# Patient Record
Sex: FEMALE | Race: WHITE | NOT HISPANIC OR LATINO
[De-identification: names, ages, dates, MRNs, and addresses within clinical notes are randomized per-mention and may not be internally consistent; named-entity substitution may affect disease eponyms.]

---

## 2017-08-28 PROBLEM — Z00.00 ENCOUNTER FOR PREVENTIVE HEALTH EXAMINATION: Status: ACTIVE | Noted: 2017-08-28

## 2017-10-02 ENCOUNTER — APPOINTMENT (OUTPATIENT)
Dept: UROLOGY | Facility: CLINIC | Age: 82
End: 2017-10-02

## 2019-05-14 ENCOUNTER — APPOINTMENT (OUTPATIENT)
Dept: CARDIOLOGY | Facility: CLINIC | Age: 84
End: 2019-05-14

## 2019-06-14 ENCOUNTER — APPOINTMENT (OUTPATIENT)
Dept: CARDIOLOGY | Facility: CLINIC | Age: 84
End: 2019-06-14
Payer: MEDICARE

## 2019-06-14 PROCEDURE — 93306 TTE W/DOPPLER COMPLETE: CPT

## 2019-06-20 ENCOUNTER — APPOINTMENT (OUTPATIENT)
Dept: CARDIOLOGY | Facility: CLINIC | Age: 84
End: 2019-06-20
Payer: MEDICARE

## 2019-06-20 PROCEDURE — 93000 ELECTROCARDIOGRAM COMPLETE: CPT

## 2019-06-20 PROCEDURE — 99214 OFFICE O/P EST MOD 30 MIN: CPT

## 2019-09-19 ENCOUNTER — APPOINTMENT (OUTPATIENT)
Dept: CARDIOLOGY | Facility: CLINIC | Age: 84
End: 2019-09-19

## 2019-10-08 ENCOUNTER — APPOINTMENT (OUTPATIENT)
Dept: CARDIOLOGY | Facility: CLINIC | Age: 84
End: 2019-10-08
Payer: MEDICARE

## 2019-10-08 PROCEDURE — 93000 ELECTROCARDIOGRAM COMPLETE: CPT

## 2019-10-08 PROCEDURE — 99213 OFFICE O/P EST LOW 20 MIN: CPT

## 2020-06-06 ENCOUNTER — EMERGENCY (EMERGENCY)
Facility: HOSPITAL | Age: 85
LOS: 0 days | Discharge: HOME | End: 2020-06-06
Attending: EMERGENCY MEDICINE | Admitting: EMERGENCY MEDICINE
Payer: MEDICARE

## 2020-06-06 VITALS
TEMPERATURE: 99 F | OXYGEN SATURATION: 96 % | RESPIRATION RATE: 18 BRPM | DIASTOLIC BLOOD PRESSURE: 109 MMHG | HEART RATE: 80 BPM | SYSTOLIC BLOOD PRESSURE: 194 MMHG

## 2020-06-06 DIAGNOSIS — I25.810 ATHEROSCLEROSIS OF CORONARY ARTERY BYPASS GRAFT(S) WITHOUT ANGINA PECTORIS: Chronic | ICD-10-CM

## 2020-06-06 DIAGNOSIS — N39.0 URINARY TRACT INFECTION, SITE NOT SPECIFIED: ICD-10-CM

## 2020-06-06 DIAGNOSIS — Z96.659 PRESENCE OF UNSPECIFIED ARTIFICIAL KNEE JOINT: Chronic | ICD-10-CM

## 2020-06-06 DIAGNOSIS — E11.649 TYPE 2 DIABETES MELLITUS WITH HYPOGLYCEMIA WITHOUT COMA: ICD-10-CM

## 2020-06-06 DIAGNOSIS — Z96.643 PRESENCE OF ARTIFICIAL HIP JOINT, BILATERAL: Chronic | ICD-10-CM

## 2020-06-06 DIAGNOSIS — R53.1 WEAKNESS: ICD-10-CM

## 2020-06-06 DIAGNOSIS — Z88.5 ALLERGY STATUS TO NARCOTIC AGENT: ICD-10-CM

## 2020-06-06 LAB
ALBUMIN SERPL ELPH-MCNC: 4.6 G/DL — SIGNIFICANT CHANGE UP (ref 3.5–5.2)
ALP SERPL-CCNC: 41 U/L — SIGNIFICANT CHANGE UP (ref 30–115)
ALT FLD-CCNC: 14 U/L — SIGNIFICANT CHANGE UP (ref 0–41)
ANION GAP SERPL CALC-SCNC: 14 MMOL/L — SIGNIFICANT CHANGE UP (ref 7–14)
APPEARANCE UR: ABNORMAL
AST SERPL-CCNC: 18 U/L — SIGNIFICANT CHANGE UP (ref 0–41)
BACTERIA # UR AUTO: ABNORMAL
BASOPHILS # BLD AUTO: 0.03 K/UL — SIGNIFICANT CHANGE UP (ref 0–0.2)
BASOPHILS NFR BLD AUTO: 0.3 % — SIGNIFICANT CHANGE UP (ref 0–1)
BILIRUB SERPL-MCNC: 0.5 MG/DL — SIGNIFICANT CHANGE UP (ref 0.2–1.2)
BILIRUB UR-MCNC: NEGATIVE — SIGNIFICANT CHANGE UP
BUN SERPL-MCNC: 25 MG/DL — HIGH (ref 10–20)
CALCIUM SERPL-MCNC: 9.8 MG/DL — SIGNIFICANT CHANGE UP (ref 8.5–10.1)
CHLORIDE SERPL-SCNC: 105 MMOL/L — SIGNIFICANT CHANGE UP (ref 98–110)
CO2 SERPL-SCNC: 25 MMOL/L — SIGNIFICANT CHANGE UP (ref 17–32)
COLOR SPEC: SIGNIFICANT CHANGE UP
CREAT SERPL-MCNC: 1.3 MG/DL — SIGNIFICANT CHANGE UP (ref 0.7–1.5)
DIFF PNL FLD: SIGNIFICANT CHANGE UP
EOSINOPHIL # BLD AUTO: 0.09 K/UL — SIGNIFICANT CHANGE UP (ref 0–0.7)
EOSINOPHIL NFR BLD AUTO: 1 % — SIGNIFICANT CHANGE UP (ref 0–8)
EPI CELLS # UR: 0 /HPF — SIGNIFICANT CHANGE UP (ref 0–5)
GLUCOSE SERPL-MCNC: 82 MG/DL — SIGNIFICANT CHANGE UP (ref 70–99)
GLUCOSE UR QL: NEGATIVE — SIGNIFICANT CHANGE UP
HCT VFR BLD CALC: 42.2 % — SIGNIFICANT CHANGE UP (ref 37–47)
HGB BLD-MCNC: 13.5 G/DL — SIGNIFICANT CHANGE UP (ref 12–16)
HYALINE CASTS # UR AUTO: 0 /LPF — SIGNIFICANT CHANGE UP (ref 0–7)
IMM GRANULOCYTES NFR BLD AUTO: 0.3 % — SIGNIFICANT CHANGE UP (ref 0.1–0.3)
KETONES UR-MCNC: NEGATIVE — SIGNIFICANT CHANGE UP
LEUKOCYTE ESTERASE UR-ACNC: ABNORMAL
LYMPHOCYTES # BLD AUTO: 1.17 K/UL — LOW (ref 1.2–3.4)
LYMPHOCYTES # BLD AUTO: 12.6 % — LOW (ref 20.5–51.1)
MCHC RBC-ENTMCNC: 29.9 PG — SIGNIFICANT CHANGE UP (ref 27–31)
MCHC RBC-ENTMCNC: 32 G/DL — SIGNIFICANT CHANGE UP (ref 32–37)
MCV RBC AUTO: 93.4 FL — SIGNIFICANT CHANGE UP (ref 81–99)
MONOCYTES # BLD AUTO: 0.54 K/UL — SIGNIFICANT CHANGE UP (ref 0.1–0.6)
MONOCYTES NFR BLD AUTO: 5.8 % — SIGNIFICANT CHANGE UP (ref 1.7–9.3)
NEUTROPHILS # BLD AUTO: 7.45 K/UL — HIGH (ref 1.4–6.5)
NEUTROPHILS NFR BLD AUTO: 80 % — HIGH (ref 42.2–75.2)
NITRITE UR-MCNC: POSITIVE
NRBC # BLD: 0 /100 WBCS — SIGNIFICANT CHANGE UP (ref 0–0)
PH UR: 6 — SIGNIFICANT CHANGE UP (ref 5–8)
PLATELET # BLD AUTO: 141 K/UL — SIGNIFICANT CHANGE UP (ref 130–400)
POTASSIUM SERPL-MCNC: 4.5 MMOL/L — SIGNIFICANT CHANGE UP (ref 3.5–5)
POTASSIUM SERPL-SCNC: 4.5 MMOL/L — SIGNIFICANT CHANGE UP (ref 3.5–5)
PROT SERPL-MCNC: 7.7 G/DL — SIGNIFICANT CHANGE UP (ref 6–8)
PROT UR-MCNC: ABNORMAL
RBC # BLD: 4.52 M/UL — SIGNIFICANT CHANGE UP (ref 4.2–5.4)
RBC # FLD: 13.6 % — SIGNIFICANT CHANGE UP (ref 11.5–14.5)
RBC CASTS # UR COMP ASSIST: 3 /HPF — SIGNIFICANT CHANGE UP (ref 0–4)
SODIUM SERPL-SCNC: 144 MMOL/L — SIGNIFICANT CHANGE UP (ref 135–146)
SP GR SPEC: 1.01 — SIGNIFICANT CHANGE UP (ref 1.01–1.02)
UROBILINOGEN FLD QL: SIGNIFICANT CHANGE UP
WBC # BLD: 9.31 K/UL — SIGNIFICANT CHANGE UP (ref 4.8–10.8)
WBC # FLD AUTO: 9.31 K/UL — SIGNIFICANT CHANGE UP (ref 4.8–10.8)
WBC UR QL: 206 /HPF — HIGH (ref 0–5)

## 2020-06-06 PROCEDURE — 99284 EMERGENCY DEPT VISIT MOD MDM: CPT

## 2020-06-06 PROCEDURE — 93010 ELECTROCARDIOGRAM REPORT: CPT

## 2020-06-06 RX ORDER — CEFDINIR 250 MG/5ML
1 POWDER, FOR SUSPENSION ORAL
Qty: 14 | Refills: 0
Start: 2020-06-06 | End: 2020-06-12

## 2020-06-06 NOTE — ED ADULT NURSE NOTE - OBJECTIVE STATEMENT
Patient presents with cco decreased LOC and slurred speech at home. Initial FSBS 43 mg/dl FD gave oral glucose repeat FSBS 68 mg/dl. Patient now awake, alert, and oriented. Patient given juice and sandwich by provider.

## 2020-06-06 NOTE — ED PROVIDER NOTE - OBJECTIVE STATEMENT
86 year old F with hx of HTN, HLD, DM, CAD s/p CABG biba for evaluation. As per family pt was found in bed this morning altered with slurred speech. Pt FS was found to be in the 40's by EMS and was given oral glucose. As per family pt is back at baseline now. Pt sts did not take insulin this morning. She currently denies any complaints. No recent illness, fever/chills, nausea, vomiting, abdominal pain, chest pain, sob, weakness, decreased rom.

## 2020-06-06 NOTE — ED ADULT NURSE NOTE - NSIMPLEMENTINTERV_GEN_ALL_ED
Implemented All Fall with Harm Risk Interventions:  [place] to call system. Call bell, personal items and telephone within reach. Instruct patient to call for assistance. Room bathroom lighting operational. Non-slip footwear when patient is off stretcher. Physically safe environment: no spills, clutter or unnecessary equipment. Stretcher in lowest position, wheels locked, appropriate side rails in place. Provide visual cue, wrist band, yellow gown, etc. Monitor gait and stability. Monitor for mental status changes and reorient to person, place, and time. Review medications for side effects contributing to fall risk. Reinforce activity limits and safety measures with patient and family. Provide visual clues: red socks.

## 2020-06-06 NOTE — ED ADULT TRIAGE NOTE - CHIEF COMPLAINT QUOTE
pt biba for weakness , slurred speech starting at 0830. f/s 43 , one amp of glucose given by ems. pt with slight right sided facial droop in amb bay, no drift noted. pt biba for weakness , slurred speech starting at 0830. f/s 43 , one amp of glucose given by ems. pt with slight right sided facial droop in amb bay, no drift noted. pt cleared by dr morrow for the main er. pt with no symptoms , alert and oriented.

## 2020-06-06 NOTE — ED ADULT NURSE NOTE - CHIEF COMPLAINT QUOTE
pt biba for weakness , slurred speech starting at 0830. f/s 43 , one amp of glucose given by ems. pt with slight right sided facial droop in amb bay, no drift noted. pt cleared by dr morrow for the main er. pt with no symptoms , alert and oriented.

## 2020-06-06 NOTE — ED PROVIDER NOTE - NS ED ROS FT
Constitutional: no fever, chills, no recent weight loss, change in appetite or malaise  Eyes: no redness/discharge/pain/vision changes  ENT: no rhinorrhea/ear pain/sore throat  Cardiac: No chest pain, SOB or edema.  Respiratory: No cough or respiratory distress  GI: No nausea, vomiting, diarrhea or abdominal pain.  : No dysuria, frequency, urgency or hematuria  MS: no pain to back or extremities, no loss of ROM, no weakness  Neuro: No headache or weakness. No LOC.  Skin: No skin rash.  Endocrine: + hx of DM  Except as documented in the HPI, all other systems are negative.

## 2020-06-06 NOTE — ED ADULT NURSE NOTE - PSH
CAD (coronary artery disease) of artery bypass graft    History of total hip replacement, bilateral    S/P total knee replacement

## 2020-06-06 NOTE — ED PROVIDER NOTE - CLINICAL SUMMARY MEDICAL DECISION MAKING FREE TEXT BOX
86F p/w ams and hypoglycemia. pt is inconsistent with meals and insulin administration. om my arrival pt was anox3, eating a sandwich. Physical-nad,perrl,mmm,rrr,ctab,abd softntnd,fromx4,anox3. review of labs wnl. repeat fs after 3 hr or obs at 114. meds reviewed with pt and family who will keep a glucose diary and coordinate dosing with  the endocrinologist. dc home.

## 2020-06-06 NOTE — ED PROVIDER NOTE - PHYSICAL EXAMINATION
CONSTITUTIONAL: Well-appearing; well-nourished; in no apparent distress.   EYES: PERRL; EOM intact.   ENT: normal nose; no rhinorrhea; normal pharynx with no tonsillar hypertrophy.   NECK: Supple; non-tender; no cervical lymphadenopathy.   CARDIOVASCULAR: Normal S1, S2; no murmurs, rubs, or gallops.   RESPIRATORY: Normal chest excursion with respiration; breath sounds clear and equal bilaterally; no wheezes, rhonchi, or rales.  GI/: Normal bowel sounds; non-distended; non-tender; no palpable organomegaly.   MS: No evidence of trauma or deformity. Normal ROM in all four extremities; non-tender to palpation; distal pulses are normal.   SKIN: Normal for age and race; warm; dry; good turgor; no apparent lesions or exudate.   NEURO/PSYCH: A & O x 3; grossly unremarkable. mood and manner are appropriate. No focal deficits. No facial droop. No tongue deviation. Cerebellar intact. Strength equal b/l 5/5 throughout. Sensation intact

## 2020-06-06 NOTE — ED PROVIDER NOTE - PATIENT PORTAL LINK FT
You can access the FollowMyHealth Patient Portal offered by [facility] by registering at the following website: [email or web address]. By joining [facility]’s FollowMyHealth portal, you will also be able to view your health information using other applications (apps) compatible with our system.

## 2020-06-08 LAB
-  AMIKACIN: SIGNIFICANT CHANGE UP
-  AMIKACIN: SIGNIFICANT CHANGE UP
-  AMPICILLIN/SULBACTAM: SIGNIFICANT CHANGE UP
-  AMPICILLIN/SULBACTAM: SIGNIFICANT CHANGE UP
-  AMPICILLIN: SIGNIFICANT CHANGE UP
-  AMPICILLIN: SIGNIFICANT CHANGE UP
-  AZTREONAM: SIGNIFICANT CHANGE UP
-  AZTREONAM: SIGNIFICANT CHANGE UP
-  CEFAZOLIN: SIGNIFICANT CHANGE UP
-  CEFAZOLIN: SIGNIFICANT CHANGE UP
-  CEFEPIME: SIGNIFICANT CHANGE UP
-  CEFEPIME: SIGNIFICANT CHANGE UP
-  CEFOXITIN: SIGNIFICANT CHANGE UP
-  CEFOXITIN: SIGNIFICANT CHANGE UP
-  CEFTRIAXONE: SIGNIFICANT CHANGE UP
-  CEFTRIAXONE: SIGNIFICANT CHANGE UP
-  CIPROFLOXACIN: SIGNIFICANT CHANGE UP
-  CIPROFLOXACIN: SIGNIFICANT CHANGE UP
-  GENTAMICIN: SIGNIFICANT CHANGE UP
-  GENTAMICIN: SIGNIFICANT CHANGE UP
-  IMIPENEM: SIGNIFICANT CHANGE UP
-  IMIPENEM: SIGNIFICANT CHANGE UP
-  LEVOFLOXACIN: SIGNIFICANT CHANGE UP
-  LEVOFLOXACIN: SIGNIFICANT CHANGE UP
-  MEROPENEM: SIGNIFICANT CHANGE UP
-  MEROPENEM: SIGNIFICANT CHANGE UP
-  NITROFURANTOIN: SIGNIFICANT CHANGE UP
-  NITROFURANTOIN: SIGNIFICANT CHANGE UP
-  PIPERACILLIN/TAZOBACTAM: SIGNIFICANT CHANGE UP
-  PIPERACILLIN/TAZOBACTAM: SIGNIFICANT CHANGE UP
-  TIGECYCLINE: SIGNIFICANT CHANGE UP
-  TIGECYCLINE: SIGNIFICANT CHANGE UP
-  TOBRAMYCIN: SIGNIFICANT CHANGE UP
-  TOBRAMYCIN: SIGNIFICANT CHANGE UP
-  TRIMETHOPRIM/SULFAMETHOXAZOLE: SIGNIFICANT CHANGE UP
-  TRIMETHOPRIM/SULFAMETHOXAZOLE: SIGNIFICANT CHANGE UP
CULTURE RESULTS: SIGNIFICANT CHANGE UP
METHOD TYPE: SIGNIFICANT CHANGE UP
METHOD TYPE: SIGNIFICANT CHANGE UP
ORGANISM # SPEC MICROSCOPIC CNT: SIGNIFICANT CHANGE UP
SPECIMEN SOURCE: SIGNIFICANT CHANGE UP

## 2020-07-23 ENCOUNTER — RECORD ABSTRACTING (OUTPATIENT)
Age: 85
End: 2020-07-23

## 2020-07-23 DIAGNOSIS — I34.0 NONRHEUMATIC MITRAL (VALVE) INSUFFICIENCY: ICD-10-CM

## 2020-07-23 DIAGNOSIS — I65.23 OCCLUSION AND STENOSIS OF BILATERAL CAROTID ARTERIES: ICD-10-CM

## 2020-07-23 DIAGNOSIS — Z78.9 OTHER SPECIFIED HEALTH STATUS: ICD-10-CM

## 2020-07-23 DIAGNOSIS — I36.0 NONRHEUMATIC TRICUSPID (VALVE) STENOSIS: ICD-10-CM

## 2020-07-23 RX ORDER — INSULIN GLARGINE 100 [IU]/ML
100 INJECTION, SOLUTION SUBCUTANEOUS
Refills: 0 | Status: ACTIVE | COMMUNITY

## 2020-08-18 ENCOUNTER — RX RENEWAL (OUTPATIENT)
Age: 85
End: 2020-08-18

## 2020-10-21 ENCOUNTER — APPOINTMENT (OUTPATIENT)
Dept: CARDIOLOGY | Facility: CLINIC | Age: 85
End: 2020-10-21
Payer: MEDICARE

## 2020-10-21 VITALS
SYSTOLIC BLOOD PRESSURE: 120 MMHG | HEIGHT: 64 IN | DIASTOLIC BLOOD PRESSURE: 60 MMHG | TEMPERATURE: 98.6 F | BODY MASS INDEX: 34.15 KG/M2 | HEART RATE: 89 BPM | WEIGHT: 200 LBS

## 2020-10-21 PROBLEM — I10 ESSENTIAL (PRIMARY) HYPERTENSION: Chronic | Status: ACTIVE | Noted: 2020-06-06

## 2020-10-21 PROBLEM — E78.5 HYPERLIPIDEMIA, UNSPECIFIED: Chronic | Status: ACTIVE | Noted: 2020-06-06

## 2020-10-21 PROBLEM — E11.9 TYPE 2 DIABETES MELLITUS WITHOUT COMPLICATIONS: Chronic | Status: ACTIVE | Noted: 2020-06-06

## 2020-10-21 PROCEDURE — 93000 ELECTROCARDIOGRAM COMPLETE: CPT

## 2020-10-21 PROCEDURE — 99214 OFFICE O/P EST MOD 30 MIN: CPT

## 2020-11-09 ENCOUNTER — RX RENEWAL (OUTPATIENT)
Age: 85
End: 2020-11-09

## 2021-03-06 ENCOUNTER — RX RENEWAL (OUTPATIENT)
Age: 86
End: 2021-03-06

## 2021-05-24 ENCOUNTER — APPOINTMENT (OUTPATIENT)
Dept: CARDIOLOGY | Facility: CLINIC | Age: 86
End: 2021-05-24
Payer: MEDICARE

## 2021-05-24 PROCEDURE — 93306 TTE W/DOPPLER COMPLETE: CPT

## 2021-06-08 ENCOUNTER — APPOINTMENT (OUTPATIENT)
Dept: CARDIOLOGY | Facility: CLINIC | Age: 86
End: 2021-06-08
Payer: MEDICARE

## 2021-06-08 ENCOUNTER — RESULT CHARGE (OUTPATIENT)
Age: 86
End: 2021-06-08

## 2021-06-08 VITALS
DIASTOLIC BLOOD PRESSURE: 68 MMHG | HEIGHT: 64 IN | WEIGHT: 196 LBS | HEART RATE: 98 BPM | SYSTOLIC BLOOD PRESSURE: 110 MMHG | BODY MASS INDEX: 33.46 KG/M2 | TEMPERATURE: 96.63 F

## 2021-06-08 PROCEDURE — 93000 ELECTROCARDIOGRAM COMPLETE: CPT

## 2021-06-08 PROCEDURE — 99213 OFFICE O/P EST LOW 20 MIN: CPT

## 2021-07-13 ENCOUNTER — RX RENEWAL (OUTPATIENT)
Age: 86
End: 2021-07-13

## 2021-08-04 ENCOUNTER — RX RENEWAL (OUTPATIENT)
Age: 86
End: 2021-08-04

## 2021-09-13 ENCOUNTER — TRANSCRIPTION ENCOUNTER (OUTPATIENT)
Age: 86
End: 2021-09-13

## 2021-10-24 ENCOUNTER — RX RENEWAL (OUTPATIENT)
Age: 86
End: 2021-10-24

## 2022-02-20 ENCOUNTER — RX RENEWAL (OUTPATIENT)
Age: 87
End: 2022-02-20

## 2022-05-10 ENCOUNTER — APPOINTMENT (OUTPATIENT)
Dept: CARDIOLOGY | Facility: CLINIC | Age: 87
End: 2022-05-10
Payer: MEDICARE

## 2022-05-10 VITALS
HEART RATE: 84 BPM | HEIGHT: 64 IN | DIASTOLIC BLOOD PRESSURE: 70 MMHG | OXYGEN SATURATION: 97 % | BODY MASS INDEX: 32.78 KG/M2 | WEIGHT: 192 LBS | TEMPERATURE: 97.4 F | SYSTOLIC BLOOD PRESSURE: 108 MMHG

## 2022-05-10 PROCEDURE — 93000 ELECTROCARDIOGRAM COMPLETE: CPT

## 2022-05-10 PROCEDURE — 99214 OFFICE O/P EST MOD 30 MIN: CPT

## 2022-07-05 ENCOUNTER — RX RENEWAL (OUTPATIENT)
Age: 87
End: 2022-07-05

## 2022-07-20 ENCOUNTER — RX RENEWAL (OUTPATIENT)
Age: 87
End: 2022-07-20

## 2022-08-10 ENCOUNTER — INPATIENT (INPATIENT)
Facility: HOSPITAL | Age: 87
LOS: 7 days | Discharge: HOME | End: 2022-08-18
Attending: PSYCHIATRY & NEUROLOGY | Admitting: PSYCHIATRY & NEUROLOGY

## 2022-08-10 VITALS
DIASTOLIC BLOOD PRESSURE: 88 MMHG | TEMPERATURE: 98 F | RESPIRATION RATE: 18 BRPM | HEART RATE: 85 BPM | OXYGEN SATURATION: 96 % | SYSTOLIC BLOOD PRESSURE: 133 MMHG

## 2022-08-10 DIAGNOSIS — I25.810 ATHEROSCLEROSIS OF CORONARY ARTERY BYPASS GRAFT(S) WITHOUT ANGINA PECTORIS: Chronic | ICD-10-CM

## 2022-08-10 DIAGNOSIS — Z96.659 PRESENCE OF UNSPECIFIED ARTIFICIAL KNEE JOINT: Chronic | ICD-10-CM

## 2022-08-10 DIAGNOSIS — Z96.643 PRESENCE OF ARTIFICIAL HIP JOINT, BILATERAL: Chronic | ICD-10-CM

## 2022-08-10 LAB
ALBUMIN SERPL ELPH-MCNC: 4.3 G/DL — SIGNIFICANT CHANGE UP (ref 3.5–5.2)
ALP SERPL-CCNC: 48 U/L — SIGNIFICANT CHANGE UP (ref 30–115)
ALT FLD-CCNC: 11 U/L — SIGNIFICANT CHANGE UP (ref 0–41)
ANION GAP SERPL CALC-SCNC: 11 MMOL/L — SIGNIFICANT CHANGE UP (ref 7–14)
APTT BLD: 33.4 SEC — SIGNIFICANT CHANGE UP (ref 27–39.2)
AST SERPL-CCNC: 14 U/L — SIGNIFICANT CHANGE UP (ref 0–41)
BASOPHILS # BLD AUTO: 0.05 K/UL — SIGNIFICANT CHANGE UP (ref 0–0.2)
BASOPHILS NFR BLD AUTO: 0.8 % — SIGNIFICANT CHANGE UP (ref 0–1)
BILIRUB SERPL-MCNC: 0.3 MG/DL — SIGNIFICANT CHANGE UP (ref 0.2–1.2)
BUN SERPL-MCNC: 36 MG/DL — HIGH (ref 10–20)
CALCIUM SERPL-MCNC: 9.7 MG/DL — SIGNIFICANT CHANGE UP (ref 8.5–10.1)
CHLORIDE SERPL-SCNC: 106 MMOL/L — SIGNIFICANT CHANGE UP (ref 98–110)
CO2 SERPL-SCNC: 24 MMOL/L — SIGNIFICANT CHANGE UP (ref 17–32)
CREAT SERPL-MCNC: 1.6 MG/DL — HIGH (ref 0.7–1.5)
EGFR: 31 ML/MIN/1.73M2 — LOW
EOSINOPHIL # BLD AUTO: 0.26 K/UL — SIGNIFICANT CHANGE UP (ref 0–0.7)
EOSINOPHIL NFR BLD AUTO: 3.9 % — SIGNIFICANT CHANGE UP (ref 0–8)
GLUCOSE BLDC GLUCOMTR-MCNC: 129 MG/DL — HIGH (ref 70–99)
GLUCOSE BLDC GLUCOMTR-MCNC: 67 MG/DL — LOW (ref 70–99)
GLUCOSE BLDC GLUCOMTR-MCNC: 78 MG/DL — SIGNIFICANT CHANGE UP (ref 70–99)
GLUCOSE SERPL-MCNC: 104 MG/DL — HIGH (ref 70–99)
HCT VFR BLD CALC: 36.7 % — LOW (ref 37–47)
HGB BLD-MCNC: 12.4 G/DL — SIGNIFICANT CHANGE UP (ref 12–16)
IMM GRANULOCYTES NFR BLD AUTO: 0.3 % — SIGNIFICANT CHANGE UP (ref 0.1–0.3)
INR BLD: 1.33 RATIO — HIGH (ref 0.65–1.3)
LYMPHOCYTES # BLD AUTO: 1.57 K/UL — SIGNIFICANT CHANGE UP (ref 1.2–3.4)
LYMPHOCYTES # BLD AUTO: 23.6 % — SIGNIFICANT CHANGE UP (ref 20.5–51.1)
MCHC RBC-ENTMCNC: 30.7 PG — SIGNIFICANT CHANGE UP (ref 27–31)
MCHC RBC-ENTMCNC: 33.8 G/DL — SIGNIFICANT CHANGE UP (ref 32–37)
MCV RBC AUTO: 90.8 FL — SIGNIFICANT CHANGE UP (ref 81–99)
MONOCYTES # BLD AUTO: 0.67 K/UL — HIGH (ref 0.1–0.6)
MONOCYTES NFR BLD AUTO: 10.1 % — HIGH (ref 1.7–9.3)
NEUTROPHILS # BLD AUTO: 4.08 K/UL — SIGNIFICANT CHANGE UP (ref 1.4–6.5)
NEUTROPHILS NFR BLD AUTO: 61.3 % — SIGNIFICANT CHANGE UP (ref 42.2–75.2)
NRBC # BLD: 0 /100 WBCS — SIGNIFICANT CHANGE UP (ref 0–0)
PLATELET # BLD AUTO: 150 K/UL — SIGNIFICANT CHANGE UP (ref 130–400)
POTASSIUM SERPL-MCNC: 4.8 MMOL/L — SIGNIFICANT CHANGE UP (ref 3.5–5)
POTASSIUM SERPL-SCNC: 4.8 MMOL/L — SIGNIFICANT CHANGE UP (ref 3.5–5)
PROT SERPL-MCNC: 7.3 G/DL — SIGNIFICANT CHANGE UP (ref 6–8)
PROTHROM AB SERPL-ACNC: 15.2 SEC — HIGH (ref 9.95–12.87)
RBC # BLD: 4.04 M/UL — LOW (ref 4.2–5.4)
RBC # FLD: 13.8 % — SIGNIFICANT CHANGE UP (ref 11.5–14.5)
SARS-COV-2 RNA SPEC QL NAA+PROBE: SIGNIFICANT CHANGE UP
SODIUM SERPL-SCNC: 141 MMOL/L — SIGNIFICANT CHANGE UP (ref 135–146)
TROPONIN T SERPL-MCNC: <0.01 NG/ML — SIGNIFICANT CHANGE UP
WBC # BLD: 6.65 K/UL — SIGNIFICANT CHANGE UP (ref 4.8–10.8)
WBC # FLD AUTO: 6.65 K/UL — SIGNIFICANT CHANGE UP (ref 4.8–10.8)

## 2022-08-10 PROCEDURE — 93010 ELECTROCARDIOGRAM REPORT: CPT

## 2022-08-10 PROCEDURE — 99291 CRITICAL CARE FIRST HOUR: CPT

## 2022-08-10 PROCEDURE — 70496 CT ANGIOGRAPHY HEAD: CPT | Mod: 26,MA

## 2022-08-10 PROCEDURE — 70498 CT ANGIOGRAPHY NECK: CPT | Mod: 26,MA

## 2022-08-10 PROCEDURE — 99223 1ST HOSP IP/OBS HIGH 75: CPT

## 2022-08-10 PROCEDURE — 0042T: CPT | Mod: MA

## 2022-08-10 RX ORDER — DEXTROSE 50 % IN WATER 50 %
25 SYRINGE (ML) INTRAVENOUS ONCE
Refills: 0 | Status: DISCONTINUED | OUTPATIENT
Start: 2022-08-10 | End: 2022-08-18

## 2022-08-10 RX ORDER — INSULIN LISPRO 100/ML
VIAL (ML) SUBCUTANEOUS
Refills: 0 | Status: DISCONTINUED | OUTPATIENT
Start: 2022-08-10 | End: 2022-08-11

## 2022-08-10 RX ORDER — ATORVASTATIN CALCIUM 80 MG/1
80 TABLET, FILM COATED ORAL AT BEDTIME
Refills: 0 | Status: DISCONTINUED | OUTPATIENT
Start: 2022-08-10 | End: 2022-08-18

## 2022-08-10 RX ORDER — INSULIN GLARGINE 100 [IU]/ML
0 INJECTION, SOLUTION SUBCUTANEOUS
Qty: 0 | Refills: 0 | DISCHARGE

## 2022-08-10 RX ORDER — METOPROLOL TARTRATE 50 MG
100 TABLET ORAL DAILY
Refills: 0 | Status: DISCONTINUED | OUTPATIENT
Start: 2022-08-10 | End: 2022-08-11

## 2022-08-10 RX ORDER — INSULIN ASPART 100 [IU]/ML
0 INJECTION, SOLUTION SUBCUTANEOUS
Qty: 0 | Refills: 0 | DISCHARGE

## 2022-08-10 RX ORDER — DEXTROSE 50 % IN WATER 50 %
12.5 SYRINGE (ML) INTRAVENOUS ONCE
Refills: 0 | Status: DISCONTINUED | OUTPATIENT
Start: 2022-08-10 | End: 2022-08-18

## 2022-08-10 RX ORDER — GLUCAGON INJECTION, SOLUTION 0.5 MG/.1ML
1 INJECTION, SOLUTION SUBCUTANEOUS ONCE
Refills: 0 | Status: DISCONTINUED | OUTPATIENT
Start: 2022-08-10 | End: 2022-08-18

## 2022-08-10 RX ORDER — DEXTROSE 50 % IN WATER 50 %
15 SYRINGE (ML) INTRAVENOUS ONCE
Refills: 0 | Status: DISCONTINUED | OUTPATIENT
Start: 2022-08-10 | End: 2022-08-18

## 2022-08-10 RX ORDER — SODIUM CHLORIDE 9 MG/ML
1000 INJECTION, SOLUTION INTRAVENOUS
Refills: 0 | Status: DISCONTINUED | OUTPATIENT
Start: 2022-08-10 | End: 2022-08-18

## 2022-08-10 RX ORDER — APIXABAN 2.5 MG/1
2.5 TABLET, FILM COATED ORAL EVERY 12 HOURS
Refills: 0 | Status: DISCONTINUED | OUTPATIENT
Start: 2022-08-10 | End: 2022-08-11

## 2022-08-10 RX ORDER — SODIUM CHLORIDE 9 MG/ML
1000 INJECTION INTRAMUSCULAR; INTRAVENOUS; SUBCUTANEOUS
Refills: 0 | Status: DISCONTINUED | OUTPATIENT
Start: 2022-08-10 | End: 2022-08-13

## 2022-08-10 RX ORDER — SODIUM CHLORIDE 9 MG/ML
1000 INJECTION INTRAMUSCULAR; INTRAVENOUS; SUBCUTANEOUS
Refills: 0 | Status: DISCONTINUED | OUTPATIENT
Start: 2022-08-10 | End: 2022-08-10

## 2022-08-10 RX ORDER — DEXTROSE 50 % IN WATER 50 %
50 SYRINGE (ML) INTRAVENOUS ONCE
Refills: 0 | Status: COMPLETED | OUTPATIENT
Start: 2022-08-10 | End: 2022-08-10

## 2022-08-10 RX ADMIN — Medication 50 MILLILITER(S): at 18:00

## 2022-08-10 RX ADMIN — APIXABAN 2.5 MILLIGRAM(S): 2.5 TABLET, FILM COATED ORAL at 23:41

## 2022-08-10 RX ADMIN — SODIUM CHLORIDE 75 MILLILITER(S): 9 INJECTION INTRAMUSCULAR; INTRAVENOUS; SUBCUTANEOUS at 16:32

## 2022-08-10 NOTE — CONSULT NOTE ADULT - ASSESSMENT
Assessment:88 F hx of Diabetes, Hyperlipemia, Hypertension, Afib on Eliquis presents to ED BIBA from home for slurred speech and right sided facial droop. Patient went to bed last night around ~10PM at baseline. Patient woke up this morning around ~11Am which is late for her. Daughter noted she was not herself,  checked her sugar and it was ~45. Noted the slurred speech. Also noted patient had trouble swallowing. Patient took her Eliquis dose this morning.  No history of strokes. Discussed case with Dr. Stanley at time of stroke code. CTH-negative for acute findings. CTA-no LVO. CTP-pparent perfusion abnormality (penumbra) within the brainstem and left posterior fossa with a total Tmax > 6s volume of 15 cc. This may be artifactual. Patient is not a tpa candidate as LKW-out of window, took Eliquis dose today and coags are abnormal. PAtient is not IA candidate as no LVO.    Suggestions:  Admit to stroke unit q4 neurochecks, vital signs  Dysphagia screen   Keep patient strictly NPO, until speech sees patient.  Permissive HTN x 24 hours  Gentle hydration @75cc per hour  Continue with Eliquis dose  a1c, lipid panel  MRI brain non contrast   TTE  PT/OT/ST/rehab  Seen and discussed case with Dr. Stanley. Pending note to follow    Assessment:88 F hx of Diabetes, Hyperlipemia, Hypertension, Afib on Eliquis presents to ED BIBA from home for slurred speech and right sided facial droop. Patient went to bed last night around ~10PM at baseline. Patient woke up this morning around ~11Am which is late for her. Daughter noted she was not herself,  checked her sugar and it was ~45. Noted the slurred speech. Also noted patient had trouble swallowing. Patient took her Eliquis dose this morning.  No history of strokes. Discussed case with Dr. Stanley at time of stroke code. CTH-negative for acute findings. CTA-no LVO. CTP-pparent perfusion abnormality (penumbra) within the brainstem and left posterior fossa with a total Tmax > 6s volume of 15 cc. This may be artifactual. Patient is not a tpa candidate as LKW-out of window, took Eliquis dose today and coags are abnormal. PAtient is not IA candidate as no LVO.    Suggestions:  Admit to stroke unit q4 neurochecks, vital signs  Dysphagia screen   Keep patient strictly NPO, until speech sees patient.  Permissive HTN x 24 hours keep < 220/110  Gentle hydration @ 60 cc/hr NS  Continue with Eliquis dose  a1c, lipid panel  MRI brain non contrast   TTE  PT/OT/ST/rehab  Seen and discussed case with Dr. Stanley. Pending note to follow    Assessment:88 F hx of Diabetes, Hyperlipemia, Hypertension, Afib on Eliquis presents to ED BIBA from home for slurred speech and right sided facial droop. Patient went to bed last night around ~10PM at baseline. Patient woke up this morning around ~11Am which is late for her. Daughter noted she was not herself,  checked her sugar and it was ~45. Noted the slurred speech. Also noted patient had trouble swallowing. Patient took her Eliquis dose this morning.  No history of strokes. Discussed case with Dr. Stanley at time of stroke code. CTH-negative for acute findings. CTA-no LVO. CTP-pparent perfusion abnormality (penumbra) within the brainstem and left posterior fossa with a total Tmax > 6s volume of 15 cc. This may be artifactual. Patient is not a tpa candidate as LKW-out of window, took Eliquis dose today and coags are abnormal. PAtient is not IA candidate as no LVO.    Suggestions:  Admit to stroke unit q4 neurochecks, vital signs  Dysphagia screen. Most likely will need NGT for medications  Keep patient strictly NPO, until speech sees patient.  Permissive HTN x 24 hours keep < 220/110  Gentle hydration @ 60 cc/hr NS  Continue with Eliquis dose  a1c, lipid panel  MRI brain non contrast   TTE  PT/OT/ST/rehab  Seen and discussed case with Dr. Stanley. Pending note to follow

## 2022-08-10 NOTE — CONSULT NOTE ADULT - NS ATTEND AMEND GEN_ALL_CORE FT
87 yo F w/ h/o HTN, DLD, AFib on Eliquis p/w acute severe dysarthria/dysphagia/R facial droop not thrombolytic candidate on Eliquis with LKNT > 4.5 hrs.  Not thrombectomy candidate with no evidence of LVO.  Recommend admit for further stroke workup and management.  Recommendations as above.

## 2022-08-10 NOTE — ED PROVIDER NOTE - OBJECTIVE STATEMENT
88 F hx of HTN, DM,HLD presents to ED BIBA from home for 7days of SOB and congestion, Son states pt has occasional productive cough where she brings up clear sputum Son states that he took pt to [facility] on Sunday where pt had work up and was found to have some congestion. pt was discharged home with Mucinex. Son states since pt left [facility] she as gotten worse . pt is nonverbal/ bedbound at baseline unable to obtain proper ROS given pt current mental status. 88 F hx of Diabetes, Hyperlipemia, Hypertension  presents to ED BIBA from home for slurred speech and right sided facial droop. daughter states she first noticed the pts symptoms around 1 pm today. as per daughter pt was not able to communicate with her and was acting altered. no hx of strokes in the past. Pt denies CP, SOB, fever, chills, N,V,D, 88 F hx of Diabetes, Hyperlipemia, Hypertension, Afib on Eliquis presents to ED BIBA from home for slurred speech and right sided facial droop. daughter states she first noticed the pts symptoms around 1 pm today. as per daughter pt was not able to communicate with her and was acting altered. no hx of strokes in the past. Pt denies CP, SOB, fever, chills, N,V,D,

## 2022-08-10 NOTE — ED PROVIDER NOTE - CLINICAL SUMMARY MEDICAL DECISION MAKING FREE TEXT BOX
Patient presented with acute onset of slurred speech and R facial droop since 1pm today. No hx strokes in the past. On arrival patient afebrile, Hd stable but with NIHSS 4 as documented in stroke code note. Code stroke called from triage. Obtained CT head, CTA/perfusion per stroke protocol which were negative for hemorrhage or signs of MVO. Labs otherwise unremarkable. UA showed (+) possible UTI. Started on IV abx for UTI and per neuro, recommending admission to stroke unit for further work up. Family agreeable with plan. HD stable at time of admission.

## 2022-08-10 NOTE — H&P ADULT - ASSESSMENT
87 yo F w/ h/o HTN, DLD, AFib on Eliquis p/w acute severe dysarthria/dysphagia/R facial droop not thrombolytic candidate on Eliquis.  Not thrombectomy candidate with no evidence of LVO.    #Acute CVA: Severe dysarthria/dysphagia/R facial droop  - CTH-negative for acute findings. CTA-no LVO. CTP-apparent perfusion abnormality (penumbra) within the brainstem and left posterior fossa with a total Tmax > 6s volume of 15cc. This may be artifactual  - Admit to stroke unit   - Q4 neurochecks, vital signs  - Keep patient strictly NPO, until speech sees patient.  - Permissive HTN x 24 hours keep < 220/110  - Gentle hydration @ 60 cc/hr NS  - Continue with Eliquis dose  - c/w ASA and statin   - F/u A1C and lipid panel  - F/u MRI brain non contrast   - F/u TTE  - PT/OT/ST/rehab    #MARCIN   - baseline: 1.3 6/2020  - check ua / urine electrolytes + pr:cr  - check us retroperitoneal  - trend bmp / scr  - ivf: iv ns  - nephro eval if no improvement    #AFib on Eliquis   - c/w Eliquis     #HTN  - Permissive HTN x 24 hours keep < 220/110    #DLD  - c/w statin   - f/u Lipid panel     #Misc  - DVT prophylaxis: Eliquis   - GI prophylaxis:   - Diet:  - Activity: IAT   - Disposition: admit to Stroke unit        87 yo F w/ h/o HTN, DLD, AFib on Eliquis p/w acute severe dysarthria/dysphagia/R facial droop not thrombolytic candidate on Eliquis.  Not thrombectomy candidate with no evidence of LVO.    #Acute CVA: Severe dysarthria/dysphagia/R facial droop  - CTH-negative for acute findings. CTA-no LVO. CTP-apparent perfusion abnormality (penumbra) within the brainstem and left posterior fossa with a total Tmax > 6s volume of 15cc. This may be artifactual  - Admit to stroke unit   - Q4 neurochecks, vital signs  - Keep patient strictly NPO, until speech sees patient.  - Permissive HTN x 24 hours keep < 220/110  - Gentle hydration @ 60 cc/hr NS  - Continue with Eliquis dose  - c/w ASA and statin   - F/u A1C and lipid panel  - F/u MRI brain non contrast   - F/u TTE  - PT/OT/ST/rehab    #MARCIN   - baseline: 1.3 6/2020  - check ua / urine electrolytes + pr:cr  - check us retroperitoneal  - trend bmp / scr  - ivf: iv ns  - nephro eval if no improvement    #AFib on Eliquis   - c/w Eliquis   - c/w Metoprolol     #HTN  - Permissive HTN x 24 hours keep < 220/110  - on Valsartan 320mg qd at home     #DLD  - c/w statin   - f/u Lipid panel     #DM   - Monitor FS  - On Lantus 40U in the AM and lispro 3U TID   - Hold insulin for now given hypoglycemia   - c/w ISS     #Misc  - DVT prophylaxis: Eliquis   - GI prophylaxis: Not indicated   - Diet: NPO   - Activity: IAT   - Disposition: admit to Stroke unit

## 2022-08-10 NOTE — H&P ADULT - NSHPPHYSICALEXAM_GEN_ALL_CORE
LOS:     VITALS:   T(C): 36.4 (08-10-22 @ 14:45), Max: 36.4 (08-10-22 @ 14:45)  HR: 75 (08-10-22 @ 21:00) (75 - 99)  BP: 163/85 (08-10-22 @ 21:00) (133/88 - 196/76)  RR: 18 (08-10-22 @ 21:00) (18 - 18)  SpO2: 99% (08-10-22 @ 21:00) (96% - 99%)    GENERAL: NAD, lying in bed comfortably  HEAD:  Atraumatic, Normocephalic  EYES: EOMI, PERRLA, conjunctiva and sclera clear  ENT: Moist mucous membranes  NECK: Supple, No JVD  CHEST/LUNG: Clear to auscultation bilaterally; No rales, rhonchi, wheezing, or rubs. Unlabored respirations  HEART: Regular rate and rhythm; No murmurs, rubs, or gallops  ABDOMEN: BSx4; Soft, nontender, nondistended  EXTREMITIES:  2+ Peripheral Pulses, brisk capillary refill. No clubbing, cyanosis, or edema  NERVOUS SYSTEM:  A&Ox3, Minor paralysis (flattened nasolabial fold, asymmetry on smiling). Severe dysarthria LOS:     VITALS:   T(C): 36.4 (08-10-22 @ 14:45), Max: 36.4 (08-10-22 @ 14:45)  HR: 75 (08-10-22 @ 21:00) (75 - 99)  BP: 163/85 (08-10-22 @ 21:00) (133/88 - 196/76)  RR: 18 (08-10-22 @ 21:00) (18 - 18)  SpO2: 99% (08-10-22 @ 21:00) (96% - 99%)    GENERAL: NAD, lying in bed comfortably  HEAD:  Atraumatic, Normocephalic  EYES: EOMI, PERRLA, conjunctiva and sclera clear  ENT: Moist mucous membranes  NECK: Supple, No JVD  CHEST/LUNG: Clear to auscultation bilaterally; No rales, rhonchi, wheezing, or rubs. Unlabored respirations  HEART: Regular rate and rhythm; No murmurs, rubs, or gallops  ABDOMEN: BSx4; Soft, nontender, nondistended  EXTREMITIES:  2+ Peripheral Pulses, brisk capillary refill. No clubbing, cyanosis, or edema  NERVOUS SYSTEM:  A&Ox2, Minor paralysis (flattened nasolabial fold, asymmetry on smiling). Severe dysarthria

## 2022-08-10 NOTE — ED PROVIDER NOTE - PHYSICAL EXAMINATION
VITAL SIGNS: I have reviewed nursing notes and confirm.  CONSTITUTIONAL:  in no acute distress. audible wheeze can be heard   SKIN: Skin exam is warm and dry, no acute rash. stage 2 sacral ulcer  HEAD: Normocephalic; atraumatic.  EYES: PERRL, EOM intact; conjunctiva and sclera clear.  ENT: No nasal discharge; airway clear.   NECK: Supple; non tender.  CARD: S1, S2 normal; no murmurs, gallops, or rubs. Regular rate and rhythm.  RESP: + b/l wheezes to all lung fields   ABD: soft; non-distended; non-tender; No rebound or guarding. No CVA tenderness. pt has peg tube in place that appears dirty  EXT:  No clubbing, cyanosis or edema. no  ulcerations to legs VITAL SIGNS: I have reviewed nursing notes and confirm.  CONSTITUTIONAL: in no acute distress. pt calm and cooperative, aphasia   SKIN: Skin exam is warm and dry, no acute rash.  HEAD: Normocephalic; atraumatic.  EYES: PERRL, EOM intact; conjunctiva and sclera clear.  ENT: No nasal discharge; airway clear.   NECK: Supple; non tender.  CARD: S1, S2 normal; no murmurs, gallops, or rubs. Regular rate and rhythm.  RESP: No wheezes, rales or rhonchi  ABD: Normal bowel sounds; soft; non-distended; non-tender; No rebound or guarding. No CVA tenderness.  EXT: Normal ROM. No clubbing, cyanosis or edema.  NEURO: right sided facial droop, + b/l forehead wrinkling, no pronator drift,

## 2022-08-10 NOTE — ED ADULT TRIAGE NOTE - CHIEF COMPLAINT QUOTE
Decreased sugar this AM with slurred speech, EMS was called, medications were given and pt got better. This afternoon, pt starting having slurred speech again, blood sugar was normal   slurred speech. Blood sugar 112 in triage.

## 2022-08-10 NOTE — ED ADULT NURSE NOTE - OBJECTIVE STATEMENT
pt came to ed wei slurred speech and low sugar, in am received meds felt better, later afternoon slurred speech again

## 2022-08-10 NOTE — ED PROVIDER NOTE - NSICDXPASTSURGICALHX_GEN_ALL_CORE_FT
PAST SURGICAL HISTORY:  CAD (coronary artery disease) of artery bypass graft     History of total hip replacement, bilateral     S/P total knee replacement

## 2022-08-10 NOTE — H&P ADULT - HISTORY OF PRESENT ILLNESS
This is a 88 F hx of Diabetes, Hyperlipemia, Hypertension, Afib on Eliquis presents to ED BIBA from home for slurred speech and right sided facial droop. Patient went to bed last night around ~10PM at baseline. Patient woke up this morning around ~11Am which is late for her. Daughter noted she was not herself,  checked her sugar and it was ~45. Noted the slurred speech. Also noted patient had trouble swallowing. Patient took her Eliquis dose this morning.  No history of strokes.     ED vitals T(F): 97.5, HR: 75, BP: 163/85, RR: 18, SpO2: 99%   Labs show Hb 12.4, HCT 36.7, WBC 6.65, , Trops neg x1, TPro  7.3  /  Alb  4.3  /  TBili  0.3  /  DBili  x   /  AST  14  /  ALT  11  /  AlkPhos  48  08-10  141  |  106  |  36<H>  ----------------------------<  104<H>  4.8   |  24  |  1.6<H>    Ca    9.7      10 Aug 2022 15:35    CT Angio Brain Stroke Protocol  w/ IV Cont, No evidence of major vascular stenosis or occlusion. Scattered mild calcific plaque.  CT perfusion: Apparent perfusion abnormality (penumbra) within the brainstem and left posterior fossa with a total Tmax > 6s volume of 15   CT Brain Stroke Protocol shows no evidence of acute intracranial hemorrhage or large territory infarct.  Chronic-appearing left cerebellar infarct (new since 2010). Moderate/severe chronic microvascular changes and parenchymal atrophy (moderately progressed since 2010).           This is a 88 F hx of CAD, Diabetes, Hyperlipemia, Hypertension, Afib on Eliquis presents to ED BIBA from home for slurred speech and right sided facial droop. Patient went to bed last night around ~10PM at baseline. Patient woke up this morning around ~11Am which is late for her. Daughter noted she was not herself,  checked her sugar and it was ~45. Noted the slurred speech. Also noted patient had trouble swallowing. Patient took her Eliquis dose this morning.  No history of strokes.     ED vitals T(F): 97.5, HR: 75, BP: 163/85, RR: 18, SpO2: 99%   Labs show Hb 12.4, HCT 36.7, WBC 6.65, , Trops neg x1, TPro  7.3  /  Alb  4.3  /  TBili  0.3  /  DBili  x   /  AST  14  /  ALT  11  /  AlkPhos  48  08-10  141  |  106  |  36<H>  ----------------------------<  104<H>  4.8   |  24  |  1.6<H>    Ca    9.7      10 Aug 2022 15:35    CT Angio Brain Stroke Protocol  w/ IV Cont, No evidence of major vascular stenosis or occlusion. Scattered mild calcific plaque.  CT perfusion: Apparent perfusion abnormality (penumbra) within the brainstem and left posterior fossa with a total Tmax > 6s volume of 15   CT Brain Stroke Protocol shows no evidence of acute intracranial hemorrhage or large territory infarct.  Chronic-appearing left cerebellar infarct (new since 2010). Moderate/severe chronic microvascular changes and parenchymal atrophy (moderately progressed since 2010).

## 2022-08-10 NOTE — ED PROVIDER NOTE - NS ED ROS FT
unable to obtain proper ROS given pt current status    AS PER SON  Review of Systems  Constitutional:  No fever  Eyes:  No eye discharge.  ENMT:  No hearing changes, pain, or discharge. No nasal congestion, discharge. No throat pain or difficulty swallowing.  Cardiac:  ,- syncope, - edema.  Respiratory:  + dyspnea,+ wheezing, + cough. No hemoptysis.  GI:  No nausea, vomiting, diarrhea, or abdominal pain. No melena or hematochezia.  :  No dysuria  MS:  No myalgia, muscle weakness, gait abnormality, joint swelling, joint pain, or back pain.  Skin:  No skin rash, + lesions to sacrum   Neuro:   No change in mental status. AS PER DAUGHTER    Review of Systems  Constitutional:  No fever, chills, malaise, generalized weakness.  Cardiac:  No chest pain, palpitations, syncope, or edema.  Respiratory:  No dyspnea, orthopnea, stridor, wheezing, or cough. No hemoptysis.  GI:  No nausea, vomiting, diarrhea, or abdominal pain. No melena or hematochezia.  :  No dysuria  MS:  No myalgia, or back pain.  Skin:  No skin rash, pruritis, jaundice, or lesions.  Neuro:  No headache, + change in mental status.

## 2022-08-10 NOTE — CONSULT NOTE ADULT - SUBJECTIVE AND OBJECTIVE BOX
Stroke CODE consult Note:    GARY NUNEZ    1. Chief Complaint:    HPI: 88 F hx of Diabetes, Hyperlipemia, Hypertension, Afib on Eliquis presents to ED BIBA from home for slurred speech and right sided facial droop. Patient went to bed last night around ~10PM at baseline. Patient woke up this morning around ~11Am which is late for her. Daughter noted she was not herself,  checked her sugar and it was ~45. Noted the slurred speech. Also noted patient had trouble swallowing. Patient took her Eliquis dose this morning.  No history of strokes.       2. Relevant PMH:   Prior ischemic stroke/TIA[ ], Afib [ ], CAD [ ], HTN [ ], DLD [ ], DM [ ], PVD [ ], Obesity [ ],   Sedentary lifestyle [ ], CHF [ ], RENATA [ ], Cancer Hx [ ].    3. Social History: Smoking [ ], Drug Use [ ], Alcohol Use [ ], Other [ ]    4. Possible Location of Stroke:    5. Relevant Brain Tissue Imaging:  < from: CT Brain Stroke Protocol (08.10.22 @ 15:04) >  IMPRESSION:    1.  No evidence of acute intracranial hemorrhage or large territory   infarct.    2.  Chronic-appearing left cerebellar infarct (new since ).    3.  Moderate/severe chronic microvascular changes and parenchymal atrophy   (moderately progressed since ).    Case was discussed with STEPHANIE DEAN 3:07 PM 8/10/2022    --- End of Report ---    < end of copied text >    6. Relevant Cerebrovascular Imaging:   < from: CT Angio Neck Stroke Protocol w/ IV Cont (08.10.22 @ 15:43) >  IMPRESSION:    2.  CTA head/neck: No evidence of major vascular stenosis or occlusion.   Scattered mild calcific plaque.    3.  CT perfusion: Apparent perfusion abnormality (penumbra) within the   brainstem and left posterior fossa with a total Tmax > 6s volume of 15   cc. This may be artifactual, recommend attention on follow-up CT or MRI.    --- End of Report ---    < end of copied text >    7. Relevant blood tests:      8. Relevant cardiac rhythm monitorin. Relevant Cardiac Structure: (TTE/DEBBIE +/-):[ ]No intracardiac thrombus/[ ] no vegetation/[ ]no akynesia/EF:    Home Medications:  Crestor 40 mg oral tablet: 1 tab(s) orally once a day (2020 10:56)  Diovan 320 mg oral tablet: 1 tab(s) orally once a day (2020 10:56)  Lantus 100 units/mL subcutaneous solution:  (2020 10:56)  NovoLOG 100 units/mL subcutaneous solution:  (2020 10:56)  Toprol- mg oral tablet, extended release: 1 tab(s) orally once a day (2020 10:56)      MEDICATIONS  (STANDING):  sodium chloride 0.9%. 1000 milliLiter(s) (75 mL/Hr) IV Continuous <Continuous>      10. PT/OT/Speech/Rehab/S&Sw/ Cognitive eval results and recommendations:    11. Exam:    Vital Signs Last 24 Hrs  T(C): 36.4 (10 Aug 2022 14:45), Max: 36.4 (10 Aug 2022 14:45)  T(F): 97.5 (10 Aug 2022 14:45), Max: 97.5 (10 Aug 2022 14:45)  HR: 85 (10 Aug 2022 14:45) (85 - 85)  BP: 133/88 (10 Aug 2022 14:45) (133/88 - 133/88)  BP(mean): --  RR: 18 (10 Aug 2022 14:45) (18 - 18)  SpO2: 96% (10 Aug 2022 14:45) (96% - 96%)    Parameters below as of 10 Aug 2022 14:45  Patient On (Oxygen Delivery Method): room air        NIH Stroke Scale:   · NIH Stroke Scale: LOC	(0) Alert; keenly responsive  · NIH Stroke Scale: LOC Question	(1) Answers one question correctly  · NIH Stroke Scale: LOC Command	(0) Performs both tasks correctly  · NIH Stroke Scale: Gaze	(0) Normal  · NIH Stroke Scale: Visual	(0) No visual loss  · NIH Stroke Scale: Facial	(1) Minor paralysis (flattened nasolabial fold, asymmetry on smiling)  · NIH Stroke Scale: Arm Left	(0) No drift; limb holds 90 (or 45) degrees for full 10 secs  · NIH Stroke Scale: Arm Right	(0) No drift; limb holds 90 (or 45) degrees for full 10 secs  · NIH Stroke Scale: Leg Left	(0) No drift; leg holds 30 degree position for full 5 secs  · NIH Stroke Scale: Leg Right	(0) No drift; leg holds 30 degree position for full 5 secs  · NIH Stroke Scale: Ataxia	(0) Absent  · NIH Stroke Scale: Sensory	(0) Normal; no sensory loss  · NIH Stroke Scale: Language	(0) No aphasia; normal  · NIH Stroke Scale: Dysarthria	(2) Severe dysarthria; patients speech is so slurred as to be unintelligible in the absence of or out of proportion to any dysphasia, or is mute/anarthric  · NIH Stroke Scale: Extinct Inattention	(0) No abnormality  · NIH Stroke Scale: Total	4   Stroke CODE consult Note:    GARY NUNEZ    1. Chief Complaint:    HPI: 88 F hx of Diabetes, Hyperlipemia, Hypertension, Afib on Eliquis presents to ED BIBA from home for slurred speech and right sided facial droop. Patient went to bed last night around ~10PM at baseline. Patient woke up this morning around ~11Am which is late for her. Daughter noted she was not herself,  checked her sugar and it was ~45. Noted the slurred speech. Also noted patient had trouble swallowing. Patient took her Eliquis dose this morning.  No history of strokes.       2. Relevant PMH:   Prior ischemic stroke/TIA[ ], Afib [ ], CAD [ ], HTN [ ], DLD [ ], DM [ ], PVD [ ], Obesity [ ],   Sedentary lifestyle [ ], CHF [ ], RENATA [ ], Cancer Hx [ ].    3. Social History: Smoking [ ], Drug Use [ ], Alcohol Use [ ], Other [ ]    4. Possible Location of Stroke:    5. Relevant Brain Tissue Imaging:  < from: CT Brain Stroke Protocol (08.10.22 @ 15:04) >  IMPRESSION:    1.  No evidence of acute intracranial hemorrhage or large territory   infarct.    2.  Chronic-appearing left cerebellar infarct (new since ).    3.  Moderate/severe chronic microvascular changes and parenchymal atrophy   (moderately progressed since ).    Case was discussed with STEPHANIE DEAN 3:07 PM 8/10/2022    --- End of Report ---    < end of copied text >    6. Relevant Cerebrovascular Imaging:   < from: CT Angio Neck Stroke Protocol w/ IV Cont (08.10.22 @ 15:43) >  IMPRESSION:    2.  CTA head/neck: No evidence of major vascular stenosis or occlusion.   Scattered mild calcific plaque.    3.  CT perfusion: Apparent perfusion abnormality (penumbra) within the   brainstem and left posterior fossa with a total Tmax > 6s volume of 15   cc. This may be artifactual, recommend attention on follow-up CT or MRI.    --- End of Report ---    < end of copied text >    7. Relevant blood tests:      8. Relevant cardiac rhythm monitorin. Relevant Cardiac Structure: (TTE/DEBBIE +/-):[ ]No intracardiac thrombus/[ ] no vegetation/[ ]no akynesia/EF:    Home Medications:  Crestor 40 mg oral tablet: 1 tab(s) orally once a day (2020 10:56)  Diovan 320 mg oral tablet: 1 tab(s) orally once a day (2020 10:56)  Lantus 100 units/mL subcutaneous solution:  (2020 10:56)  NovoLOG 100 units/mL subcutaneous solution:  (2020 10:56)  Toprol- mg oral tablet, extended release: 1 tab(s) orally once a day (2020 10:56)      MEDICATIONS  (STANDING):  sodium chloride 0.9%. 1000 milliLiter(s) (75 mL/Hr) IV Continuous <Continuous>      10. PT/OT/Speech/Rehab/S&Sw/ Cognitive eval results and recommendations:    11. Exam:    Vital Signs Last 24 Hrs  T(C): 36.4 (10 Aug 2022 14:45), Max: 36.4 (10 Aug 2022 14:45)  T(F): 97.5 (10 Aug 2022 14:45), Max: 97.5 (10 Aug 2022 14:45)  HR: 85 (10 Aug 2022 14:45) (85 - 85)  BP: 133/88 (10 Aug 2022 14:45) (133/88 - 133/88)  BP(mean): --  RR: 18 (10 Aug 2022 14:45) (18 - 18)  SpO2: 96% (10 Aug 2022 14:45) (96% - 96%)    Parameters below as of 10 Aug 2022 14:45  Patient On (Oxygen Delivery Method): room air        NIH Stroke Scale:   · NIH Stroke Scale: LOC	(0) Alert; keenly responsive  · NIH Stroke Scale: LOC Question	(1) Answers one question correctly  · NIH Stroke Scale: LOC Command	(0) Performs both tasks correctly  · NIH Stroke Scale: Gaze	(0) Normal  · NIH Stroke Scale: Visual	(0) No visual loss  · NIH Stroke Scale: Facial	(1) Minor paralysis (flattened nasolabial fold, asymmetry on smiling)  · NIH Stroke Scale: Arm Left	(0) No drift; limb holds 90 (or 45) degrees for full 10 secs  · NIH Stroke Scale: Arm Right	(0) No drift; limb holds 90 (or 45) degrees for full 10 secs  · NIH Stroke Scale: Leg Left	(0) No drift; leg holds 30 degree position for full 5 secs  · NIH Stroke Scale: Leg Right	(0) No drift; leg holds 30 degree position for full 5 secs  · NIH Stroke Scale: Ataxia	(0) Absent  · NIH Stroke Scale: Sensory	(0) Normal; no sensory loss  · NIH Stroke Scale: Language	(0) No aphasia; normal  · NIH Stroke Scale: Dysarthria	(2) Severe dysarthria; patients speech is so slurred as to be unintelligible in the absence of or out of proportion to any dysphasia, or is mute/anarthric  · NIH Stroke Scale: Extinct Inattention	(0) No abnormality  · NIH Stroke Scale: Total	4    no elevation palate on command.  no gag.    lingual dysarthria significant - midline tongue

## 2022-08-10 NOTE — H&P ADULT - NSHPLABSRESULTS_GEN_ALL_CORE
< from: CT Angio Brain Stroke Protocol  w/ IV Cont (08.10.22 @ 15:44) >    Findings:    CTA neck:  The visualized aortic arch and great vessel origins demonstrate calcific   plaque without significant stenosis.    The right common, internal and external carotid arteries are patent.    The left common, internal and external carotid arteries are patent.   Minimal calcific plaque at the left carotid bifurcation without   significant stenosis.    The vertebral arteries are patent.    CTA brain: The distal segments of the internal carotid arteries   demonstrate mild calcific plaque without significant stenosis. The   anterior and middle cerebral arteries are patent.    The distal vertebral arteries are patent. The basilar artery is patent.   The posterior cerebral arteries are patent.    Perfusion:  There is apparent perfusion abnormality (penumbra) within the   brainstem and left posterior fossa with a total Tmax > 6s volume of 15   cc. There is no evidence of abnormal cerebral blood flow to this cortical   infarct.    Other: Multilevel degenerative changes of the spine. The patient is   status post median sternotomy. Status post bilateral cataract surgery.    IMPRESSION:    2.  CTA head/neck: No evidence of major vascular stenosis or occlusion.   Scattered mild calcific plaque.    3.  CT perfusion: Apparent perfusion abnormality (penumbra) within the   brainstem and left posterior fossa with a total Tmax > 6s volume of 15   cc. This may be artifactual, recommend attention on follow-up CT or MRI.    --- End of Report ---    < end of copied text >      < from: CT Brain Stroke Protocol (08.10.22 @ 15:04) >      IMPRESSION:    1.  No evidence of acute intracranial hemorrhage or large territory   infarct.    2.  Chronic-appearing left cerebellar infarct (new since 2010).    3.  Moderate/severe chronic microvascular changes and parenchymal atrophy   (moderately progressed since 2010).    Case was discussed with STEPHANIE DEAN 3:07 PM 8/10/2022    --- End of Report ---    < end of copied text >

## 2022-08-11 LAB
A1C WITH ESTIMATED AVERAGE GLUCOSE RESULT: 6.3 % — HIGH (ref 4–5.6)
ALBUMIN SERPL ELPH-MCNC: 4.2 G/DL — SIGNIFICANT CHANGE UP (ref 3.5–5.2)
ALP SERPL-CCNC: 48 U/L — SIGNIFICANT CHANGE UP (ref 30–115)
ALT FLD-CCNC: 10 U/L — SIGNIFICANT CHANGE UP (ref 0–41)
ANION GAP SERPL CALC-SCNC: 12 MMOL/L — SIGNIFICANT CHANGE UP (ref 7–14)
APPEARANCE UR: CLEAR — SIGNIFICANT CHANGE UP
AST SERPL-CCNC: 15 U/L — SIGNIFICANT CHANGE UP (ref 0–41)
BACTERIA # UR AUTO: ABNORMAL
BILIRUB SERPL-MCNC: 0.5 MG/DL — SIGNIFICANT CHANGE UP (ref 0.2–1.2)
BILIRUB UR-MCNC: NEGATIVE — SIGNIFICANT CHANGE UP
BUN SERPL-MCNC: 28 MG/DL — HIGH (ref 10–20)
CALCIUM SERPL-MCNC: 9.1 MG/DL — SIGNIFICANT CHANGE UP (ref 8.5–10.1)
CHLORIDE SERPL-SCNC: 107 MMOL/L — SIGNIFICANT CHANGE UP (ref 98–110)
CHOLEST SERPL-MCNC: 154 MG/DL — SIGNIFICANT CHANGE UP
CO2 SERPL-SCNC: 22 MMOL/L — SIGNIFICANT CHANGE UP (ref 17–32)
COLOR SPEC: SIGNIFICANT CHANGE UP
CREAT ?TM UR-MCNC: 34 MG/DL — SIGNIFICANT CHANGE UP
CREAT SERPL-MCNC: 1.4 MG/DL — SIGNIFICANT CHANGE UP (ref 0.7–1.5)
DIFF PNL FLD: ABNORMAL
EGFR: 36 ML/MIN/1.73M2 — LOW
EPI CELLS # UR: 0 /HPF — SIGNIFICANT CHANGE UP (ref 0–5)
ESTIMATED AVERAGE GLUCOSE: 134 MG/DL — HIGH (ref 68–114)
GLUCOSE BLDC GLUCOMTR-MCNC: 101 MG/DL — HIGH (ref 70–99)
GLUCOSE BLDC GLUCOMTR-MCNC: 109 MG/DL — HIGH (ref 70–99)
GLUCOSE BLDC GLUCOMTR-MCNC: 121 MG/DL — HIGH (ref 70–99)
GLUCOSE BLDC GLUCOMTR-MCNC: 134 MG/DL — HIGH (ref 70–99)
GLUCOSE BLDC GLUCOMTR-MCNC: 145 MG/DL — HIGH (ref 70–99)
GLUCOSE BLDC GLUCOMTR-MCNC: 62 MG/DL — LOW (ref 70–99)
GLUCOSE BLDC GLUCOMTR-MCNC: 98 MG/DL — SIGNIFICANT CHANGE UP (ref 70–99)
GLUCOSE SERPL-MCNC: 94 MG/DL — SIGNIFICANT CHANGE UP (ref 70–99)
GLUCOSE UR QL: NEGATIVE — SIGNIFICANT CHANGE UP
HCT VFR BLD CALC: 38.7 % — SIGNIFICANT CHANGE UP (ref 37–47)
HDLC SERPL-MCNC: 53 MG/DL — SIGNIFICANT CHANGE UP
HGB BLD-MCNC: 12.6 G/DL — SIGNIFICANT CHANGE UP (ref 12–16)
HYALINE CASTS # UR AUTO: 0 /LPF — SIGNIFICANT CHANGE UP (ref 0–7)
KETONES UR-MCNC: NEGATIVE — SIGNIFICANT CHANGE UP
LEUKOCYTE ESTERASE UR-ACNC: ABNORMAL
LIPID PNL WITH DIRECT LDL SERPL: 83 MG/DL — SIGNIFICANT CHANGE UP
MAGNESIUM SERPL-MCNC: 1.9 MG/DL — SIGNIFICANT CHANGE UP (ref 1.8–2.4)
MCHC RBC-ENTMCNC: 29.5 PG — SIGNIFICANT CHANGE UP (ref 27–31)
MCHC RBC-ENTMCNC: 32.6 G/DL — SIGNIFICANT CHANGE UP (ref 32–37)
MCV RBC AUTO: 90.6 FL — SIGNIFICANT CHANGE UP (ref 81–99)
NITRITE UR-MCNC: NEGATIVE — SIGNIFICANT CHANGE UP
NON HDL CHOLESTEROL: 101 MG/DL — SIGNIFICANT CHANGE UP
NRBC # BLD: 0 /100 WBCS — SIGNIFICANT CHANGE UP (ref 0–0)
OSMOLALITY UR: 456 MOS/KG — SIGNIFICANT CHANGE UP (ref 50–1200)
PH UR: 6.5 — SIGNIFICANT CHANGE UP (ref 5–8)
PLATELET # BLD AUTO: 133 K/UL — SIGNIFICANT CHANGE UP (ref 130–400)
POTASSIUM SERPL-MCNC: 4.8 MMOL/L — SIGNIFICANT CHANGE UP (ref 3.5–5)
POTASSIUM SERPL-SCNC: 4.8 MMOL/L — SIGNIFICANT CHANGE UP (ref 3.5–5)
POTASSIUM UR-SCNC: 19 MMOL/L — SIGNIFICANT CHANGE UP
PROT ?TM UR-MCNC: 24 MG/DLG/24H — SIGNIFICANT CHANGE UP
PROT SERPL-MCNC: 6.8 G/DL — SIGNIFICANT CHANGE UP (ref 6–8)
PROT UR-MCNC: ABNORMAL
PROT/CREAT UR-RTO: 0.7 RATIO — HIGH (ref 0–0.2)
RBC # BLD: 4.27 M/UL — SIGNIFICANT CHANGE UP (ref 4.2–5.4)
RBC # FLD: 13.8 % — SIGNIFICANT CHANGE UP (ref 11.5–14.5)
RBC CASTS # UR COMP ASSIST: 2 /HPF — SIGNIFICANT CHANGE UP (ref 0–4)
SODIUM SERPL-SCNC: 141 MMOL/L — SIGNIFICANT CHANGE UP (ref 135–146)
SODIUM UR-SCNC: 107 MMOL/L — SIGNIFICANT CHANGE UP
SP GR SPEC: 1.03 — HIGH (ref 1.01–1.03)
TRIGL SERPL-MCNC: 91 MG/DL — SIGNIFICANT CHANGE UP
TSH SERPL-MCNC: 2.44 UIU/ML — SIGNIFICANT CHANGE UP (ref 0.27–4.2)
UROBILINOGEN FLD QL: SIGNIFICANT CHANGE UP
UUN UR-MCNC: 374 MG/DL — SIGNIFICANT CHANGE UP
WBC # BLD: 8.93 K/UL — SIGNIFICANT CHANGE UP (ref 4.8–10.8)
WBC # FLD AUTO: 8.93 K/UL — SIGNIFICANT CHANGE UP (ref 4.8–10.8)
WBC UR QL: 90 /HPF — HIGH (ref 0–5)

## 2022-08-11 PROCEDURE — 70551 MRI BRAIN STEM W/O DYE: CPT | Mod: 26

## 2022-08-11 PROCEDURE — 93010 ELECTROCARDIOGRAM REPORT: CPT

## 2022-08-11 PROCEDURE — 99232 SBSQ HOSP IP/OBS MODERATE 35: CPT

## 2022-08-11 PROCEDURE — 99223 1ST HOSP IP/OBS HIGH 75: CPT

## 2022-08-11 RX ORDER — LANOLIN ALCOHOL/MO/W.PET/CERES
5 CREAM (GRAM) TOPICAL ONCE
Refills: 0 | Status: DISCONTINUED | OUTPATIENT
Start: 2022-08-11 | End: 2022-08-11

## 2022-08-11 RX ORDER — DIAZEPAM 5 MG
2 TABLET ORAL ONCE
Refills: 0 | Status: DISCONTINUED | OUTPATIENT
Start: 2022-08-11 | End: 2022-08-11

## 2022-08-11 RX ORDER — METOPROLOL TARTRATE 50 MG
5 TABLET ORAL EVERY 8 HOURS
Refills: 0 | Status: DISCONTINUED | OUTPATIENT
Start: 2022-08-11 | End: 2022-08-11

## 2022-08-11 RX ORDER — ENOXAPARIN SODIUM 100 MG/ML
90 INJECTION SUBCUTANEOUS EVERY 12 HOURS
Refills: 0 | Status: DISCONTINUED | OUTPATIENT
Start: 2022-08-11 | End: 2022-08-17

## 2022-08-11 RX ORDER — DEXTROSE 50 % IN WATER 50 %
25 SYRINGE (ML) INTRAVENOUS ONCE
Refills: 0 | Status: DISCONTINUED | OUTPATIENT
Start: 2022-08-11 | End: 2022-08-11

## 2022-08-11 RX ORDER — HALOPERIDOL DECANOATE 100 MG/ML
1 INJECTION INTRAMUSCULAR ONCE
Refills: 0 | Status: COMPLETED | OUTPATIENT
Start: 2022-08-11 | End: 2022-08-11

## 2022-08-11 RX ORDER — METOPROLOL TARTRATE 50 MG
5 TABLET ORAL EVERY 8 HOURS
Refills: 0 | Status: DISCONTINUED | OUTPATIENT
Start: 2022-08-11 | End: 2022-08-12

## 2022-08-11 RX ORDER — LABETALOL HCL 100 MG
10 TABLET ORAL ONCE
Refills: 0 | Status: COMPLETED | OUTPATIENT
Start: 2022-08-11 | End: 2022-08-11

## 2022-08-11 RX ORDER — CEFTRIAXONE 500 MG/1
1000 INJECTION, POWDER, FOR SOLUTION INTRAMUSCULAR; INTRAVENOUS EVERY 24 HOURS
Refills: 0 | Status: COMPLETED | OUTPATIENT
Start: 2022-08-11 | End: 2022-08-13

## 2022-08-11 RX ORDER — METOPROLOL TARTRATE 50 MG
50 TABLET ORAL
Refills: 0 | Status: DISCONTINUED | OUTPATIENT
Start: 2022-08-11 | End: 2022-08-11

## 2022-08-11 RX ORDER — METOPROLOL TARTRATE 50 MG
5 TABLET ORAL ONCE
Refills: 0 | Status: COMPLETED | OUTPATIENT
Start: 2022-08-11 | End: 2022-08-11

## 2022-08-11 RX ORDER — DEXTROSE 50 % IN WATER 50 %
12.5 SYRINGE (ML) INTRAVENOUS ONCE
Refills: 0 | Status: DISCONTINUED | OUTPATIENT
Start: 2022-08-11 | End: 2022-08-18

## 2022-08-11 RX ORDER — METOPROLOL TARTRATE 50 MG
5 TABLET ORAL ONCE
Refills: 0 | Status: DISCONTINUED | OUTPATIENT
Start: 2022-08-11 | End: 2022-08-11

## 2022-08-11 RX ADMIN — SODIUM CHLORIDE 60 MILLILITER(S): 9 INJECTION INTRAMUSCULAR; INTRAVENOUS; SUBCUTANEOUS at 12:02

## 2022-08-11 RX ADMIN — Medication 10 MILLIGRAM(S): at 20:43

## 2022-08-11 RX ADMIN — CEFTRIAXONE 100 MILLIGRAM(S): 500 INJECTION, POWDER, FOR SOLUTION INTRAMUSCULAR; INTRAVENOUS at 15:44

## 2022-08-11 RX ADMIN — Medication 5 MILLIGRAM(S): at 20:57

## 2022-08-11 RX ADMIN — Medication 5 MILLIGRAM(S): at 21:25

## 2022-08-11 RX ADMIN — SODIUM CHLORIDE 60 MILLILITER(S): 9 INJECTION INTRAMUSCULAR; INTRAVENOUS; SUBCUTANEOUS at 00:01

## 2022-08-11 RX ADMIN — ENOXAPARIN SODIUM 90 MILLIGRAM(S): 100 INJECTION SUBCUTANEOUS at 18:24

## 2022-08-11 RX ADMIN — HALOPERIDOL DECANOATE 1 MILLIGRAM(S): 100 INJECTION INTRAMUSCULAR at 07:05

## 2022-08-11 RX ADMIN — Medication 5 MILLIGRAM(S): at 17:28

## 2022-08-11 RX ADMIN — Medication 2 MILLIGRAM(S): at 17:28

## 2022-08-11 RX ADMIN — Medication 5 MILLIGRAM(S): at 11:22

## 2022-08-11 NOTE — PROGRESS NOTE ADULT - SUBJECTIVE AND OBJECTIVE BOX
Neurology Stroke Progress Note    INTERVAL HPI/OVERNIGHT EVENTS:  HD #2  Patient seen and examined.   Overnight patient was hypoglycemic ~ 65. Was given 1/2 D5  Patient was agitated and combative towards staff in early AM. Was given Haldol 1mg IM one time.  No other acute overnight events.   She denies any headache, chest pain, fever, chills, shortness of breath, abd pain, n/v/d.   Endorses generalized weakness, unable to cooperate with further questioning.     MEDICATIONS  (STANDING):  apixaban 2.5 milliGRAM(s) Oral every 12 hours  atorvastatin 80 milliGRAM(s) Oral at bedtime  cefTRIAXone   IVPB 1000 milliGRAM(s) IV Intermittent every 24 hours  dextrose 5%. 1000 milliLiter(s) (50 mL/Hr) IV Continuous <Continuous>  dextrose 5%. 1000 milliLiter(s) (100 mL/Hr) IV Continuous <Continuous>  dextrose 50% Injectable 25 Gram(s) IV Push once  dextrose 50% Injectable 12.5 Gram(s) IV Push once  dextrose 50% Injectable 25 Gram(s) IV Push once  dextrose 50% Injectable 12.5 Gram(s) IV Push once  glucagon  Injectable 1 milliGRAM(s) IntraMuscular once  metoprolol tartrate Injectable 5 milliGRAM(s) IV Push every 8 hours  metoprolol tartrate Injectable 5 milliGRAM(s) IV Push once  sodium chloride 0.9%. 1000 milliLiter(s) (60 mL/Hr) IV Continuous <Continuous>    MEDICATIONS  (PRN):  dextrose Oral Gel 15 Gram(s) Oral once PRN Blood Glucose LESS THAN 70 milliGRAM(s)/deciliter    Allergies    codeine (Unknown)    Intolerances      Vital Signs Last 24 Hrs  T(C): 35.9 (11 Aug 2022 08:00), Max: 37.2 (10 Aug 2022 20:00)  T(F): 96.7 (11 Aug 2022 08:00), Max: 98.9 (10 Aug 2022 20:00)  HR: 106 (11 Aug 2022 12:10) (75 - 106)  BP: 186/76 (11 Aug 2022 12:10) (133/88 - 196/76)  BP(mean): 122 (11 Aug 2022 12:10) (104 - 133)  RR: 18 (11 Aug 2022 12:10) (18 - 18)  SpO2: 95% (11 Aug 2022 12:10) (95% - 99%)    Parameters below as of 11 Aug 2022 12:10  Patient On (Oxygen Delivery Method): nasal cannula      Physical exam:  General: No acute distress, awake to speech, edentulous, elderly female    Eyes: Anicteric sclerae, moist conjunctivae, see below for CNs  Neck: trachea midline, supple, no thyromegaly or lymphadenopathy  Cardiovascular: Regular rate and rhythm, no murmurs, rubs, or gallops. No carotid bruits.   Pulmonary: Anterior breath sounds clear bilaterally, no crackles or wheezing. No use of accessory muscles  GI: Abdomen soft, non-distended, non-tender  Extremities: Radial and DP pulses +2, no edema    Neurologic:  -Mental status: Awake to speech, alert, oriented to person, not place or time. Speech is slurred, dysarthria. Memory unable to be assessed as patient is non-cooperative Follows commands. Attention/concentration and fund of knowledge unable to be assessed  -Cranial nerves:   II: left visual field deficit   III, IV, VI: Extraocular movements are intact without nystagmus. Pupils equally round and reactive to light  V:  Facial sensation V1-V3 equal and intact   VII: asymmetric with normal eye closure and smile  VIII: Hearing is bilaterally intact to finger rub  IX, X: Uvula is midline and soft palate rises symmetrically  XI: Head turning and shoulder shrug are intact.  XII: Tongue protrudes midline and out of oral cavity, appears swollen   Motor: Normal bulk and tone. No pronator drift. Strength bilateral upper extremity 5/5, bilateral lower extremities 5/5.  Rapid alternating movements unable to be assessed   Sensation: Intact to light touch bilaterally. No neglect or extinction on double simultaneous testing.  Coordination: No dysmetria on finger-to-nose and heel-to-shin bilaterally  Reflexes: Downgoing toes bilaterally   Gait: Deferred     LABS:                        12.6   8.93  )-----------( 133      ( 11 Aug 2022 05:39 )             38.7         141  |  107  |  28<H>  ----------------------------<  94  4.8   |  22  |  1.4    Ca    9.1      11 Aug 2022 05:39  Mg     1.9         TPro  6.8  /  Alb  4.2  /  TBili  0.5  /  DBili  x   /  AST  15  /  ALT  10  /  AlkPhos  48  08-11    PT/INR - ( 10 Aug 2022 15:35 )   PT: 15.20 sec;   INR: 1.33 ratio         PTT - ( 10 Aug 2022 15:35 )  PTT:33.4 sec  Urinalysis Basic - ( 10 Aug 2022 23:45 )    Color: Light Yellow / Appearance: Clear / S.035 / pH: x  Gluc: x / Ketone: Negative  / Bili: Negative / Urobili: <2 mg/dL   Blood: x / Protein: 30 mg/dL / Nitrite: Negative   Leuk Esterase: Large / RBC: 2 /HPF / WBC 90 /HPF   Sq Epi: x / Non Sq Epi: 0 /HPF / Bacteria: Many         Neurology Stroke Progress Note    INTERVAL HPI/OVERNIGHT EVENTS:  HD #2  Patient seen and examined.   Overnight patient was hypoglycemic ~ 65. Was given 1/2 D5  Patient was agitated and combative towards staff in early AM. Was given Haldol 1mg IM one time.  No other acute overnight events.   She denies any headache, chest pain, fever, chills, shortness of breath, abd pain, n/v/d.   Endorses generalized weakness, unable to cooperate with further questioning.     MEDICATIONS  (STANDING):  apixaban 2.5 milliGRAM(s) Oral every 12 hours  atorvastatin 80 milliGRAM(s) Oral at bedtime  cefTRIAXone   IVPB 1000 milliGRAM(s) IV Intermittent every 24 hours  dextrose 5%. 1000 milliLiter(s) (50 mL/Hr) IV Continuous <Continuous>  dextrose 5%. 1000 milliLiter(s) (100 mL/Hr) IV Continuous <Continuous>  dextrose 50% Injectable 25 Gram(s) IV Push once  dextrose 50% Injectable 12.5 Gram(s) IV Push once  dextrose 50% Injectable 25 Gram(s) IV Push once  dextrose 50% Injectable 12.5 Gram(s) IV Push once  glucagon  Injectable 1 milliGRAM(s) IntraMuscular once  metoprolol tartrate Injectable 5 milliGRAM(s) IV Push every 8 hours  metoprolol tartrate Injectable 5 milliGRAM(s) IV Push once  sodium chloride 0.9%. 1000 milliLiter(s) (60 mL/Hr) IV Continuous <Continuous>    MEDICATIONS  (PRN):  dextrose Oral Gel 15 Gram(s) Oral once PRN Blood Glucose LESS THAN 70 milliGRAM(s)/deciliter    Allergies    codeine (Unknown)    Intolerances      Vital Signs Last 24 Hrs  T(C): 35.9 (11 Aug 2022 08:00), Max: 37.2 (10 Aug 2022 20:00)  T(F): 96.7 (11 Aug 2022 08:00), Max: 98.9 (10 Aug 2022 20:00)  HR: 106 (11 Aug 2022 12:10) (75 - 106)  BP: 186/76 (11 Aug 2022 12:10) (133/88 - 196/76)  BP(mean): 122 (11 Aug 2022 12:10) (104 - 133)  RR: 18 (11 Aug 2022 12:10) (18 - 18)  SpO2: 95% (11 Aug 2022 12:10) (95% - 99%)    Parameters below as of 11 Aug 2022 12:10  Patient On (Oxygen Delivery Method): nasal cannula      Physical exam:  General: No acute distress, awake to speech, edentulous, elderly female    Eyes: Anicteric sclerae, moist conjunctivae, see below for CNs  Neck: trachea midline, supple, no thyromegaly or lymphadenopathy  Cardiovascular: Regular rate and rhythm, no murmurs, rubs, or gallops. No carotid bruits.   Pulmonary: Anterior breath sounds clear bilaterally, no crackles or wheezing. No use of accessory muscles  GI: Abdomen soft, non-distended, non-tender  Extremities: Radial and DP pulses +2, no edema    Neurologic:  -Mental status: Awake to speech, alert, oriented to person, not place or time. dysarthric. Memory unable to be assessed as patient is non-cooperative Follows commands. Attention/concentration and fund of knowledge unable to be assessed  -Cranial nerves:   II: left visual field deficit   III, IV, VI: Extraocular movements are intact without nystagmus. Pupils equally round and reactive to light  V:  Facial sensation V1-V3 equal and intact   VII: asymmetric with normal eye closure and smile  VIII: Hearing is bilaterally intact to finger rub  IX, X: Uvula is midline and soft palate rises symmetrically  XI: Head turning and shoulder shrug are intact.  XII: Tongue protrudes midline and out of oral cavity, appears swollen   Motor: Normal bulk and tone. No pronator drift. Strength bilateral upper extremity 5/5, bilateral lower extremities 5/5.  Rapid alternating movements unable to be assessed   Sensation: Intact to light touch bilaterally. No neglect or extinction on double simultaneous testing.  Coordination: No dysmetria on finger-to-nose and heel-to-shin bilaterally  Reflexes: Downgoing toes bilaterally   Gait: Deferred     LABS:                        12.6   8.93  )-----------( 133      ( 11 Aug 2022 05:39 )             38.7         141  |  107  |  28<H>  ----------------------------<  94  4.8   |  22  |  1.4    Ca    9.1      11 Aug 2022 05:39  Mg     1.9         TPro  6.8  /  Alb  4.2  /  TBili  0.5  /  DBili  x   /  AST  15  /  ALT  10  /  AlkPhos  48  08-11    PT/INR - ( 10 Aug 2022 15:35 )   PT: 15.20 sec;   INR: 1.33 ratio         PTT - ( 10 Aug 2022 15:35 )  PTT:33.4 sec  Urinalysis Basic - ( 10 Aug 2022 23:45 )    Color: Light Yellow / Appearance: Clear / S.035 / pH: x  Gluc: x / Ketone: Negative  / Bili: Negative / Urobili: <2 mg/dL   Blood: x / Protein: 30 mg/dL / Nitrite: Negative   Leuk Esterase: Large / RBC: 2 /HPF / WBC 90 /HPF   Sq Epi: x / Non Sq Epi: 0 /HPF / Bacteria: Many

## 2022-08-11 NOTE — CONSULT NOTE ADULT - ASSESSMENT
89 yo F with history of CAD, HTN, diabetes, AFib on Eliquis presented to ED with CC of acute severe dysarthria/dysphagia/R facial droop noticed by daughter at which time blood sugar was 45. In ED, , not thrombolytic candidate on Eliquis.  Not thrombectomy candidate- no evidence of LVO.    #R/o acute stroke vs encephalopathy due to hypoglycemia vs ?UTI: Severe dysarthria/dysphagia/R facial droop  - CTH: negative for acute findings. CTA: no LVO. CTP: apparent perfusion abnormality (penumbra) within the brainstem and left posterior fossa with a total Tmax > 6s volume of 15cc. This may be artifactual  - Q4 neurochecks, vital signs  - Keep patient strictly NPO, until speech sees patient  - Permissive HTN  - Gentle hydration @ 60 cc/hr NS while NPO  - Continue with Eliquis dose, crush into applesauce. If unable to swallow, consider therapeutic Lovenox   - c/w ASA and statin   - A1C 6.3, Chol 154, LDL 83, Triglycerides 91  - Pending MRI brain non contrast   - Pending TTE  - PT/OT/ST/rehab  - Ordered B12  - hold all Insulin products. FS q4-q6  - treat possible UTI    #Possible allergic rxn  - ENT urgent consult for tongue/oral swelling possible bedside scope required to r/o allergic rxn   - if signs of angioedema, stat Decadron    #Possible UTI  - Ordered urine culture given pyuria   - ceftriaxone 1000mg IV daily after Cx collected    #Chronic AFib on Eliquis   - Continue home Eliquis 2.5mg PO BID or Lovenox as above  - Continue metoprolol 5mg IV push Q8 while NPO    #HTN  - on Valsartan 320mg qd at home   - Ordered metoprolol 5mg IV push Q8, hold for SBP <110, HR <70     #Dyslipidemia   - Chol 154, LDL 83, Triglycerides 91  - Continue Atorvastatin 80mg PO QHS once speech/swallow clears    #DM   - Hypoglycemic on fingersticks requiring 1/2 D5  - Monitor FS  - On Lantus 40U in the AM and lispro 3U TID at home  - Hold all insulin for now given hypoglycemia    - Ordered glucagon 1mg IM PRN for BG <70     #MARCIN - Resolved, Cr 1.4 - 8/11/22  - baseline: 1.3 6/2020  - continue ivf: iv ns    #Misc  - DVT prophylaxis: Eliquis or Lovenox  - GI prophylaxis: Not indicated   - Diet: NPO   - Activity: IAT   - Disposition: stroke unit    #Progress Note Handoff  Pending: Consults____Clinical improvement and stability__x___ENT, MRI, TTE_______PT____x____  Pt/Family discussion: Pt/son informed and agree with the current plan  Disposition: Home______/SNF_______/4A______/To be determined____x____    My note supersedes the residents note should a discrepancy arise.    Chart and notes personally reviewed.  Care Discussed with Consultants/Other Providers/ Housestaff [ x] YES [ ] NO   Radiology, labs, old records personally reviewed.    discussed w/ housestaff, nursing, case management, neuro team, ENT, son

## 2022-08-11 NOTE — PROGRESS NOTE ADULT - ATTENDING COMMENTS
Patient seen and examined and agree with above except as noted.  Patients history, notes, labs, imaging, vitals and meds reviewed personally.  Patient with dysarthria and appears to have swelling of the tongue.  Son says she has had similar episodes including tongue swelling when she is hypoglycemic   Had some agitation overnight    Plan as above

## 2022-08-11 NOTE — CHART NOTE - NSCHARTNOTEFT_GEN_A_CORE
Neurovascular addendum:  Called for tachycardia 130-160 with confusion.   Examined patient, no obvious focality, patient coherant but combative, wanting to go home.   Discussed with nocturnist Dr. Key, suggested metoprolol IV. Patients toprol was held the previous evening for suspected dysphagia, also need for permissive HTN and   unable to crush toprol. Discussed case with Dr. Stanley, assessed EKG and suggested Haldol 1mg IM.   Following Neurovascular team to follow

## 2022-08-11 NOTE — CONSULT NOTE ADULT - ASSESSMENT
Pt is a 87yo Female who presents with slurred speech and facial droop - called to assess for tongue edema. Tongue mildly swollen - likely related to oral cavity being dry/irritated.     ·	consider switching to humidified face mask for moisture to oral cavity  ·	aggressive oral care  ·	would rec Decadron 10mg IV x 1 dose if any signs of worsening tongue edema  ·	HOB elevated if possible  ·	diet per SLP, although pt not currently appropriate for trials, consider alternate means of nutrition  ·	MRI brain pending  ·	d/w medical resident  ·	w/d with attng, will follow

## 2022-08-11 NOTE — CONSULT NOTE ADULT - SUBJECTIVE AND OBJECTIVE BOX
GARY NUNEZ  88y  Female    Patient is a 88y old  Female who presents with a chief complaint of slurred speech and right sided facial droop. (10 Aug 2022 22:15)      HPI:  This is a 88 F hx of CAD, Diabetes, Hyperlipemia, Hypertension, Afib on Eliquis presents to ED BIBA from home for slurred speech and right sided facial droop. Patient went to bed last night around ~10PM at baseline. Patient woke up this morning around ~11Am which is late for her. Daughter noted she was not herself,  checked her sugar and it was ~45. Noted the slurred speech. Also noted patient had trouble swallowing. Patient took her Eliquis dose this morning.  No history of strokes.     ED vitals T(F): 97.5, HR: 75, BP: 163/85, RR: 18, SpO2: 99%   Labs show Hb 12.4, HCT 36.7, WBC 6.65, , Trops neg x1, TPro  7.3  /  Alb  4.3  /  TBili  0.3  /  DBili  x   /  AST  14  /  ALT  11  /  AlkPhos  48  08-10  141  |  106  |  36<H>  ----------------------------<  104<H>  4.8   |  24  |  1.6<H>    Ca    9.7      10 Aug 2022 15:35    CT Angio Brain Stroke Protocol  w/ IV Cont, No evidence of major vascular stenosis or occlusion. Scattered mild calcific plaque.  CT perfusion: Apparent perfusion abnormality (penumbra) within the brainstem and left posterior fossa with a total Tmax > 6s volume of 15   CT Brain Stroke Protocol shows no evidence of acute intracranial hemorrhage or large territory infarct.  Chronic-appearing left cerebellar infarct (new since ). Moderate/severe chronic microvascular changes and parenchymal atrophy (moderately progressed since ).           (10 Aug 2022 22:15)    S: Patient was examined and seen at bedside. This morning pt is resting comfortably in bed and reports no new issues or overnight events. No complaints, feels better  Denies CP, SOB, N/V/D/C/AP, cough, F, chills, dizziness, new focal weakness, HA, vision changes, dysuria, or urinary symptoms, blood in stool.  ROS: all other systems reviewed and are negative    PAST MEDICAL & SURGICAL HISTORY:  Diabetes      Hypertension      Hyperlipemia      CAD (coronary artery disease) of artery bypass graft      S/P total knee replacement      History of total hip replacement, bilateral        SOCIAL HISTORY:  Tobacco: denies  Illicit drugs: denies  Alcohol: social  Family history reviewed and otherwise non-contributory No clotting disorders, CVAs at early age.  ALLERGIES: NKDA    MEDICATIONS  (STANDING):  apixaban 2.5 milliGRAM(s) Oral every 12 hours  atorvastatin 80 milliGRAM(s) Oral at bedtime  dextrose 5%. 1000 milliLiter(s) (50 mL/Hr) IV Continuous <Continuous>  dextrose 5%. 1000 milliLiter(s) (100 mL/Hr) IV Continuous <Continuous>  dextrose 50% Injectable 25 Gram(s) IV Push once  dextrose 50% Injectable 12.5 Gram(s) IV Push once  dextrose 50% Injectable 25 Gram(s) IV Push once  dextrose 50% Injectable 12.5 Gram(s) IV Push once  glucagon  Injectable 1 milliGRAM(s) IntraMuscular once  insulin lispro (ADMELOG) corrective regimen sliding scale   SubCutaneous three times a day before meals  metoprolol succinate  milliGRAM(s) Oral daily  metoprolol tartrate Injectable 5 milliGRAM(s) IV Push once  sodium chloride 0.9%. 1000 milliLiter(s) (60 mL/Hr) IV Continuous <Continuous>    MEDICATIONS  (PRN):  dextrose Oral Gel 15 Gram(s) Oral once PRN Blood Glucose LESS THAN 70 milliGRAM(s)/deciliter    Home Medications:  Crestor 40 mg oral tablet: 1 tab(s) orally once a day (2020 10:56)  Diovan 320 mg oral tablet: 1 tab(s) orally once a day (2020 10:56)  Eliquis 2.5 mg oral tablet: 1 tab(s) orally 2 times a day (10 Aug 2022 23:02)  Lantus 100 units/mL subcutaneous solution: 40 unit(s) subcutaneous once a day (in the morning) (10 Aug 2022 23:01)  NovoLOG 100 units/mL subcutaneous solution: 3 unit(s) subcutaneous 3 times a day (before meals) (10 Aug 2022 23:01)  oxybutynin 15 mg/24 hr oral tablet, extended release: 1 tab(s) orally once a day (10 Aug 2022 23:03)  Toprol- mg oral tablet, extended release: 1 tab(s) orally once a day (2020 10:56)          Vital Signs Last 24 Hrs  T(C): 35.9 (11 Aug 2022 08:00), Max: 37.2 (10 Aug 2022 20:00)  T(F): 96.7 (11 Aug 2022 08:00), Max: 98.9 (10 Aug 2022 20:00)  HR: 103 (11 Aug 2022 08:00) (75 - 104)  BP: 148/84 (11 Aug 2022 08:00) (133/88 - 196/76)  BP(mean): 125 (11 Aug 2022 08:00) (104 - 133)  RR: 18 (11 Aug 2022 08:00) (18 - 18)  SpO2: 97% (11 Aug 2022 08:00) (95% - 99%)    Parameters below as of 11 Aug 2022 08:00  Patient On (Oxygen Delivery Method): room air      CAPILLARY BLOOD GLUCOSE  112 (10 Aug 2022 18:24)      POCT Blood Glucose.: 98 mg/dL (11 Aug 2022 05:55)  POCT Blood Glucose.: 101 mg/dL (11 Aug 2022 04:09)  POCT Blood Glucose.: 62 mg/dL (11 Aug 2022 01:44)  POCT Blood Glucose.: 78 mg/dL (10 Aug 2022 23:52)  POCT Blood Glucose.: 129 mg/dL (10 Aug 2022 19:43)  POCT Blood Glucose.: 67 mg/dL (10 Aug 2022 18:19)  POCT Blood Glucose.: 112 mg/dL (10 Aug 2022 14:48)      General: NAD. Looks stated age.  HEENT: clean oropharynx, EOMI, no LAD; ?LUQ field cut  Neck: trachea midline, no thyromegaly  CV: nl S1 S2; no m/r/g  Resp: decreased breath sounds at base  GI: NT/ND/S +BS  MS: no clubbing/cyanosis/edema, +pulses  Neuro: motor, sensory intact; + reflexes  Skin: no rashes, nl turgor  Psychiatric: AA0x w/  insight and judgement    tele: SR, nonspecific changes (on my own evaluation of tele monitor)        LABS:                        12.6   8.93  )-----------( 133      ( 11 Aug 2022 05:39 )             38.7     08-11    141  |  107  |  28<H>  ----------------------------<  94  4.8   |  22  |  1.4    Ca    9.1      11 Aug 2022 05:39  Mg     1.9     -    TPro  6.8  /  Alb  4.2  /  TBili  0.5  /  DBili  x   /  AST  15  /  ALT  10  /  AlkPhos  48  08-11    LIVER FUNCTIONS - ( 11 Aug 2022 05:39 )  Alb: 4.2 g/dL / Pro: 6.8 g/dL / ALK PHOS: 48 U/L / ALT: 10 U/L / AST: 15 U/L / GGT: x           CARDIAC MARKERS ( 10 Aug 2022 15:35 )  x     / <0.01 ng/mL / x     / x     / x          PT/INR - ( 10 Aug 2022 15:35 )   PT: 15.20 sec;   INR: 1.33 ratio         PTT - ( 10 Aug 2022 15:35 )  PTT:33.4 sec  Urinalysis Basic - ( 10 Aug 2022 23:45 )    Color: Light Yellow / Appearance: Clear / S.035 / pH: x  Gluc: x / Ketone: Negative  / Bili: Negative / Urobili: <2 mg/dL   Blood: x / Protein: 30 mg/dL / Nitrite: Negative   Leuk Esterase: Large / RBC: 2 /HPF / WBC 90 /HPF   Sq Epi: x / Non Sq Epi: 0 /HPF / Bacteria: Many            EKG - afib, nonspecific changes (my read)  Chart and consultant noted personally reviewed.  Care Discussed with Consultants/Other Providers/ Housestaff [ x] YES [ ] NO   Radiology, labs, old records personally reviewed.           GARY NUNEZ  88y  Female    Patient is a 88y old  Female who presents with a chief complaint of slurred speech and right sided facial droop. (10 Aug 2022 22:15)      HPI:  This is a 88 F hx of CAD, Diabetes, Hyperlipemia, Hypertension, Afib on Eliquis presents to ED BIBA from home for slurred speech and right sided facial droop. Patient went to bed last night around ~10PM at baseline. Patient woke up this morning around ~11Am which is late for her. Daughter noted she was not herself,  checked her sugar and it was ~45. Noted the slurred speech. Also noted patient had trouble swallowing. Patient took her Eliquis dose this morning.  No history of strokes.     ED vitals T(F): 97.5, HR: 75, BP: 163/85, RR: 18, SpO2: 99%   Labs show Hb 12.4, HCT 36.7, WBC 6.65, , Trops neg x1, TPro  7.3  /  Alb  4.3  /  TBili  0.3  /  DBili  x   /  AST  14  /  ALT  11  /  AlkPhos  48  08-10  141  |  106  |  36<H>  ----------------------------<  104<H>  4.8   |  24  |  1.6<H>    Ca    9.7      10 Aug 2022 15:35    CT Angio Brain Stroke Protocol  w/ IV Cont, No evidence of major vascular stenosis or occlusion. Scattered mild calcific plaque.  CT perfusion: Apparent perfusion abnormality (penumbra) within the brainstem and left posterior fossa with a total Tmax > 6s volume of 15   CT Brain Stroke Protocol shows no evidence of acute intracranial hemorrhage or large territory infarct.  Chronic-appearing left cerebellar infarct (new since ). Moderate/severe chronic microvascular changes and parenchymal atrophy (moderately progressed since ).           (10 Aug 2022 22:15)    S: Patient was examined and seen at bedside. This morning pt is resting comfortably in bed. Son at bedside. Son reports ?tongue swelling that she gets when her sugars are low. Pt denies CP, SOB, urinary symptoms. No h/o allergic rxns. No lip tingling. Denies throat swelling. FS still on a low side. Overnight was agitated and received haldol.  Denies CP, SOB, N/V/D/C/AP, cough, F, chills, dizziness, new focal weakness, HA, vision changes, dysuria, or urinary symptoms, blood in stool.  ROS: all other systems reviewed and are negative    PAST MEDICAL & SURGICAL HISTORY:  Diabetes      Hypertension      Hyperlipemia      CAD (coronary artery disease) of artery bypass graft      S/P total knee replacement      History of total hip replacement, bilateral        SOCIAL HISTORY:  Tobacco: denies  Illicit drugs: denies  Alcohol: social  Family history reviewed and otherwise non-contributory No clotting disorders, CVAs at early age.  ALLERGIES: codeine    MEDICATIONS  (STANDING):  apixaban 2.5 milliGRAM(s) Oral every 12 hours  atorvastatin 80 milliGRAM(s) Oral at bedtime  dextrose 5%. 1000 milliLiter(s) (50 mL/Hr) IV Continuous <Continuous>  dextrose 5%. 1000 milliLiter(s) (100 mL/Hr) IV Continuous <Continuous>  dextrose 50% Injectable 25 Gram(s) IV Push once  dextrose 50% Injectable 12.5 Gram(s) IV Push once  dextrose 50% Injectable 25 Gram(s) IV Push once  dextrose 50% Injectable 12.5 Gram(s) IV Push once  glucagon  Injectable 1 milliGRAM(s) IntraMuscular once  insulin lispro (ADMELOG) corrective regimen sliding scale   SubCutaneous three times a day before meals  metoprolol succinate  milliGRAM(s) Oral daily  metoprolol tartrate Injectable 5 milliGRAM(s) IV Push once  sodium chloride 0.9%. 1000 milliLiter(s) (60 mL/Hr) IV Continuous <Continuous>    MEDICATIONS  (PRN):  dextrose Oral Gel 15 Gram(s) Oral once PRN Blood Glucose LESS THAN 70 milliGRAM(s)/deciliter    Home Medications:  Crestor 40 mg oral tablet: 1 tab(s) orally once a day (2020 10:56)  Diovan 320 mg oral tablet: 1 tab(s) orally once a day (2020 10:56)  Eliquis 2.5 mg oral tablet: 1 tab(s) orally 2 times a day (10 Aug 2022 23:02)  Lantus 100 units/mL subcutaneous solution: 40 unit(s) subcutaneous once a day (in the morning) (10 Aug 2022 23:01)  NovoLOG 100 units/mL subcutaneous solution: 3 unit(s) subcutaneous 3 times a day (before meals) (10 Aug 2022 23:01)  oxybutynin 15 mg/24 hr oral tablet, extended release: 1 tab(s) orally once a day (10 Aug 2022 23:03)  Toprol- mg oral tablet, extended release: 1 tab(s) orally once a day (2020 10:56)          Vital Signs Last 24 Hrs  T(C): 35.9 (11 Aug 2022 08:00), Max: 37.2 (10 Aug 2022 20:00)  T(F): 96.7 (11 Aug 2022 08:00), Max: 98.9 (10 Aug 2022 20:00)  HR: 103 (11 Aug 2022 08:00) (75 - 104)  BP: 148/84 (11 Aug 2022 08:00) (133/88 - 196/76)  BP(mean): 125 (11 Aug 2022 08:00) (104 - 133)  RR: 18 (11 Aug 2022 08:00) (18 - 18)  SpO2: 97% (11 Aug 2022 08:00) (95% - 99%)    Parameters below as of 11 Aug 2022 08:00  Patient On (Oxygen Delivery Method): room air      CAPILLARY BLOOD GLUCOSE  112 (10 Aug 2022 18:24)      POCT Blood Glucose.: 98 mg/dL (11 Aug 2022 05:55)  POCT Blood Glucose.: 101 mg/dL (11 Aug 2022 04:09)  POCT Blood Glucose.: 62 mg/dL (11 Aug 2022 01:44)  POCT Blood Glucose.: 78 mg/dL (10 Aug 2022 23:52)  POCT Blood Glucose.: 129 mg/dL (10 Aug 2022 19:43)  POCT Blood Glucose.: 67 mg/dL (10 Aug 2022 18:19)  POCT Blood Glucose.: 112 mg/dL (10 Aug 2022 14:48)      General: NAD. Looks stated age. +Dysarthria  HEENT: clean oropharynx, EOMI, no LAD; ?LUQ field cut, +?macroglossia ?dry tongue  Neck: trachea midline, no thyromegaly  CV: nl S1 S2; no m/r/g  Resp: decreased breath sounds at base  GI: NT/ND/S +BS  MS: no clubbing/cyanosis/edema, +pulses  Neuro: motor, sensory intact; + reflexes  Skin: no rashes, nl turgor  Psychiatric: AA0x1+ w/ compromised insight and judgement    tele: SR, nonspecific changes (on my own evaluation of tele monitor)        LABS:                        12.6   8.93  )-----------( 133      ( 11 Aug 2022 05:39 )             38.7     08-11    141  |  107  |  28<H>  ----------------------------<  94  4.8   |  22  |  1.4    Ca    9.1      11 Aug 2022 05:39  Mg     1.9     08-    TPro  6.8  /  Alb  4.2  /  TBili  0.5  /  DBili  x   /  AST  15  /  ALT  10  /  AlkPhos  48  08-11    LIVER FUNCTIONS - ( 11 Aug 2022 05:39 )  Alb: 4.2 g/dL / Pro: 6.8 g/dL / ALK PHOS: 48 U/L / ALT: 10 U/L / AST: 15 U/L / GGT: x           CARDIAC MARKERS ( 10 Aug 2022 15:35 )  x     / <0.01 ng/mL / x     / x     / x          PT/INR - ( 10 Aug 2022 15:35 )   PT: 15.20 sec;   INR: 1.33 ratio         PTT - ( 10 Aug 2022 15:35 )  PTT:33.4 sec  Urinalysis Basic - ( 10 Aug 2022 23:45 )    Color: Light Yellow / Appearance: Clear / S.035 / pH: x  Gluc: x / Ketone: Negative  / Bili: Negative / Urobili: <2 mg/dL   Blood: x / Protein: 30 mg/dL / Nitrite: Negative   Leuk Esterase: Large / RBC: 2 /HPF / WBC 90 /HPF   Sq Epi: x / Non Sq Epi: 0 /HPF / Bacteria: Many            EKG - afib, nonspecific changes (my read)  Chart and consultant noted personally reviewed.  Care Discussed with Consultants/Other Providers/ Housestaff [ x] YES [ ] NO   Radiology, labs, old records personally reviewed.

## 2022-08-11 NOTE — PROGRESS NOTE ADULT - ASSESSMENT
87 yo F with history of CAD, HTN, diabetes, AFib on Eliquis presented to ED with CC of acute severe dysarthria/dysphagia/R facial droop noticed by daughter at which time blood sugar was 45. In ED, , not thrombolytic candidate on Eliquis.  Not thrombectomy candidate- no evidence of LVO.    #Possible acute stroke vs encephalopathy: Severe dysarthria/dysphagia/R facial droop  - CTH: negative for acute findings. CTA: no LVO. CTP: apparent perfusion abnormality (penumbra) within the brainstem and left posterior fossa with a total Tmax > 6s volume of 15cc. This may be artifactual  - Q4 neurochecks, vital signs  - Keep patient strictly NPO, until speech sees patient  - Permissive HTN x 24 hours keep < 220/110  - Gentle hydration @ 60 cc/hr NS  - Continue with Eliquis dose, crush into applesauce   - c/w ASA and statin   - A1C 6.3, Chol 154, LDL 83, Triglycerides 91  - Pending MRI brain non contrast   - Pending TTE  - PT/OT/ST/rehab  - Ordered B12  - Ordered urine culture given pyuria   - Ordered ceftriaxone 1000mg IV daily   - ENT urgent consult for tongue/oral swelling possible bedside scope required    #AFib on Eliquis   - Continue home Eliquis 2.5mg PO BID  - Continue metoprolol 5mg IV push Q8    #HTN  - on Valsartan 320mg qd at home   - Ordered metoprolol 5mg IV push Q8, hold for SBP <110, HR <70     #Dyslipidemia   - Chol 154, LDL 83, Triglycerides 91  - Continue Atorvastatin 80mg PO QHS once speech/swallow clears    #DM   - Hypoglycemic on fingersticks requiring 1/2 D5  - Monitor FS  - On Lantus 40U in the AM and lispro 3U TID at home  - Hold insulin for now given hypoglycemia   - Discontinue ISS for now given hypoglycemia   - Ordered glucagon 1mg IM PRN for BG <70     #MARCIN - Resolved, Cr 1.4 - 8/11/22  - baseline: 1.3 6/2020  - continue ivf: iv ns  - nephro eval if no improvement    #Misc  - DVT prophylaxis: Eliquis   - GI prophylaxis: Not indicated   - Diet: NPO   - Activity: IAT   - Disposition: 3E    This a 89 yo F with history of CAD, HTN, diabetes, AFib on Eliquis presented to ED with CC of acute severe dysarthria/dysphagia/R facial droop possibly secondary to L MCA territory infarct vs infectious encephalopathy    #Possible acute stroke vs encephalopathy: Severe dysarthria/dysphagia/R facial droop  - Initial NIHSS 4 for dysarthria, facial and LOC  - CTH: negative for acute findings  - CTA: no LVO  - CTP: apparent perfusion abnormality (penumbra) within the brainstem and left posterior fossa with a total Tmax > 6s volume of 15cc. This may be artifactual  - Q4 neurochecks, vital signs  - s/p Speech eval: strict NPO for now  - Permissive HTN x 24 hours keep < 220/110  - Gentle hydration @ 60 cc/hr NS  - Continue with Eliquis dose, crush into applesauce   - c/w ASA and statin   - A1C 6.3, Chol 154, LDL 83, Triglycerides 91  - Pending MRI brain non contrast   - Pending TTE  - PT/OT/ST/rehab  - Ordered B12  - Ordered urine culture given pyuria   - Ordered ceftriaxone 1000mg IV daily   - ENT Consult for tongue/oral swelling possible bedside scope required, will consider steroid if any signs of worsening  - Obtain PT/OT/Physiatry    #AFib on Eliquis   - Switch eliquis to Heparin drip  - Continue metoprolol 5mg IV push Q8 with holding parameters    #HTN  - on Valsartan 320mg qd at home   - Ordered metoprolol 5mg IV push Q8, hold for SBP <110, HR <70     #Dyslipidemia   - Chol 154, LDL 83, Triglycerides 91  - Continue Atorvastatin 80mg PO QHS once speech/swallow clears    #DM   - Hypoglycemic on fingersticks requiring 1/2 D5  - Monitor FS  - On Lantus 40U in the AM and lispro 3U TID at home  - Hold insulin for now given hypoglycemia   - Discontinue ISS for now given hypoglycemia   - Ordered glucagon 1mg IM PRN for BG <70     #MARCIN - Resolved, Cr 1.4 - 8/11/22  - baseline: 1.3 6/2020  - continue ivf: iv ns  - nephro eval if no improvement    #Misc  - DVT prophylaxis: Eliquis   - GI prophylaxis: Not indicated   - Diet: NPO   - Activity: IAT   - Disposition: 3E from home    Pending: MRI, ENT eval   This a 87 yo F with history of CAD, HTN, diabetes, AFib on Eliquis presented to ED with CC of acute severe dysarthria/dysphagia/R facial droop possibly secondary to L MCA territory infarct vs infectious encephalopathy    #Possible acute stroke vs encephalopathy: Severe dysarthria/dysphagia/R facial droop  - Initial NIHSS 4 for dysarthria, facial and LOC  - CTH: negative for acute findings  - CTA: no LVO  - CTP: apparent perfusion abnormality (penumbra) within the brainstem and left posterior fossa with a total Tmax > 6s volume of 15cc. This may be artifactual  - Q4 neurochecks, vital signs  - s/p Speech eval: strict NPO for now  - Permissive HTN x 24 hours keep < 220/110  - Gentle hydration @ 60 cc/hr NS  - will switch from eliquis to lovenox or heparin drip  - c/w ASA and statin   - A1C 6.3, Chol 154, LDL 83, Triglycerides 91  - Pending MRI brain non contrast   - Pending TTE  - PT/OT/ST/rehab  - Ordered B12  - Ordered urine culture given pyuria   - Ordered ceftriaxone 1000mg IV daily   - ENT Consult for tongue/oral swelling possible bedside scope required, will consider steroid if any signs of worsening  - Obtain PT/OT/Physiatry    #AFib on Eliquis   - Switch eliquis to Lovenox or hep drip  - Continue metoprolol 5mg IV push Q8 with holding parameters    #HTN  - on Valsartan 320mg qd at home   - Ordered metoprolol 5mg IV push Q8, hold for SBP <110, HR <70     #Dyslipidemia   - Chol 154, LDL 83, Triglycerides 91  - Continue Atorvastatin 80mg PO QHS once speech/swallow clears    #DM   - Hypoglycemic on fingersticks requiring 1/2 D5  - Monitor FS  - On Lantus 40U in the AM and lispro 3U TID at home  - Hold insulin for now given hypoglycemia   - Discontinue ISS for now given hypoglycemia   - Ordered glucagon 1mg IM PRN for BG <70     #MARCIN - Resolved, Cr 1.4 - 8/11/22  - baseline: 1.3 6/2020  - continue ivf: iv ns  - nephro eval if no improvement    #Misc  - DVT prophylaxis: Eliquis   - GI prophylaxis: Not indicated   - Diet: NPO   - Activity: IAT   - Disposition: 3E from home    Pending: MRI, ENT eval

## 2022-08-11 NOTE — CHART NOTE - NSCHARTNOTEFT_GEN_A_CORE
Neurovascular addendum:  Patient NPO for dysarthria and facial asymmetry however in the presence of being adentulous.   Attempted NGT x3 with no success. Bedside dysphagia screen done cautiously and patient able to swallow Eliquis without coughing.   Formal swallow Eval in the morning.

## 2022-08-11 NOTE — CONSULT NOTE ADULT - SUBJECTIVE AND OBJECTIVE BOX
Pt is a 89yo Female who presents with slurred speech and facial droop - called to assess for tongue edema. Pt seen and examined at bedside with son present. Pt's son states he noticed the symptoms yesterday, thought patient may have had a hypoglycemic episode, but became more confused - called an ambulance to have pt assessed in ED. PT's son does not endorse any precipitating factors to her tongue swelling, does not know of any recent changes or possible allergies to anything new. Pt currently does not offer any complaints, no tongue or oral pain, no neck pain. Pt denies any SOB/trouble breathing. As per son, patient voice is altered, but difficult to assess as she is now slurring her speech more than prior.     PAST MEDICAL & SURGICAL HISTORY:  Diabetes  Hypertension  Hyperlipemia  CAD (coronary artery disease) of artery bypass graft  S/P total knee replacement  History of total hip replacement, bilateral      MEDICATIONS  (STANDING):  atorvastatin 80 milliGRAM(s) Oral at bedtime  cefTRIAXone   IVPB 1000 milliGRAM(s) IV Intermittent every 24 hours  dextrose 5%. 1000 milliLiter(s) (50 mL/Hr) IV Continuous <Continuous>  dextrose 5%. 1000 milliLiter(s) (100 mL/Hr) IV Continuous <Continuous>  dextrose 50% Injectable 25 Gram(s) IV Push once  dextrose 50% Injectable 12.5 Gram(s) IV Push once  dextrose 50% Injectable 25 Gram(s) IV Push once  dextrose 50% Injectable 12.5 Gram(s) IV Push once  enoxaparin Injectable 90 milliGRAM(s) SubCutaneous every 12 hours  glucagon  Injectable 1 milliGRAM(s) IntraMuscular once  metoprolol tartrate Injectable 5 milliGRAM(s) IV Push every 8 hours  sodium chloride 0.9%. 1000 milliLiter(s) (60 mL/Hr) IV Continuous <Continuous>    MEDICATIONS  (PRN):  dextrose Oral Gel 15 Gram(s) Oral once PRN Blood Glucose LESS THAN 70 milliGRAM(s)/deciliter      Allergies    codeine (Unknown)    Intolerances      REVIEW OF SYSTEMS   [x]Due to altered mental status/intubation, subjective information were not able to be obtained from patient. History was obtained, to the extent possible, from review of the chart and collateral sources of information.    Vital Signs Last 24 Hrs  T(C): 35.9 (11 Aug 2022 08:00), Max: 37.2 (10 Aug 2022 20:00)  T(F): 96.7 (11 Aug 2022 08:00), Max: 98.9 (10 Aug 2022 20:00)  HR: 100 (11 Aug 2022 15:) (75 - 106)  BP: 163/94 (11 Aug 2022 15:00) (148/83 - 196/76)  BP(mean): 129 (11 Aug 2022 15:) (104 - 133)  RR: 18 (11 Aug 2022 15:) (18 - 18)  SpO2: 94% (11 Aug 2022 15:) (94% - 99%)    Parameters below as of 11 Aug 2022 15:00  Patient On (Oxygen Delivery Method): nasal cannula      GEN: NAD, lethargic, confused at times. No drooling or pooling of secretions. No stridor or stertor. + slurred speech  SKIN: Good color, non diaphoretic.  HEENT: Oral mucosa pink and very dry. tongue with mild edema, soft. able to easily visualize post OP/uvula. No erythema or edema noted to buccal mucosa, tongue, FOM, uvula or posterior oropharynx. Uvula midline.   NECK: Trachea midline, Neck supple, no TTP to B/L lateral neck, no cervical LAD.  RESP: No dyspnea, non-labored breathing. No use of accessory muscles.+ nasal cannula.    CARDIO: +S1/S2  ABDO: Soft, NT.  EXT: BELLA x 4      LABS:                        12.6   8.93  )-----------( 133      ( 11 Aug 2022 05:39 )             38.7     08-11    141  |  107  |  28<H>  ----------------------------<  94  4.8   |  22  |  1.4    Ca    9.1      11 Aug 2022 05:39  Mg     1.9     08-11    TPro  6.8  /  Alb  4.2  /  TBili  0.5  /  DBili  x   /  AST  15  /  ALT  10  /  AlkPhos  48  08-11    PT/INR - ( 10 Aug 2022 15:35 )   PT: 15.20 sec;   INR: 1.33 ratio         PTT - ( 10 Aug 2022 15:35 )  PTT:33.4 sec  Urinalysis Basic - ( 10 Aug 2022 23:45 )    Color: Light Yellow / Appearance: Clear / S.035 / pH: x  Gluc: x / Ketone: Negative  / Bili: Negative / Urobili: <2 mg/dL   Blood: x / Protein: 30 mg/dL / Nitrite: Negative   Leuk Esterase: Large / RBC: 2 /HPF / WBC 90 /HPF   Sq Epi: x / Non Sq Epi: 0 /HPF / Bacteria: Many        RADIOLOGY & ADDITIONAL STUDIES:    cc< from: CT Brain Stroke Protocol (08.10.22 @ 15:04) >    ACC: [id number] EXAM:  CT BRAIN STROKE PROTOCOL                          PROCEDURE DATE:  08/10/2022          INTERPRETATION:  Clinical History / Reason for exam: Stroke code. Slurred   speech.    Technique: Noncontrast head CT.  Contiguous unenhanced CT axial images of   the head from the base to the vertex with coronal and sagittal reformats.    Comparison: CT head 2010    Findings:    The ventricles and cortical sulci are compatible with a moderate/severe   degree of parenchymal volume loss.    There are confluent hypodensities throughout the hemispheric white matter   without mass effect compatible with chronic microvascular changes. There   is a well demarcated infarct within the inferior left cerebellar   hemisphere, likely chronic.    There is no acute intracranial hemorrhage, extra-axial fluid collection   or midline shift.  Gray white matter differentiation is otherwise   maintained.    Vascular calcifications are noted.    There is partial opacification of the right maxillary sinus with an   air-fluid level. There is additional minor scattered paranasal sinus   mucosal thickening. The mastoids are grossly clear. The patient is status   post bilateral cataract surgery.    IMPRESSION:    1.  No evidence of acute intracranial hemorrhage or large territory   infarct.    2.  Chronic-appearing left cerebellar infarct (new since ).    3.  Moderate/severe chronic microvascular changes and parenchymal atrophy   (moderately progressed since ).    Case was discussed with STEPHANIE DEAN 3:07 PM 8/10/2022    --- End of Report ---            CARROLL ROJAS MD; Attending Radiologist  This document has been electronically signed. Aug 10 2022  3:13PM    < end of copied text >

## 2022-08-11 NOTE — PATIENT PROFILE ADULT - FALL HARM RISK - HARM RISK INTERVENTIONS
Assistance with ambulation/Assistance OOB with selected safe patient handling equipment/Communicate Risk of Fall with Harm to all staff/Monitor for mental status changes/Reinforce activity limits and safety measures with patient and family/Tailored Fall Risk Interventions/Visual Cue: Yellow wristband and red socks/Bed in lowest position, wheels locked, appropriate side rails in place/Call bell, personal items and telephone in reach/Instruct patient to call for assistance before getting out of bed or chair/Non-slip footwear when patient is out of bed/[place] to call system/Physically safe environment - no spills, clutter or unnecessary equipment/Purposeful Proactive Rounding/Room/bathroom lighting operational, light cord in reach

## 2022-08-11 NOTE — SWALLOW BEDSIDE ASSESSMENT ADULT - SLP PERTINENT HISTORY OF CURRENT PROBLEM
88 y.o F adm w/ slurred speech, R sided facial droop. 88 y.o F adm w/ slurred speech, R sided facial droop. Patient went to bed last night around ~10PM at baseline. Patient woke up this morning around ~11Am which is late for her. Daughter noted she was not herself,  checked her sugar and it was ~45. Noted the slurred speech and difficulty swallowing pills.  CT perfusion: Apparent perfusion abnormality (penumbra) within the  brainstem and left posterior fossa with a total Tmax > 6s volume of 15 cc. MRI pending.   PM history of CAD, HTN, diabetes, AFib on Eliquis

## 2022-08-12 LAB
ALBUMIN SERPL ELPH-MCNC: 4 G/DL — SIGNIFICANT CHANGE UP (ref 3.5–5.2)
ALP SERPL-CCNC: 51 U/L — SIGNIFICANT CHANGE UP (ref 30–115)
ALT FLD-CCNC: 11 U/L — SIGNIFICANT CHANGE UP (ref 0–41)
ANION GAP SERPL CALC-SCNC: 15 MMOL/L — HIGH (ref 7–14)
AST SERPL-CCNC: 23 U/L — SIGNIFICANT CHANGE UP (ref 0–41)
BASOPHILS # BLD AUTO: 0.02 K/UL — SIGNIFICANT CHANGE UP (ref 0–0.2)
BASOPHILS NFR BLD AUTO: 0.2 % — SIGNIFICANT CHANGE UP (ref 0–1)
BILIRUB SERPL-MCNC: 0.5 MG/DL — SIGNIFICANT CHANGE UP (ref 0.2–1.2)
BUN SERPL-MCNC: 33 MG/DL — HIGH (ref 10–20)
CALCIUM SERPL-MCNC: 9.3 MG/DL — SIGNIFICANT CHANGE UP (ref 8.5–10.1)
CHLORIDE SERPL-SCNC: 106 MMOL/L — SIGNIFICANT CHANGE UP (ref 98–110)
CO2 SERPL-SCNC: 18 MMOL/L — SIGNIFICANT CHANGE UP (ref 17–32)
CREAT SERPL-MCNC: 1.4 MG/DL — SIGNIFICANT CHANGE UP (ref 0.7–1.5)
EGFR: 36 ML/MIN/1.73M2 — LOW
EOSINOPHIL # BLD AUTO: 0 K/UL — SIGNIFICANT CHANGE UP (ref 0–0.7)
EOSINOPHIL NFR BLD AUTO: 0 % — SIGNIFICANT CHANGE UP (ref 0–8)
GAS PNL BLDA: SIGNIFICANT CHANGE UP
GLUCOSE BLDC GLUCOMTR-MCNC: 144 MG/DL — HIGH (ref 70–99)
GLUCOSE BLDC GLUCOMTR-MCNC: 161 MG/DL — HIGH (ref 70–99)
GLUCOSE BLDC GLUCOMTR-MCNC: 173 MG/DL — HIGH (ref 70–99)
GLUCOSE BLDC GLUCOMTR-MCNC: 177 MG/DL — HIGH (ref 70–99)
GLUCOSE SERPL-MCNC: 173 MG/DL — HIGH (ref 70–99)
HCT VFR BLD CALC: 40.4 % — SIGNIFICANT CHANGE UP (ref 37–47)
HGB BLD-MCNC: 13.7 G/DL — SIGNIFICANT CHANGE UP (ref 12–16)
IMM GRANULOCYTES NFR BLD AUTO: 0.6 % — HIGH (ref 0.1–0.3)
LYMPHOCYTES # BLD AUTO: 0.77 K/UL — LOW (ref 1.2–3.4)
LYMPHOCYTES # BLD AUTO: 6.2 % — LOW (ref 20.5–51.1)
MAGNESIUM SERPL-MCNC: 1.7 MG/DL — LOW (ref 1.8–2.4)
MCHC RBC-ENTMCNC: 30.8 PG — SIGNIFICANT CHANGE UP (ref 27–31)
MCHC RBC-ENTMCNC: 33.9 G/DL — SIGNIFICANT CHANGE UP (ref 32–37)
MCV RBC AUTO: 90.8 FL — SIGNIFICANT CHANGE UP (ref 81–99)
MONOCYTES # BLD AUTO: 0.74 K/UL — HIGH (ref 0.1–0.6)
MONOCYTES NFR BLD AUTO: 6 % — SIGNIFICANT CHANGE UP (ref 1.7–9.3)
NEUTROPHILS # BLD AUTO: 10.75 K/UL — HIGH (ref 1.4–6.5)
NEUTROPHILS NFR BLD AUTO: 87 % — HIGH (ref 42.2–75.2)
NRBC # BLD: 0 /100 WBCS — SIGNIFICANT CHANGE UP (ref 0–0)
PLATELET # BLD AUTO: 121 K/UL — LOW (ref 130–400)
POTASSIUM SERPL-MCNC: 4.8 MMOL/L — SIGNIFICANT CHANGE UP (ref 3.5–5)
POTASSIUM SERPL-SCNC: 4.8 MMOL/L — SIGNIFICANT CHANGE UP (ref 3.5–5)
PROT SERPL-MCNC: 6.8 G/DL — SIGNIFICANT CHANGE UP (ref 6–8)
RBC # BLD: 4.45 M/UL — SIGNIFICANT CHANGE UP (ref 4.2–5.4)
RBC # FLD: 13.9 % — SIGNIFICANT CHANGE UP (ref 11.5–14.5)
SODIUM SERPL-SCNC: 139 MMOL/L — SIGNIFICANT CHANGE UP (ref 135–146)
VIT B12 SERPL-MCNC: 341 PG/ML — SIGNIFICANT CHANGE UP (ref 232–1245)
WBC # BLD: 12.35 K/UL — HIGH (ref 4.8–10.8)
WBC # FLD AUTO: 12.35 K/UL — HIGH (ref 4.8–10.8)

## 2022-08-12 PROCEDURE — 76770 US EXAM ABDO BACK WALL COMP: CPT | Mod: 26

## 2022-08-12 PROCEDURE — 95816 EEG AWAKE AND DROWSY: CPT | Mod: 26

## 2022-08-12 PROCEDURE — 71045 X-RAY EXAM CHEST 1 VIEW: CPT | Mod: 26

## 2022-08-12 PROCEDURE — 70450 CT HEAD/BRAIN W/O DYE: CPT | Mod: 26

## 2022-08-12 PROCEDURE — 99232 SBSQ HOSP IP/OBS MODERATE 35: CPT

## 2022-08-12 PROCEDURE — 31575 DIAGNOSTIC LARYNGOSCOPY: CPT

## 2022-08-12 PROCEDURE — 99233 SBSQ HOSP IP/OBS HIGH 50: CPT

## 2022-08-12 RX ORDER — MAGNESIUM SULFATE 500 MG/ML
2 VIAL (ML) INJECTION ONCE
Refills: 0 | Status: COMPLETED | OUTPATIENT
Start: 2022-08-12 | End: 2022-08-12

## 2022-08-12 RX ORDER — METOPROLOL TARTRATE 50 MG
3 TABLET ORAL ONCE
Refills: 0 | Status: COMPLETED | OUTPATIENT
Start: 2022-08-12 | End: 2022-08-12

## 2022-08-12 RX ORDER — METOPROLOL TARTRATE 50 MG
5 TABLET ORAL ONCE
Refills: 0 | Status: COMPLETED | OUTPATIENT
Start: 2022-08-12 | End: 2022-08-12

## 2022-08-12 RX ORDER — METOPROLOL TARTRATE 50 MG
5 TABLET ORAL EVERY 6 HOURS
Refills: 0 | Status: DISCONTINUED | OUTPATIENT
Start: 2022-08-12 | End: 2022-08-17

## 2022-08-12 RX ORDER — FUROSEMIDE 40 MG
20 TABLET ORAL DAILY
Refills: 0 | Status: DISCONTINUED | OUTPATIENT
Start: 2022-08-12 | End: 2022-08-15

## 2022-08-12 RX ADMIN — SODIUM CHLORIDE 60 MILLILITER(S): 9 INJECTION INTRAMUSCULAR; INTRAVENOUS; SUBCUTANEOUS at 00:55

## 2022-08-12 RX ADMIN — Medication 5 MILLIGRAM(S): at 09:45

## 2022-08-12 RX ADMIN — ENOXAPARIN SODIUM 90 MILLIGRAM(S): 100 INJECTION SUBCUTANEOUS at 18:28

## 2022-08-12 RX ADMIN — CEFTRIAXONE 100 MILLIGRAM(S): 500 INJECTION, POWDER, FOR SOLUTION INTRAMUSCULAR; INTRAVENOUS at 14:04

## 2022-08-12 RX ADMIN — ENOXAPARIN SODIUM 90 MILLIGRAM(S): 100 INJECTION SUBCUTANEOUS at 05:54

## 2022-08-12 RX ADMIN — Medication 25 GRAM(S): at 10:58

## 2022-08-12 RX ADMIN — Medication 5 MILLIGRAM(S): at 12:29

## 2022-08-12 RX ADMIN — Medication 3 MILLIGRAM(S): at 01:04

## 2022-08-12 RX ADMIN — Medication 5 MILLIGRAM(S): at 05:53

## 2022-08-12 RX ADMIN — Medication 5 MILLIGRAM(S): at 18:28

## 2022-08-12 RX ADMIN — Medication 20 MILLIGRAM(S): at 10:59

## 2022-08-12 NOTE — PROGRESS NOTE ADULT - ASSESSMENT
Pt is a 88y Female a/w slurred speech/facial droop, MRI confirmed CVA - ENT following for tongue swelling. Medical team concerned for possible laryngeal involvement due to worsening respiratory status. FFL at bedside shows + thick dry secretions to supraglottic larynx, pt not triggering to swallow, unable to clear secretions. The surface of epiglottis very dry, no edema. Patent laryngeal and tracheal airway. TVC/FVC mobile and intact, although patient not following commands to fully assess vocal cord movement.    ·	no airway involvement noted at this time  ·	good oral care, humidified 02  ·	alternate means of nutrition; can recall SLP, however doubt pt will be able to tolerate adequate PO safely  ·	no need for steroids at this point from an ENT perspective  ·	d/w medical/neuro team, d/w SLP  ·	w/d with attng

## 2022-08-12 NOTE — PROVIDER CONTACT NOTE (CHANGE IN STATUS NOTIFICATION) - SITUATION
at sign out received pt lethargic, only responsive to pain. NIHSS done at bedside by 2 RNS. GCS = 7 ( down from 14), NIHSS 25 ( up from 5). Pt lethargic on assessment, not following commands, does not answer any questions, has facial droop, moves all extremities to painful stimulus. unable to assess speech due to lethargy.

## 2022-08-12 NOTE — PROGRESS NOTE ADULT - ATTENDING COMMENTS
Patient seen and examined and agree with above except as noted.  Patients history, notes, labs, imaging, vitals and meds reviewed personally.  Patient has no new complaints  No major confusion overnight  Still unable to swallow   SOB slightly worse today and likely new fluid in lung bases    Plan as above

## 2022-08-12 NOTE — PROGRESS NOTE ADULT - ASSESSMENT
87 yo F with history of CAD, HTN, diabetes, AFib on Eliquis presented to ED with CC of acute severe dysarthria/dysphagia/R facial droop noticed by daughter at which time blood sugar was 45. In ED, , not thrombolytic candidate on Eliquis.  Not thrombectomy candidate- no evidence of LVO.    #Acute ischemic strokes / Hypoglycemia, ?UTI: Severe dysarthria/dysphagia/R facial droop  - CTH: negative for acute findings. CTA: no LVO. CTP: apparent perfusion abnormality (penumbra) within the brainstem and left posterior fossa with a total Tmax > 6s volume of 15cc. This may be artifactual  - Q4 neurochecks, vital signs  - Keep patient strictly NPO, until speech clears  - Permissive HTN  - Gentle hydration @ 60 cc/hr NS while NPO  - therapeutic Lovenox for now  - c/w ASA and statin   - A1C 6.3, Chol 154, LDL 83, Triglycerides 91  -  MRI brain non contrast < from: MR Head No Cont (08.11.22 @ 17:30) >  Motion degraded incomplete examination demonstrating small foci of acute infarct in the left temporal and parietal lobes.  Extensive chronic microsphere ischemic changes.  < end of copied text >  - Pending TTE  - PT/OT/ST/rehab  - Ordered B12  - hold all Insulin products. FS q4-q6  - treat possible UTI  - Cr better and might need ELiquis 5 BID on d/c    #Acute hypoxemic resp failure  -CXR w/ b/l opacities (?effusions, atlectasis, asp-n pneumonitis)  -Lasix 20mg IVP x1, then reacess  -asp-n precautions    #Dysphagia  -consider NGT feeds    #R/o angioedema  - scoped by ENT: no evidence of angioedema. Significant secretions and pt appears not to be able to clear secretions  -keep NPO  -suction pt    #Possible UTI  - Ordered urine culture given pyuria   - ceftriaxone 1000mg IV daily after Cx collected  -f/u Cx    #Chronic AFib on Eliquis   - Lovenox as above  - metoprolol 5-10mg IV push Q6 while NPO w/ holding parameters  - Cr better and might need ELiquis 5 BID on d/c    #HTN  - on Valsartan 320mg qd at home   - Ordered metoprolol 5mg IV push Q6, hold for SBP <110, HR <70     #Dyslipidemia   - Chol 154, LDL 83, Triglycerides 91  - Continue Atorvastatin 80mg PO QHS once speech/swallow clears    #DM w/ hypoglycemia on admission  - Hypoglycemic on fingersticks requiring 1/2 D5  - Monitor FS  - On Lantus 40U in the AM and lispro 3U TID at home  - Hold all insulin for now given hypoglycemia    - Ordered glucagon 1mg IM PRN for BG <70   -A1c=6.3  -lower dose of Insulin on d/c    #MARCIN - Resolved, Cr 1.4 - 8/11/22  - baseline: 1.3 6/2020  - continue ivf: iv ns    #Misc  - DVT prophylaxis: Eliquis or Lovenox  - GI prophylaxis: Not indicated   - Diet: NPO   - Activity: IAT   - Disposition: stroke unit    High risk pt. Px is guarded    #Progress Note Handoff  Pending: Consults____Clinical improvement and stability__x__TTE_______PT____x____  Pt/Family discussion: Pt/son informed and agree with the current plan  Disposition: Home______/SNF_______/4A______/To be determined____x____    My note supersedes the residents note should a discrepancy arise.    Chart and notes personally reviewed.  Care Discussed with Consultants/Other Providers/ Housestaff [ x] YES [ ] NO   Radiology, labs, old records personally reviewed.    discussed w/ housestaff, nursing, case management, neuro team, ENT, son

## 2022-08-12 NOTE — SWALLOW BEDSIDE ASSESSMENT ADULT - SWALLOW EVAL: PATIENT/FAMILY GOALS STATEMENT
Pt's son in law Jose Elias educated on reasoning and rationale for diet rec
SLP educated Pt's daughter Virgie and her son in law Amos Parra on recs

## 2022-08-12 NOTE — PROGRESS NOTE ADULT - SUBJECTIVE AND OBJECTIVE BOX
ENT DAILY PROGRESS NOTE    Pt is a 88y Female a/w slurred speech/facial droop, MRI confirmed CVA - ENT following for tongue swelling. Pt seen and examined at bedside. Pts son at bedside states he feels her tongue looks the same but her breathing is worse today, more labored. Pt still lethargic, able to open eyes and respond verbally - still with dysarthria. Medical team concerned for possible intralaryngeal issue, asking for airway eval.       REVIEW OF SYSTEMS   [x] A ten-point review of systems was otherwise negative except as noted.    Allergies    codeine (Unknown)    Intolerances      MEDICATIONS:  atorvastatin 80 milliGRAM(s) Oral at bedtime  cefTRIAXone   IVPB 1000 milliGRAM(s) IV Intermittent every 24 hours  dextrose 5%. 1000 milliLiter(s) IV Continuous <Continuous>  dextrose 5%. 1000 milliLiter(s) IV Continuous <Continuous>  dextrose 50% Injectable 25 Gram(s) IV Push once  dextrose 50% Injectable 12.5 Gram(s) IV Push once  dextrose 50% Injectable 25 Gram(s) IV Push once  dextrose 50% Injectable 12.5 Gram(s) IV Push once  dextrose Oral Gel 15 Gram(s) Oral once PRN  enoxaparin Injectable 90 milliGRAM(s) SubCutaneous every 12 hours  furosemide   Injectable 20 milliGRAM(s) IV Push daily  glucagon  Injectable 1 milliGRAM(s) IntraMuscular once  metoprolol tartrate Injectable 5 milliGRAM(s) IV Push every 6 hours  sodium chloride 0.9%. 1000 milliLiter(s) IV Continuous <Continuous>      Vital Signs Last 24 Hrs  T(C): 36.4 (12 Aug 2022 08:29), Max: 37.1 (12 Aug 2022 04:00)  T(F): 97.5 (12 Aug 2022 08:29), Max: 98.8 (12 Aug 2022 04:00)  HR: 112 (12 Aug 2022 08:29) (95 - 154)  BP: 168/103 (12 Aug 2022 08:29) (110/74 - 189/137)  BP(mean): 124 (12 Aug 2022 08:29) (95 - 160)  RR: 21 (12 Aug 2022 08:29) (18 - 21)  SpO2: 95% (12 Aug 2022 08:29) (94% - 98%)    Parameters below as of 12 Aug 2022 08:29  Patient On (Oxygen Delivery Method): nasal cannula  O2 Flow (L/min): 3      08-11 @ 07:01  -  08-12 @ 07:00  --------------------------------------------------------  IN:    sodium chloride 0.9%: 660 mL  Total IN: 660 mL    OUT:  Total OUT: 0 mL    Total NET: 660 mL    PHYSICAL EXAM:    GEN: NAD, lethargic, opens eyes to verbal stimuli. No drooling or pooling of secretions. No stridor or stertor. dysarthric, garbled speech.    SKIN: Good color, non diaphoretic  HEENT: Oral mucosa pink and very dry. tongue with mild edema, still very dry, uvula very dry. FOM non boggy, not raised. No erythema or edema noted to buccal mucosa, FOM, uvula or posterior oropharynx. Uvula midline  NECK:  Trachea midline. Neck supple, no TTP to B/L lateral neck, no cervical LAD.  RESP: No dyspnea, non-labored breathing. No use of accessory muscles.  CARDIO: +S1/S2  ABDO: Soft, NT.  EXT: BELLA x 4    FFL: no masses or lesions noted to NP/OP/HP. + thick dry secretions noted to supraglottic larynx, pt not triggering to swallow, unable to clear secretions. surface of epiglottis very dry, no edema. + patent laryngeal and tracheal airway. TVC/FVC mobile and intact, although patient not following commands to fully assess vocal cord movement.     LABS:  CBC-                        13.7   12.35 )-----------( 121      ( 12 Aug 2022 05:39 )             40.4     BMP/CMP-  12 Aug 2022 05:39    139    |  106    |  33     ----------------------------<  173    4.8     |  18     |  1.4      Ca    9.3        12 Aug 2022 05:39  Mg     1.7       12 Aug 2022 05:39    TPro  6.8    /  Alb  4.0    /  TBili  0.5    /  DBili  x      /  AST  23     /  ALT  11     /  AlkPhos  51     12 Aug 2022 05:39    Coagulation Studies-  PT/INR - ( 10 Aug 2022 15:35 )   PT: 15.20 sec;   INR: 1.33 ratio         PTT - ( 10 Aug 2022 15:35 )  PTT:33.4 sec  Endocrine Panel-  Calcium, Total Serum: 9.3 mg/dL (08-12 @ 05:39)      RADIOLOGY & ADDITIONAL STUDIES:    < from: MR Head No Cont (08.11.22 @ 17:30) >  ACC: [id number] EXAM:  MR BRAIN                          PROCEDURE DATE:  08/11/2022        INTERPRETATION:  CLINICAL INDICATION: Recent stroke code for slurred   speech and right facial droop.    TECHNIQUE: Multi-planar multi-sequential MR imagingof the brain was   performed without intravenous contrast. Incomplete scanning as per   patient request.    COMPARISON: None. Correlation made with CT head 8/10/2022.    FINDINGS:    Motion degraded incomplete examination.    There are 2 small foci of restricted diffusion involving the left   temporal and parietal lobe compatible with acute ischemic infarct. There   is redemonstration of small chronic infarct in the left cerebellum.    There is prominence of the cerebral sulci and fissural spacesreflecting   mild diffuse parenchymal volume loss.    Allowing for limitation from severe motion artifact, there are confluent   FLAIR signal in the subcortical and periventricular white matter   suggestive of extensive chronic microvascular changes.    No intracranial hemorrhage or midline shift.  There is no evidence of   hydrocephalus.    The visualized soft tissues and osseous structures appear normal.      IMPRESSION:    Motion degraded incomplete examination demonstrating small foci of acute   infarct in the left temporal and parietal lobes.    Extensive chronic microsphere ischemic changes.    The above findings were communicated to Dr. Phillip Simon by Dr. Childress   on 8/12/2022 at 9:20 AM.    --- End of Report ---      HENRIQUE CHILDRESS MD; Resident Radiologist  This document has been electronically signed.  DIEGO SNOW MD; Attending Radiologist  This document has been electronically signed. Aug 12 2022 10:14AM    < end of copied text >

## 2022-08-12 NOTE — PROGRESS NOTE ADULT - ASSESSMENT
This a 89 yo F with history of CAD, HTN, diabetes, AFib on Eliquis presented to ED with CC of acute severe dysarthria/dysphagia/R facial droop possibly secondary to L MCA territory infarct vs infectious encephalopathy    #Possible acute stroke of left partial lacunar origin vs encephalopathy: Severe dysarthria/dysphagia/R facial droop  - Initial NIHSS 4 for dysarthria, facial and LOC  - CTH: negative for acute findings  - CTA: no LVO  - CTP: apparent perfusion abnormality (penumbra) within the brainstem and left posterior fossa with a total Tmax > 6s volume of 15cc. This may be artifactual  - MRI:  2 small foci of restricted diffusion involving the left temporal and parietal lobe compatible with acute ischemic infarct. Confluent FLAIR signal in the subcortical and periventricular white matter suggestive of extensive chronic microvascular changes.  - Q4 neurochecks, vital signs  - s/p Speech eval: strict NPO for now  - s/p ENT bed-side scope: no airway obstruction  - Permissive HTN x 24 hours keep < 220/110  - Gentle hydration @ 60 cc/hr NS  - Start apixaban 5mg PO BID (can crush)  - c/w ASA and statin   - A1C 6.3, Chol 154, LDL 83, Triglycerides 91, B12 341  - Pending TTE  - PT/OT/ST/rehab  - Pending urine culture given pyuria   - Continue ceftriaxone 1000mg IV daily   - Obtain PT/OT/Physiatry    #R/O Pleural Effusion  - CXR (pending final read); showing possible small effusions  - Ordered Lasix 20mg IV daily   - Aspiration precautions    #AFib on Eliquis   - Start apixaban 5mg PO BID (can crush)  - Increase to metoprolol 5mg IV push Q6 with holding parameters    #HTN  - on Valsartan 320mg qd at home   - Increase to metoprolol 5mg IV push Q6, hold for SBP <110, HR <70     #Dyslipidemia   - Chol 154, LDL 83, Triglycerides 91  - Continue Atorvastatin 80mg PO QHS once speech/swallow clears    #DM   - Hypoglycemic on fingersticks requiring 1/2 D5  - Monitor FS  - On Lantus 40U in the AM and lispro 3U TID at home  - Hold insulin for now given hypoglycemia   - Discontinue ISS for now given hypoglycemia   - Ordered glucagon 1mg IM PRN for BG <70     #MARCIN - Resolved, Cr 1.4 - 8/11/22  - baseline: 1.3 6/2020  - continue ivf: iv ns  - nephro eval if no improvement    #Misc  - DVT prophylaxis: Eliquis   - GI prophylaxis: Not indicated   - Diet: NPO   - Activity: IAT   - Disposition: 3E from home    Pending: TTE, CXR This a 87 yo F with history of CAD, HTN, diabetes, AFib on Eliquis presented to ED with CC of acute severe dysarthria/dysphagia/R facial droop possibly secondary to  multiple punctate infarcts in the L MCA territory involving the temporal, parietal lobes most likely cardioembolic in nature    #Left Temporal/Parietal punctate infarcts  - Initial NIHSS 4 for dysarthria, facial and LOC  - CTH: negative for acute findings  - CTA: no LVO  - CTP: apparent perfusion abnormality (penumbra) within the brainstem and left posterior fossa with a total Tmax > 6s volume of 15cc. This may be artifactual  - MRI:  2 small foci of restricted diffusion involving the left temporal and parietal lobe compatible with acute ischemic infarct. Confluent FLAIR signal in the subcortical and periventricular white matter suggestive of extensive chronic microvascular changes.  - Q4 neurochecks, vital signs  - s/p Speech eval: strict NPO for now  - s/p ENT bed-side scope: no airway obstruction, patent airway  - Permissive HTN x 24 hours keep < 220/110  - Gentle hydration @ 60 cc/hr NS  - Will dc on apixaban 5mg PO BID, patient came in on 2.5 BID  - c/w ASA and statin   - A1C 6.3, Chol 154, LDL 83, Triglycerides 91, B12 341  - Pending TTE  - PT/OT/ST/rehab  - Pending urine culture given pyuria   - Continue ceftriaxone 1000mg IV daily   - Obtain PT/OT/Physiatry  - Obtain CTH stat  - Obtain ABG  - will need NGT    #R/O Pleural Effusion  - CXR (pending final read); showing possible small effusions  - Ordered Lasix 20mg IV daily   - Aspiration precautions    #AFib on Eliquis   - c/w lovenox 90mg BID for now, will switch to eliquis once patient able to swallow  - Increase to metoprolol 5mg IV push Q6 with holding parameters    #HTN  - on Valsartan 320mg qd at home   - Increase to metoprolol 5mg IV push Q6, hold for SBP <110, HR <70     #Dyslipidemia   - Chol 154, LDL 83, Triglycerides 91  - Continue Atorvastatin 80mg PO QHS once speech/swallow clears    #DM   - Hypoglycemic on fingersticks requiring 1/2 D5  - Monitor FS  - On Lantus 40U in the AM and lispro 3U TID at home  - Hold insulin for now given hypoglycemia   - Discontinue ISS for now given hypoglycemia   - Ordered glucagon 1mg IM PRN for BG <70     #MARCIN - Resolved, Cr 1.4 - 8/11/22  - baseline: 1.3 6/2020  - continue ivf: iv ns  - nephro eval if no improvement    #Misc  - DVT prophylaxis: Eliquis   - GI prophylaxis: Not indicated   - Diet: NPO   - Activity: IAT   - Disposition: 3E from home    Pending: TTE, JESSICA

## 2022-08-12 NOTE — SWALLOW BEDSIDE ASSESSMENT ADULT - ASR SWALLOW LINGUAL MOBILITY
within functional limits/impaired left lateral movement/impaired right lateral movement
ENT had also been contacted for lingual swelling - PA states Pt to be prescribed steroid dose for possible allergic reaction

## 2022-08-12 NOTE — PROVIDER CONTACT NOTE (CHANGE IN STATUS NOTIFICATION) - BACKGROUND
received sign out that pt became progressively lethargic during the day. During the day EEG was done, CT head, ABG, chest Xray. CT head negative. reported by day RN pt on 3 L NC and needs to be suctioned as needed.

## 2022-08-12 NOTE — SWALLOW BEDSIDE ASSESSMENT ADULT - COMMENTS
Pt with waxing and waning mental status. Pt appears to have decreased lingual swelling  today 8/12. Pt seen by ENT for scope earlier in the day. Pt with improved clarity of vocalizations today, but remains severely dysarthric.  "FFL: no masses or lesions noted to NP/OP/HP. + thick dry secretions noted to supraglottic larynx, pt not triggering to swallow, unable to clear secretions. surface of epiglottis very dry, no edema. + patent laryngeal and tracheal airway. TVC/FVC mobile and intact, although patient not following commands to fully assess vocal cord movement"

## 2022-08-12 NOTE — PROGRESS NOTE ADULT - SUBJECTIVE AND OBJECTIVE BOX
GARY NUNEZ  88y  Female    Patient is a 88y old  Female who presents with a chief complaint of slurred speech and right sided facial droop. (10 Aug 2022 22:15)      HPI:  This is a 88 F hx of CAD, Diabetes, Hyperlipemia, Hypertension, Afib on Eliquis presents to ED BIBA from home for slurred speech and right sided facial droop. Patient went to bed last night around ~10PM at baseline. Patient woke up this morning around ~11Am which is late for her. Daughter noted she was not herself,  checked her sugar and it was ~45. Noted the slurred speech. Also noted patient had trouble swallowing. Patient took her Eliquis dose this morning.  No history of strokes.     ED vitals T(F): 97.5, HR: 75, BP: 163/85, RR: 18, SpO2: 99%   Labs show Hb 12.4, HCT 36.7, WBC 6.65, , Trops neg x1, TPro  7.3  /  Alb  4.3  /  TBili  0.3  /  DBili  x   /  AST  14  /  ALT  11  /  AlkPhos  48  08-10  141  |  106  |  36<H>  ----------------------------<  104<H>  4.8   |  24  |  1.6<H>    Ca    9.7      10 Aug 2022 15:35    CT Angio Brain Stroke Protocol  w/ IV Cont, No evidence of major vascular stenosis or occlusion. Scattered mild calcific plaque.  CT perfusion: Apparent perfusion abnormality (penumbra) within the brainstem and left posterior fossa with a total Tmax > 6s volume of 15   CT Brain Stroke Protocol shows no evidence of acute intracranial hemorrhage or large territory infarct.  Chronic-appearing left cerebellar infarct (new since ). Moderate/severe chronic microvascular changes and parenchymal atrophy (moderately progressed since ).           (10 Aug 2022 22:15)    S: Patient was examined and seen at bedside. This morning pt is resting in bed. Son at bedside. Pt is more dysarthric today and speech is more unintelligible. Had Afib w/ RVR overnight  ROS: unable to access due to severe dysarthria     PAST MEDICAL & SURGICAL HISTORY:  Diabetes      Hypertension      Hyperlipemia      CAD (coronary artery disease) of artery bypass graft      S/P total knee replacement      History of total hip replacement, bilateral        SOCIAL HISTORY:  Tobacco: denies  Illicit drugs: denies  Alcohol: social  Family history reviewed and otherwise non-contributory No clotting disorders, CVAs at early age.  ALLERGIES: codeine    General: Lethargic. Looks stated age. +Severe Dysarthria  HEENT: clean oropharynx, EOMI, no LAD; ?dry tongue vs macroglossioa  Neck: trachea midline, no thyromegaly  CV: nl S1 S2; no m/r/g  Resp: decreased breath sounds at base  GI: NT/ND/S +BS  MS: no clubbing/cyanosis/edema, +pulses  Neuro: moves all extremities symmetrically but generalized weakness  Skin: no rashes, nl turgor      tele: afib, nonspecific changes (on my own evaluation of tele monitor)  episodes of Afib w/ RVR            MEDICATIONS  (STANDING):  atorvastatin 80 milliGRAM(s) Oral at bedtime  cefTRIAXone   IVPB 1000 milliGRAM(s) IV Intermittent every 24 hours  dextrose 5%. 1000 milliLiter(s) (50 mL/Hr) IV Continuous <Continuous>  dextrose 5%. 1000 milliLiter(s) (100 mL/Hr) IV Continuous <Continuous>  dextrose 50% Injectable 25 Gram(s) IV Push once  dextrose 50% Injectable 12.5 Gram(s) IV Push once  dextrose 50% Injectable 25 Gram(s) IV Push once  dextrose 50% Injectable 12.5 Gram(s) IV Push once  enoxaparin Injectable 90 milliGRAM(s) SubCutaneous every 12 hours  furosemide   Injectable 20 milliGRAM(s) IV Push daily  glucagon  Injectable 1 milliGRAM(s) IntraMuscular once  metoprolol tartrate Injectable 5 milliGRAM(s) IV Push every 6 hours  sodium chloride 0.9%. 1000 milliLiter(s) (60 mL/Hr) IV Continuous <Continuous>    MEDICATIONS  (PRN):  dextrose Oral Gel 15 Gram(s) Oral once PRN Blood Glucose LESS THAN 70 milliGRAM(s)/deciliter    Home Medications:  Crestor 40 mg oral tablet: 1 tab(s) orally once a day (2020 10:56)  Diovan 320 mg oral tablet: 1 tab(s) orally once a day (2020 10:56)  Eliquis 2.5 mg oral tablet: 1 tab(s) orally 2 times a day (10 Aug 2022 23:02)  Lantus 100 units/mL subcutaneous solution: 40 unit(s) subcutaneous once a day (in the morning) (10 Aug 2022 23:01)  NovoLOG 100 units/mL subcutaneous solution: 3 unit(s) subcutaneous 3 times a day (before meals) (10 Aug 2022 23:01)  oxybutynin 15 mg/24 hr oral tablet, extended release: 1 tab(s) orally once a day (10 Aug 2022 23:03)  Toprol- mg oral tablet, extended release: 1 tab(s) orally once a day (2020 10:56)    Vital Signs Last 24 Hrs  T(C): 35.7 (12 Aug 2022 12:34), Max: 37.1 (12 Aug 2022 04:00)  T(F): 96.2 (12 Aug 2022 12:34), Max: 98.8 (12 Aug 2022 04:00)  HR: 98 (12 Aug 2022 12:34) (95 - 154)  BP: 154/90 (12 Aug 2022 12:34) (110/74 - 189/137)  BP(mean): 119 (12 Aug 2022 12:34) (95 - 160)  RR: 19 (12 Aug 2022 12:34) (18 - 21)  SpO2: 96% (12 Aug 2022 12:34) (94% - 98%)    Parameters below as of 12 Aug 2022 12:34  Patient On (Oxygen Delivery Method): nasal cannula  O2 Flow (L/min): 3    CAPILLARY BLOOD GLUCOSE      POCT Blood Glucose.: 161 mg/dL (12 Aug 2022 12:40)  POCT Blood Glucose.: 173 mg/dL (12 Aug 2022 08:50)  POCT Blood Glucose.: 145 mg/dL (11 Aug 2022 21:39)  POCT Blood Glucose.: 134 mg/dL (11 Aug 2022 18:10)  POCT Blood Glucose.: 121 mg/dL (11 Aug 2022 13:07)    LABS:                        13.7   12.35 )-----------( 121      ( 12 Aug 2022 05:39 )             40.4     08-12    139  |  106  |  33<H>  ----------------------------<  173<H>  4.8   |  18  |  1.4    Ca    9.3      12 Aug 2022 05:39  Mg     1.7     08-12    TPro  6.8  /  Alb  4.0  /  TBili  0.5  /  DBili  x   /  AST  23  /  ALT  11  /  AlkPhos  51  08-12    LIVER FUNCTIONS - ( 12 Aug 2022 05:39 )  Alb: 4.0 g/dL / Pro: 6.8 g/dL / ALK PHOS: 51 U/L / ALT: 11 U/L / AST: 23 U/L / GGT: x           CARDIAC MARKERS ( 10 Aug 2022 15:35 )  x     / <0.01 ng/mL / x     / x     / x          PT/INR - ( 10 Aug 2022 15:35 )   PT: 15.20 sec;   INR: 1.33 ratio         PTT - ( 10 Aug 2022 15:35 )  PTT:33.4 sec  Urinalysis Basic - ( 10 Aug 2022 23:45 )    Color: Light Yellow / Appearance: Clear / S.035 / pH: x  Gluc: x / Ketone: Negative  / Bili: Negative / Urobili: <2 mg/dL   Blood: x / Protein: 30 mg/dL / Nitrite: Negative   Leuk Esterase: Large / RBC: 2 /HPF / WBC 90 /HPF   Sq Epi: x / Non Sq Epi: 0 /HPF / Bacteria: Many      ABG - ( 12 Aug 2022 11:46 )  pH, Arterial: 7.38  pH, Blood: x     /  pCO2: 32    /  pO2: 81    / HCO3: 19    / Base Excess: -5.2  /  SaO2: 98.3                    Consultant Notes Reviewed:  [x ] YES  [ ] NO  Care Discussed with Consultants/Other Providers/ Housestaff [ x] YES  [ ] NO  Radiology, labs, new studies personally reviewed.

## 2022-08-12 NOTE — PROVIDER CONTACT NOTE (OTHER) - ASSESSMENT
/103, . sPO2 90% on 3L NC, mouth labored breathing. NIH 4. Vital signs triggered increase in MEWS score to 7
Patient unable to follow commands and participate in assessment. Patient lethargic, in and out of sleep

## 2022-08-12 NOTE — PROGRESS NOTE ADULT - SUBJECTIVE AND OBJECTIVE BOX
Neurology Stroke Progress Note    INTERVAL HPI/OVERNIGHT EVENTS:  HD#3  Patient seen and examined.   Overnight, patient was hypertensive and tachycardic (SBP 180s, HR 130s) requiring labetalol 10mg and total metoprolol 8mg.   She failed speech/swallow evaluation.   No other acute overnight events.   She denies any headache, chest pain, fever, chills, shortness of breath, abd pain, n/v/d.   Endorses sleepiness and is unable to cooperate with further questioning.       MEDICATIONS  (STANDING):  atorvastatin 80 milliGRAM(s) Oral at bedtime  cefTRIAXone   IVPB 1000 milliGRAM(s) IV Intermittent every 24 hours  dextrose 5%. 1000 milliLiter(s) (50 mL/Hr) IV Continuous <Continuous>  dextrose 5%. 1000 milliLiter(s) (100 mL/Hr) IV Continuous <Continuous>  dextrose 50% Injectable 25 Gram(s) IV Push once  dextrose 50% Injectable 12.5 Gram(s) IV Push once  dextrose 50% Injectable 25 Gram(s) IV Push once  dextrose 50% Injectable 12.5 Gram(s) IV Push once  enoxaparin Injectable 90 milliGRAM(s) SubCutaneous every 12 hours  furosemide   Injectable 20 milliGRAM(s) IV Push daily  glucagon  Injectable 1 milliGRAM(s) IntraMuscular once  metoprolol tartrate Injectable 5 milliGRAM(s) IV Push every 6 hours  sodium chloride 0.9%. 1000 milliLiter(s) (60 mL/Hr) IV Continuous <Continuous>    MEDICATIONS  (PRN):  dextrose Oral Gel 15 Gram(s) Oral once PRN Blood Glucose LESS THAN 70 milliGRAM(s)/deciliter    Allergies    codeine (Unknown)    Intolerances      Vital Signs Last 24 Hrs  T(C): 36.4 (12 Aug 2022 08:29), Max: 37.1 (12 Aug 2022 04:00)  T(F): 97.5 (12 Aug 2022 08:29), Max: 98.8 (12 Aug 2022 04:00)  HR: 112 (12 Aug 2022 08:29) (95 - 154)  BP: 168/103 (12 Aug 2022 08:29) (110/74 - 189/137)  BP(mean): 124 (12 Aug 2022 08:29) (95 - 160)  RR: 21 (12 Aug 2022 08:29) (18 - 21)  SpO2: 95% (12 Aug 2022 08:29) (94% - 98%)    Parameters below as of 12 Aug 2022 08:29  Patient On (Oxygen Delivery Method): nasal cannula  O2 Flow (L/min): 3      Physical exam:  General: No acute distress, awake to speech but drowsy, edentulous, elderly female    Eyes: Anicteric sclerae, moist conjunctivae, see below for CNs  Neck: trachea midline, supple, no thyromegaly or lymphadenopathy  Cardiovascular: Regular rate and rhythm, no murmurs, rubs, or gallops. No carotid bruits.   Pulmonary: Anterior breath sounds clear bilaterally, no crackles or wheezing. No use of accessory muscles, deep shallow breathing, use of nasal canula   GI: Abdomen soft, non-distended, non-tender  Extremities: Radial and DP pulses +2, no edema    Neurologic:  -Mental status: Awake to speech, alert, oriented to person, not place or time. dysarthric. Memory unable to be assessed as patient is non-cooperative Follows commands. Attention/concentration and fund of knowledge unable to be assessed  -Cranial nerves:   II: left visual field deficit   III, IV, VI: Extraocular movements are intact without nystagmus. Pupils equally round and reactive to light  V:  Facial sensation V1-V3 equal and intact   VII: asymmetric with eye closure and smile  VIII: Hearing is bilaterally intact to finger rub  IX, X: Uvula is midline and soft palate rises symmetrically  XI: Head turning and shoulder shrug are intact but weak on left side due to chronic history of shoulder pain  XII: Tongue protrudes midline and out of oral cavity, appears swollen and dry  Motor: Normal bulk and tone. No pronator drift. Strength bilateral upper extremity 5/5, bilateral lower extremities 5/5.  Rapid alternating movements unable to be assessed   Sensation: Intact to light touch bilaterally. No neglect or extinction on double simultaneous testing.  Coordination: No dysmetria on finger-to-nose and heel-to-shin bilaterally  Reflexes: Downgoing toes bilaterally   Gait: Deferred     LABS:                        13.7   12.35 )-----------( 121      ( 12 Aug 2022 05:39 )             40.4     08-12    139  |  106  |  33<H>  ----------------------------<  173<H>  4.8   |  18  |  1.4    Ca    9.3      12 Aug 2022 05:39  Mg     1.7     08-12    TPro  6.8  /  Alb  4.0  /  TBili  0.5  /  DBili  x   /  AST  23  /  ALT  11  /  AlkPhos  51  08-12    PT/INR - ( 10 Aug 2022 15:35 )   PT: 15.20 sec;   INR: 1.33 ratio         PTT - ( 10 Aug 2022 15:35 )  PTT:33.4 sec  Urinalysis Basic - ( 10 Aug 2022 23:45 )    Color: Light Yellow / Appearance: Clear / S.035 / pH: x  Gluc: x / Ketone: Negative  / Bili: Negative / Urobili: <2 mg/dL   Blood: x / Protein: 30 mg/dL / Nitrite: Negative   Leuk Esterase: Large / RBC: 2 /HPF / WBC 90 /HPF   Sq Epi: x / Non Sq Epi: 0 /HPF / Bacteria: Many        RADIOLOGY & ADDITIONAL TESTS:  MR Brain:   FINDINGS:    Motion degraded incomplete examination.    There are 2 small foci of restricted diffusion involving the left   temporal and parietal lobe compatible with acute ischemic infarct. There   is redemonstration of small chronic infarct in the left cerebellum.    There is prominence of the cerebral sulci and fissural spaces reflecting   mild diffuse parenchymal volume loss.    Allowing for limitation from severe motion artifact, there are confluent   FLAIR signal in the subcortical and periventricular white matter   suggestive of extensive chronic microvascular changes.    No intracranial hemorrhage or midline shift.  There is no evidence of   hydrocephalus.    The visualized soft tissues and osseous structures appear normal.

## 2022-08-12 NOTE — CHART NOTE - NSCHARTNOTEFT_GEN_A_CORE
Overnight RN alerted @ 2026 that Pt had a NIHSS from 5 (@1540) to 25 (@1917)    RN was informed that Pt was signed out as encephalopathic and w/u was done to investigate etiology for change in mental status  ABG - ( 12 Aug 2022 11:46 )  pH, Arterial: 7.38  pH, Blood: x     /  pCO2: 32    /  pO2: 81    / HCO3: 19    / Base Excess: -5.2  /  SaO2: 98.3    CTH - no acute changes   CXR w/ likely pleural effusion and she was started on Furosemide 20mg QD      All concerns was addressed at bedside w/ RN and NIHSS presently is 22    1A: Level of consciousness   +2= Movements to pain    1B: Ask month and age  +2= 0 questions right    1C: "Blink eyes" and "Squeeze Hands   +2= Performs 0 tasks    2: Horizontal EOMs   0= Normal    3: Visual fields  unable to assess - pt resist eye opening    4: Facial palsy (use grimace if obtunded)   +1= Minor paralysis (flat NLF, smile asymmetry)      5A: Left arm motor drift (count out loud and use fingers to show count)  +2= Some effort against gravity    5B: Right arm motor drift  +2= Some effort against gravity      6A: Left leg motor drift     +2= Some effort against gravity        6B: Right leg motor drift   +2= Some effort against gravity      7: Limb ataxia (FNF/heel-shin) - unable to assess - 0 pt does not follow command       8: Sensation   0= Normal, no sensory loss - respond to pain equally in all limbs        9: Language/aphasia- describe the scene (on conrado); name the items; read the sentences (on conrado)     +3= Mute/global aphasia: no usable speech/auditory comprehension    10: Dysarthria- read the words     +2= Mute/anarthric    11: Extinction/inattention - unable to assess - 0 pt does not follow command

## 2022-08-12 NOTE — SWALLOW BEDSIDE ASSESSMENT ADULT - SLP PERTINENT HISTORY OF CURRENT PROBLEM
88 y.o F adm w/ slurred speech, R sided facial droop. Patient went to bed last night around ~10PM at baseline. Patient woke up this morning around ~11Am which is late for her. Daughter noted she was not herself,  checked her sugar and it was ~45. Noted the slurred speech and difficulty swallowing pills.  CT perfusion: Apparent perfusion abnormality (penumbra) within the  brainstem and left posterior fossa with a total Tmax > 6s volume of 15 cc. MRI pending.   PM history of CAD, HTN, diabetes, AFib on Eliquis 88 y.o F adm w/ slurred speech, R sided facial droop. Patient went to bed 8/9  around ~10PM at baseline. Patient woke  morning of 8/10 around ~11Am which is late for her. Daughter noted she was not herself,  checked her sugar and it was ~45. Noted the slurred speech and difficulty swallowing pills.  CT perfusion: Apparent perfusion abnormality (penumbra) within the  brainstem and left posterior fossa with a total Tmax > 6s volume of 15 cc. MRI 8/11 Motion degraded incomplete examination demonstrating small foci of acute infarct in the left temporal and parietal lobes. Extensive chronic microsphere ischemic changes.   PM history of CAD, HTN, diabetes, AFib on Eliquis

## 2022-08-13 LAB
ALBUMIN SERPL ELPH-MCNC: 4.1 G/DL — SIGNIFICANT CHANGE UP (ref 3.5–5.2)
ALP SERPL-CCNC: 52 U/L — SIGNIFICANT CHANGE UP (ref 30–115)
ALT FLD-CCNC: 13 U/L — SIGNIFICANT CHANGE UP (ref 0–41)
ANION GAP SERPL CALC-SCNC: 14 MMOL/L — SIGNIFICANT CHANGE UP (ref 7–14)
AST SERPL-CCNC: 21 U/L — SIGNIFICANT CHANGE UP (ref 0–41)
BILIRUB SERPL-MCNC: 0.6 MG/DL — SIGNIFICANT CHANGE UP (ref 0.2–1.2)
BUN SERPL-MCNC: 42 MG/DL — HIGH (ref 10–20)
CALCIUM SERPL-MCNC: 9.4 MG/DL — SIGNIFICANT CHANGE UP (ref 8.5–10.1)
CHLORIDE SERPL-SCNC: 106 MMOL/L — SIGNIFICANT CHANGE UP (ref 98–110)
CO2 SERPL-SCNC: 22 MMOL/L — SIGNIFICANT CHANGE UP (ref 17–32)
CREAT SERPL-MCNC: 1.4 MG/DL — SIGNIFICANT CHANGE UP (ref 0.7–1.5)
CULTURE RESULTS: SIGNIFICANT CHANGE UP
EGFR: 36 ML/MIN/1.73M2 — LOW
GLUCOSE BLDC GLUCOMTR-MCNC: 120 MG/DL — HIGH (ref 70–99)
GLUCOSE BLDC GLUCOMTR-MCNC: 129 MG/DL — HIGH (ref 70–99)
GLUCOSE BLDC GLUCOMTR-MCNC: 142 MG/DL — HIGH (ref 70–99)
GLUCOSE BLDC GLUCOMTR-MCNC: 159 MG/DL — HIGH (ref 70–99)
GLUCOSE SERPL-MCNC: 122 MG/DL — HIGH (ref 70–99)
HCT VFR BLD CALC: 42.4 % — SIGNIFICANT CHANGE UP (ref 37–47)
HGB BLD-MCNC: 13.6 G/DL — SIGNIFICANT CHANGE UP (ref 12–16)
MCHC RBC-ENTMCNC: 29.4 PG — SIGNIFICANT CHANGE UP (ref 27–31)
MCHC RBC-ENTMCNC: 32.1 G/DL — SIGNIFICANT CHANGE UP (ref 32–37)
MCV RBC AUTO: 91.6 FL — SIGNIFICANT CHANGE UP (ref 81–99)
NRBC # BLD: 0 /100 WBCS — SIGNIFICANT CHANGE UP (ref 0–0)
NT-PROBNP SERPL-SCNC: 4725 PG/ML — HIGH (ref 0–300)
PLATELET # BLD AUTO: 127 K/UL — LOW (ref 130–400)
POTASSIUM SERPL-MCNC: 4.6 MMOL/L — SIGNIFICANT CHANGE UP (ref 3.5–5)
POTASSIUM SERPL-SCNC: 4.6 MMOL/L — SIGNIFICANT CHANGE UP (ref 3.5–5)
PROT SERPL-MCNC: 7.4 G/DL — SIGNIFICANT CHANGE UP (ref 6–8)
RBC # BLD: 4.63 M/UL — SIGNIFICANT CHANGE UP (ref 4.2–5.4)
RBC # FLD: 13.9 % — SIGNIFICANT CHANGE UP (ref 11.5–14.5)
SODIUM SERPL-SCNC: 142 MMOL/L — SIGNIFICANT CHANGE UP (ref 135–146)
SPECIMEN SOURCE: SIGNIFICANT CHANGE UP
WBC # BLD: 10.44 K/UL — SIGNIFICANT CHANGE UP (ref 4.8–10.8)
WBC # FLD AUTO: 10.44 K/UL — SIGNIFICANT CHANGE UP (ref 4.8–10.8)

## 2022-08-13 PROCEDURE — 99233 SBSQ HOSP IP/OBS HIGH 50: CPT

## 2022-08-13 RX ORDER — SODIUM CHLORIDE 9 MG/ML
1000 INJECTION INTRAMUSCULAR; INTRAVENOUS; SUBCUTANEOUS
Refills: 0 | Status: DISCONTINUED | OUTPATIENT
Start: 2022-08-13 | End: 2022-08-17

## 2022-08-13 RX ORDER — HALOPERIDOL DECANOATE 100 MG/ML
2 INJECTION INTRAMUSCULAR ONCE
Refills: 0 | Status: COMPLETED | OUTPATIENT
Start: 2022-08-13 | End: 2022-08-13

## 2022-08-13 RX ORDER — METOPROLOL TARTRATE 50 MG
5 TABLET ORAL ONCE
Refills: 0 | Status: COMPLETED | OUTPATIENT
Start: 2022-08-13 | End: 2022-08-13

## 2022-08-13 RX ADMIN — Medication 5 MILLIGRAM(S): at 17:23

## 2022-08-13 RX ADMIN — Medication 5 MILLIGRAM(S): at 05:37

## 2022-08-13 RX ADMIN — CEFTRIAXONE 100 MILLIGRAM(S): 500 INJECTION, POWDER, FOR SOLUTION INTRAMUSCULAR; INTRAVENOUS at 16:52

## 2022-08-13 RX ADMIN — Medication 5 MILLIGRAM(S): at 11:32

## 2022-08-13 RX ADMIN — ENOXAPARIN SODIUM 90 MILLIGRAM(S): 100 INJECTION SUBCUTANEOUS at 05:36

## 2022-08-13 RX ADMIN — Medication 20 MILLIGRAM(S): at 05:36

## 2022-08-13 RX ADMIN — ENOXAPARIN SODIUM 90 MILLIGRAM(S): 100 INJECTION SUBCUTANEOUS at 17:24

## 2022-08-13 RX ADMIN — Medication 5 MILLIGRAM(S): at 21:39

## 2022-08-13 RX ADMIN — Medication 5 MILLIGRAM(S): at 00:08

## 2022-08-13 NOTE — PROGRESS NOTE ADULT - ATTENDING COMMENTS
87 yo F w/ h/o HTN, DLD, AFib on Eliquis p/w acute severe dysarthria/dysphagia/R facial droop found to have several small infarcts currently stable.  Recommend increase Eliquis to 5 mg BID once able to take PO meds.  In meantime continue lovenox 40 BID and f/u Echocardiogram.  Continue secondary stroke prevention and will repeat SLP eval in 2 days to assess need for PEG.  In meantime recommend NGT for tube feeding and consider nutrition consult.  Continue stroke unit for now.

## 2022-08-13 NOTE — PROGRESS NOTE ADULT - SUBJECTIVE AND OBJECTIVE BOX
Neurology Stroke Progress Note    INTERVAL HPI/OVERNIGHT EVENTS:  Patient seen and examined. She was sitting comfortably in bed but did not appear in acute distress.     MEDICATIONS  (STANDING):  atorvastatin 80 milliGRAM(s) Oral at bedtime  cefTRIAXone   IVPB 1000 milliGRAM(s) IV Intermittent every 24 hours  dextrose 5%. 1000 milliLiter(s) (50 mL/Hr) IV Continuous <Continuous>  dextrose 5%. 1000 milliLiter(s) (100 mL/Hr) IV Continuous <Continuous>  dextrose 50% Injectable 25 Gram(s) IV Push once  dextrose 50% Injectable 12.5 Gram(s) IV Push once  dextrose 50% Injectable 25 Gram(s) IV Push once  dextrose 50% Injectable 12.5 Gram(s) IV Push once  enoxaparin Injectable 90 milliGRAM(s) SubCutaneous every 12 hours  furosemide   Injectable 20 milliGRAM(s) IV Push daily  glucagon  Injectable 1 milliGRAM(s) IntraMuscular once  metoprolol tartrate Injectable 5 milliGRAM(s) IV Push every 6 hours  sodium chloride 0.9%. 1000 milliLiter(s) (60 mL/Hr) IV Continuous <Continuous>    MEDICATIONS  (PRN):  dextrose Oral Gel 15 Gram(s) Oral once PRN Blood Glucose LESS THAN 70 milliGRAM(s)/deciliter    Allergies    codeine (Unknown)    Intolerances      Vital Signs Last 24 Hrs  T(C): 37.1 (13 Aug 2022 08:00), Max: 37.3 (12 Aug 2022 21:01)  T(F): 98.8 (13 Aug 2022 08:00), Max: 99.2 (12 Aug 2022 21:01)  HR: 88 (13 Aug 2022 11:51) (78 - 106)  BP: 123/82 (13 Aug 2022 11:51) (95/51 - 165/108)  BP(mean): 95 (13 Aug 2022 11:51) (65 - 131)  RR: 20 (13 Aug 2022 11:51) (19 - 23)  SpO2: 99% (13 Aug 2022 11:51) (95% - 100%)    Parameters below as of 13 Aug 2022 11:51  Patient On (Oxygen Delivery Method): room air      Neurologic:  -Mental status: Awake, alert, oriented to person, place, but not time. dysarthric with mild aphasia, Follows commands. Attention/concentration intact. Fund of knowledge appropriate.  -Cranial nerves:   II: Visual fields are full to confrontation.  III, IV, VI: Extraocular movements are intact without nystagmus. Pupils equally round and reactive to light  V:  Facial sensation V1-V3 equal and intact   VII: Face is symmetric with normal eye closure and smile  VIII: Hearing is bilaterally intact to finger rub  IX, X: Uvula is midline and soft palate rises symmetrically  XI: Head turning and shoulder shrug are intact.  XII: Tongue protrudes midline  Motor: Normal bulk and tone. No pronator drift. Strength bilateral upper extremity 5/5, bilateral lower extremities 5/5. left UE limited due shoulder pain   Rapid alternating movements intact and symmetric  Sensation: Intact to light touch bilaterally. No neglect or extinction on double simultaneous testing.  Coordination: No dysmetria on finger-to-nose and heel-to-shin bilaterally      LABS:                        13.7   12.35 )-----------( 121      ( 12 Aug 2022 05:39 )             40.4     08-12    139  |  106  |  33<H>  ----------------------------<  173<H>  4.8   |  18  |  1.4    Ca    9.3      12 Aug 2022 05:39  Mg     1.7     08-12    TPro  6.8  /  Alb  4.0  /  TBili  0.5  /  DBili  x   /  AST  23  /  ALT  11  /  AlkPhos  51  08-12          RADIOLOGY & ADDITIONAL TESTS:     Neurology Stroke Progress Note    INTERVAL HPI/OVERNIGHT EVENTS:  Patient seen and examined. She was sitting comfortably in bed but did not appear in acute distress.  Still severely dysarthric and dysphagic.      MEDICATIONS  (STANDING):  atorvastatin 80 milliGRAM(s) Oral at bedtime  cefTRIAXone   IVPB 1000 milliGRAM(s) IV Intermittent every 24 hours  dextrose 5%. 1000 milliLiter(s) (50 mL/Hr) IV Continuous <Continuous>  dextrose 5%. 1000 milliLiter(s) (100 mL/Hr) IV Continuous <Continuous>  dextrose 50% Injectable 25 Gram(s) IV Push once  dextrose 50% Injectable 12.5 Gram(s) IV Push once  dextrose 50% Injectable 25 Gram(s) IV Push once  dextrose 50% Injectable 12.5 Gram(s) IV Push once  enoxaparin Injectable 90 milliGRAM(s) SubCutaneous every 12 hours  furosemide   Injectable 20 milliGRAM(s) IV Push daily  glucagon  Injectable 1 milliGRAM(s) IntraMuscular once  metoprolol tartrate Injectable 5 milliGRAM(s) IV Push every 6 hours  sodium chloride 0.9%. 1000 milliLiter(s) (60 mL/Hr) IV Continuous <Continuous>    MEDICATIONS  (PRN):  dextrose Oral Gel 15 Gram(s) Oral once PRN Blood Glucose LESS THAN 70 milliGRAM(s)/deciliter    Allergies    codeine (Unknown)    Intolerances      Vital Signs Last 24 Hrs  T(C): 37.1 (13 Aug 2022 08:00), Max: 37.3 (12 Aug 2022 21:01)  T(F): 98.8 (13 Aug 2022 08:00), Max: 99.2 (12 Aug 2022 21:01)  HR: 88 (13 Aug 2022 11:51) (78 - 106)  BP: 123/82 (13 Aug 2022 11:51) (95/51 - 165/108)  BP(mean): 95 (13 Aug 2022 11:51) (65 - 131)  RR: 20 (13 Aug 2022 11:51) (19 - 23)  SpO2: 99% (13 Aug 2022 11:51) (95% - 100%)    Parameters below as of 13 Aug 2022 11:51  Patient On (Oxygen Delivery Method): room air      Neurologic:  -Mental status: Awake, alert, oriented to person, place, but not time. dysarthric with mild aphasia, Follows commands. Attention/concentration intact. Fund of knowledge appropriate.  -Cranial nerves:   II: Visual fields are full to confrontation.  III, IV, VI: Extraocular movements are intact without nystagmus. Pupils equally round and reactive to light  V:  Facial sensation V1-V3 equal and intact   VII: Face is symmetric with normal eye closure and smile  VIII: Hearing is bilaterally intact to finger rub  IX, X: Uvula is midline and soft palate rises symmetrically  XI: Head turning and shoulder shrug are intact.  XII: Tongue protrudes midline  Motor: Normal bulk and tone. No pronator drift. Strength bilateral upper extremity 5/5, bilateral lower extremities 5/5. left UE limited due shoulder pain   Rapid alternating movements intact and symmetric  Sensation: Intact to light touch bilaterally. No neglect or extinction on double simultaneous testing.  Coordination: No dysmetria on finger-to-nose and heel-to-shin bilaterally      LABS:                        13.7   12.35 )-----------( 121      ( 12 Aug 2022 05:39 )             40.4     08-12    139  |  106  |  33<H>  ----------------------------<  173<H>  4.8   |  18  |  1.4    Ca    9.3      12 Aug 2022 05:39  Mg     1.7     08-12    TPro  6.8  /  Alb  4.0  /  TBili  0.5  /  DBili  x   /  AST  23  /  ALT  11  /  AlkPhos  51  08-12          RADIOLOGY & ADDITIONAL TESTS:

## 2022-08-13 NOTE — CONSULT NOTE ADULT - SUBJECTIVE AND OBJECTIVE BOX
HPI:89 y/o F hx of CAD, Diabetes, Hyperlipedmia, Hypertension, Afib on Eliquis presents to ED BIBA from home for slurred speech and right sided facial droop. Patient went to bed last night around ~10PM at baseline. Patient woke up this morning around ~11Am which is late for her. Daughter noted she was not herself,  checked her sugar and it was ~45. Noted the slurred speech. Also noted patient had trouble swallowing. Patient took her Eliquis dose this morning.  No history of strokes.     ED vitals T(F): 97.5, HR: 75, BP: 163/85, RR: 18, SpO2: 99%   Labs show Hb 12.4, HCT 36.7, WBC 6.65, , Trops neg x1, TPro  7.3  /  Alb  4.3  /  TBili  0.3  /  DBili  x   /  AST  14  /  ALT  11  /  AlkPhos  48  08-10  141  |  106  |  36<H>  ----------------------------<  104<H>  4.8   |  24  |  1.6<H>    Ca    9.7      10 Aug 2022 15:35    CT Angio Brain Stroke Protocol  w/ IV Cont, No evidence of major vascular stenosis or occlusion. Scattered mild calcific plaque.  CT perfusion: Apparent perfusion abnormality (penumbra) within the brainstem and left posterior fossa with a total Tmax > 6s volume of 15   CT Brain Stroke Protocol shows no evidence of acute intracranial hemorrhage or large territory infarct.  Chronic-appearing left cerebellar infarct (new since 2010). Moderate/severe chronic microvascular changes and parenchymal atrophy (moderately progressed since 2010).  ptn seen and  exam at  bed side nad ptn son  at  bed side visiting  provide hx      PTN  REFERRED TO ACUTE  REHAB  FOR  EVAL AND  TX   PAST MEDICAL & SURGICAL HISTORY:  Diabetes      Hypertension      Hyperlipemia      CAD (coronary artery disease) of artery bypass graft      S/P total knee replacement      History of total hip replacement, bilateral          Hospital Course:    TODAY'S SUBJECTIVE & REVIEW OF SYMPTOMS:     Constitutional WNL   Cardio WNL   Resp WNL   GI WNL  Heme WNL  Endo WNL  Skin WNL  MSK WNL  Neuro WNL  Cognitive WNL  Psych WNL      MEDICATIONS  (STANDING):  atorvastatin 80 milliGRAM(s) Oral at bedtime  cefTRIAXone   IVPB 1000 milliGRAM(s) IV Intermittent every 24 hours  dextrose 5%. 1000 milliLiter(s) (50 mL/Hr) IV Continuous <Continuous>  dextrose 5%. 1000 milliLiter(s) (100 mL/Hr) IV Continuous <Continuous>  dextrose 50% Injectable 25 Gram(s) IV Push once  dextrose 50% Injectable 12.5 Gram(s) IV Push once  dextrose 50% Injectable 25 Gram(s) IV Push once  dextrose 50% Injectable 12.5 Gram(s) IV Push once  enoxaparin Injectable 90 milliGRAM(s) SubCutaneous every 12 hours  furosemide   Injectable 20 milliGRAM(s) IV Push daily  glucagon  Injectable 1 milliGRAM(s) IntraMuscular once  metoprolol tartrate Injectable 5 milliGRAM(s) IV Push every 6 hours  sodium chloride 0.9%. 1000 milliLiter(s) (60 mL/Hr) IV Continuous <Continuous>    MEDICATIONS  (PRN):  dextrose Oral Gel 15 Gram(s) Oral once PRN Blood Glucose LESS THAN 70 milliGRAM(s)/deciliter      FAMILY HISTORY:      Allergies    codeine (Unknown)    Intolerances        SOCIAL HISTORY:    [  ] Etoh  [  ] Smoking  [  ] Substance abuse     Home Environment:  [  ] Home Alone  [x  ] Lives with Family  [  ] Home Health Aid    Dwelling:  [  ] Apartment  [ x ] Private House  [  ] Adult Home  [  ] Skilled Nursing Facility      [  ] Short Term  [  ] Long Term  [ x ] Stairs       Elevator [  ]    FUNCTIONAL STATUS PTA: (Check all that apply)  Ambulation: [ x  ]Independent    [  ] Dependent     [  ] Non-Ambulatory  Assistive Device: [ x ] SA Cane  [  ]  Q Cane  [ x ] Walker  [  ]  Wheelchair  ADL : [x  ] Independent  [  ]  Dependent       Vital Signs Last 24 Hrs  T(C): 37.2 (13 Aug 2022 03:55), Max: 37.3 (12 Aug 2022 21:01)  T(F): 98.9 (13 Aug 2022 03:55), Max: 99.2 (12 Aug 2022 21:01)  HR: 100 (13 Aug 2022 08:00) (78 - 106)  BP: 159/81 (13 Aug 2022 08:00) (95/51 - 165/108)  BP(mean): 107 (13 Aug 2022 08:00) (65 - 131)  RR: 20 (13 Aug 2022 08:00) (19 - 23)  SpO2: 99% (13 Aug 2022 08:00) (95% - 100%)    Parameters below as of 13 Aug 2022 08:00  Patient On (Oxygen Delivery Method): nasal cannula  O2 Flow (L/min): 3        PHYSICAL EXAM: Alert & Oriented X 2  GENERAL: NAD, well-groomed, well-developed  HEAD:  Atraumatic, Normocephalic  EYES: EOMI, PERRLA, conjunctiva and sclera clear  NECK: Supple, No JVD, Normal thyroid  CHEST/LUNG: Clear to percussion bilaterally; No rales, rhonchi, wheezing, or rubs  HEART: Regular rate and rhythm; No murmurs, rubs, or gallops  ABDOMEN: Soft, Nontender, Nondistended; Bowel sounds present  EXTREMITIES:  2+ Peripheral Pulses, No clubbing, cyanosis, or edema    NERVOUS SYSTEM:  Cranial Nerves 2-12 intact [ x ] Abnormal  [  ]  ROM: WFL all extremities [  ]  Abnormal [ x ]  Motor Strength: WFL all extremities  [  ]  Abnormal [x  ]  Sensation: intact to light touch [  ] Abnormal [x  ]  Reflexes: Symmetric [  ]  Abnormal [  ]    FUNCTIONAL STATUS:  Bed Mobility: Independent [  ]  Supervision [  ]  Needs Assistance [  ]  N/A [ x ]  Transfers: Independent [  ]  Supervision [  ]  Needs Assistance [  ]  N/A [ x ]   Ambulation: Independent [  ]  Supervision [  ]  Needs Assistance [  ]  N/A [x  ]  ADL: Independent [  ] Requires Assistance [  ] N/A [  ]  SEE PT/OT IE NOTES    LABS:                        13.7   12.35 )-----------( 121      ( 12 Aug 2022 05:39 )             40.4     08-12    139  |  106  |  33<H>  ----------------------------<  173<H>  4.8   |  18  |  1.4    Ca    9.3      12 Aug 2022 05:39  Mg     1.7     08-12    TPro  6.8  /  Alb  4.0  /  TBili  0.5  /  DBili  x   /  AST  23  /  ALT  11  /  AlkPhos  51  08-12          RADIOLOGY & ADDITIONAL STUDIES:  < from: CT Head No Cont (08.12.22 @ 14:58) >  Limited examination due to motion and significant calvarial streak   artifact.    Recently demonstrated focal acute infarcts are not identified on this   examination. No evidence of intracranial hemorrhage, mass effect or   midline shift.    Extensive chronic microvascular type changes.    < end of copied text >    Assesment:

## 2022-08-13 NOTE — PROGRESS NOTE ADULT - ASSESSMENT
87 yo F with history of CAD, HTN, diabetes, AFib on Eliquis presented to ED with CC of acute severe dysarthria/dysphagia/R facial droop possibly secondary to  multiple punctate infarcts in the L MCA territory involving the temporal, parietal lobes most likely cardioembolic in nature    #Left Temporal/Parietal punctate infarcts  - Initial NIHSS 4 for dysarthria, facial and LOC  - CTH: negative for acute findings  - CTA: no LVO  - CTP: apparent perfusion abnormality (penumbra) within the brainstem and left posterior fossa with a total Tmax > 6s volume of 15cc. This may be artifactual  - MRI:  2 small foci of restricted diffusion involving the left temporal and parietal lobe compatible with acute ischemic infarct. Confluent FLAIR signal in the subcortical and periventricular white matter suggestive of extensive chronic microvascular changes.  - Q4 neurochecks, vital signs  - s/p Speech eval: strict NPO for now  - s/p ENT bed-side scope: no airway obstruction, patent airway  - Permissive HTN x 24 hours keep < 220/110  - Gentle hydration @ 60 cc/hr NS  - Will dc on apixaban 5mg PO BID, patient came in on 2.5 BID  - c/w statin   - A1C 6.3, Chol 154, LDL 83, Triglycerides 91, B12 341  - Pending TTE  - Pending PT/OT/ST/rehab  - Pending urine culture given pyuria   - Continue ceftriaxone 1000mg IV daily   - Obtain ABG  - will need NGT    #R/O Pleural Effusion  - CXR (pending final read); showing possible small effusions  - Ordered Lasix 20mg IV daily   - Aspiration precautions    #AFib on Eliquis   - c/w lovenox 90mg BID for now, will switch to eliquis once patient able to swallow  - Increase to metoprolol 5mg IV push Q6 with holding parameters    #HTN  - on Valsartan 320mg qd at home   - Increase to metoprolol 5mg IV push Q6, hold for SBP <110, HR <70     #Dyslipidemia   - Chol 154, LDL 83, Triglycerides 91  - Continue Atorvastatin 80mg PO QHS once speech/swallow clears    #DM   - Hypoglycemic on fingersticks requiring 1/2 D5  - Monitor FS  - On Lantus 40U in the AM and lispro 3U TID at home  - Hold insulin for now given hypoglycemia   - Discontinue ISS for now given hypoglycemia   - Ordered glucagon 1mg IM PRN for BG <70     #MARCIN - Resolved, Cr 1.4 - 8/11/22  - baseline: 1.3 6/2020  - continue ivf: iv ns  - nephro eval if no improvement    #Misc  - DVT prophylaxis: Eliquis   - GI prophylaxis: Not indicated   - Diet: NPO   - Activity: IAT   - Disposition: 3E from home    Pending: TTE,

## 2022-08-13 NOTE — CONSULT NOTE ADULT - REASON FOR ADMISSION
slurred speech and right sided facial droop.

## 2022-08-13 NOTE — CONSULT NOTE ADULT - ASSESSMENT
IMPRESSION: Rehab of 88  y.o f  rehab  for  cva  rt  side  weakness / tia     PRECAUTIONS: [ x ] Cardiac  [  ] Respiratory  [  ] Seizures [  ] Contact Isolation  [  ] Droplet Isolation  [ FALL ] Other    Weight Bearing Status:     RECOMMENDATION:    Out of Bed to Chair     DVT/Decubiti Prophylaxis    REHAB PLAN:     [  x ] Bedside P/T 3-5 times a week   [  x]   Bedside O/T  2-3 times a week             [   ] No Rehab Therapy Indicated                   [x   ]  Speech Therapy   Conditioning/ROM                                    ADL  Bed Mobility                                               Conditioning/ROM  Transfers                                                     Bed Mobility  Sitting /Standing Balance                         Transfers                                        Gait Training                                               Sitting/Standing Balance  Stair Training [   ]Applicable                    Home equipment Eval                                                                        Splinting  [   ] Only      GOALS:   ADL   [ x  ]   Independent                    Transfers  [ x  ] Independent                          Ambulation  [x   ] Independent     [    ] With device                            [  x ]  CG                                                         [ x  ]  CG                                                                  [ x  ] CG                            [    ] Min A                                                   [   ] Min A                                                              [   ] Min  A          DISCHARGE PLAN:   [   ]  Good candidate for Intensive Rehabilitation/Hospital based-4A [facility]                                             Will tolerate 3hrs Intensive Rehab Daily                                       [    ]  Short Term Rehab in Skilled Nursing Facility                                       [    ]  Home with Outpatient or VN services                                         [  xx  ]  Possible Candidate for Intensive Hospital based Rehab

## 2022-08-13 NOTE — PROGRESS NOTE ADULT - ASSESSMENT
89 yo F with history of CAD, HTN, diabetes, AFib on Eliquis presented to ED with CC of acute severe dysarthria/dysphagia/R facial droop noticed by daughter at which time blood sugar was 45. In ED, , not thrombolytic candidate on Eliquis.  Not thrombectomy candidate- no evidence of LVO.    #Acute ischemic strokes / Hypoglycemia, ?UTI: Severe dysarthria/dysphagia/R facial droop  - CTH: negative for acute findings. CTA: no LVO. CTP: apparent perfusion abnormality (penumbra) within the brainstem and left posterior fossa with a total Tmax > 6s volume of 15cc. This may be artifactual  - Q4 neurochecks, vital signs  - Keep patient strictly NPO, until speech clears  - Permissive HTN  - Gentle hydration @ 60 cc/hr NS while NPO  - therapeutic Lovenox for now  - c/w ASA and statin   - A1C 6.3, Chol 154, LDL 83, Triglycerides 91  -  MRI brain non contrast < from: MR Head No Cont (08.11.22 @ 17:30) >  Motion degraded incomplete examination demonstrating small foci of acute infarct in the left temporal and parietal lobes.  Extensive chronic microsphere ischemic changes.  < end of copied text >  - Pending TTE  - PT/OT/ST/rehab  - Ordered B12  - hold all Insulin products. FS q4-q6  - treat possible UTI  - Cr better and might need ELiquis 5 BID on d/c    #Acute hypoxemic resp failure  -CXR w/ b/l opacities (?effusions, atelectasis, asp-n pneumonitis)  -8/ 12 s/p Lasix 20mg IVP x1, then reacess  -asp-n precautions  -on RA on 8/13    #Dysphagia  -S&S f/u to start feeds  - if on PO, d/c IVFs    #R/o angioedema  - scoped by ENT: no evidence of angioedema. Significant secretions and pt appears not to be able to clear secretions  -suction pt  -resolving Sx    #Possible UTI  - Ordered urine culture given pyuria   - ceftriaxone 1000mg IV daily    #Chronic AFib on Eliquis   - Lovenox as above  - metoprolol 5-10mg IV push Q6 while NPO w/ holding parameters  - Cr better and might need ELiquis 5 BID on d/c    #HTN  - on Valsartan 320mg qd at home   - Ordered metoprolol 5mg IV push Q6, hold for SBP <110, HR <70     #Dyslipidemia   - Chol 154, LDL 83, Triglycerides 91  - Continue Atorvastatin 80mg PO QHS once speech/swallow clears    #DM w/ hypoglycemia on admission  - Hypoglycemic on fingersticks requiring 1/2 D5  - Monitor FS  - On Lantus 40U in the AM and lispro 3U TID at home  - Hold all insulin for now given hypoglycemia    - Ordered glucagon 1mg IM PRN for BG <70   -A1c=6.3  -lower dose of Insulin on d/c    #MARCIN - Resolved, Cr 1.4 - 8/11/22  - baseline: 1.3 6/2020  - continue ivf: iv ns    #Misc  - DVT prophylaxis: Eliquis or Lovenox  - GI prophylaxis: Not indicated   - Diet: NPO   - Activity: IAT   - Disposition: stroke unit    High risk pt. Px is guarded    #Progress Note Handoff  Pending: Consults____Clinical improvement and stability__x__TTE, S&S_____PT____x____  Pt/Family discussion: Pt/son informed and agree with the current plan  Disposition: Home______/SNF_______/4A______/To be determined____x____    My note supersedes the residents note should a discrepancy arise.    Chart and notes personally reviewed.  Care Discussed with Consultants/Other Providers/ Housestaff [ x] YES [ ] NO   Radiology, labs, old records personally reviewed.    discussed w/ housestaff, nursing, case management, neuro team, son

## 2022-08-13 NOTE — PROGRESS NOTE ADULT - SUBJECTIVE AND OBJECTIVE BOX
GARY NUNEZ  88y  Female    Patient is a 88y old  Female who presents with a chief complaint of slurred speech and right sided facial droop. (10 Aug 2022 22:15)      HPI:  This is a 88 F hx of CAD, Diabetes, Hyperlipemia, Hypertension, Afib on Eliquis presents to ED BIBA from home for slurred speech and right sided facial droop. Patient went to bed last night around ~10PM at baseline. Patient woke up this morning around ~11Am which is late for her. Daughter noted she was not herself,  checked her sugar and it was ~45. Noted the slurred speech. Also noted patient had trouble swallowing. Patient took her Eliquis dose this morning.  No history of strokes.     ED vitals T(F): 97.5, HR: 75, BP: 163/85, RR: 18, SpO2: 99%   Labs show Hb 12.4, HCT 36.7, WBC 6.65, , Trops neg x1, TPro  7.3  /  Alb  4.3  /  TBili  0.3  /  DBili  x   /  AST  14  /  ALT  11  /  AlkPhos  48  08-10  141  |  106  |  36<H>  ----------------------------<  104<H>  4.8   |  24  |  1.6<H>    Ca    9.7      10 Aug 2022 15:35    CT Angio Brain Stroke Protocol  w/ IV Cont, No evidence of major vascular stenosis or occlusion. Scattered mild calcific plaque.  CT perfusion: Apparent perfusion abnormality (penumbra) within the brainstem and left posterior fossa with a total Tmax > 6s volume of 15   CT Brain Stroke Protocol shows no evidence of acute intracranial hemorrhage or large territory infarct.  Chronic-appearing left cerebellar infarct (new since 2010). Moderate/severe chronic microvascular changes and parenchymal atrophy (moderately progressed since 2010).           (10 Aug 2022 22:15)    S: Patient was examined and seen at bedside. This morning pt is resting in bed. Son#2 at bedside. Pt is much more alert today and speech is more intelligible. HR is better controlled. No specific complaints  ROS: denies CP, SOB, pain    PAST MEDICAL & SURGICAL HISTORY:  Diabetes      Hypertension      Hyperlipemia      CAD (coronary artery disease) of artery bypass graft      S/P total knee replacement      History of total hip replacement, bilateral        SOCIAL HISTORY:  Tobacco: denies  Illicit drugs: denies  Alcohol: social  Family history reviewed and otherwise non-contributory No clotting disorders, CVAs at early age.  ALLERGIES: codeine    General: NAD, AA0x2. Looks stated age. +Mod Dysarthria  HEENT: clean oropharynx, EOMI, no LAD;   Neck: trachea midline, no thyromegaly  CV: nl S1 S2; no m/r/g  Resp: decreased breath sounds at base  GI: NT/ND/S +BS  MS: no clubbing/cyanosis/edema, +pulses  Neuro: moves all extremities symmetrically but generalized weakness  Skin: no rashes, nl turgor      tele: afib, nonspecific changes (on my own evaluation of tele monitor)            MEDICATIONS  (STANDING):  atorvastatin 80 milliGRAM(s) Oral at bedtime  cefTRIAXone   IVPB 1000 milliGRAM(s) IV Intermittent every 24 hours  dextrose 5%. 1000 milliLiter(s) (50 mL/Hr) IV Continuous <Continuous>  dextrose 5%. 1000 milliLiter(s) (100 mL/Hr) IV Continuous <Continuous>  dextrose 50% Injectable 25 Gram(s) IV Push once  dextrose 50% Injectable 12.5 Gram(s) IV Push once  dextrose 50% Injectable 25 Gram(s) IV Push once  dextrose 50% Injectable 12.5 Gram(s) IV Push once  enoxaparin Injectable 90 milliGRAM(s) SubCutaneous every 12 hours  furosemide   Injectable 20 milliGRAM(s) IV Push daily  glucagon  Injectable 1 milliGRAM(s) IntraMuscular once  metoprolol tartrate Injectable 5 milliGRAM(s) IV Push every 6 hours  sodium chloride 0.9%. 1000 milliLiter(s) (50 mL/Hr) IV Continuous <Continuous>    MEDICATIONS  (PRN):  dextrose Oral Gel 15 Gram(s) Oral once PRN Blood Glucose LESS THAN 70 milliGRAM(s)/deciliter    Home Medications:  Crestor 40 mg oral tablet: 1 tab(s) orally once a day (06 Jun 2020 10:56)  Diovan 320 mg oral tablet: 1 tab(s) orally once a day (06 Jun 2020 10:56)  Eliquis 2.5 mg oral tablet: 1 tab(s) orally 2 times a day (10 Aug 2022 23:02)  Lantus 100 units/mL subcutaneous solution: 40 unit(s) subcutaneous once a day (in the morning) (10 Aug 2022 23:01)  NovoLOG 100 units/mL subcutaneous solution: 3 unit(s) subcutaneous 3 times a day (before meals) (10 Aug 2022 23:01)  oxybutynin 15 mg/24 hr oral tablet, extended release: 1 tab(s) orally once a day (10 Aug 2022 23:03)  Toprol- mg oral tablet, extended release: 1 tab(s) orally once a day (06 Jun 2020 10:56)    Vital Signs Last 24 Hrs  T(C): 37.1 (13 Aug 2022 08:00), Max: 37.3 (12 Aug 2022 21:01)  T(F): 98.8 (13 Aug 2022 08:00), Max: 99.2 (12 Aug 2022 21:01)  HR: 88 (13 Aug 2022 11:51) (78 - 106)  BP: 123/82 (13 Aug 2022 11:51) (95/51 - 165/108)  BP(mean): 95 (13 Aug 2022 11:51) (65 - 131)  RR: 20 (13 Aug 2022 11:51) (19 - 23)  SpO2: 99% (13 Aug 2022 11:51) (95% - 100%)    Parameters below as of 13 Aug 2022 11:51  Patient On (Oxygen Delivery Method): room air      CAPILLARY BLOOD GLUCOSE      POCT Blood Glucose.: 129 mg/dL (13 Aug 2022 13:22)  POCT Blood Glucose.: 142 mg/dL (13 Aug 2022 07:48)  POCT Blood Glucose.: 120 mg/dL (13 Aug 2022 00:20)  POCT Blood Glucose.: 144 mg/dL (12 Aug 2022 20:41)  POCT Blood Glucose.: 177 mg/dL (12 Aug 2022 16:47)    LABS:                        13.7   12.35 )-----------( 121      ( 12 Aug 2022 05:39 )             40.4     08-12    139  |  106  |  33<H>  ----------------------------<  173<H>  4.8   |  18  |  1.4    Ca    9.3      12 Aug 2022 05:39  Mg     1.7     08-12    TPro  6.8  /  Alb  4.0  /  TBili  0.5  /  DBili  x   /  AST  23  /  ALT  11  /  AlkPhos  51  08-12    LIVER FUNCTIONS - ( 12 Aug 2022 05:39 )  Alb: 4.0 g/dL / Pro: 6.8 g/dL / ALK PHOS: 51 U/L / ALT: 11 U/L / AST: 23 U/L / GGT: x                   ABG - ( 12 Aug 2022 11:46 )  pH, Arterial: 7.38  pH, Blood: x     /  pCO2: 32    /  pO2: 81    / HCO3: 19    / Base Excess: -5.2  /  SaO2: 98.3                    Consultant Notes Reviewed:  [x ] YES  [ ] NO  Care Discussed with Consultants/Other Providers/ Housestaff [ x] YES  [ ] NO  Radiology, labs, new studies personally reviewed.

## 2022-08-14 LAB
ALBUMIN SERPL ELPH-MCNC: 4.2 G/DL — SIGNIFICANT CHANGE UP (ref 3.5–5.2)
ALP SERPL-CCNC: 48 U/L — SIGNIFICANT CHANGE UP (ref 30–115)
ALT FLD-CCNC: 15 U/L — SIGNIFICANT CHANGE UP (ref 0–41)
ANION GAP SERPL CALC-SCNC: 15 MMOL/L — HIGH (ref 7–14)
AST SERPL-CCNC: 30 U/L — SIGNIFICANT CHANGE UP (ref 0–41)
BASOPHILS # BLD AUTO: 0.04 K/UL — SIGNIFICANT CHANGE UP (ref 0–0.2)
BASOPHILS NFR BLD AUTO: 0.5 % — SIGNIFICANT CHANGE UP (ref 0–1)
BILIRUB SERPL-MCNC: 0.6 MG/DL — SIGNIFICANT CHANGE UP (ref 0.2–1.2)
BUN SERPL-MCNC: 42 MG/DL — HIGH (ref 10–20)
CALCIUM SERPL-MCNC: 9.3 MG/DL — SIGNIFICANT CHANGE UP (ref 8.5–10.1)
CHLORIDE SERPL-SCNC: 106 MMOL/L — SIGNIFICANT CHANGE UP (ref 98–110)
CO2 SERPL-SCNC: 23 MMOL/L — SIGNIFICANT CHANGE UP (ref 17–32)
CREAT SERPL-MCNC: 1.4 MG/DL — SIGNIFICANT CHANGE UP (ref 0.7–1.5)
EGFR: 36 ML/MIN/1.73M2 — LOW
EOSINOPHIL # BLD AUTO: 0.04 K/UL — SIGNIFICANT CHANGE UP (ref 0–0.7)
EOSINOPHIL NFR BLD AUTO: 0.5 % — SIGNIFICANT CHANGE UP (ref 0–8)
GLUCOSE BLDC GLUCOMTR-MCNC: 139 MG/DL — HIGH (ref 70–99)
GLUCOSE BLDC GLUCOMTR-MCNC: 161 MG/DL — HIGH (ref 70–99)
GLUCOSE BLDC GLUCOMTR-MCNC: 164 MG/DL — HIGH (ref 70–99)
GLUCOSE SERPL-MCNC: 171 MG/DL — HIGH (ref 70–99)
HCT VFR BLD CALC: 40.5 % — SIGNIFICANT CHANGE UP (ref 37–47)
HGB BLD-MCNC: 13.2 G/DL — SIGNIFICANT CHANGE UP (ref 12–16)
IMM GRANULOCYTES NFR BLD AUTO: 0.5 % — HIGH (ref 0.1–0.3)
LYMPHOCYTES # BLD AUTO: 1.2 K/UL — SIGNIFICANT CHANGE UP (ref 1.2–3.4)
LYMPHOCYTES # BLD AUTO: 13.8 % — LOW (ref 20.5–51.1)
MAGNESIUM SERPL-MCNC: 2 MG/DL — SIGNIFICANT CHANGE UP (ref 1.8–2.4)
MCHC RBC-ENTMCNC: 29.6 PG — SIGNIFICANT CHANGE UP (ref 27–31)
MCHC RBC-ENTMCNC: 32.6 G/DL — SIGNIFICANT CHANGE UP (ref 32–37)
MCV RBC AUTO: 90.8 FL — SIGNIFICANT CHANGE UP (ref 81–99)
MONOCYTES # BLD AUTO: 1.02 K/UL — HIGH (ref 0.1–0.6)
MONOCYTES NFR BLD AUTO: 11.7 % — HIGH (ref 1.7–9.3)
NEUTROPHILS # BLD AUTO: 6.37 K/UL — SIGNIFICANT CHANGE UP (ref 1.4–6.5)
NEUTROPHILS NFR BLD AUTO: 73 % — SIGNIFICANT CHANGE UP (ref 42.2–75.2)
NRBC # BLD: 0 /100 WBCS — SIGNIFICANT CHANGE UP (ref 0–0)
PHOSPHATE SERPL-MCNC: 2.8 MG/DL — SIGNIFICANT CHANGE UP (ref 2.1–4.9)
PLATELET # BLD AUTO: 121 K/UL — LOW (ref 130–400)
POTASSIUM SERPL-MCNC: 4.2 MMOL/L — SIGNIFICANT CHANGE UP (ref 3.5–5)
POTASSIUM SERPL-SCNC: 4.2 MMOL/L — SIGNIFICANT CHANGE UP (ref 3.5–5)
PROT SERPL-MCNC: 7.3 G/DL — SIGNIFICANT CHANGE UP (ref 6–8)
RBC # BLD: 4.46 M/UL — SIGNIFICANT CHANGE UP (ref 4.2–5.4)
RBC # FLD: 13.6 % — SIGNIFICANT CHANGE UP (ref 11.5–14.5)
SODIUM SERPL-SCNC: 144 MMOL/L — SIGNIFICANT CHANGE UP (ref 135–146)
WBC # BLD: 8.71 K/UL — SIGNIFICANT CHANGE UP (ref 4.8–10.8)
WBC # FLD AUTO: 8.71 K/UL — SIGNIFICANT CHANGE UP (ref 4.8–10.8)

## 2022-08-14 PROCEDURE — 99233 SBSQ HOSP IP/OBS HIGH 50: CPT

## 2022-08-14 PROCEDURE — 93306 TTE W/DOPPLER COMPLETE: CPT | Mod: 26

## 2022-08-14 RX ORDER — NYSTATIN CREAM 100000 [USP'U]/G
1 CREAM TOPICAL
Refills: 0 | Status: DISCONTINUED | OUTPATIENT
Start: 2022-08-14 | End: 2022-08-18

## 2022-08-14 RX ADMIN — Medication 5 MILLIGRAM(S): at 00:00

## 2022-08-14 RX ADMIN — Medication 5 MILLIGRAM(S): at 11:53

## 2022-08-14 RX ADMIN — ENOXAPARIN SODIUM 90 MILLIGRAM(S): 100 INJECTION SUBCUTANEOUS at 17:16

## 2022-08-14 RX ADMIN — ENOXAPARIN SODIUM 90 MILLIGRAM(S): 100 INJECTION SUBCUTANEOUS at 05:08

## 2022-08-14 RX ADMIN — Medication 5 MILLIGRAM(S): at 17:16

## 2022-08-14 RX ADMIN — Medication 20 MILLIGRAM(S): at 05:11

## 2022-08-14 RX ADMIN — Medication 5 MILLIGRAM(S): at 05:11

## 2022-08-14 RX ADMIN — NYSTATIN CREAM 1 APPLICATION(S): 100000 CREAM TOPICAL at 21:26

## 2022-08-14 NOTE — SWALLOW BEDSIDE ASSESSMENT ADULT - SLP PERTINENT HISTORY OF CURRENT PROBLEM
88 y.o F adm w/ slurred speech, R sided facial droop. Patient went to bed 8/9  around ~10PM at baseline. Patient woke  morning of 8/10 around ~11Am which is late for her. Daughter noted she was not herself,  checked her sugar and it was ~45. Noted the slurred speech and difficulty swallowing pills.  CT perfusion: Apparent perfusion abnormality (penumbra) within the  brainstem and left posterior fossa with a total Tmax > 6s volume of 15 cc. MRI 8/11 Motion degraded incomplete examination demonstrating small foci of acute infarct in the left temporal and parietal lobes. Extensive chronic microsphere ischemic changes.   PM history of CAD, HTN, diabetes, AFib on Eliquis.

## 2022-08-14 NOTE — PHYSICAL THERAPY INITIAL EVALUATION ADULT - GENERAL OBSERVATIONS, REHAB EVAL
[id number] am. 87 y/o RT-handed F with slurred speech (new on admission) received in bed, left in reclined b/s chair, nad, + tele, son and RN present. vitals: in bed 158/82 99 97% on RA, sitting at /75 121 100% on RA (mildly dizzy); reclined in chair 117/69 99 100% on RA. pt transferred with max A and ambulated 4 ft with unsteady gait, + buckling. spoke to RN, nursing will transfer pt back to bed with assist x 2 or with a Priti lift.

## 2022-08-14 NOTE — PROGRESS NOTE ADULT - ASSESSMENT
87 yo F with history of CAD, HTN, diabetes, AFib on Eliquis presented to ED with CC of acute severe dysarthria/dysphagia/R facial droop noticed by daughter at which time blood sugar was 45. In ED, , not thrombolytic candidate on Eliquis.  Not thrombectomy candidate- no evidence of LVO.    #Acute ischemic strokes / Hypoglycemia, ?UTI: Severe dysarthria/dysphagia/R facial droop  - CTH: negative for acute findings. CTA: no LVO. CTP: apparent perfusion abnormality (penumbra) within the brainstem and left posterior fossa with a total Tmax > 6s volume of 15cc. This may be artifactual  - Q4 neurochecks, vital signs  - Keep patient strictly NPO, until speech clears  - Permissive HTN  - Gentle hydration @ 60 cc/hr NS while NPO  - therapeutic Lovenox for now  - c/w ASA and statin   - A1C 6.3, Chol 154, LDL 83, Triglycerides 91  -  MRI brain non contrast < from: MR Head No Cont (08.11.22 @ 17:30) >  Motion degraded incomplete examination demonstrating small foci of acute infarct in the left temporal and parietal lobes.  Extensive chronic microsphere ischemic changes.  < end of copied text >  - Pending TTE  - PT/OT/ST/rehab  - Ordered B12  - hold all Insulin products. FS q4-q6  - treat possible UTI  - Cr better and might need ELiquis 5 BID on d/c    #Acute hypoxemic resp failure  -CXR w/ b/l opacities (?effusions, atelectasis, asp-n pneumonitis)  -8/ 12 s/p Lasix 20mg IVP x1, then reacess  -asp-n precautions  -on RA since 8/13  -will use Lasix PRN    #Dysphagia  -S&S f/u to start feeds  - if on PO, d/c IVFs    #?Tongue swelling  - scoped by ENT: no evidence of angioedema. Significant secretions and pt appears not to be able to clear secretions  -suction pt  -resolving Sx    #Possible UTI  - ceftriaxone 1000mg IV daily    #Chronic AFib on Eliquis   - Lovenox as above  - metoprolol 5-10mg IV push Q6 while NPO w/ holding parameters  - Cr better and might need ELiquis 5 BID on d/c    #HTN  - on Valsartan 320mg qd at home   - Ordered metoprolol 5mg IV push Q6, hold for SBP <110, HR <70     #Dyslipidemia   - Chol 154, LDL 83, Triglycerides 91  - Continue Atorvastatin 40mg PO QHS once speech/swallow clears    #DM w/ hypoglycemia on admission  - Hypoglycemic on fingersticks requiring 1/2 D5  - Monitor FS  - On Lantus 40U in the AM and lispro 3U TID at home  - Hold all insulin for now given hypoglycemia    - Ordered glucagon 1mg IM PRN for BG <70   -A1c=6.3  -lower dose of Insulin on d/c    #MARCIN - Resolved, Cr 1.4 - 8/11/22  - baseline: 1.3 6/2020  - continue ivf: iv ns    #Misc  - DVT prophylaxis: Eliquis or Lovenox  - GI prophylaxis: Not indicated   - Diet: NPO   - Activity: IAT   - Disposition: stroke unit    High risk pt. Px is guarded    #Progress Note Handoff  Pending: Consults____Clinical improvement and stability__x__TTE, S&S_____PT____x____  Pt/Family discussion: Pt/son informed and agree with the current plan  Disposition: Home______/SNF_______/4A______/To be determined____x____    My note supersedes the residents note should a discrepancy arise.    Chart and notes personally reviewed.  Care Discussed with Consultants/Other Providers/ Housestaff [ x] YES [ ] NO   Radiology, labs, old records personally reviewed.    discussed w/ housestaff, nursing, case management, neuro team, son

## 2022-08-14 NOTE — SWALLOW BEDSIDE ASSESSMENT ADULT - SWALLOW EVAL: FUNCTIONAL LEVEL AT TIME OF EVAL
awake, confused, open mouth posture, some audible secretions today, dry cough when cued to clear, some simple direction following, +triggering of swallow today during PO trials awake, confused, mod-severe dysarthria, open mouth posture, some audible secretions today, dry cough when cued to clear, some simple direction following, +triggering of swallow today during most PO trials

## 2022-08-14 NOTE — PROGRESS NOTE ADULT - ASSESSMENT
87 yo F with history of CAD, HTN, diabetes, AFib on Eliquis presented to ED with CC of acute severe dysarthria/dysphagia/R facial droop possibly secondary to  multiple punctate infarcts in the L MCA territory involving the temporal, parietal lobes most likely cardioembolic in nature    #Left Temporal/Parietal punctate infarcts  - Initial NIHSS 4 for dysarthria, facial and LOC  - CTH: negative for acute findings  - CTA: no LVO  - CTP: apparent perfusion abnormality (penumbra) within the brainstem and left posterior fossa with a total Tmax > 6s volume of 15cc. This may be artifactual  - MRI:  2 small foci of restricted diffusion involving the left temporal and parietal lobe compatible with acute ischemic infarct. Confluent FLAIR signal in the subcortical and periventricular white matter suggestive of extensive chronic microvascular changes.  - Q4 neurochecks, vital signs  - s/p Speech eval: strict NPO for now  - s/p ENT bed-side scope: no airway obstruction, patent airway  - Permissive HTN x 24 hours keep < 220/110  - Gentle hydration @ 60 cc/hr NS  - Will dc on apixaban 5mg PO BID, patient came in on 2.5 BID  - c/w statin   - A1C 6.3, Chol 154, LDL 83, Triglycerides 91, B12 341  - Pending TTE  - Pending PT/OT/ST/rehab  - Pending urine culture given pyuria   - Continue ceftriaxone 1000mg IV daily   - Obtain ABG  - will need NGT today     #R/O Pleural Effusion  - CXR (pending final read); showing possible small effusions  - Ordered Lasix 20mg IV daily   - Aspiration precautions    #AFib on Eliquis   - c/w lovenox 90mg BID for now, will switch to eliquis once patient able to swallow  - Increase to metoprolol 5mg IV push Q6 with holding parameters    #HTN  - on Valsartan 320mg qd at home   - Increase to metoprolol 5mg IV push Q6, hold for SBP <110, HR <70     #Dyslipidemia   - Chol 154, LDL 83, Triglycerides 91  - Continue Atorvastatin 80mg PO QHS once speech/swallow clears    #DM   - Hypoglycemic on fingersticks requiring 1/2 D5  - Monitor FS  - On Lantus 40U in the AM and lispro 3U TID at home  - Hold insulin for now given hypoglycemia   - Discontinue ISS for now given hypoglycemia   - Ordered glucagon 1mg IM PRN for BG <70     #MARCIN - Resolved, Cr 1.4 - 8/11/22  - baseline: 1.3 6/2020  - continue ivf: iv ns  - nephro eval if no improvement    #Misc  - DVT prophylaxis: Eliquis   - GI prophylaxis: Not indicated   - Diet: NPO   - Activity: IAT   - Disposition: 3E from home    Pending: TTE,    89 yo F with history of CAD, HTN, diabetes, AFib on Eliquis presented to ED with CC of acute severe dysarthria/dysphagia/R facial droop possibly secondary to  multiple punctate infarcts in the L MCA territory involving the temporal, parietal lobes most likely cardioembolic in nature    #Left Temporal/Parietal punctate infarcts  - Initial NIHSS 4 for dysarthria, facial and LOC  - CTH: negative for acute findings  - CTA: no LVO  - CTP: apparent perfusion abnormality (penumbra) within the brainstem and left posterior fossa with a total Tmax > 6s volume of 15cc. This may be artifactual  - MRI:  2 small foci of restricted diffusion involving the left temporal and parietal lobe compatible with acute ischemic infarct. Confluent FLAIR signal in the subcortical and periventricular white matter suggestive of extensive chronic microvascular changes.  - Q4 neurochecks, vital signs  - s/p Speech eval: strict NPO for now  - s/p ENT bed-side scope: no airway obstruction, patent airway  - Permissive HTN x 24 hours keep < 220/110  - Gentle hydration @ 60 cc/hr NS  - Will dc on apixaban 5mg PO BID, patient came in on 2.5 BID  - c/w statin   - A1C 6.3, Chol 154, LDL 83, Triglycerides 91, B12 341  - Pending TTE  - Pending PT/OT/ST/rehab  - Pending urine culture given pyuria   - Continue ceftriaxone 1000mg IV daily   - Obtain ABG  - NGT if pt amenable today    #hospital delirium  - currently much improved this AM  - if agitated tonight, may give Haldol 1 mg IM PRN  - frequent reorientation    #R/O Pleural Effusion  - CXR (pending final read); showing possible small effusions  - Ordered Lasix 20mg IV daily   - Aspiration precautions    #AFib on Eliquis   - c/w lovenox 90mg BID for now, will switch to eliquis once patient able to swallow  - Increase to metoprolol 5mg IV push Q6 with holding parameters    #HTN  - on Valsartan 320mg qd at home   - Increase to metoprolol 5mg IV push Q6, hold for SBP <110, HR <70     #Dyslipidemia   - Chol 154, LDL 83, Triglycerides 91  - Continue Atorvastatin 80mg PO QHS once speech/swallow clears    #DM   - Hypoglycemic on fingersticks requiring 1/2 D5  - Monitor FS  - On Lantus 40U in the AM and lispro 3U TID at home  - Hold insulin for now given hypoglycemia   - Discontinue ISS for now given hypoglycemia   - Ordered glucagon 1mg IM PRN for BG <70     #MARCIN - Resolved, Cr 1.4 - 8/11/22  - baseline: 1.3 6/2020  - continue ivf: iv ns  - nephro eval if no improvement    #Misc  - DVT prophylaxis: Eliquis   - GI prophylaxis: Not indicated   - Diet: NPO   - Activity: IAT   - Disposition: 3E from home    Pending: TTE,

## 2022-08-14 NOTE — SWALLOW BEDSIDE ASSESSMENT ADULT - COMMENTS
Pt w/ waxing/waning cognitive status.  Son at bedside reports less alert/more confused today than yesterday at this time.  Per ENT FFL on 8/12: "FFL: no masses or lesions noted to NP/OP/HP. + thick dry secretions noted to supraglottic larynx, pt not triggering to swallow, unable to clear secretions. surface of epiglottis very dry, no edema. + patent laryngeal and tracheal airway. TVC/FVC mobile and intact, although patient not following commands to fully assess vocal cord movement"  \ Pt w/ waxing/waning cognitive status.  Son at bedside reports less alert/more confused today than yesterday at this time.  Per ENT FFL on 8/12: "FFL: no masses or lesions noted to NP/OP/HP. + thick dry secretions noted to supraglottic larynx, pt not triggering to swallow, unable to clear secretions. surface of epiglottis very dry, no edema. + patent laryngeal and tracheal airway. TVC/FVC mobile and intact, although patient not following commands to fully assess vocal cord movement"

## 2022-08-14 NOTE — PROGRESS NOTE ADULT - SUBJECTIVE AND OBJECTIVE BOX
Neurology Stroke Progress Note    INTERVAL HPI/OVERNIGHT EVENTS:  Patient agitated overnight and went in a fib, ordered haloperidol 2mg and an additional metoprolol 5mg IVP.   Patient seen and examined at bedside. She was resting comfortably and did not appear in acute distress.     MEDICATIONS  (STANDING):  atorvastatin 80 milliGRAM(s) Oral at bedtime  dextrose 5%. 1000 milliLiter(s) (50 mL/Hr) IV Continuous <Continuous>  dextrose 5%. 1000 milliLiter(s) (100 mL/Hr) IV Continuous <Continuous>  dextrose 50% Injectable 25 Gram(s) IV Push once  dextrose 50% Injectable 12.5 Gram(s) IV Push once  dextrose 50% Injectable 25 Gram(s) IV Push once  dextrose 50% Injectable 12.5 Gram(s) IV Push once  enoxaparin Injectable 90 milliGRAM(s) SubCutaneous every 12 hours  furosemide   Injectable 20 milliGRAM(s) IV Push daily  glucagon  Injectable 1 milliGRAM(s) IntraMuscular once  metoprolol tartrate Injectable 5 milliGRAM(s) IV Push every 6 hours  sodium chloride 0.9%. 1000 milliLiter(s) (50 mL/Hr) IV Continuous <Continuous>    MEDICATIONS  (PRN):  dextrose Oral Gel 15 Gram(s) Oral once PRN Blood Glucose LESS THAN 70 milliGRAM(s)/deciliter    Allergies    codeine (Unknown)    Intolerances      Vital Signs Last 24 Hrs  T(C): 37 (14 Aug 2022 04:01), Max: 37.3 (13 Aug 2022 23:16)  T(F): 98.6 (14 Aug 2022 04:01), Max: 99.1 (13 Aug 2022 23:16)  HR: 92 (14 Aug 2022 04:01) (88 - 128)  BP: 145/78 (14 Aug 2022 04:01) (123/82 - 164/113)  BP(mean): 110 (14 Aug 2022 04:01) (95 - 137)  RR: 19 (14 Aug 2022 04:01) (19 - 21)  SpO2: 96% (14 Aug 2022 04:01) (95% - 99%)    Parameters below as of 14 Aug 2022 04:01  Patient On (Oxygen Delivery Method): room air        Physical exam:  Neurologic:  -Mental status: Awake, alert, oriented to person, place, but not time. dysarthric with mild aphasia, Follows commands. Attention/concentration intact. Fund of knowledge appropriate.  -Cranial nerves:   II: Visual fields are full to confrontation.  III, IV, VI: Extraocular movements are intact without nystagmus. Pupils equally round and reactive to light  V:  Facial sensation V1-V3 equal and intact   VII: Face is symmetric with normal eye closure and smile  VIII: Hearing is bilaterally intact to finger rub  IX, X: Uvula is midline and soft palate rises symmetrically  XI: Head turning and shoulder shrug are intact.  XII: Tongue protrudes midline  Motor: Normal bulk and tone. No pronator drift. Strength bilateral upper extremity 5/5, bilateral lower extremities 5/5. left UE limited due shoulder pain   Rapid alternating movements intact and symmetric  Sensation: Intact to light touch bilaterally. No neglect or extinction on double simultaneous testing.  Coordination: No dysmetria on finger-to-nose and heel-to-shin bilaterally      LABS:                        13.6   10.44 )-----------( 127      ( 13 Aug 2022 17:18 )             42.4     08-13    142  |  106  |  42<H>  ----------------------------<  122<H>  4.6   |  22  |  1.4    Ca    9.4      13 Aug 2022 17:18    TPro  7.4  /  Alb  4.1  /  TBili  0.6  /  DBili  x   /  AST  21  /  ALT  13  /  AlkPhos  52  08-13          RADIOLOGY & ADDITIONAL TESTS:

## 2022-08-14 NOTE — PROGRESS NOTE ADULT - SUBJECTIVE AND OBJECTIVE BOX
GARY NUNEZ  88y  Female    Patient is a 88y old  Female who presents with a chief complaint of slurred speech and right sided facial droop. (10 Aug 2022 22:15)      HPI:  This is a 88 F hx of CAD, Diabetes, Hyperlipemia, Hypertension, Afib on Eliquis presents to ED BIBA from home for slurred speech and right sided facial droop. Patient went to bed last night around ~10PM at baseline. Patient woke up this morning around ~11Am which is late for her. Daughter noted she was not herself,  checked her sugar and it was ~45. Noted the slurred speech. Also noted patient had trouble swallowing. Patient took her Eliquis dose this morning.  No history of strokes.     ED vitals T(F): 97.5, HR: 75, BP: 163/85, RR: 18, SpO2: 99%   Labs show Hb 12.4, HCT 36.7, WBC 6.65, , Trops neg x1, TPro  7.3  /  Alb  4.3  /  TBili  0.3  /  DBili  x   /  AST  14  /  ALT  11  /  AlkPhos  48  08-10  141  |  106  |  36<H>  ----------------------------<  104<H>  4.8   |  24  |  1.6<H>    Ca    9.7      10 Aug 2022 15:35    CT Angio Brain Stroke Protocol  w/ IV Cont, No evidence of major vascular stenosis or occlusion. Scattered mild calcific plaque.  CT perfusion: Apparent perfusion abnormality (penumbra) within the brainstem and left posterior fossa with a total Tmax > 6s volume of 15   CT Brain Stroke Protocol shows no evidence of acute intracranial hemorrhage or large territory infarct.  Chronic-appearing left cerebellar infarct (new since 2010). Moderate/severe chronic microvascular changes and parenchymal atrophy (moderately progressed since 2010).           (10 Aug 2022 22:15)    S: Patient was examined and seen at bedside. This morning pt is resting in bed. Son#2 at bedside. Had an episode of sundowning last night.  Currently alert but confused. HR is better controlled. No specific complaints  ROS: denies CP, SOB, pain    PAST MEDICAL & SURGICAL HISTORY:  Diabetes      Hypertension      Hyperlipemia      CAD (coronary artery disease) of artery bypass graft      S/P total knee replacement      History of total hip replacement, bilateral        SOCIAL HISTORY:  Tobacco: denies  Illicit drugs: denies  Alcohol: social  Family history reviewed and otherwise non-contributory No clotting disorders, CVAs at early age.  ALLERGIES: codeine    General: NAD, AA0x1+. Looks stated age. +Mod Dysarthria  HEENT: clean oropharynx, EOMI, no LAD;   Neck: trachea midline, no thyromegaly  CV: nl S1 S2; no m/r/g  Resp: decreased breath sounds at base  GI: NT/ND/S +BS  MS: no clubbing/cyanosis/edema, +pulses  Neuro: moves all extremities symmetrically but generalized weakness  Skin: no rashes, nl turgor      tele: afib, nonspecific changes (on my own evaluation of tele monitor)              MEDICATIONS  (STANDING):  atorvastatin 80 milliGRAM(s) Oral at bedtime  dextrose 5%. 1000 milliLiter(s) (50 mL/Hr) IV Continuous <Continuous>  dextrose 5%. 1000 milliLiter(s) (100 mL/Hr) IV Continuous <Continuous>  dextrose 50% Injectable 25 Gram(s) IV Push once  dextrose 50% Injectable 12.5 Gram(s) IV Push once  dextrose 50% Injectable 25 Gram(s) IV Push once  dextrose 50% Injectable 12.5 Gram(s) IV Push once  enoxaparin Injectable 90 milliGRAM(s) SubCutaneous every 12 hours  furosemide   Injectable 20 milliGRAM(s) IV Push daily  glucagon  Injectable 1 milliGRAM(s) IntraMuscular once  metoprolol tartrate Injectable 5 milliGRAM(s) IV Push every 6 hours  sodium chloride 0.9%. 1000 milliLiter(s) (50 mL/Hr) IV Continuous <Continuous>    MEDICATIONS  (PRN):  dextrose Oral Gel 15 Gram(s) Oral once PRN Blood Glucose LESS THAN 70 milliGRAM(s)/deciliter    Home Medications:  Crestor 40 mg oral tablet: 1 tab(s) orally once a day (06 Jun 2020 10:56)  Diovan 320 mg oral tablet: 1 tab(s) orally once a day (06 Jun 2020 10:56)  Eliquis 2.5 mg oral tablet: 1 tab(s) orally 2 times a day (10 Aug 2022 23:02)  Lantus 100 units/mL subcutaneous solution: 40 unit(s) subcutaneous once a day (in the morning) (10 Aug 2022 23:01)  NovoLOG 100 units/mL subcutaneous solution: 3 unit(s) subcutaneous 3 times a day (before meals) (10 Aug 2022 23:01)  oxybutynin 15 mg/24 hr oral tablet, extended release: 1 tab(s) orally once a day (10 Aug 2022 23:03)  Toprol- mg oral tablet, extended release: 1 tab(s) orally once a day (06 Jun 2020 10:56)    Vital Signs Last 24 Hrs  T(C): 37 (14 Aug 2022 11:47), Max: 37.3 (13 Aug 2022 23:16)  T(F): 98.6 (14 Aug 2022 11:47), Max: 99.1 (13 Aug 2022 23:16)  HR: 94 (14 Aug 2022 11:47) (92 - 128)  BP: 117/69 (14 Aug 2022 11:47) (117/69 - 165/70)  BP(mean): 86 (14 Aug 2022 11:47) (86 - 137)  RR: 18 (14 Aug 2022 11:47) (18 - 21)  SpO2: 98% (14 Aug 2022 11:47) (95% - 100%)    Parameters below as of 14 Aug 2022 11:47  Patient On (Oxygen Delivery Method): room air      CAPILLARY BLOOD GLUCOSE      POCT Blood Glucose.: 161 mg/dL (14 Aug 2022 05:18)  POCT Blood Glucose.: 159 mg/dL (13 Aug 2022 23:30)  POCT Blood Glucose.: 129 mg/dL (13 Aug 2022 13:22)    LABS:                        13.2   8.71  )-----------( 121      ( 14 Aug 2022 08:22 )             40.5     08-14    144  |  106  |  42<H>  ----------------------------<  171<H>  4.2   |  23  |  1.4    Ca    9.3      14 Aug 2022 08:22  Phos  2.8     08-14  Mg     2.0     08-14    TPro  7.3  /  Alb  4.2  /  TBili  0.6  /  DBili  x   /  AST  30  /  ALT  15  /  AlkPhos  48  08-14    LIVER FUNCTIONS - ( 14 Aug 2022 08:22 )  Alb: 4.2 g/dL / Pro: 7.3 g/dL / ALK PHOS: 48 U/L / ALT: 15 U/L / AST: 30 U/L / GGT: x                           Culture - Urine (collected 11 Aug 2022 12:40)  Source: Clean Catch Clean Catch (Midstream)  Final Report (13 Aug 2022 17:16):    >=3 organisms. Probable collection contamination.      Consultant Notes Reviewed:  [x ] YES  [ ] NO  Care Discussed with Consultants/Other Providers/ Housestaff [ x] YES  [ ] NO  Radiology, labs, new studies personally reviewed.

## 2022-08-15 LAB
ALBUMIN SERPL ELPH-MCNC: 3.8 G/DL — SIGNIFICANT CHANGE UP (ref 3.5–5.2)
ALP SERPL-CCNC: 47 U/L — SIGNIFICANT CHANGE UP (ref 30–115)
ALT FLD-CCNC: 14 U/L — SIGNIFICANT CHANGE UP (ref 0–41)
ANION GAP SERPL CALC-SCNC: 21 MMOL/L — HIGH (ref 7–14)
AST SERPL-CCNC: 30 U/L — SIGNIFICANT CHANGE UP (ref 0–41)
BASOPHILS # BLD AUTO: 0.04 K/UL — SIGNIFICANT CHANGE UP (ref 0–0.2)
BASOPHILS NFR BLD AUTO: 0.5 % — SIGNIFICANT CHANGE UP (ref 0–1)
BILIRUB SERPL-MCNC: 0.5 MG/DL — SIGNIFICANT CHANGE UP (ref 0.2–1.2)
BUN SERPL-MCNC: 41 MG/DL — HIGH (ref 10–20)
CALCIUM SERPL-MCNC: 8.9 MG/DL — SIGNIFICANT CHANGE UP (ref 8.5–10.1)
CHLORIDE SERPL-SCNC: 105 MMOL/L — SIGNIFICANT CHANGE UP (ref 98–110)
CO2 SERPL-SCNC: 16 MMOL/L — LOW (ref 17–32)
CREAT SERPL-MCNC: 1.4 MG/DL — SIGNIFICANT CHANGE UP (ref 0.7–1.5)
EGFR: 36 ML/MIN/1.73M2 — LOW
EOSINOPHIL # BLD AUTO: 0.19 K/UL — SIGNIFICANT CHANGE UP (ref 0–0.7)
EOSINOPHIL NFR BLD AUTO: 2.2 % — SIGNIFICANT CHANGE UP (ref 0–8)
GLUCOSE BLDC GLUCOMTR-MCNC: 111 MG/DL — HIGH (ref 70–99)
GLUCOSE BLDC GLUCOMTR-MCNC: 156 MG/DL — HIGH (ref 70–99)
GLUCOSE BLDC GLUCOMTR-MCNC: 164 MG/DL — HIGH (ref 70–99)
GLUCOSE BLDC GLUCOMTR-MCNC: 171 MG/DL — HIGH (ref 70–99)
GLUCOSE SERPL-MCNC: 139 MG/DL — HIGH (ref 70–99)
HCT VFR BLD CALC: 42.5 % — SIGNIFICANT CHANGE UP (ref 37–47)
HGB BLD-MCNC: 13.6 G/DL — SIGNIFICANT CHANGE UP (ref 12–16)
IMM GRANULOCYTES NFR BLD AUTO: 0.5 % — HIGH (ref 0.1–0.3)
LACTATE SERPL-SCNC: 1.7 MMOL/L — SIGNIFICANT CHANGE UP (ref 0.7–2)
LYMPHOCYTES # BLD AUTO: 1.6 K/UL — SIGNIFICANT CHANGE UP (ref 1.2–3.4)
LYMPHOCYTES # BLD AUTO: 18.8 % — LOW (ref 20.5–51.1)
MAGNESIUM SERPL-MCNC: 2.1 MG/DL — SIGNIFICANT CHANGE UP (ref 1.8–2.4)
MCHC RBC-ENTMCNC: 29.8 PG — SIGNIFICANT CHANGE UP (ref 27–31)
MCHC RBC-ENTMCNC: 32 G/DL — SIGNIFICANT CHANGE UP (ref 32–37)
MCV RBC AUTO: 93 FL — SIGNIFICANT CHANGE UP (ref 81–99)
MONOCYTES # BLD AUTO: 1.16 K/UL — HIGH (ref 0.1–0.6)
MONOCYTES NFR BLD AUTO: 13.6 % — HIGH (ref 1.7–9.3)
NEUTROPHILS # BLD AUTO: 5.49 K/UL — SIGNIFICANT CHANGE UP (ref 1.4–6.5)
NEUTROPHILS NFR BLD AUTO: 64.4 % — SIGNIFICANT CHANGE UP (ref 42.2–75.2)
NRBC # BLD: 0 /100 WBCS — SIGNIFICANT CHANGE UP (ref 0–0)
PHOSPHATE SERPL-MCNC: 3 MG/DL — SIGNIFICANT CHANGE UP (ref 2.1–4.9)
PLATELET # BLD AUTO: 119 K/UL — LOW (ref 130–400)
POTASSIUM SERPL-MCNC: 4.7 MMOL/L — SIGNIFICANT CHANGE UP (ref 3.5–5)
POTASSIUM SERPL-SCNC: 4.7 MMOL/L — SIGNIFICANT CHANGE UP (ref 3.5–5)
PROT SERPL-MCNC: 7 G/DL — SIGNIFICANT CHANGE UP (ref 6–8)
RBC # BLD: 4.57 M/UL — SIGNIFICANT CHANGE UP (ref 4.2–5.4)
RBC # FLD: 13.7 % — SIGNIFICANT CHANGE UP (ref 11.5–14.5)
SODIUM SERPL-SCNC: 142 MMOL/L — SIGNIFICANT CHANGE UP (ref 135–146)
WBC # BLD: 8.52 K/UL — SIGNIFICANT CHANGE UP (ref 4.8–10.8)
WBC # FLD AUTO: 8.52 K/UL — SIGNIFICANT CHANGE UP (ref 4.8–10.8)

## 2022-08-15 PROCEDURE — 99233 SBSQ HOSP IP/OBS HIGH 50: CPT

## 2022-08-15 PROCEDURE — 99232 SBSQ HOSP IP/OBS MODERATE 35: CPT

## 2022-08-15 PROCEDURE — 71045 X-RAY EXAM CHEST 1 VIEW: CPT | Mod: 26

## 2022-08-15 RX ADMIN — Medication 5 MILLIGRAM(S): at 16:59

## 2022-08-15 RX ADMIN — Medication 20 MILLIGRAM(S): at 05:14

## 2022-08-15 RX ADMIN — Medication 5 MILLIGRAM(S): at 06:00

## 2022-08-15 RX ADMIN — ATORVASTATIN CALCIUM 80 MILLIGRAM(S): 80 TABLET, FILM COATED ORAL at 21:15

## 2022-08-15 RX ADMIN — Medication 5 MILLIGRAM(S): at 11:52

## 2022-08-15 RX ADMIN — SODIUM CHLORIDE 50 MILLILITER(S): 9 INJECTION INTRAMUSCULAR; INTRAVENOUS; SUBCUTANEOUS at 17:01

## 2022-08-15 RX ADMIN — ENOXAPARIN SODIUM 90 MILLIGRAM(S): 100 INJECTION SUBCUTANEOUS at 05:14

## 2022-08-15 RX ADMIN — NYSTATIN CREAM 1 APPLICATION(S): 100000 CREAM TOPICAL at 05:15

## 2022-08-15 RX ADMIN — NYSTATIN CREAM 1 APPLICATION(S): 100000 CREAM TOPICAL at 17:00

## 2022-08-15 RX ADMIN — ENOXAPARIN SODIUM 90 MILLIGRAM(S): 100 INJECTION SUBCUTANEOUS at 17:00

## 2022-08-15 NOTE — SWALLOW BEDSIDE ASSESSMENT ADULT - CONSISTENCIES ADMINISTERED
droplets of thin liquids via tsp, 1/4 tsp of puree/thin liquid/pureed
thin liquid/pureed
thin liquid/mildly thick/pureed
ice chips x2, thins via tsp x3, thins via small controlled cup sips x4, thins via larger cup sips x1/thin liquid/pureed

## 2022-08-15 NOTE — SWALLOW BEDSIDE ASSESSMENT ADULT - ASR SWALLOW DENTITION
edentulous, does not have dentures
no upper dentition observed , however view was obstructed/incomplete
edentulous upper, incomplete bottom

## 2022-08-15 NOTE — OCCUPATIONAL THERAPY INITIAL EVALUATION ADULT - GENERAL OBSERVATIONS, REHAB EVAL
Pt encountered semi reclined in bed, family at bedside. Agreeable to OT IE. + TELE, + PULSE OX,+ 2 liters 02 via NC,+ BP cuff LUE, IV lcoked per RN. Pt noted with limited mobility LUE, chrronic. Pt with generalized weakness and fatigue, + postural lean to L when seated EOB and standing. Pt assisted to chair with RN present. Pt left in b/s chair,  in NAD. 02 and monitors in tact.

## 2022-08-15 NOTE — PROVIDER CONTACT NOTE (CHANGE IN STATUS NOTIFICATION) - ASSESSMENT
/80, map 92, HR 87, POx 100% 3 L NC. NS @ 60 cc/hr in progress.
B/P 127/92, P 128 on monitor, T 98.2, POx 98 RA
Pt is upset with daughter. Asymptomatic. No c/o. Highest 's.
B/P re-checked 101/83, map 89, P 94, T 99.2 rectally, POx 100% 3 LNC. .   NIH done at bedside by RN, dr Edgar and dr Randolph. NIHSS score 21, GCS 7.

## 2022-08-15 NOTE — SWALLOW BEDSIDE ASSESSMENT ADULT - ORAL PREPARATORY PHASE
left side/Anterior loss of bolus
reduced spoon stripping/Reduced oral grading
Within functional limits
In later trials, Pt with decreased spoon stripping , Pt fell asleep with spoon/ trial puree in oral cavity, anterior loss of water via cup sip, decreased labial seal to cup/Anterior loss of bolus

## 2022-08-15 NOTE — SWALLOW BEDSIDE ASSESSMENT ADULT - PHARYNGEAL PHASE
Pt made no attempt to manipulate bolus, Not appropriate for PO trials , SLP removed the small quantity of applesauce provided- no swallow occurred.
grimace; +cough w/ thins/Wet vocal quality post oral intake/Throat clear post oral intake
Pt with throat clear x1 after small cup sip thins and prolonged coughing fit after single trial of larger cup sip. Difficulty catching breath after however remained stable./Cough post oral intake/Throat clear post oral intake
laryngeal pumping observed ,/Delayed pharyngeal swallow/Wet vocal quality post oral intake/Multiple swallows

## 2022-08-15 NOTE — SWALLOW BEDSIDE ASSESSMENT ADULT - COMMENTS
Pt w/ waxing/waning cognitive status.  Son at bedside reports less alert/more confused today than yesterday at this time.  Per ENT FFL on 8/12: "FFL: no masses or lesions noted to NP/OP/HP. + thick dry secretions noted to supraglottic larynx, pt not triggering to swallow, unable to clear secretions. surface of epiglottis very dry, no edema. + patent laryngeal and tracheal airway. TVC/FVC mobile and intact, although patient not following commands to fully assess vocal cord movement"

## 2022-08-15 NOTE — PROGRESS NOTE ADULT - ATTENDING COMMENTS
Patient seen and examined and agree with above except as noted.  Patients history, notes, labs, imaging, vitals and meds reviewed personally.  Patient doing well no new complaints.  Speech slightly improved   Awaiting repeat Speech and swallow evaluation    Plan as above  PEG discussed however will wait to see if swallowing improves

## 2022-08-15 NOTE — OCCUPATIONAL THERAPY INITIAL EVALUATION ADULT - LEVEL OF CONSCIOUSNESS, OT EVAL
awake, dysarthric, min confusion at times. Pt lethargic however became more alert as session progressed

## 2022-08-15 NOTE — OCCUPATIONAL THERAPY INITIAL EVALUATION ADULT - PERTINENT HX OF CURRENT PROBLEM, REHAB EVAL
Pt is a R hand dominant female admitted 8/10 with slurred speech, R facial droop, and dysphagia. Pt found with L temporal/ parietal punctate infarcts. Hospital stay c/b hospital delirium and possible pleural effusions

## 2022-08-15 NOTE — SWALLOW BEDSIDE ASSESSMENT ADULT - SLP PERTINENT HISTORY OF CURRENT PROBLEM
88 y.o F adm w/ slurred speech, R sided facial droop. Patient went to bed last night around ~10PM at baseline. Patient woke up this morning around ~11Am which is late for her. Daughter noted she was not herself,  checked her sugar and it was ~45. Noted the slurred speech and difficulty swallowing pills.  CT perfusion: Apparent perfusion abnormality (penumbra) within the  brainstem and left posterior fossa with a total Tmax > 6s volume of 15 cc. MRI pending.   PM history of CAD, HTN, diabetes, AFib on Eliquis

## 2022-08-15 NOTE — SWALLOW BEDSIDE ASSESSMENT ADULT - SWALLOW EVAL: ORAL MUSCULATURE
unable to assess due to poor participation/comprehension
Pt minimally participated - better than yesterday
ENT was consulted for lingual swelling, which appears significantly reduced today,- not obstructive view of oral cavity/generally intact

## 2022-08-15 NOTE — CHART NOTE - NSCHARTNOTEFT_GEN_A_CORE
NUTRITION SUPPORT TEAM  -  CONSULT NOTE     ADMISSION HPI:  This is a 88 F hx of CAD, Diabetes, Hyperlipemia, Hypertension, Afib on Eliquis presents to ED BIBA from home for slurred speech and right sided facial droop. Patient went to bed last night around ~10PM at baseline. Patient woke up this morning around ~11Am which is late for her. Daughter noted she was not herself,  checked her sugar and it was ~45. Noted the slurred speech. Also noted patient had trouble swallowing. Patient took her Eliquis dose this morning.  No history of strokes.     ED vitals T(F): 97.5, HR: 75, BP: 163/85, RR: 18, SpO2: 99%   Labs show Hb 12.4, HCT 36.7, WBC 6.65, , Trops neg x1, TPro  7.3  /  Alb  4.3  /  TBili  0.3  /  DBili  x   /  AST  14  /  ALT  11  /  AlkPhos  48  08-10  141  |  106  |  36<H>  ----------------------------<  104<H>  4.8   |  24  |  1.6<H>    Ca    9.7      10 Aug 2022 15:35    CT Angio Brain Stroke Protocol  w/ IV Cont, No evidence of major vascular stenosis or occlusion. Scattered mild calcific plaque.  CT perfusion: Apparent perfusion abnormality (penumbra) within the brainstem and left posterior fossa with a total Tmax > 6s volume of 15   CT Brain Stroke Protocol shows no evidence of acute intracranial hemorrhage or large territory infarct.  Chronic-appearing left cerebellar infarct (new since 2010). Moderate/severe chronic microvascular changes and parenchymal atrophy (moderately progressed since 2010).  (10 Aug 2022 22:15)    NUTRITION SUPPORT NOTE:        REVIEW OF SYSTEMS:  Negative except as noted above.     PAST MEDICAL/SURGICAL HISTORY:   Diabetes  Hypertension  Hyperlipemia  CAD (coronary artery disease) of artery bypass graft  S/P total knee replacement  History of total hip replacement, bilateral    ALLERGIES:  codeine (Unknown)    VITALS:  T(F): 96.2 (08-15 @ 07:30), Max: 97.8 (08-14 @ 23:19)  HR: 67 (08-15 @ 07:30) (49 - 88)  BP: 154/67 (08-15 @ 07:30) (144/61 - 155/88)  RR: 18 (08-15 @ 07:30) (16 - 18)  SpO2: 100% (08-15 @ 07:30) (97% - 100%)    HEIGHT/WEIGHT/BMI:     Weight (kg): 88.9 (08-14)    PHYSICAL EXAM:   GENERAL: NAD, well-groomed, well-developed  HEENT: Moist mucous membranes, Good dentition, No lesions  ABDOMEN: Soft, Nontender, Nondistended  EXTREMITIES:  No clubbing, cyanosis, or edema  SKIN: warm and well perfused; No obvious rashes or lesions  IV ACCESS:   ENTERAL ACCESS:     I/Os:     08-14-22 @ 07:01  -  08-15-22 @ 07:00  --------------------------------------------------------  IN:    sodium chloride 0.9%: 850 mL  Total IN: 850 mL    OUT:    Voided (mL): 1250 mL  Total OUT: 1250 mL    Total NET: -400 mL      STANDING MEDICATIONS:   atorvastatin 80 milliGRAM(s) Oral at bedtime  dextrose 5%. 1000 milliLiter(s) IV Continuous <Continuous>  dextrose 5%. 1000 milliLiter(s) IV Continuous <Continuous>  dextrose 50% Injectable 25 Gram(s) IV Push once  dextrose 50% Injectable 12.5 Gram(s) IV Push once  dextrose 50% Injectable 25 Gram(s) IV Push once  dextrose 50% Injectable 12.5 Gram(s) IV Push once  dextrose Oral Gel 15 Gram(s) Oral once PRN  enoxaparin Injectable 90 milliGRAM(s) SubCutaneous every 12 hours  furosemide   Injectable 20 milliGRAM(s) IV Push daily  glucagon  Injectable 1 milliGRAM(s) IntraMuscular once  metoprolol tartrate Injectable 5 milliGRAM(s) IV Push every 6 hours  nystatin Powder 1 Application(s) Topical two times a day  sodium chloride 0.9%. 1000 milliLiter(s) IV Continuous <Continuous>    LABS:                         13.6   8.52  )-----------( 119      ( 15 Aug 2022 05:39 )             42.5     144  |  106  |  42<H>  ----------------------------<  171<H>          (08-14-22 @ 08:22)  4.2   |  23  |  1.4    Ca    9.3          (08-14-22 @ 08:22)  Phos  2.8         (08-14-22 @ 08:22)  Mg     2.0         (08-14-22 @ 08:22)    TPro  7.3  /  Alb  4.2  /  TBili  0.6  /  DBili  x   /  AST  30  /  ALT  15  /  AlkPhos  48       08-14-22 @ 08:22    Triglycerides, Serum: 91 mg/dL (08-11 @ 05:39)    Vitamin B12, Serum: 341 pg/mL (08-11 @ 12:15)    Blood Glucose (Past 24 hours):  111 mg/dL (08-15 @ 08:03)  156 mg/dL (08-15 @ 05:11)  164 mg/dL (08-14 @ 22:26)  139 mg/dL (08-14 @ 17:59)    DIET:   Diet, NPO:   Except Medications (08-10-22 @ 22:23) [Active]      RADIOLOGY:         ASSESSMENT/PLAN: NUTRITION SUPPORT TEAM  -  CONSULT NOTE     ADMISSION HPI:  This is a 88 F hx of CAD, Diabetes, Hyperlipemia, Hypertension, Afib on Eliquis presents to ED BIBA from home for slurred speech and right sided facial droop. Patient went to bed last night around ~10PM at baseline. Patient woke up this morning around ~11Am which is late for her. Daughter noted she was not herself,  checked her sugar and it was ~45. Noted the slurred speech. Also noted patient had trouble swallowing. Patient took her Eliquis dose this morning.  No history of strokes.     ED vitals T(F): 97.5, HR: 75, BP: 163/85, RR: 18, SpO2: 99%   Labs show Hb 12.4, HCT 36.7, WBC 6.65, , Trops neg x1, TPro  7.3  /  Alb  4.3  /  TBili  0.3  /  DBili  x   /  AST  14  /  ALT  11  /  AlkPhos  48  08-10  141  |  106  |  36<H>  ----------------------------<  104<H>  4.8   |  24  |  1.6<H>    Ca    9.7      10 Aug 2022 15:35    CT Angio Brain Stroke Protocol  w/ IV Cont, No evidence of major vascular stenosis or occlusion. Scattered mild calcific plaque.  CT perfusion: Apparent perfusion abnormality (penumbra) within the brainstem and left posterior fossa with a total Tmax > 6s volume of 15   CT Brain Stroke Protocol shows no evidence of acute intracranial hemorrhage or large territory infarct.  Chronic-appearing left cerebellar infarct (new since 2010). Moderate/severe chronic microvascular changes and parenchymal atrophy (moderately progressed since 2010).  (10 Aug 2022 22:15)    NUTRITION SUPPORT NOTE:    Pt to have repeat swallow eval this afternoon, NG placement on hold until after speech re-eval     REVIEW OF SYSTEMS:  Negative except as noted above.     PAST MEDICAL/SURGICAL HISTORY:   Diabetes  Hypertension  Hyperlipemia  CAD (coronary artery disease) of artery bypass graft  S/P total knee replacement  History of total hip replacement, bilateral    ALLERGIES:  codeine (Unknown)    VITALS:  T(F): 96.2 (08-15 @ 07:30), Max: 97.8 (08-14 @ 23:19)  HR: 67 (08-15 @ 07:30) (49 - 88)  BP: 154/67 (08-15 @ 07:30) (144/61 - 155/88)  RR: 18 (08-15 @ 07:30) (16 - 18)  SpO2: 100% (08-15 @ 07:30) (97% - 100%)    HEIGHT/WEIGHT/BMI:     Weight (kg): 88.9 (08-14)    PHYSICAL EXAM:   GENERAL: NAD, appears well groomed, well nourished  ABDOMEN: Soft, Nontender, Nondistended, obese  EXTREMITIES:  No clubbing, cyanosis, or edema  SKIN: warm and well perfused; No obvious rashes or lesions  IV ACCESS: peripheral   ENTERAL ACCESS: none    I/Os:     08-14-22 @ 07:01  -  08-15-22 @ 07:00  --------------------------------------------------------  IN:    sodium chloride 0.9%: 850 mL  Total IN: 850 mL    OUT:    Voided (mL): 1250 mL  Total OUT: 1250 mL    Total NET: -400 mL    STANDING MEDICATIONS:   atorvastatin 80 milliGRAM(s) Oral at bedtime  dextrose 5%. 1000 milliLiter(s) IV Continuous <Continuous>  dextrose 5%. 1000 milliLiter(s) IV Continuous <Continuous>  dextrose 50% Injectable 25 Gram(s) IV Push once  dextrose 50% Injectable 12.5 Gram(s) IV Push once  dextrose 50% Injectable 25 Gram(s) IV Push once  dextrose 50% Injectable 12.5 Gram(s) IV Push once  dextrose Oral Gel 15 Gram(s) Oral once PRN  enoxaparin Injectable 90 milliGRAM(s) SubCutaneous every 12 hours  furosemide   Injectable 20 milliGRAM(s) IV Push daily  glucagon  Injectable 1 milliGRAM(s) IntraMuscular once  metoprolol tartrate Injectable 5 milliGRAM(s) IV Push every 6 hours  nystatin Powder 1 Application(s) Topical two times a day  sodium chloride 0.9%. 1000 milliLiter(s) IV Continuous <Continuous>    LABS:                         13.6   8.52  )-----------( 119      ( 15 Aug 2022 05:39 )             42.5     144  |  106  |  42<H>  ----------------------------<  171<H>          (08-14-22 @ 08:22)  4.2   |  23  |  1.4    Ca    9.3          (08-14-22 @ 08:22)  Phos  2.8         (08-14-22 @ 08:22)  Mg     2.0         (08-14-22 @ 08:22)    TPro  7.3  /  Alb  4.2  /  TBili  0.6  /  DBili  x   /  AST  30  /  ALT  15  /  AlkPhos  48       08-14-22 @ 08:22    Triglycerides, Serum: 91 mg/dL (08-11 @ 05:39)    Vitamin B12, Serum: 341 pg/mL (08-11 @ 12:15)    Blood Glucose (Past 24 hours):  111 mg/dL (08-15 @ 08:03)  156 mg/dL (08-15 @ 05:11)  164 mg/dL (08-14 @ 22:26)  139 mg/dL (08-14 @ 17:59)    DIET:   Diet, NPO:   Except Medications (08-10-22 @ 22:23) [Active]    ASSESSMENT/PLAN:  89 yo F with history of CAD, HTN, diabetes, AFib on Eliquis presented to ED with CC of acute severe dysarthria/dysphagia/R facial droop noticed by daughter at which time blood sugar was 45. In ED, , not thrombolytic candidate on Eliquis.  Not thrombectomy candidate- no evidence of LVO.    - Acute ischemic strokes / Hypoglycemia, ?UTI: Severe dysarthria/dysphagia/R facial droop  - dysphagia with tongue swelling  - MARCIN   - obesity      PLAN:  - document height for BMI calculation  - f/u after speech eval today  - if NGT placed give Glucerna 1.2 240ml X 4 feeds/day to start  - monitor lytes, gluc  - will follow NUTRITION SUPPORT TEAM  -  CONSULT NOTE     This is a 88 F hx of CAD, Diabetes, Hyperlipemia, Hypertension, Afib on Eliquis presents to ED BIBA from home for slurred speech and right sided facial droop. Patient went to bed last night around ~10PM at baseline. Patient woke up this morning around ~11Am which is late for her. Daughter noted she was not herself,  checked her sugar and it was ~45. Noted the slurred speech. Also noted patient had trouble swallowing. Patient took her Eliquis dose this morning.  No history of strokes.     ED vitals T(F): 97.5, HR: 75, BP: 163/85, RR: 18, SpO2: 99%   Labs show Hb 12.4, HCT 36.7, WBC 6.65, , Trops neg x1, TPro  7.3  /  Alb  4.3  /  TBili  0.3  /  DBili  x   /  AST  14  /  ALT  11  /  AlkPhos  48  08-10  141  |  106  |  36<H>  ----------------------------<  104<H>  4.8   |  24  |  1.6<H>    Ca    9.7      10 Aug 2022 15:35    CT Angio Brain Stroke Protocol  w/ IV Cont, No evidence of major vascular stenosis or occlusion. Scattered mild calcific plaque.  CT perfusion: Apparent perfusion abnormality (penumbra) within the brainstem and left posterior fossa with a total Tmax > 6s volume of 15   CT Brain Stroke Protocol shows no evidence of acute intracranial hemorrhage or large territory infarct.  Chronic-appearing left cerebellar infarct (new since 2010). Moderate/severe chronic microvascular changes and parenchymal atrophy (moderately progressed since 2010).  (10 Aug 2022 22:15)    NUTRITION SUPPORT NOTE:    Pt to have repeat swallow eval this afternoon, NG placement on hold until after speech re-eval     REVIEW OF SYSTEMS:  Negative except as noted above.     PAST MEDICAL/SURGICAL HISTORY:   Diabetes  Hypertension  Hyperlipemia  CAD (coronary artery disease) of artery bypass graft  S/P total knee replacement  History of total hip replacement, bilateral    ALLERGIES:  codeine (Unknown)    VITALS:  T(F): 96.2 (08-15 @ 07:30), Max: 97.8 (08-14 @ 23:19)  HR: 67 (08-15 @ 07:30) (49 - 88)  BP: 154/67 (08-15 @ 07:30) (144/61 - 155/88)  RR: 18 (08-15 @ 07:30) (16 - 18)  SpO2: 100% (08-15 @ 07:30) (97% - 100%)    HEIGHT/WEIGHT/BMI:     Weight (kg): 88.9 (08-14)    PHYSICAL EXAM:   GENERAL: NAD, appears well groomed, well nourished, alert and OOB in chair  ABDOMEN: Soft, Nontender, Nondistended, obese  EXTREMITIES:  No clubbing, cyanosis, or edema  SKIN: warm and well perfused; No obvious rashes or lesions  IV ACCESS: peripheral , IV locked when seen  ENTERAL ACCESS: none    I/Os:     08-14-22 @ 07:01  -  08-15-22 @ 07:00  --------------------------------------------------------  IN:    sodium chloride 0.9%: 850 mL  Total IN: 850 mL    OUT:    Voided (mL): 1250 mL  Total OUT: 1250 mL    Total NET: -400 mL    STANDING MEDICATIONS:   atorvastatin 80 milliGRAM(s) Oral at bedtime  dextrose 5%. 1000 milliLiter(s) IV Continuous <Continuous>  enoxaparin Injectable 90 milliGRAM(s) SubCutaneous every 12 hours  furosemide   Injectable 20 milliGRAM(s) IV Push daily  metoprolol tartrate Injectable 5 milliGRAM(s) IV Push every 6 hours  nystatin Powder 1 Application(s) Topical two times a day  sodium chloride 0.9%. 1000 milliLiter(s) IV Continuous <Continuous>    LABS:                         13.6   8.52  )-----------( 119      ( 15 Aug 2022 05:39 )             42.5     144  |  106  |  42<H>  ----------------------------<  171<H>          (08-14-22 @ 08:22)  4.2   |  23  |  1.4    Ca    9.3          (08-14-22 @ 08:22)  Phos  2.8         (08-14-22 @ 08:22)  Mg     2.0         (08-14-22 @ 08:22)    TPro  7.3  /  Alb  4.2  /  TBili  0.6  /  DBili  x   /  AST  30  /  ALT  15  /  AlkPhos  48       08-14-22 @ 08:22    Triglycerides, Serum: 91 mg/dL (08-11 @ 05:39)    Vitamin B12, Serum: 341 pg/mL (08-11 @ 12:15)    Blood Glucose (Past 24 hours):  111 mg/dL (08-15 @ 08:03)  156 mg/dL (08-15 @ 05:11)  164 mg/dL (08-14 @ 22:26)  139 mg/dL (08-14 @ 17:59)    DIET:   Diet, NPO:   Except Medications (08-10-22 @ 22:23) [Active]    ASSESSMENT/PLAN:  87 yo F with history of CAD, HTN, diabetes, AFib on Eliquis presented to ED with CC of acute severe dysarthria/dysphagia/R facial droop noticed by daughter at which time blood sugar was 45. In ED, , not thrombolytic candidate on Eliquis.  Not thrombectomy candidate- no evidence of LVO.    - Acute ischemic strokes / Hypoglycemia, ?UTI: Severe dysarthria/dysphagia/R facial droop  - dysphagia with tongue swelling  - MARCIN   - obesity      PLAN:  - document height for BMI calculation  - f/u after speech eval today  - if NGT placed give Glucerna 1.2 240ml X 4 feeds/day to start  - monitor lytes, gluc  - will follow

## 2022-08-15 NOTE — OCCUPATIONAL THERAPY INITIAL EVALUATION ADULT - LIVES WITH, PROFILE
PH, pt's daughter lives upstairs, + chair lift to access. Pt's bedroom and bathroom with shower stall on ground level

## 2022-08-15 NOTE — OCCUPATIONAL THERAPY INITIAL EVALUATION ADULT - BED MOBILITY TRAINING, PT EVAL
Pt will increase supine to sit to Min A by discharge to increase I and preparedness for OOB activity

## 2022-08-15 NOTE — OCCUPATIONAL THERAPY INITIAL EVALUATION ADULT - PLANNED THERAPY INTERVENTIONS, OT EVAL
ADL retraining/balance training/bed mobility training/joint mobilization/parent/caregiver training.../ROM/strengthening/stretching/transfer training

## 2022-08-15 NOTE — OCCUPATIONAL THERAPY INITIAL EVALUATION ADULT - RANGE OF MOTION EXAMINATION, UPPER EXTREMITY
L shoulder ~1/4 active flexion, elbow ~1/2 -3/4 flexion, full gross grasp (chronic per pt/ family) However per family, pt avoids use of extremity/bilateral UE Active ROM was WFL  (within functional limits)

## 2022-08-15 NOTE — PROGRESS NOTE ADULT - SUBJECTIVE AND OBJECTIVE BOX
GARY NUNEZ  88y  Female    Patient is a 88y old  Female who presents with a chief complaint of slurred speech and right sided facial droop. (10 Aug 2022 22:15)      HPI:  This is a 88 F hx of CAD, Diabetes, Hyperlipemia, Hypertension, Afib on Eliquis presents to ED BIBA from home for slurred speech and right sided facial droop. Patient went to bed last night around ~10PM at baseline. Patient woke up this morning around ~11Am which is late for her. Daughter noted she was not herself,  checked her sugar and it was ~45. Noted the slurred speech. Also noted patient had trouble swallowing. Patient took her Eliquis dose this morning.  No history of strokes.     ED vitals T(F): 97.5, HR: 75, BP: 163/85, RR: 18, SpO2: 99%   Labs show Hb 12.4, HCT 36.7, WBC 6.65, , Trops neg x1, TPro  7.3  /  Alb  4.3  /  TBili  0.3  /  DBili  x   /  AST  14  /  ALT  11  /  AlkPhos  48  08-10  141  |  106  |  36<H>  ----------------------------<  104<H>  4.8   |  24  |  1.6<H>    Ca    9.7      10 Aug 2022 15:35    CT Angio Brain Stroke Protocol  w/ IV Cont, No evidence of major vascular stenosis or occlusion. Scattered mild calcific plaque.  CT perfusion: Apparent perfusion abnormality (penumbra) within the brainstem and left posterior fossa with a total Tmax > 6s volume of 15   CT Brain Stroke Protocol shows no evidence of acute intracranial hemorrhage or large territory infarct.  Chronic-appearing left cerebellar infarct (new since 2010). Moderate/severe chronic microvascular changes and parenchymal atrophy (moderately progressed since 2010).           (10 Aug 2022 22:15)    S: Patient was examined and seen at bedside. This morning pt is resting in bed. Oldest son at bedside. Currently alert and following commands. No agitation overnight. Still NPO and awaiting S&S f/u. HR is better controlled. No specific complaints  ROS: denies CP, SOB, pain    PAST MEDICAL & SURGICAL HISTORY:  Diabetes      Hypertension      Hyperlipemia      CAD (coronary artery disease) of artery bypass graft      S/P total knee replacement      History of total hip replacement, bilateral        SOCIAL HISTORY:  Tobacco: denies  Illicit drugs: denies  Alcohol: social  Family history reviewed and otherwise non-contributory No clotting disorders, CVAs at early age.  ALLERGIES: codeine    General: NAD, AA0x1+. Looks stated age. +Mod Dysarthria  HEENT: clean oropharynx, EOMI, no LAD;   Neck: trachea midline, no thyromegaly  CV: nl S1 S2; no m/r/g  Resp: decreased breath sounds at base  GI: NT/ND/S +BS  MS: no clubbing/cyanosis/edema, +pulses  Neuro: moves all extremities symmetrically but generalized weakness  Skin: no rashes, nl turgor      tele: afib, nonspecific changes (on my own evaluation of tele monitor)            MEDICATIONS  (STANDING):  atorvastatin 80 milliGRAM(s) Oral at bedtime  dextrose 5%. 1000 milliLiter(s) (50 mL/Hr) IV Continuous <Continuous>  dextrose 5%. 1000 milliLiter(s) (100 mL/Hr) IV Continuous <Continuous>  dextrose 50% Injectable 25 Gram(s) IV Push once  dextrose 50% Injectable 12.5 Gram(s) IV Push once  dextrose 50% Injectable 25 Gram(s) IV Push once  dextrose 50% Injectable 12.5 Gram(s) IV Push once  enoxaparin Injectable 90 milliGRAM(s) SubCutaneous every 12 hours  glucagon  Injectable 1 milliGRAM(s) IntraMuscular once  metoprolol tartrate Injectable 5 milliGRAM(s) IV Push every 6 hours  nystatin Powder 1 Application(s) Topical two times a day  sodium chloride 0.9%. 1000 milliLiter(s) (50 mL/Hr) IV Continuous <Continuous>    MEDICATIONS  (PRN):  dextrose Oral Gel 15 Gram(s) Oral once PRN Blood Glucose LESS THAN 70 milliGRAM(s)/deciliter    Home Medications:  Crestor 40 mg oral tablet: 1 tab(s) orally once a day (06 Jun 2020 10:56)  Diovan 320 mg oral tablet: 1 tab(s) orally once a day (06 Jun 2020 10:56)  Eliquis 2.5 mg oral tablet: 1 tab(s) orally 2 times a day (10 Aug 2022 23:02)  Lantus 100 units/mL subcutaneous solution: 40 unit(s) subcutaneous once a day (in the morning) (10 Aug 2022 23:01)  NovoLOG 100 units/mL subcutaneous solution: 3 unit(s) subcutaneous 3 times a day (before meals) (10 Aug 2022 23:01)  oxybutynin 15 mg/24 hr oral tablet, extended release: 1 tab(s) orally once a day (10 Aug 2022 23:03)  Toprol- mg oral tablet, extended release: 1 tab(s) orally once a day (06 Jun 2020 10:56)    Vital Signs Last 24 Hrs  T(C): 36.6 (15 Aug 2022 11:30), Max: 36.7 (14 Aug 2022 19:11)  T(F): 97.8 (15 Aug 2022 11:30), Max: 98.1 (14 Aug 2022 19:11)  HR: 92 (15 Aug 2022 11:30) (49 - 92)  BP: 129/70 (15 Aug 2022 11:30) (125/55 - 155/88)  BP(mean): 105 (15 Aug 2022 11:30) (81 - 116)  RR: 18 (15 Aug 2022 11:30) (16 - 18)  SpO2: 100% (15 Aug 2022 11:30) (97% - 100%)    Parameters below as of 15 Aug 2022 11:30  Patient On (Oxygen Delivery Method): nasal cannula  O2 Flow (L/min): 2    CAPILLARY BLOOD GLUCOSE      POCT Blood Glucose.: 171 mg/dL (15 Aug 2022 12:28)  POCT Blood Glucose.: 111 mg/dL (15 Aug 2022 08:03)  POCT Blood Glucose.: 156 mg/dL (15 Aug 2022 05:11)  POCT Blood Glucose.: 164 mg/dL (14 Aug 2022 22:26)  POCT Blood Glucose.: 139 mg/dL (14 Aug 2022 17:59)    LABS:                        13.6   8.52  )-----------( 119      ( 15 Aug 2022 05:39 )             42.5     08-15    142  |  105  |  41<H>  ----------------------------<  139<H>  4.7   |  16<L>  |  1.4    Ca    8.9      15 Aug 2022 05:39  Phos  3.0     08-15  Mg     2.1     08-15    TPro  7.0  /  Alb  3.8  /  TBili  0.5  /  DBili  x   /  AST  30  /  ALT  14  /  AlkPhos  47  08-15    LIVER FUNCTIONS - ( 15 Aug 2022 05:39 )  Alb: 3.8 g/dL / Pro: 7.0 g/dL / ALK PHOS: 47 U/L / ALT: 14 U/L / AST: 30 U/L / GGT: x                           Consultant Notes Reviewed:  [x ] YES  [ ] NO  Care Discussed with Consultants/Other Providers/ Housestaff [ x] YES  [ ] NO  Radiology, labs, new studies personally reviewed.

## 2022-08-15 NOTE — PROGRESS NOTE ADULT - SUBJECTIVE AND OBJECTIVE BOX
Neurology Stroke Progress Note    INTERVAL HPI/OVERNIGHT EVENTS:  Patient seen and examined. No acute events overnight. Patient denies any pain, or discomfort. No CP, abd pain. No fever, sweats or chills    MEDICATIONS  (STANDING):  atorvastatin 80 milliGRAM(s) Oral at bedtime  dextrose 5%. 1000 milliLiter(s) (50 mL/Hr) IV Continuous <Continuous>  dextrose 5%. 1000 milliLiter(s) (100 mL/Hr) IV Continuous <Continuous>  dextrose 50% Injectable 25 Gram(s) IV Push once  dextrose 50% Injectable 12.5 Gram(s) IV Push once  dextrose 50% Injectable 25 Gram(s) IV Push once  dextrose 50% Injectable 12.5 Gram(s) IV Push once  enoxaparin Injectable 90 milliGRAM(s) SubCutaneous every 12 hours  glucagon  Injectable 1 milliGRAM(s) IntraMuscular once  metoprolol tartrate Injectable 5 milliGRAM(s) IV Push every 6 hours  nystatin Powder 1 Application(s) Topical two times a day  sodium chloride 0.9%. 1000 milliLiter(s) (50 mL/Hr) IV Continuous <Continuous>    MEDICATIONS  (PRN):  dextrose Oral Gel 15 Gram(s) Oral once PRN Blood Glucose LESS THAN 70 milliGRAM(s)/deciliter    Allergies    codeine (Unknown)    Intolerances      Vital Signs Last 24 Hrs  T(C): 36.6 (15 Aug 2022 11:30), Max: 36.7 (14 Aug 2022 19:11)  T(F): 97.8 (15 Aug 2022 11:30), Max: 98.1 (14 Aug 2022 19:11)  HR: 92 (15 Aug 2022 11:30) (49 - 92)  BP: 129/70 (15 Aug 2022 11:30) (125/55 - 155/88)  BP(mean): 105 (15 Aug 2022 11:30) (81 - 116)  RR: 18 (15 Aug 2022 11:30) (16 - 18)  SpO2: 100% (15 Aug 2022 11:30) (97% - 100%)    Parameters below as of 15 Aug 2022 11:30  Patient On (Oxygen Delivery Method): nasal cannula  O2 Flow (L/min): 2      Physical exam:  General: No acute distress, awake and alert  Eyes: Anicteric sclerae, moist conjunctivae, see below for CNs  Neck: trachea midline, FROM, supple, no thyromegaly or lymphadenopathy  Cardiovascular: Regular rate and rhythm, no murmurs, rubs, or gallops. No carotid bruits.   Pulmonary: Anterior breath sounds clear bilaterally, no crackles or wheezing. No use of accessory muscles  GI: Abdomen soft, non-distended, non-tender  Extremities: Radial and DP pulses +2, no edema    Neurologic:  -Mental status: Awake, alert, oriented to person, place, and time. Speech is fluent with intact naming, repetition, and comprehension, no dysarthria. Recent and remote memory intact. Follows commands. Attention/concentration intact. Fund of knowledge appropriate.  -Cranial nerves:   II: Visual fields are full to confrontation.  III, IV, VI: Extraocular movements are intact without nystagmus. Pupils equally round and reactive to light  V:  Facial sensation V1-V3 equal and intact   VII: Face is symmetric with normal eye closure and smile  VIII: Hearing is bilaterally intact to finger rub  IX, X: Uvula is midline and soft palate rises symmetrically  XI: Head turning and shoulder shrug are intact.  XII: Tongue protrudes midline  Motor: Normal bulk and tone. No pronator drift. Strength bilateral upper extremity 5/5, bilateral lower extremities 5/5.  Rapid alternating movements intact and symmetric  Sensation: Intact to light touch bilaterally. No neglect or extinction on double simultaneous testing.  Coordination: No dysmetria on finger-to-nose and heel-to-shin bilaterally  Reflexes: Downgoing toes bilaterally   Gait: Narrow gait and steady    LABS:                        13.6   8.52  )-----------( 119      ( 15 Aug 2022 05:39 )             42.5     08-15    142  |  105  |  41<H>  ----------------------------<  139<H>  4.7   |  16<L>  |  1.4    Ca    8.9      15 Aug 2022 05:39  Phos  3.0     08-15  Mg     2.1     08-15    TPro  7.0  /  Alb  3.8  /  TBili  0.5  /  DBili  x   /  AST  30  /  ALT  14  /  AlkPhos  47  08-15          RADIOLOGY & ADDITIONAL TESTS:     Neurology Stroke Progress Note    INTERVAL HPI/OVERNIGHT EVENTS:  Patient seen and examined. No acute events overnight. Patient denies any pain, or discomfort. No CP, abd pain. No fever, sweats or chills    MEDICATIONS  (STANDING):  atorvastatin 80 milliGRAM(s) Oral at bedtime  dextrose 5%. 1000 milliLiter(s) (50 mL/Hr) IV Continuous <Continuous>  dextrose 5%. 1000 milliLiter(s) (100 mL/Hr) IV Continuous <Continuous>  dextrose 50% Injectable 25 Gram(s) IV Push once  dextrose 50% Injectable 12.5 Gram(s) IV Push once  dextrose 50% Injectable 25 Gram(s) IV Push once  dextrose 50% Injectable 12.5 Gram(s) IV Push once  enoxaparin Injectable 90 milliGRAM(s) SubCutaneous every 12 hours  glucagon  Injectable 1 milliGRAM(s) IntraMuscular once  metoprolol tartrate Injectable 5 milliGRAM(s) IV Push every 6 hours  nystatin Powder 1 Application(s) Topical two times a day  sodium chloride 0.9%. 1000 milliLiter(s) (50 mL/Hr) IV Continuous <Continuous>    MEDICATIONS  (PRN):  dextrose Oral Gel 15 Gram(s) Oral once PRN Blood Glucose LESS THAN 70 milliGRAM(s)/deciliter    Allergies    codeine (Unknown)    Intolerances      Vital Signs Last 24 Hrs  T(C): 36.6 (15 Aug 2022 11:30), Max: 36.7 (14 Aug 2022 19:11)  T(F): 97.8 (15 Aug 2022 11:30), Max: 98.1 (14 Aug 2022 19:11)  HR: 92 (15 Aug 2022 11:30) (49 - 92)  BP: 129/70 (15 Aug 2022 11:30) (125/55 - 155/88)  BP(mean): 105 (15 Aug 2022 11:30) (81 - 116)  RR: 18 (15 Aug 2022 11:30) (16 - 18)  SpO2: 100% (15 Aug 2022 11:30) (97% - 100%)    Parameters below as of 15 Aug 2022 11:30  Patient On (Oxygen Delivery Method): nasal cannula  O2 Flow (L/min): 2      Physical exam:  General: No acute distress, awake and alert  Eyes: Anicteric sclerae, moist conjunctivae, see below for CNs  Neck: trachea midline, FROM, supple  Cardiovascular: Regular rate and rhythm, no murmurs  Pulmonary: Anterior breath sounds clear bilaterally, no crackles or wheezing. No use of accessory muscles  GI: Abdomen soft, non-distended, non-tender  Extremities: Radial and DP pulses +2, no edema    Neurologic:  -Mental status: Awake, alert, oriented to person, place, and time. Speech is fluent with intact naming, repetition, and comprehension, dysarthria present. Recent and remote memory intact. Follows commands. Attention/concentration intact. Fund of knowledge appropriate.  -Cranial nerves:   II: Visual fields are full to confrontation.  III, IV, VI: Extraocular movements are intact without nystagmus. Pupils equally round and reactive to light  V:  Facial sensation V1-V3 equal and intact   VII: Face is symmetric with normal eye closure and smile  VIII: Hearing is bilaterally intact to finger rub  IX, X: Uvula is midline and soft palate rises symmetrically  XI: Head turning and shoulder shrug are intact.  XII: Tongue protrudes midline, less edematous than   Motor: Normal bulk and tone. No pronator drift. Strength bilateral upper extremity 4/5, bilateral lower extremities 4/5.  Rapid alternating movements intact and symmetric  Sensation: Intact to light touch bilaterally. No neglect or extinction on double simultaneous testing.  Coordination: No dysmetria on finger-to-nose and heel-to-shin bilaterally  Reflexes: Downgoing toes bilaterally   Gait: Narrow gait and steady    LABS:                        13.6   8.52  )-----------( 119      ( 15 Aug 2022 05:39 )             42.5     08-15    142  |  105  |  41<H>  ----------------------------<  139<H>  4.7   |  16<L>  |  1.4    Ca    8.9      15 Aug 2022 05:39  Phos  3.0     08-15  Mg     2.1     08-15    TPro  7.0  /  Alb  3.8  /  TBili  0.5  /  DBili  x   /  AST  30  /  ALT  14  /  AlkPhos  47  08-15          RADIOLOGY & ADDITIONAL TESTS:     Neurology Stroke Progress Note    INTERVAL HPI/OVERNIGHT EVENTS:  Patient seen and examined. No acute events overnight. Patient denies any pain, or discomfort. No CP, abd pain. No fever, sweats or chills    MEDICATIONS  (STANDING):  atorvastatin 80 milliGRAM(s) Oral at bedtime  dextrose 5%. 1000 milliLiter(s) (50 mL/Hr) IV Continuous <Continuous>  dextrose 5%. 1000 milliLiter(s) (100 mL/Hr) IV Continuous <Continuous>  dextrose 50% Injectable 25 Gram(s) IV Push once  dextrose 50% Injectable 12.5 Gram(s) IV Push once  dextrose 50% Injectable 25 Gram(s) IV Push once  dextrose 50% Injectable 12.5 Gram(s) IV Push once  enoxaparin Injectable 90 milliGRAM(s) SubCutaneous every 12 hours  glucagon  Injectable 1 milliGRAM(s) IntraMuscular once  metoprolol tartrate Injectable 5 milliGRAM(s) IV Push every 6 hours  nystatin Powder 1 Application(s) Topical two times a day  sodium chloride 0.9%. 1000 milliLiter(s) (50 mL/Hr) IV Continuous <Continuous>    MEDICATIONS  (PRN):  dextrose Oral Gel 15 Gram(s) Oral once PRN Blood Glucose LESS THAN 70 milliGRAM(s)/deciliter    Allergies    codeine (Unknown)    Intolerances      Vital Signs Last 24 Hrs  T(C): 36.6 (15 Aug 2022 11:30), Max: 36.7 (14 Aug 2022 19:11)  T(F): 97.8 (15 Aug 2022 11:30), Max: 98.1 (14 Aug 2022 19:11)  HR: 92 (15 Aug 2022 11:30) (49 - 92)  BP: 129/70 (15 Aug 2022 11:30) (125/55 - 155/88)  BP(mean): 105 (15 Aug 2022 11:30) (81 - 116)  RR: 18 (15 Aug 2022 11:30) (16 - 18)  SpO2: 100% (15 Aug 2022 11:30) (97% - 100%)    Parameters below as of 15 Aug 2022 11:30  Patient On (Oxygen Delivery Method): nasal cannula  O2 Flow (L/min): 2      Physical exam:  General: No acute distress, awake and alert  Eyes: Anicteric sclerae, moist conjunctivae, see below for CNs  Neck: trachea midline, FROM, supple  Cardiovascular: Regular rate and rhythm, no murmurs  Pulmonary: Anterior breath sounds clear bilaterally, no crackles or wheezing. No use of accessory muscles  GI: Abdomen soft, non-distended, non-tender  Extremities: Radial and DP pulses +2, no edema    Neurologic:  -Mental status: Awake, alert, oriented to person, place, and time. Speech is fluent with intact naming, repetition, and comprehension, dysarthria present. Recent and remote memory intact. Follows commands. Attention/concentration intact. Fund of knowledge appropriate.  -Cranial nerves:   II: Visual fields are full to confrontation.  III, IV, VI: Extraocular movements are intact without nystagmus. Pupils equally round and reactive to light  V:  Facial sensation V1-V3 equal and intact   VII: Face is symmetric with normal eye closure and smile  VIII: Hearing is bilaterally intact to finger rub  IX, X: Uvula is midline and soft palate rises symmetrically  XI: Head turning and shoulder shrug are intact.  XII: Tongue protrudes midline, less edematous than   Motor: Normal bulk and tone. No pronator drift. Strength bilateral upper extremity 4/5, bilateral lower extremities 4/5.  Rapid alternating movements intact and symmetric  Sensation: Intact to light touch bilaterally. No neglect or extinction on double simultaneous testing.  Coordination: No dysmetria on finger-to-nose and heel-to-shin bilaterally  Reflexes: Downgoing toes bilaterally   Gait: Narrow gait and steady  NIH 2 dysarthria    LABS:                        13.6   8.52  )-----------( 119      ( 15 Aug 2022 05:39 )             42.5     08-15    142  |  105  |  41<H>  ----------------------------<  139<H>  4.7   |  16<L>  |  1.4    Ca    8.9      15 Aug 2022 05:39  Phos  3.0     08-15  Mg     2.1     08-15    TPro  7.0  /  Alb  3.8  /  TBili  0.5  /  DBili  x   /  AST  30  /  ALT  14  /  AlkPhos  47  08-15          RADIOLOGY & ADDITIONAL TESTS:

## 2022-08-15 NOTE — PROGRESS NOTE ADULT - ASSESSMENT
89 yo F with history of CAD, HTN, diabetes, AFib on Eliquis presented to ED with CC of acute severe dysarthria/dysphagia/R facial droop noticed by daughter at which time blood sugar was 45. In ED, , not thrombolytic candidate on Eliquis.  Not thrombectomy candidate- no evidence of LVO.    #Acute ischemic strokes / Hypoglycemia, ?UTI: Severe dysarthria/dysphagia/R facial droop  - CTH: negative for acute findings. CTA: no LVO. CTP: apparent perfusion abnormality (penumbra) within the brainstem and left posterior fossa with a total Tmax > 6s volume of 15cc. This may be artifactual  - Q4 neurochecks, vital signs  - Keep patient strictly NPO, until speech clears  - Permissive HTN  - Gentle hydration @ 60 cc/hr NS while NPO  - therapeutic Lovenox for now  - c/w ASA and statin   - A1C 6.3, Chol 154, LDL 83, Triglycerides 91  -  MRI brain non contrast < from: MR Head No Cont (08.11.22 @ 17:30) >  Motion degraded incomplete examination demonstrating small foci of acute infarct in the left temporal and parietal lobes.  Extensive chronic microsphere ischemic changes.  < end of copied text >  -  TTE=nl EF  - PT/OT/ST/rehab  - hold all Insulin products. FS q6  - treating possible UTI  - Cr better and might need ELiquis 5 BID on d/c    #Acute hypoxemic resp failure  -CXR w/ b/l opacities (?effusions, atelectasis, asp-n pneumonitis)  -8/ 12 s/p Lasix 20mg IVP x1, then reacess  -asp-n precautions  -on RA since 8/13  -will use Lasix PRN  -8/15 back on O2: recommend OOB, incentive spirometer. CXR reviewed by me: atelectasis, small pleural effusions    #Dysphagia  -S&S f/u to start feeds  - if on PO, d/c IVFs  -8/15: if fails S&S again, start NGT and d/c IVFs    #?Tongue swelling  - scoped by ENT: no evidence of angioedema. Significant secretions and pt appears not to be able to clear secretions  -suction pt  -resolved     #Possible UTI  - ceftriaxone 1000mg IV daily to complete 5 days    #Chronic AFib on Eliquis   - Lovenox as above  - metoprolol 5-10mg IV push Q6 while NPO w/ holding parameters  - Cr better and might need ELiquis 5 BID on d/c    #HTN  - on Valsartan 320mg qd at home   - Ordered metoprolol 5mg IV push Q6, hold for SBP <110, HR <70     #Dyslipidemia   - Chol 154, LDL 83, Triglycerides 91  - Continue Atorvastatin 40mg PO QHS once speech/swallow clears    #DM w/ hypoglycemia on admission  - Hypoglycemic on fingersticks requiring 1/2 D5  - Monitor FS  - On Lantus 40U in the AM and lispro 3U TID at home  - Hold all insulin for now given hypoglycemia    - Ordered glucagon 1mg IM PRN for BG <70   -A1c=6.3  -lower dose of Insulin on d/c    #MARCIN - Resolved, Cr 1.4 - 8/11/22  - baseline: 1.3 6/2020  - continue ivf: iv ns    #Misc  - DVT prophylaxis: Eliquis or Lovenox  - GI prophylaxis: Not indicated   - Diet: NPO   - Activity: IAT   - Disposition: stroke unit    High risk pt.     #Progress Note Handoff  Pending: Consults____Clinical improvement and stability__x__S&S____PT____x____  Pt/Family discussion: Pt/son informed and agree with the current plan  Disposition: Home______/SNF_______/4A______/To be determined____x____    My note supersedes the residents note should a discrepancy arise.    Chart and notes personally reviewed.  Care Discussed with Consultants/Other Providers/ Housestaff [ x] YES [ ] NO   Radiology, labs, old records personally reviewed.    discussed w/ housestaff, nursing, case management, neuro team, son   89 yo F with history of CAD, HTN, diabetes, AFib on Eliquis presented to ED with CC of acute severe dysarthria/dysphagia/R facial droop noticed by daughter at which time blood sugar was 45. In ED, , not thrombolytic candidate on Eliquis.  Not thrombectomy candidate- no evidence of LVO.    #Acute ischemic strokes / Hypoglycemia, ?UTI: Severe dysarthria/dysphagia/R facial droop  - CTH: negative for acute findings. CTA: no LVO. CTP: apparent perfusion abnormality (penumbra) within the brainstem and left posterior fossa with a total Tmax > 6s volume of 15cc. This may be artifactual  - Q4 neurochecks, vital signs  - Keep patient strictly NPO, until speech clears  - Permissive HTN  - Gentle hydration @ 60 cc/hr NS while NPO  - therapeutic Lovenox for now  - c/w ASA and statin   - A1C 6.3, Chol 154, LDL 83, Triglycerides 91  -  MRI brain non contrast < from: MR Head No Cont (08.11.22 @ 17:30) >  Motion degraded incomplete examination demonstrating small foci of acute infarct in the left temporal and parietal lobes.  Extensive chronic microsphere ischemic changes.  < end of copied text >  -  TTE=nl EF  - PT/OT/ST/rehab  - hold all Insulin products. FS q6  - treating possible UTI  - Cr better and might need ELiquis 5 BID on d/c    #Acute hypoxemic resp failure  -CXR w/ b/l opacities (?effusions, atelectasis, asp-n pneumonitis)  -8/ 12 s/p Lasix 20mg IVP x1, then reacess  -asp-n precautions  -on RA since 8/13  -will use Lasix PRN  -8/15 back on O2: recommend OOB, incentive spirometer. CXR reviewed by me: atelectasis, small pleural effusions    #Dysphagia  -S&S f/u to start feeds  - if on PO, d/c IVFs  -8/15: if fails S&S again, start NGT and d/c IVFs    HAGMA  -likely starvation ketoacidosis  -needs either PO or NGT to be started today!    #?Tongue swelling  - scoped by ENT: no evidence of angioedema. Significant secretions and pt appears not to be able to clear secretions  -suction pt  -resolved     #Possible UTI  - ceftriaxone 1000mg IV daily to complete 5 days    #Chronic AFib on Eliquis   - Lovenox as above  - metoprolol 5-10mg IV push Q6 while NPO w/ holding parameters  - Cr better and might need ELiquis 5 BID on d/c    #HTN  - on Valsartan 320mg qd at home   - Ordered metoprolol 5mg IV push Q6, hold for SBP <110, HR <70     #Dyslipidemia   - Chol 154, LDL 83, Triglycerides 91  - Continue Atorvastatin 40mg PO QHS once speech/swallow clears    #DM w/ hypoglycemia on admission  - Hypoglycemic on fingersticks requiring 1/2 D5  - Monitor FS  - On Lantus 40U in the AM and lispro 3U TID at home  - Hold all insulin for now given hypoglycemia    - Ordered glucagon 1mg IM PRN for BG <70   -A1c=6.3  -lower dose of Insulin on d/c    #MARCIN - Resolved, Cr 1.4 - 8/11/22  - baseline: 1.3 6/2020  - continue ivf: iv ns    #Misc  - DVT prophylaxis: Eliquis or Lovenox  - GI prophylaxis: Not indicated   - Diet: NPO   - Activity: IAT   - Disposition: stroke unit    High risk pt.     #Progress Note Handoff  Pending: Consults____Clinical improvement and stability__x__S&S_, Lactate___PT____x____  Pt/Family discussion: Pt/son informed and agree with the current plan  Disposition: Home______/SNF_______/4A______/To be determined____x____    My note supersedes the residents note should a discrepancy arise.    Chart and notes personally reviewed.  Care Discussed with Consultants/Other Providers/ Housestaff [ x] YES [ ] NO   Radiology, labs, old records personally reviewed.    discussed w/ housestaff, nursing, case management, neuro team, son

## 2022-08-15 NOTE — PROGRESS NOTE ADULT - ASSESSMENT
89 yo F with history of CAD, HTN, diabetes, AFib on Eliquis presented to ED with CC of acute severe dysarthria/dysphagia/R facial droop possibly secondary to  multiple punctate infarcts in the L MCA territory involving the temporal, parietal lobes most likely cardioembolic in nature    #Left Temporal/Parietal punctate infarcts  - Initial NIHSS 4 for dysarthria, facial and LOC  - CTH: negative for acute findings  - CTA: no LVO  - CTP: apparent perfusion abnormality (penumbra) within the brainstem and left posterior fossa with a total Tmax > 6s volume of 15cc. This may be artifactual  - MRI:  2 small foci of restricted diffusion involving the left temporal and parietal lobe compatible with acute ischemic infarct. Confluent FLAIR signal in the subcortical and periventricular white matter suggestive of extensive chronic microvascular changes.  - Q4 neurochecks, vital signs  - s/p Speech eval: strict NPO for now  - s/p ENT bed-side scope: no airway obstruction, patent airway  - Permissive HTN x 24 hours keep < 220/110  - Gentle hydration @ 60 cc/hr NS  - Will dc on apixaban 5mg PO BID, patient came in on 2.5 BID  - c/w statin   - A1C 6.3, Chol 154, LDL 83, Triglycerides 91, B12 341  - TTE - 50-55, Mod enlarged right atrium, mod l. hypertrophy  - s/p PT/OT/ST/rehab - candidate for inpatient rehab  - Pending urine culture given pyuria   - s/p 3 days ceftriaxone 1000mg IV daily   - SS follow up today then possibly NGT if needed    #hospital delirium  - if agitated tonight, may give Haldol 1 mg IM PRN  - frequent reorientation    #R/O Pleural Effusion  - CXR (pending final read); showing possible small effusions  - Ordered Lasix 20mg IV daily   - Aspiration precautions    #AFib on Eliquis   - c/w lovenox 90mg BID for now, will switch to eliquis once patient able to swallow  - Increase to metoprolol 5mg IV push Q6 with holding parameters    #HTN  - on Valsartan 320mg qd at home   - Increase to metoprolol 5mg IV push Q6, hold for SBP <110, HR <70     #Dyslipidemia   - Chol 154, LDL 83, Triglycerides 91  - Continue Atorvastatin 80mg PO QHS once speech/swallow clears    #DM   - Hypoglycemic on fingersticks requiring 1/2 D5  - Monitor FS  - On Lantus 40U in the AM and lispro 3U TID at home  - Hold insulin for now given hypoglycemia   - Discontinue ISS for now given hypoglycemia   - Ordered glucagon 1mg IM PRN for BG <70     #MARCIN - Resolved, Cr 1.4 - 8/11/22  - baseline: 1.3 6/2020  - continue ivf: iv ns  - nephro eval if no improvement    #Misc  - DVT prophylaxis: Eliquis   - GI prophylaxis: Not indicated   - Diet: NPO   - Activity: IAT   - Disposition: 3E from home    Pending: TTE,    89 yo F with history of CAD, HTN, diabetes, AFib on Eliquis presented to ED with CC of acute severe dysarthria/dysphagia/R facial droop possibly secondary to  multiple punctate infarcts in the L MCA territory involving the temporal, parietal lobes most likely cardioembolic in nature    #Left Temporal/Parietal punctate infarcts  - Initial NIHSS 4 for dysarthria, facial and LOC  - CTH: negative for acute findings  - CTA: no LVO  - CTP: apparent perfusion abnormality (penumbra) within the brainstem and left posterior fossa with a total Tmax > 6s volume of 15cc. This may be artifactual  - MRI:  2 small foci of restricted diffusion involving the left temporal and parietal lobe compatible with acute ischemic infarct. Confluent FLAIR signal in the subcortical and periventricular white matter suggestive of extensive chronic microvascular changes.  - Q4 neurochecks, vital signs  - s/p Speech eval: strict NPO for now  - s/p ENT bed-side scope: no airway obstruction, patent airway  - Permissive HTN x 24 hours keep < 220/110  - Gentle hydration @ 60 cc/hr NS  - Will dc on apixaban 5mg PO BID, patient came in on 2.5 BID  - c/w statin   - A1C 6.3, Chol 154, LDL 83, Triglycerides 91, B12 341  - TTE - 50-55, Mod enlarged right atrium, mod l. hypertrophy  - s/p PT/OT/ST/rehab - candidate for inpatient rehab  - s/p 3 days ceftriaxone 1000mg IV daily   - SS follow up today then possibly NGT if needed    #hospital delirium  - if agitated tonight, may give Haldol 1 mg IM PRN  - frequent reorientation    #R/O Pleural Effusion  - CXR (pending final read); showing possible small effusions  - repeat CXR  - Aspiration precautions    #AFib on Eliquis   - c/w lovenox 90mg BID for now, will switch to eliquis once patient able to swallow  - Increase to metoprolol 5mg IV push Q6 with holding parameters    #HTN  - on Valsartan 320mg qd at home   - Increase to metoprolol 5mg IV push Q6, hold for SBP <110, HR <70     #Dyslipidemia   - Chol 154, LDL 83, Triglycerides 91  - Continue Atorvastatin 80mg PO QHS once speech/swallow clears    #DM   - Hypoglycemic on fingersticks requiring 1/2 D5  - Monitor FS  - On Lantus 40U in the AM and lispro 3U TID at home  - Hold insulin for now given hypoglycemia   - Discontinue ISS for now given hypoglycemia   - Ordered glucagon 1mg IM PRN for BG <70     #MARCIN - Resolved, Cr 1.4 - 8/11/22  - baseline: 1.3 6/2020  - continue ivf: iv ns  - nephro eval if no improvement    #Misc  - DVT prophylaxis: Eliquis   - GI prophylaxis: Not indicated   - Diet: NPO   - Activity: IAT   - Disposition: 3E from home    Pending: SS follow up   89 yo F with history of CAD, HTN, diabetes, AFib on Eliquis presented to ED with CC of acute severe dysarthria/dysphagia/R facial droop possibly secondary to  multiple punctate infarcts in the L MCA territory involving the temporal, parietal lobes most likely cardioembolic in nature    #Left Temporal/Parietal punctate infarcts  - Initial NIHSS 4 for dysarthria, facial and LOC  - CTH: negative for acute findings  - CTA: no LVO  - CTP: apparent perfusion abnormality (penumbra) within the brainstem and left posterior fossa with a total Tmax > 6s volume of 15cc. This may be artifactual  - MRI:  2 small foci of restricted diffusion involving the left temporal and parietal lobe compatible with acute ischemic infarct. Confluent FLAIR signal in the subcortical and periventricular white matter suggestive of extensive chronic microvascular changes.  - Q4 neurochecks, vital signs  - s/p Speech eval: strict NPO for now  - s/p ENT bed-side scope: no airway obstruction, patent airway  - Permissive HTN x 24 hours keep < 220/110  - Gentle hydration @ 60 cc/hr NS  - Will dc on apixaban 5mg PO BID, patient came in on 2.5 BID  - c/w statin   - A1C 6.3, Chol 154, LDL 83, Triglycerides 91, B12 341  - TTE - 50-55, Mod enlarged right atrium, mod l. hypertrophy  - s/p PT/OT/ST/rehab - candidate for inpatient rehab  - s/p 3 days ceftriaxone 1000mg IV daily   - SS follow up today then possibly NGT if needed    #hospital delirium  - if agitated tonight, may give Haldol 1 mg IM PRN  - frequent reorientation    #R/O Pleural Effusion  - CXR (pending final read); showing possible small effusions  - repeat CXR  - Aspiration precautions    #AFib on Eliquis   - c/w lovenox 90mg BID for now, will switch to eliquis once patient able to swallow  - Increase to metoprolol 5mg IV push Q6 with holding parameters    #HTN  - on Valsartan 320mg qd at home   - Increase to metoprolol 5mg IV push Q6, hold for SBP <110, HR <70     #Dyslipidemia   - Chol 154, LDL 83, Triglycerides 91  - Continue Atorvastatin 80mg PO QHS once speech/swallow clears    #DM   - Hypoglycemic on fingersticks requiring 1/2 D5  - Monitor FS  - On Lantus 40U in the AM and lispro 3U TID at home  - Hold insulin for now given hypoglycemia   - Discontinue ISS for now given hypoglycemia   - Ordered glucagon 1mg IM PRN for BG <70     #MARCIN - Resolved, Cr 1.4 - 8/11/22  - baseline: 1.3 6/2020  - continue ivf: iv ns    #Misc  - DVT prophylaxis: Eliquis   - GI prophylaxis: Not indicated   - Diet: NPO   - Activity: IAT   - Disposition: 3E from home    Pending:  follow up

## 2022-08-15 NOTE — OCCUPATIONAL THERAPY INITIAL EVALUATION ADULT - IMPAIRED TRANSFERS: SIT/STAND, REHAB EVAL
Lean to L/ posterior/impaired balance/decreased flexibility/impaired postural control/decreased strength

## 2022-08-15 NOTE — SWALLOW BEDSIDE ASSESSMENT ADULT - NS SPL SWALLOW CLINIC TRIAL FT
Concern for pharyngeal impairment given diagnosis and overt s/s aspiration at bedside. Rec FEES to determine safest and least restrictive diet.
mild oral impairment, suspected pharyngeal dysphagia
severe oral dysphagia . Suspected pharyngeal dysphagia . Pt unsafe for diet at this time 2'2 fatigue and decreased attention/ alertness.
Pt with decreased awareness, severely slurred speech. Not safe for trials at this time.

## 2022-08-15 NOTE — OCCUPATIONAL THERAPY INITIAL EVALUATION ADULT - SHORT TERM MEMORY, REHAB EVAL
Pt's family reports decline in STM, pt receives assistance with medication management at home/impaired

## 2022-08-15 NOTE — PROGRESS NOTE ADULT - ASSESSMENT
Imp: rehab of L CVA / gait ataxia, dysarthria, dysphagia   Plan: continue bedside therapy as tolerated         screen for possible 4a          Imp: rehab of L CVA / gait ataxia, dysarthria, dysphagia   Plan: continue bedside therapy as tolerated         screen for possible 4a rehab

## 2022-08-15 NOTE — PROVIDER CONTACT NOTE (CHANGE IN STATUS NOTIFICATION) - RECOMMENDATIONS
Administer Lopressor 5 mg IVP early.
Pt remains in the stroke unit, no stroke code called, will monitor patient and re-assess as per stroke unit protocol.
haldol 2 mg IM ordered
will not call RR as per dr Edgar and dr Randolph. will re-assess pt in 1 hr and report to md.

## 2022-08-15 NOTE — PROGRESS NOTE ADULT - SUBJECTIVE AND OBJECTIVE BOX
Patient is a 88y old  Female who presents with a chief complaint of slurred speech and right sided facial droop.       HPI:  This is a 88 F hx of CAD, Diabetes, Hyperlipemia, Hypertension, Afib on Eliquis presents to ED BIBA from home for slurred speech and right sided facial droop. Patient went to bed last night around ~10PM at baseline. Patient woke up this morning around ~11Am which is late for her. Daughter noted she was not herself,  checked her sugar and it was ~45. Noted the slurred speech. Also noted patient had trouble swallowing. Patient took her Eliquis dose this morning.  No history of strokes.     ED vitals T(F): 97.5, HR: 75, BP: 163/85, RR: 18, SpO2: 99%   Labs show Hb 12.4, HCT 36.7, WBC 6.65, , Trops neg x1, TPro  7.3  /  Alb  4.3  /  TBili  0.3  /  DBili  x   /  AST  14  /  ALT  11  /  AlkPhos  48  08-10  141  |  106  |  36<H>  ----------------------------<  104<H>  4.8   |  24  |  1.6<H>    Ca    9.7      10 Aug 2022 15:35    CT Angio Brain Stroke Protocol  w/ IV Cont, No evidence of major vascular stenosis or occlusion. Scattered mild calcific plaque.  CT perfusion: Apparent perfusion abnormality (penumbra) within the brainstem and left posterior fossa with a total Tmax > 6s volume of 15   CT Brain Stroke Protocol shows no evidence of acute intracranial hemorrhage or large territory infarct.  Chronic-appearing left cerebellar infarct (new since 2010). Moderate/severe chronic microvascular changes and parenchymal atrophy (moderately progressed since 2010).    #Acute ischemic strokes / Hypoglycemia, ?UTI: Severe dysarthria/dysphagia/R facial droop  - CTH: negative for acute findings. CTA: no LVO. CTP: apparent perfusion abnormality (penumbra) within the brainstem and left posterior fossa with a total Tmax > 6s volume of 15cc. This may be artifactual  - Q4 neurochecks, vital signs  - Keep patient strictly NPO, until speech clears  - Permissive HTN  - Gentle hydration @ 60 cc/hr NS while NPO  - therapeutic Lovenox for now  - c/w ASA and statin   - A1C 6.3, Chol 154, LDL 83, Triglycerides 91  -  MRI brain non contrast < from: MR Head No Cont (08.11.22 @ 17:30) >  Motion degraded incomplete examination demonstrating small foci of acute infarct in the left temporal and parietal lobes.  Extensive chronic microsphere ischemic changes.                PHYSICAL EXAM    Vital Signs Last 24 Hrs  T(C): 36.6 (15 Aug 2022 11:30), Max: 36.7 (14 Aug 2022 19:11)  T(F): 97.8 (15 Aug 2022 11:30), Max: 98.1 (14 Aug 2022 19:11)  HR: 110 (15 Aug 2022 15:30) (49 - 110)  BP: 117/64 (15 Aug 2022 15:30) (117/64 - 155/88)  BP(mean): 67 (15 Aug 2022 15:30) (67 - 116)  RR: 18 (15 Aug 2022 15:30) (16 - 18)  SpO2: 97% (15 Aug 2022 15:30) (97% - 100%)    Parameters below as of 15 Aug 2022 15:30  Patient On (Oxygen Delivery Method): nasal cannula  O2 Flow (L/min): 2      Constitutional - NAD  Chest - CTA  Cardiovascular - S1S2+  Abdomen -  Soft  Extremities -  No calf tenderness   Function : bed mobility and transfer max A                 ambulate 4'RW max A    atorvastatin 80 milliGRAM(s) Oral at bedtime  dextrose 5%. 1000 milliLiter(s) IV Continuous <Continuous>  dextrose 5%. 1000 milliLiter(s) IV Continuous <Continuous>  dextrose 50% Injectable 25 Gram(s) IV Push once  dextrose 50% Injectable 12.5 Gram(s) IV Push once  dextrose 50% Injectable 25 Gram(s) IV Push once  dextrose 50% Injectable 12.5 Gram(s) IV Push once  dextrose Oral Gel 15 Gram(s) Oral once PRN  enoxaparin Injectable 90 milliGRAM(s) SubCutaneous every 12 hours  glucagon  Injectable 1 milliGRAM(s) IntraMuscular once  metoprolol tartrate Injectable 5 milliGRAM(s) IV Push every 6 hours  nystatin Powder 1 Application(s) Topical two times a day  sodium chloride 0.9%. 1000 milliLiter(s) IV Continuous <Continuous>      RECENT LABS/IMAGING                        13.6   8.52  )-----------( 119      ( 15 Aug 2022 05:39 )             42.5     08-15    142  |  105  |  41<H>  ----------------------------<  139<H>  4.7   |  16<L>  |  1.4    Ca    8.9      15 Aug 2022 05:39  Phos  3.0     08-15  Mg     2.1     08-15    TPro  7.0  /  Alb  3.8  /  TBili  0.5  /  DBili  x   /  AST  30  /  ALT  14  /  AlkPhos  47  08-15

## 2022-08-15 NOTE — SWALLOW BEDSIDE ASSESSMENT ADULT - ORAL PHASE
Delayed oral transit time
Decreased anterior-posterior movement of the bolus/Lingual stasis
Decreased anterior-posterior movement of the bolus/Delayed oral transit time/Lingual stasis
mildly delayed/Delayed oral transit time

## 2022-08-15 NOTE — OCCUPATIONAL THERAPY INITIAL EVALUATION ADULT - IMPAIRED TRANSFERS: BED/CHAIR, REHAB EVAL
+Lean to L and posterior/impaired balance/decreased flexibility/impaired postural control/decreased strength

## 2022-08-15 NOTE — OCCUPATIONAL THERAPY INITIAL EVALUATION ADULT - TRANSFER TRAINING, PT EVAL
Pt will increase bed to chair transfer to MOD A by discharge to increase independence with functional transfers

## 2022-08-16 LAB
ALBUMIN SERPL ELPH-MCNC: 3.9 G/DL — SIGNIFICANT CHANGE UP (ref 3.5–5.2)
ALP SERPL-CCNC: 46 U/L — SIGNIFICANT CHANGE UP (ref 30–115)
ALT FLD-CCNC: 16 U/L — SIGNIFICANT CHANGE UP (ref 0–41)
ANION GAP SERPL CALC-SCNC: 19 MMOL/L — HIGH (ref 7–14)
APTT BLD: 34.8 SEC — SIGNIFICANT CHANGE UP (ref 27–39.2)
AST SERPL-CCNC: 21 U/L — SIGNIFICANT CHANGE UP (ref 0–41)
BASOPHILS # BLD AUTO: 0.02 K/UL — SIGNIFICANT CHANGE UP (ref 0–0.2)
BASOPHILS # BLD AUTO: 0.02 K/UL — SIGNIFICANT CHANGE UP (ref 0–0.2)
BASOPHILS NFR BLD AUTO: 0.3 % — SIGNIFICANT CHANGE UP (ref 0–1)
BASOPHILS NFR BLD AUTO: 0.3 % — SIGNIFICANT CHANGE UP (ref 0–1)
BILIRUB SERPL-MCNC: 0.6 MG/DL — SIGNIFICANT CHANGE UP (ref 0.2–1.2)
BLD GP AB SCN SERPL QL: SIGNIFICANT CHANGE UP
BUN SERPL-MCNC: 51 MG/DL — HIGH (ref 10–20)
CALCIUM SERPL-MCNC: 8.9 MG/DL — SIGNIFICANT CHANGE UP (ref 8.5–10.1)
CHLORIDE SERPL-SCNC: 107 MMOL/L — SIGNIFICANT CHANGE UP (ref 98–110)
CO2 SERPL-SCNC: 20 MMOL/L — SIGNIFICANT CHANGE UP (ref 17–32)
CREAT SERPL-MCNC: 1.6 MG/DL — HIGH (ref 0.7–1.5)
EGFR: 31 ML/MIN/1.73M2 — LOW
EOSINOPHIL # BLD AUTO: 0.14 K/UL — SIGNIFICANT CHANGE UP (ref 0–0.7)
EOSINOPHIL # BLD AUTO: 0.18 K/UL — SIGNIFICANT CHANGE UP (ref 0–0.7)
EOSINOPHIL NFR BLD AUTO: 2.1 % — SIGNIFICANT CHANGE UP (ref 0–8)
EOSINOPHIL NFR BLD AUTO: 2.4 % — SIGNIFICANT CHANGE UP (ref 0–8)
GLUCOSE BLDC GLUCOMTR-MCNC: 160 MG/DL — HIGH (ref 70–99)
GLUCOSE BLDC GLUCOMTR-MCNC: 161 MG/DL — HIGH (ref 70–99)
GLUCOSE BLDC GLUCOMTR-MCNC: 172 MG/DL — HIGH (ref 70–99)
GLUCOSE BLDC GLUCOMTR-MCNC: 179 MG/DL — HIGH (ref 70–99)
GLUCOSE SERPL-MCNC: 162 MG/DL — HIGH (ref 70–99)
HCT VFR BLD CALC: 35.3 % — LOW (ref 37–47)
HCT VFR BLD CALC: 37.3 % — SIGNIFICANT CHANGE UP (ref 37–47)
HGB BLD-MCNC: 11.4 G/DL — LOW (ref 12–16)
HGB BLD-MCNC: 12.4 G/DL — SIGNIFICANT CHANGE UP (ref 12–16)
IMM GRANULOCYTES NFR BLD AUTO: 0.4 % — HIGH (ref 0.1–0.3)
IMM GRANULOCYTES NFR BLD AUTO: 0.5 % — HIGH (ref 0.1–0.3)
INR BLD: 1.17 RATIO — SIGNIFICANT CHANGE UP (ref 0.65–1.3)
LYMPHOCYTES # BLD AUTO: 1.23 K/UL — SIGNIFICANT CHANGE UP (ref 1.2–3.4)
LYMPHOCYTES # BLD AUTO: 1.52 K/UL — SIGNIFICANT CHANGE UP (ref 1.2–3.4)
LYMPHOCYTES # BLD AUTO: 16.6 % — LOW (ref 20.5–51.1)
LYMPHOCYTES # BLD AUTO: 23.3 % — SIGNIFICANT CHANGE UP (ref 20.5–51.1)
MAGNESIUM SERPL-MCNC: 2 MG/DL — SIGNIFICANT CHANGE UP (ref 1.8–2.4)
MCHC RBC-ENTMCNC: 29.5 PG — SIGNIFICANT CHANGE UP (ref 27–31)
MCHC RBC-ENTMCNC: 30.2 PG — SIGNIFICANT CHANGE UP (ref 27–31)
MCHC RBC-ENTMCNC: 32.3 G/DL — SIGNIFICANT CHANGE UP (ref 32–37)
MCHC RBC-ENTMCNC: 33.2 G/DL — SIGNIFICANT CHANGE UP (ref 32–37)
MCV RBC AUTO: 90.8 FL — SIGNIFICANT CHANGE UP (ref 81–99)
MCV RBC AUTO: 91.5 FL — SIGNIFICANT CHANGE UP (ref 81–99)
MONOCYTES # BLD AUTO: 0.79 K/UL — HIGH (ref 0.1–0.6)
MONOCYTES # BLD AUTO: 0.82 K/UL — HIGH (ref 0.1–0.6)
MONOCYTES NFR BLD AUTO: 10.6 % — HIGH (ref 1.7–9.3)
MONOCYTES NFR BLD AUTO: 12.6 % — HIGH (ref 1.7–9.3)
NEUTROPHILS # BLD AUTO: 4 K/UL — SIGNIFICANT CHANGE UP (ref 1.4–6.5)
NEUTROPHILS # BLD AUTO: 5.18 K/UL — SIGNIFICANT CHANGE UP (ref 1.4–6.5)
NEUTROPHILS NFR BLD AUTO: 61.2 % — SIGNIFICANT CHANGE UP (ref 42.2–75.2)
NEUTROPHILS NFR BLD AUTO: 69.7 % — SIGNIFICANT CHANGE UP (ref 42.2–75.2)
NRBC # BLD: 0 /100 WBCS — SIGNIFICANT CHANGE UP (ref 0–0)
NRBC # BLD: 0 /100 WBCS — SIGNIFICANT CHANGE UP (ref 0–0)
PLATELET # BLD AUTO: 140 K/UL — SIGNIFICANT CHANGE UP (ref 130–400)
PLATELET # BLD AUTO: 145 K/UL — SIGNIFICANT CHANGE UP (ref 130–400)
POTASSIUM SERPL-MCNC: 3.8 MMOL/L — SIGNIFICANT CHANGE UP (ref 3.5–5)
POTASSIUM SERPL-SCNC: 3.8 MMOL/L — SIGNIFICANT CHANGE UP (ref 3.5–5)
PROT SERPL-MCNC: 6.8 G/DL — SIGNIFICANT CHANGE UP (ref 6–8)
PROTHROM AB SERPL-ACNC: 13.4 SEC — HIGH (ref 9.95–12.87)
RBC # BLD: 3.86 M/UL — LOW (ref 4.2–5.4)
RBC # BLD: 4.11 M/UL — LOW (ref 4.2–5.4)
RBC # FLD: 13.6 % — SIGNIFICANT CHANGE UP (ref 11.5–14.5)
RBC # FLD: 13.6 % — SIGNIFICANT CHANGE UP (ref 11.5–14.5)
SODIUM SERPL-SCNC: 146 MMOL/L — SIGNIFICANT CHANGE UP (ref 135–146)
WBC # BLD: 6.53 K/UL — SIGNIFICANT CHANGE UP (ref 4.8–10.8)
WBC # BLD: 7.43 K/UL — SIGNIFICANT CHANGE UP (ref 4.8–10.8)
WBC # FLD AUTO: 6.53 K/UL — SIGNIFICANT CHANGE UP (ref 4.8–10.8)
WBC # FLD AUTO: 7.43 K/UL — SIGNIFICANT CHANGE UP (ref 4.8–10.8)

## 2022-08-16 PROCEDURE — 99232 SBSQ HOSP IP/OBS MODERATE 35: CPT | Mod: GC

## 2022-08-16 PROCEDURE — 74230 X-RAY XM SWLNG FUNCJ C+: CPT | Mod: 26

## 2022-08-16 PROCEDURE — 99233 SBSQ HOSP IP/OBS HIGH 50: CPT

## 2022-08-16 RX ORDER — LANOLIN ALCOHOL/MO/W.PET/CERES
10 CREAM (GRAM) TOPICAL AT BEDTIME
Refills: 0 | Status: DISCONTINUED | OUTPATIENT
Start: 2022-08-16 | End: 2022-08-18

## 2022-08-16 RX ADMIN — NYSTATIN CREAM 1 APPLICATION(S): 100000 CREAM TOPICAL at 17:25

## 2022-08-16 RX ADMIN — Medication 5 MILLIGRAM(S): at 17:25

## 2022-08-16 RX ADMIN — Medication 5 MILLIGRAM(S): at 05:06

## 2022-08-16 RX ADMIN — NYSTATIN CREAM 1 APPLICATION(S): 100000 CREAM TOPICAL at 05:07

## 2022-08-16 RX ADMIN — ENOXAPARIN SODIUM 90 MILLIGRAM(S): 100 INJECTION SUBCUTANEOUS at 05:07

## 2022-08-16 RX ADMIN — Medication 10 MILLIGRAM(S): at 21:53

## 2022-08-16 RX ADMIN — ATORVASTATIN CALCIUM 80 MILLIGRAM(S): 80 TABLET, FILM COATED ORAL at 21:53

## 2022-08-16 RX ADMIN — SODIUM CHLORIDE 75 MILLILITER(S): 9 INJECTION INTRAMUSCULAR; INTRAVENOUS; SUBCUTANEOUS at 12:18

## 2022-08-16 RX ADMIN — Medication 5 MILLIGRAM(S): at 01:50

## 2022-08-16 NOTE — SWALLOW BEDSIDE ASSESSMENT ADULT - SWALLOW EVAL: RECOMMENDED DIET
continue NPO w/ alternate means of nutrition/hydration
NPO with allowance for ice chips and meds in puree only, pending FEES
NPO with alternate means of hydration/ nutrition.
NPO with alternate means of hydration / nutrition
NPO with allowance for ice chips and meds in puree only, pending FEES

## 2022-08-16 NOTE — PROVIDER CONTACT NOTE (OTHER) - SITUATION
When providing incontinence care, writer noted hematoma to pt.'s left lower abdomen extending to hip. Center is hard to touch. Pt. denies pain/discomfort. No c/o.
Writer to assess patient's NIH. Patient is very lethargic, in and out of sleep, unable to follow commands and participate in assessment.
Patient returned form ultrasound with labored mouth breathing with HOB lowered to 15 degrees on 3L NC. Vitals completed. /103, , temp 97.5, Spo2 90% on 3 L. Current NIH 4.

## 2022-08-16 NOTE — PROGRESS NOTE ADULT - ATTENDING COMMENTS
Patient seen and examined and agree with above except as noted.  Patients history, notes, labs, imaging, vitals and meds reviewed personally.  No new complaints  Agitation overnight  Having FEES done today to assess swallowing  Speech dysarthric    Plan as above

## 2022-08-16 NOTE — SWALLOW BEDSIDE ASSESSMENT ADULT - DIET PRIOR TO ADMI
regular and thin as per Pt's son in law at bedside
regular and thin as per Pt's son in law at bedside
regular w/ thins

## 2022-08-16 NOTE — PROGRESS NOTE ADULT - SUBJECTIVE AND OBJECTIVE BOX
pt is admitted for slurred speech and right side facial droop   last night was agitated   speech and swallow eval appreciated    ROS: no cp, no sob, no n/v, no fever    PAST MEDICAL & SURGICAL HISTORY:  Diabetes  Hypertension  Hyperlipemia  CAD (coronary artery disease) of artery bypass graft  S/P total knee replacement  History of total hip replacement, bilateral      Vital Signs Last 24 Hrs  T(C): 36.7 (16 Aug 2022 07:32), Max: 36.8 (15 Aug 2022 20:00)  T(F): 98.1 (16 Aug 2022 07:32), Max: 98.2 (15 Aug 2022 20:00)  HR: 86 (16 Aug 2022 14:00) (80 - 110)  BP: 113/68 (16 Aug 2022 14:00) (105/55 - 168/96)  BP(mean): 86 (16 Aug 2022 14:00) (67 - 124)  RR: 18 (16 Aug 2022 11:30) (18 - 18)  SpO2: 98% (16 Aug 2022 11:30) (97% - 98%)    Parameters below as of 16 Aug 2022 11:30  Patient On (Oxygen Delivery Method): room air        physical exam  constitutional NAD, AAOx3, Respiratory  lungs CTA, CVS heart RRR, GI: abdomen Soft NT, ND, BS+,   Motor 4/5 upper and lower , slurred speech has improved       MEDICATIONS  (STANDING):  atorvastatin 80 milliGRAM(s) Oral at bedtime  dextrose 5%. 1000 milliLiter(s) (50 mL/Hr) IV Continuous <Continuous>  dextrose 5%. 1000 milliLiter(s) (100 mL/Hr) IV Continuous <Continuous>  dextrose 50% Injectable 25 Gram(s) IV Push once  dextrose 50% Injectable 12.5 Gram(s) IV Push once  dextrose 50% Injectable 25 Gram(s) IV Push once  dextrose 50% Injectable 12.5 Gram(s) IV Push once  enoxaparin Injectable 90 milliGRAM(s) SubCutaneous every 12 hours  glucagon  Injectable 1 milliGRAM(s) IntraMuscular once  melatonin 10 milliGRAM(s) Oral at bedtime  metoprolol tartrate Injectable 5 milliGRAM(s) IV Push every 6 hours  nystatin Powder 1 Application(s) Topical two times a day  sodium chloride 0.9%. 1000 milliLiter(s) (75 mL/Hr) IV Continuous <Continuous>    MEDICATIONS  (PRN):  dextrose Oral Gel 15 Gram(s) Oral once PRN Blood Glucose LESS THAN 70 milliGRAM(s)/deciliter      CAPILLARY BLOOD GLUCOSE      POCT Blood Glucose.: 179 mg/dL (16 Aug 2022 12:53)  POCT Blood Glucose.: 160 mg/dL (16 Aug 2022 05:56)  POCT Blood Glucose.: 161 mg/dL (16 Aug 2022 00:01)  POCT Blood Glucose.: 164 mg/dL (15 Aug 2022 17:09)                          12.4   7.43  )-----------( 140      ( 16 Aug 2022 06:27 )             37.3     08-16    146  |  107  |  51<H>  ----------------------------<  162<H>  3.8   |  20  |  1.6<H>    Ca    8.9      16 Aug 2022 06:27  Phos  3.0     08-15  Mg     2.0     08-16    TPro  6.8  /  Alb  3.9  /  TBili  0.6  /  DBili  x   /  AST  21  /  ALT  16  /  AlkPhos  46  08-16      COVID-19 PCR: NotDetec (08-10-22 @ 16:39)    < from: MR Head No Cont (08.11.22 @ 17:30) >  Motion degraded incomplete examination demonstrating small foci of acute   infarct in the left temporal and parietal lobes.    Extensive chronic microsphere ischemic changes.    < end of copied text >    Lipid Profile in AM (08.11.22 @ 05:39)    Cholesterol, Serum: 154 mg/dL    Triglycerides, Serum: 91 mg/dL    HDL Cholesterol, Serum: 53 mg/dL    Non HDL Cholesterol: 101: Patients Atherosclerotic Cardiovascular Disease (ASCVD) Risk    LDL Cholesterol Calculated: 83 mg/dL    < from: TTE Echo Complete w/o Contrast w/ Doppler (08.14.22 @ 12:51) >   1. Left ventricular ejection fraction, by visual estimation, is 50 to   55%.   2. Moderate left ventricular hypertrophy.   3. Mildly enlarged left atrium.   4. Moderately enlarged right atrium.   5. Degenerative mitral valve.   6. Mild mitral valve regurgitation.   7. Mitral annular calcification.   8. Mild tricuspid regurgitation.   9. Mild aortic regurgitation.    < end of copied text >          a/p  # acute stroke with dysphagia,   plan is modified barium swallow today   cont meds per neuro   pt has developed delirium and was agitated last night,     # Diabetes  A1C with Estimated Average Glucose Result: 6.3:  (08.11.22 @ 05:39)  POCT Blood Glucose.: 179 mg/dL (08-16-22 @ 12:53)  POCT Blood Glucose.: 160 mg/dL (08-16-22 @ 05:56)  POCT Blood Glucose.: 161 mg/dL (08-16-22 @ 00:01)  POCT Blood Glucose.: 164 mg/dL (08-15-22 @ 17:09)    cont current management , monitor fs     # afib with RVR, hx of hypertension but now hypotensive   cont ac     # hypotension BP: 113/68 (08-16-22 @ 14:00) (105/55 - 168/96)    avoid hypotension since it increases risk of hypoperfusion   decrease bb and then taper off   tachycardia could be due to anxiety   recommend discussing the tachycardia with EP     # CAD cont meds     # abnl cr , ozzie on ckd 4, cont IVF, probably pre renal   creatinine trend  1.6 (08-16-22 @ 06:27)  1.4 (08-15-22 @ 05:39)  1.4 (08-14-22 @ 08:22)  1.4 (08-13-22 @ 17:18)  Creatinine, Serum: 1.3 mg/dL (06.06.20 @ 09:44)        #Progress Note Handoff  Pending (specify):  modified barium swallow today   Family discussion: neuro team are communicating and updating family   Disposition: acute

## 2022-08-16 NOTE — SWALLOW VFSS/MBS ASSESSMENT ADULT - PHARYNGEAL PHASE COMMENTS
Swallow trigger with bolus head at the posterior angle of the ramus. Epiglottic inversion within functional limits. Reduced BOT retraction, hyolaryngeal excursion, pharyngeal squeeze, and stripping wave resulting in trace-mild residue along the BOT, in the vallecula and along the posterior pharyngeal wall. Volitional secondary swallow and mildly thick liquid wash helped to clear some of the remaining residue. Pharyngeal swallow was triggered with bolus head at the pyriforms. Epiglottic inversion within functional limits. Reduced BOT retraction, hyolaryngeal excursion,  pharyngeal squeeze, and stripping wave resulted in trace-mild penetration during the swallow with aspiration during the swallow with thin liquid trials (usually with cough response, however 1 instance of silent aspiration), and trace-mild residue along the BOT, in the vallecula and along the posterior pharyngeal wall. Volitional secondary swallow helped to clear the remaining residue.

## 2022-08-16 NOTE — SWALLOW VFSS/MBS ASSESSMENT ADULT - ORAL PHASE
Delayed oral transit time/Reduced anterior - posterior transport/Residue in oral cavity/Uncontrolled bolus / spillover in hypopharynx

## 2022-08-16 NOTE — SWALLOW BEDSIDE ASSESSMENT ADULT - SWALLOW EVAL: DIAGNOSIS
Pt disoriented with severely dysarthric unintelligible speech. Droplets of water presented and a small 1/4 tsp presented to Pt. Pt with no awareness of trials. SLP removed applesauce. Pt is not appropriate for any PO trials at this time.
Pt presents with mild oral dysphagia with concern for pharyngeal dysphagia given overt s/s aspiration at bedside.
Pt lethargic requiring cues for maintaining arousability. Limited trials given, Pt required mod- max cues to initiate swallow. Pt with severe oral dysphagia, suspected pharyngeal dysphagia as Pt observed with delayed swallow initiation, laryngeal pumping, suspected globus sensation as Pt initaied a second swallow after delay. Pt dozed off with PO trial in mouth.
+oral impairment, suspected pharyngeal dysphagia
Pt presents with mild oral dysphagia with concern for pharyngeal dysphagia given overt s/s aspiration at bedside.

## 2022-08-16 NOTE — SWALLOW BEDSIDE ASSESSMENT ADULT - SLP GENERAL OBSERVATIONS
Pt lethargic in bed, RN was trying to initiate neuro check upon SLP's arrival but Pt was lethargic with decreased direction following , o2 via NC, Family received at bedside.
Pt received alert, awake, sitting upright in chair next to bed. +mild dysarthria. Makes all wants and needs knows, A&Ox3.
Pt appears disoriented in bed, o2 via NC, Pt's son in law received at bedside,  severely dysarthric speech, decreased direction following
Pt received alert, awake, and talking. Pt is s/p transfer to chair with PT services, w/o increased WOB. +2L O2 via NC. Per discussion with son-in-law at bedside, pt is at baseline mental status, with minor confusion. Makes all wants and needs knows, A&Ox3.

## 2022-08-16 NOTE — SWALLOW BEDSIDE ASSESSMENT ADULT - ASR SWALLOW RECOMMEND DIAG
FEES on 8/16 to determine safest and least restrictive diet/FEES
FEES on 8/16 to determine safest and least restrictive diet/FEES

## 2022-08-16 NOTE — SWALLOW VFSS/MBS ASSESSMENT ADULT - RECOMMENDED FEEDING/EATING TECHNIQUES
Benefits from cued secondary swallow as well as liquid wash after solids to reduce residue./alternate food with liquid/oral hygiene/position upright (90 degrees)/small sips/bites

## 2022-08-16 NOTE — PROGRESS NOTE ADULT - SUBJECTIVE AND OBJECTIVE BOX
Neurology Stroke Progress Note    INTERVAL HPI/OVERNIGHT EVENTS:  Patient seen and examined. Overnight patient was agitated and had to be placed on restraints. Currently denies any HA, CP, abd pain. No fever, or sweats.     MEDICATIONS  (STANDING):  atorvastatin 80 milliGRAM(s) Oral at bedtime  dextrose 5%. 1000 milliLiter(s) (50 mL/Hr) IV Continuous <Continuous>  dextrose 5%. 1000 milliLiter(s) (100 mL/Hr) IV Continuous <Continuous>  dextrose 50% Injectable 25 Gram(s) IV Push once  dextrose 50% Injectable 12.5 Gram(s) IV Push once  dextrose 50% Injectable 25 Gram(s) IV Push once  dextrose 50% Injectable 12.5 Gram(s) IV Push once  enoxaparin Injectable 90 milliGRAM(s) SubCutaneous every 12 hours  glucagon  Injectable 1 milliGRAM(s) IntraMuscular once  melatonin 10 milliGRAM(s) Oral at bedtime  metoprolol tartrate Injectable 5 milliGRAM(s) IV Push every 6 hours  nystatin Powder 1 Application(s) Topical two times a day  sodium chloride 0.9%. 1000 milliLiter(s) (50 mL/Hr) IV Continuous <Continuous>    MEDICATIONS  (PRN):  dextrose Oral Gel 15 Gram(s) Oral once PRN Blood Glucose LESS THAN 70 milliGRAM(s)/deciliter    Allergies    codeine (Unknown)    Intolerances      Vital Signs Last 24 Hrs  T(C): 36.7 (16 Aug 2022 07:32), Max: 36.8 (15 Aug 2022 20:00)  T(F): 98.1 (16 Aug 2022 07:32), Max: 98.2 (15 Aug 2022 20:00)  HR: 84 (16 Aug 2022 07:32) (84 - 110)  BP: 152/86 (16 Aug 2022 07:32) (117/64 - 168/96)  BP(mean): 107 (16 Aug 2022 07:32) (67 - 124)  RR: 18 (16 Aug 2022 07:32) (18 - 18)  SpO2: 98% (16 Aug 2022 07:32) (97% - 100%)    Parameters below as of 16 Aug 2022 07:32  Patient On (Oxygen Delivery Method): room air        Physical exam:  General: No acute distress, awake and alert  Eyes: Anicteric sclerae, moist conjunctivae, see below for CNs  Neck: trachea midline, FROM, supple  Cardiovascular: Regular rate and rhythm, no murmurs  Pulmonary: Anterior breath sounds clear bilaterally, no crackles or wheezing. No use of accessory muscles  GI: Abdomen soft, non-distended, non-tender  Extremities: Radial and DP pulses +2, no edema    Neurologic:  -Mental status: Awake, alert, oriented to person, place, and time. Speech is fluent with intact naming, repetition, and comprehension, dysarthria present. Recent and remote memory intact. Follows commands. Attention/concentration intact. Fund of knowledge appropriate.  -Cranial nerves:   II: Visual fields are full to confrontation.  III, IV, VI: Extraocular movements are intact without nystagmus. Pupils equally round and reactive to light  V:  Facial sensation V1-V3 equal and intact   VII: Face is symmetric with normal eye closure and smile  VIII: Hearing is bilaterally intact to finger rub  IX, X: Uvula is midline and soft palate rises symmetrically  XI: Head turning and shoulder shrug are intact.  XII: Tongue protrudes midline, less edematous than   Motor: Normal bulk and tone. No pronator drift. Strength bilateral upper extremity 4/5, bilateral lower extremities 4/5.  Rapid alternating movements intact and symmetric  Sensation: Intact to light touch bilaterally. No neglect or extinction on double simultaneous testing.  Coordination: No dysmetria on finger-to-nose and heel-to-shin bilaterally  Reflexes: Downgoing toes bilaterally   Gait: Narrow gait and steady  NIH 2 dysarthria    LABS:                        12.4   7.43  )-----------( 140      ( 16 Aug 2022 06:27 )             37.3     08-16    146  |  107  |  51<H>  ----------------------------<  162<H>  3.8   |  20  |  1.6<H>    Ca    8.9      16 Aug 2022 06:27  Phos  3.0     08-15  Mg     2.0     08-16    TPro  6.8  /  Alb  3.9  /  TBili  0.6  /  DBili  x   /  AST  21  /  ALT  16  /  AlkPhos  46  08-16          RADIOLOGY & ADDITIONAL TESTS:

## 2022-08-16 NOTE — SWALLOW BEDSIDE ASSESSMENT ADULT - COMMENTS
Pt w/ waxing/waning cognitive status- per nursing notes was combative this morning. Son-in-law at bedside again today, reports increased confusion compared to yesterday. Now is cooperative and friendly, only mild confusion. Remains dysarthric- waxes and wanes, benefits from reminders to slow rate of speech for improved intelligibility. FEES reexplained and pt and son-in-law are in agreement. No trials administered during this evaluation, will f/u with FEES later this morning.

## 2022-08-16 NOTE — SWALLOW BEDSIDE ASSESSMENT ADULT - NS ASR SWALLOW FINDINGS DISCUS
Physician/Nursing/Patient/Family
Physician/Nursing/Patient/Family
JONATAN Aaron, Dr. Simon/Physician/Nursing/Family
MD Giselle Sawant/Physician/Nursing/Patient/Family
RN Fara, Dr. Higgins/Physician/Nursing/Patient/Family

## 2022-08-16 NOTE — PROVIDER CONTACT NOTE (OTHER) - RECOMMENDATIONS
Have MD assess pt at bedside.
Writer repositioned patient to [facility] 90degrees. SPO2 increased to 97% on 3L NC.
MD Bucio to bedside to assess

## 2022-08-16 NOTE — SWALLOW VFSS/MBS ASSESSMENT ADULT - MODE OF PRESENTATION
Pt is able to feed self however to preserve view, SLP fed the pt during this study./cup/spoon/straw/fed by clinician

## 2022-08-16 NOTE — SWALLOW VFSS/MBS ASSESSMENT ADULT - ORAL PHASE COMMENTS
Mild-moderate oral phase deficits for puree and minced and moist solids. Generalized weakness and disorganization of oral phase resulting in prolonged mastication/bolus formation with minced and moist solids. Disorganized AP propulsion of bolus with piecemeal swallowing pattern for both puree and minced and moist solids. Mild-moderate oral phase deficits for liquids. Generalized weakness and disorganization of oral phase resulting in disorganized AP propulsion of bolus and piecemeal swallowing pattern for more viscous liquids. Reduce bolus control resulted in the premature spillage of liquids into the hypopharynx.

## 2022-08-16 NOTE — PROGRESS NOTE ADULT - ASSESSMENT
87 yo F with history of CAD, HTN, diabetes, AFib on Eliquis presented to ED with CC of acute severe dysarthria/dysphagia/R facial droop possibly secondary to  multiple punctate infarcts in the L MCA territory involving the temporal, parietal lobes most likely cardioembolic in nature    #Left Temporal/Parietal punctate infarcts  - Initial NIHSS 4 for dysarthria, facial and LOC  - CTH: negative for acute findings  - CTA: no LVO  - CTP: apparent perfusion abnormality (penumbra) within the brainstem and left posterior fossa with a total Tmax > 6s volume of 15cc. This may be artifactual  - MRI:  2 small foci of restricted diffusion involving the left temporal and parietal lobe compatible with acute ischemic infarct. Confluent FLAIR signal in the subcortical and periventricular white matter suggestive of extensive chronic microvascular changes.  - Q4 neurochecks, vital signs  - s/p Speech eval: strict NPO for now  - s/p ENT bed-side scope: no airway obstruction, patent airway  - Permissive HTN x 24 hours keep < 220/110  - Gentle hydration @ 60 cc/hr NS  - Will dc on apixaban 5mg PO BID, patient came in on 2.5 BID  - c/w statin   - A1C 6.3, Chol 154, LDL 83, Triglycerides 91, B12 341  - TTE - 50-55, Mod enlarged right atrium, mod l. hypertrophy  - s/p PT/OT/ST/rehab - candidate for inpatient rehab  - s/p 3 days ceftriaxone 1000mg IV daily   - FEES study today and modified barium swallow. Follow up on SS recs    #hospital delirium  - if agitated tonight, may give Haldol 1 mg IM PRN  - frequent reorientation  - start patient on melatonin 10mg at HS    #R/O Pleural Effusion - improved  - CXR (pending final read); showing possible small effusions  - repeat CXR shows resolution of opacities  - Aspiration precautions    #AFib on Eliquis   - c/w lovenox 90mg BID for now, will switch to eliquis once patient able to swallow  - c/w metoprolol 5mg IV push Q6 with holding parameters    #HTN  - on Valsartan 320mg qd at home   - c/w metoprolol 5mg IV push Q6, hold for SBP <110, HR <70     #Dyslipidemia   - Chol 154, LDL 83, Triglycerides 91  - Continue Atorvastatin 80mg PO QHS once speech/swallow clears    #DM   - Hypoglycemic on fingersticks requiring 1/2 D5  - Monitor FS  - On Lantus 40U in the AM and lispro 3U TID at home  - Hold insulin for now given hypoglycemia   - Discontinue ISS for now given hypoglycemia   - Ordered glucagon 1mg IM PRN for BG <70     #MARCIN - Cr 1.6  - baseline: 1.3 6/2020  - continue ivf: iv ns    #Misc  - DVT prophylaxis: Eliquis   - GI prophylaxis: Not indicated   - Diet: NPO   - Activity: IAT   - Disposition: 3E from home    Pending: SS follow up

## 2022-08-16 NOTE — SWALLOW VFSS/MBS ASSESSMENT ADULT - COMMENTS
Pt seen for FEES this AM, with aspiration during the swallow. View and scope of test was limited however d/t movement. Recommended f/u with MBSS to better visualize aspiration during the swallow and determine safest and least restrictive PO diet.

## 2022-08-16 NOTE — SWALLOW VFSS/MBS ASSESSMENT ADULT - ORAL PREP COMMENTS
Poor labial seal around cup and spoon resulting in mild anterior spillage, increased with thins compared to thicker viscosities.

## 2022-08-16 NOTE — PROVIDER CONTACT NOTE (OTHER) - ACTION/TREATMENT ORDERED:
Awaiting further intervention
MD Simon to bedside to assess. Awaiting further intervention.
MD made aware and will assess pt at bedside to determine further interventions.

## 2022-08-16 NOTE — SWALLOW VFSS/MBS ASSESSMENT ADULT - DIAGNOSTIC IMPRESSIONS
Pt presents with mild oropharyngeal dysphagia, characterized by generalized oral weakness & disorganization and reduced hyolaryngeal excursion, BOT retraction and overall pharyngeal squeeze, resulting in penetration during the swallow for thin, mildly thick and moderately thick liquids with aspiration during the swallow for thin liquid liquids, as well as trace-mild pharyngeal residue after initial swallow for minced and moist solids.

## 2022-08-16 NOTE — PROVIDER CONTACT NOTE (OTHER) - DATE AND TIME:
Patient admitted to floor in stable condition in NAD, denies chest pain, SOB, resting comfortably. Books and glasses at bedside OK by [facility]. Patient transported with transportation.   
12-Aug-2022 11:35
12-Aug-2022 08:35
16-Aug-2022 16:00

## 2022-08-17 LAB
ALBUMIN SERPL ELPH-MCNC: 3.6 G/DL — SIGNIFICANT CHANGE UP (ref 3.5–5.2)
ALP SERPL-CCNC: 42 U/L — SIGNIFICANT CHANGE UP (ref 30–115)
ALT FLD-CCNC: 15 U/L — SIGNIFICANT CHANGE UP (ref 0–41)
ANION GAP SERPL CALC-SCNC: 16 MMOL/L — HIGH (ref 7–14)
AST SERPL-CCNC: 20 U/L — SIGNIFICANT CHANGE UP (ref 0–41)
BASOPHILS # BLD AUTO: 0.02 K/UL — SIGNIFICANT CHANGE UP (ref 0–0.2)
BASOPHILS NFR BLD AUTO: 0.3 % — SIGNIFICANT CHANGE UP (ref 0–1)
BILIRUB SERPL-MCNC: 0.5 MG/DL — SIGNIFICANT CHANGE UP (ref 0.2–1.2)
BUN SERPL-MCNC: 42 MG/DL — HIGH (ref 10–20)
CALCIUM SERPL-MCNC: 8.9 MG/DL — SIGNIFICANT CHANGE UP (ref 8.5–10.1)
CHLORIDE SERPL-SCNC: 110 MMOL/L — SIGNIFICANT CHANGE UP (ref 98–110)
CO2 SERPL-SCNC: 20 MMOL/L — SIGNIFICANT CHANGE UP (ref 17–32)
CREAT SERPL-MCNC: 1.2 MG/DL — SIGNIFICANT CHANGE UP (ref 0.7–1.5)
EGFR: 44 ML/MIN/1.73M2 — LOW
EOSINOPHIL # BLD AUTO: 0.15 K/UL — SIGNIFICANT CHANGE UP (ref 0–0.7)
EOSINOPHIL NFR BLD AUTO: 1.9 % — SIGNIFICANT CHANGE UP (ref 0–8)
GLUCOSE BLDC GLUCOMTR-MCNC: 155 MG/DL — HIGH (ref 70–99)
GLUCOSE BLDC GLUCOMTR-MCNC: 157 MG/DL — HIGH (ref 70–99)
GLUCOSE BLDC GLUCOMTR-MCNC: 165 MG/DL — HIGH (ref 70–99)
GLUCOSE BLDC GLUCOMTR-MCNC: 166 MG/DL — HIGH (ref 70–99)
GLUCOSE SERPL-MCNC: 172 MG/DL — HIGH (ref 70–99)
HCT VFR BLD CALC: 35.6 % — LOW (ref 37–47)
HGB BLD-MCNC: 11.7 G/DL — LOW (ref 12–16)
IMM GRANULOCYTES NFR BLD AUTO: 0.4 % — HIGH (ref 0.1–0.3)
LYMPHOCYTES # BLD AUTO: 1.23 K/UL — SIGNIFICANT CHANGE UP (ref 1.2–3.4)
LYMPHOCYTES # BLD AUTO: 15.9 % — LOW (ref 20.5–51.1)
MAGNESIUM SERPL-MCNC: 2.1 MG/DL — SIGNIFICANT CHANGE UP (ref 1.8–2.4)
MCHC RBC-ENTMCNC: 30.5 PG — SIGNIFICANT CHANGE UP (ref 27–31)
MCHC RBC-ENTMCNC: 32.9 G/DL — SIGNIFICANT CHANGE UP (ref 32–37)
MCV RBC AUTO: 92.7 FL — SIGNIFICANT CHANGE UP (ref 81–99)
MONOCYTES # BLD AUTO: 0.72 K/UL — HIGH (ref 0.1–0.6)
MONOCYTES NFR BLD AUTO: 9.3 % — SIGNIFICANT CHANGE UP (ref 1.7–9.3)
NEUTROPHILS # BLD AUTO: 5.57 K/UL — SIGNIFICANT CHANGE UP (ref 1.4–6.5)
NEUTROPHILS NFR BLD AUTO: 72.2 % — SIGNIFICANT CHANGE UP (ref 42.2–75.2)
NRBC # BLD: 0 /100 WBCS — SIGNIFICANT CHANGE UP (ref 0–0)
PLATELET # BLD AUTO: 129 K/UL — LOW (ref 130–400)
POTASSIUM SERPL-MCNC: 3.7 MMOL/L — SIGNIFICANT CHANGE UP (ref 3.5–5)
POTASSIUM SERPL-SCNC: 3.7 MMOL/L — SIGNIFICANT CHANGE UP (ref 3.5–5)
PROT SERPL-MCNC: 6.5 G/DL — SIGNIFICANT CHANGE UP (ref 6–8)
RBC # BLD: 3.84 M/UL — LOW (ref 4.2–5.4)
RBC # FLD: 13.9 % — SIGNIFICANT CHANGE UP (ref 11.5–14.5)
SODIUM SERPL-SCNC: 146 MMOL/L — SIGNIFICANT CHANGE UP (ref 135–146)
WBC # BLD: 7.72 K/UL — SIGNIFICANT CHANGE UP (ref 4.8–10.8)
WBC # FLD AUTO: 7.72 K/UL — SIGNIFICANT CHANGE UP (ref 4.8–10.8)

## 2022-08-17 PROCEDURE — 99233 SBSQ HOSP IP/OBS HIGH 50: CPT

## 2022-08-17 PROCEDURE — 99232 SBSQ HOSP IP/OBS MODERATE 35: CPT

## 2022-08-17 RX ORDER — METOPROLOL TARTRATE 50 MG
25 TABLET ORAL EVERY 8 HOURS
Refills: 0 | Status: DISCONTINUED | OUTPATIENT
Start: 2022-08-17 | End: 2022-08-18

## 2022-08-17 RX ORDER — APIXABAN 2.5 MG/1
5 TABLET, FILM COATED ORAL
Refills: 0 | Status: DISCONTINUED | OUTPATIENT
Start: 2022-08-17 | End: 2022-08-18

## 2022-08-17 RX ADMIN — Medication 5 MILLIGRAM(S): at 06:10

## 2022-08-17 RX ADMIN — APIXABAN 5 MILLIGRAM(S): 2.5 TABLET, FILM COATED ORAL at 18:15

## 2022-08-17 RX ADMIN — NYSTATIN CREAM 1 APPLICATION(S): 100000 CREAM TOPICAL at 18:14

## 2022-08-17 RX ADMIN — Medication 5 MILLIGRAM(S): at 00:43

## 2022-08-17 RX ADMIN — NYSTATIN CREAM 1 APPLICATION(S): 100000 CREAM TOPICAL at 06:11

## 2022-08-17 RX ADMIN — ATORVASTATIN CALCIUM 80 MILLIGRAM(S): 80 TABLET, FILM COATED ORAL at 21:42

## 2022-08-17 RX ADMIN — Medication 25 MILLIGRAM(S): at 21:42

## 2022-08-17 RX ADMIN — Medication 25 MILLIGRAM(S): at 14:15

## 2022-08-17 RX ADMIN — Medication 10 MILLIGRAM(S): at 21:42

## 2022-08-17 NOTE — PROGRESS NOTE ADULT - SUBJECTIVE AND OBJECTIVE BOX
Neurology Stroke Progress Note    INTERVAL HPI/OVERNIGHT EVENTS:  Patient seen and examined. No acute events overnight. Patient denies any pain or discomfort this morning. ROS otherwise neg.     MEDICATIONS  (STANDING):  atorvastatin 80 milliGRAM(s) Oral at bedtime  dextrose 5%. 1000 milliLiter(s) (50 mL/Hr) IV Continuous <Continuous>  dextrose 5%. 1000 milliLiter(s) (100 mL/Hr) IV Continuous <Continuous>  dextrose 50% Injectable 25 Gram(s) IV Push once  dextrose 50% Injectable 12.5 Gram(s) IV Push once  dextrose 50% Injectable 25 Gram(s) IV Push once  dextrose 50% Injectable 12.5 Gram(s) IV Push once  enoxaparin Injectable 90 milliGRAM(s) SubCutaneous every 12 hours  glucagon  Injectable 1 milliGRAM(s) IntraMuscular once  melatonin 10 milliGRAM(s) Oral at bedtime  metoprolol tartrate 25 milliGRAM(s) Oral every 8 hours  nystatin Powder 1 Application(s) Topical two times a day    MEDICATIONS  (PRN):  dextrose Oral Gel 15 Gram(s) Oral once PRN Blood Glucose LESS THAN 70 milliGRAM(s)/deciliter    Allergies    codeine (Unknown)    Intolerances      Vital Signs Last 24 Hrs  T(C): 35.9 (17 Aug 2022 13:22), Max: 36.7 (16 Aug 2022 15:30)  T(F): 96.7 (17 Aug 2022 13:22), Max: 98.1 (16 Aug 2022 20:55)  HR: 101 (17 Aug 2022 13:22) (70 - 101)  BP: 131/57 (17 Aug 2022 13:22) (131/57 - 168/77)  BP(mean): 100 (16 Aug 2022 15:30) (100 - 100)  RR: 20 (17 Aug 2022 13:22) (18 - 20)  SpO2: 98% (16 Aug 2022 22:29) (97% - 98%)    Parameters below as of 17 Aug 2022 13:22  Patient On (Oxygen Delivery Method): room air        Physical exam:  General: No acute distress, awake and alert  Eyes: Anicteric sclerae, moist conjunctivae, see below for CNs  Neck: trachea midline, FROM, supple  Cardiovascular: Regular rate and rhythm, no murmurs  Pulmonary: Anterior breath sounds clear bilaterally, no crackles or wheezing. No use of accessory muscles  GI: Abdomen soft, non-distended, non-tender, ecchymotic bruise in left lower abdomen from lovenox shot  Extremities: Radial and DP pulses +2, no edema    Neurologic:  -Mental status: Awake, alert, oriented to person, place, and time. Speech is fluent with intact naming, repetition, and comprehension, dysarthria present. Recent and remote memory intact. Follows commands. Attention/concentration intact. Fund of knowledge appropriate.  -Cranial nerves:   II: Visual fields are full to confrontation.  III, IV, VI: Extraocular movements are intact without nystagmus. Pupils equally round and reactive to light  V:  Facial sensation V1-V3 equal and intact   VII: Face is symmetric with normal eye closure and smile  VIII: Hearing is bilaterally intact to finger rub  IX, X: Uvula is midline and soft palate rises symmetrically  XI: Head turning and shoulder shrug are intact.  XII: Tongue protrudes midline, less edematous than   Motor: Normal bulk and tone. No pronator drift. Strength bilateral upper extremity 4/5, bilateral lower extremities 4/5.  Rapid alternating movements intact and symmetric  Sensation: Intact to light touch bilaterally. No neglect or extinction on double simultaneous testing.  Coordination: No dysmetria on finger-to-nose and heel-to-shin bilaterally  Reflexes: Downgoing toes bilaterally   Gait: Deferred  NIH 2 dysarthria      LABS:                        11.7   7.72  )-----------( 129      ( 17 Aug 2022 08:11 )             35.6     08-17    146  |  110  |  42<H>  ----------------------------<  172<H>  3.7   |  20  |  1.2    Ca    8.9      17 Aug 2022 08:11  Mg     2.1     08-17    TPro  6.5  /  Alb  3.6  /  TBili  0.5  /  DBili  x   /  AST  20  /  ALT  15  /  AlkPhos  42  08-17    PT/INR - ( 16 Aug 2022 21:17 )   PT: 13.40 sec;   INR: 1.17 ratio         PTT - ( 16 Aug 2022 21:17 )  PTT:34.8 sec      RADIOLOGY & ADDITIONAL TESTS:

## 2022-08-17 NOTE — PROGRESS NOTE ADULT - ASSESSMENT
89 yo F with history of CAD, HTN, diabetes, AFib on Eliquis presented to ED with CC of acute severe dysarthria/dysphagia/R facial droop noticed by daughter at which time blood sugar was 45. In ED, , not thrombolytic candidate on Eliquis.  Not thrombectomy candidate- no evidence of LVO.    #Acute ischemic strokes / Hypoglycemia, ?UTI: Severe dysarthria/dysphagia/R facial droop  - CTH: negative for acute findings. CTA: no LVO. CTP: apparent perfusion abnormality (penumbra) within the brainstem and left posterior fossa with a total Tmax > 6s volume of 15cc. This may be artifactual  - Q4 neurochecks, vital signs  - Keep patient strictly NPO, until speech clears  - Permissive HTN  - Gentle hydration @ 60 cc/hr NS while NPO  - therapeutic Lovenox for now  - c/w ASA and statin   - A1C 6.3, Chol 154, LDL 83, Triglycerides 91  -  MRI brain non contrast < from: MR Head No Cont (08.11.22 @ 17:30) >  Motion degraded incomplete examination demonstrating small foci of acute infarct in the left temporal and parietal lobes.  Extensive chronic microsphere ischemic changes.  < end of copied text >  -  TTE=nl EF  - PT/OT/ST/rehab  - hold all Insulin products. FS q6  - treating possible UTI  - Cr better and might need ELiquis 5 BID on d/c    #Acute hypoxemic resp failure  -CXR w/ b/l opacities (?effusions, atelectasis, asp-n pneumonitis)  -8/ 12 s/p Lasix 20mg IVP x1, then reacess  -asp-n precautions  -on RA since 8/13  -will use Lasix PRN  -8/15 back on O2: recommend OOB, incentive spirometer. CXR reviewed by me: atelectasis, small pleural effusions  -resolved    #Dysphagia  -S&S f/u to start feeds  - if on PO, d/c IVFs  -8/15: if fails S&S again, start NGT and d/c IVFs    HAGMA  -likely starvation ketoacidosis  -needs either PO or NGT to be started today!    #?Tongue swelling  - scoped by ENT: no evidence of angioedema. Significant secretions and pt appears not to be able to clear secretions  -suction pt  -resolved     #Possible UTI  - ceftriaxone 1000mg IV daily to complete 5 days    #Chronic AFib on Eliquis   - Lovenox as above  - metoprolol 5-10mg IV push Q6 while NPO w/ holding parameters  - Cr better and might need ELiquis 5 BID on d/c    #HTN  - on Valsartan 320mg qd at home   - Ordered metoprolol 5mg IV push Q6, hold for SBP <110, HR <70     #Dyslipidemia   - Chol 154, LDL 83, Triglycerides 91  - Continue Atorvastatin 40mg PO QHS once speech/swallow clears    #DM w/ hypoglycemia on admission  - Hypoglycemic on fingersticks requiring 1/2 D5  - Monitor FS  - On Lantus 40U in the AM and lispro 3U TID at home  - Hold all insulin for now given hypoglycemia    - Ordered glucagon 1mg IM PRN for BG <70   -A1c=6.3  -lower dose of Insulin on d/c    #MARCIN - Resolved, Cr 1.4 - 8/11/22  - baseline: 1.3 6/2020  - continue ivf: iv ns    #Misc  - DVT prophylaxis: Eliquis or Lovenox  - GI prophylaxis: Not indicated   - Diet: NPO   - Activity: IAT   - Disposition: stroke unit    High risk pt.     #Progress Note Handoff  Pending: Consults____Clinical improvement and stability__x__S&S_, Lactate___PT____x____  Pt/Family discussion: Pt/son informed and agree with the current plan  Disposition: Home______/SNF_______/4A______/To be determined____x____    My note supersedes the residents note should a discrepancy arise.    Chart and notes personally reviewed.  Care Discussed with Consultants/Other Providers/ Housestaff [ x] YES [ ] NO   Radiology, labs, old records personally reviewed.    discussed w/ housestaff, nursing, case management, neuro team, son   87 yo F with history of CAD, HTN, diabetes, AFib on Eliquis presented to ED with CC of acute severe dysarthria/dysphagia/R facial droop noticed by daughter at which time blood sugar was 45. In ED, , not thrombolytic candidate on Eliquis.  Not thrombectomy candidate- no evidence of LVO.    #Acute ischemic strokes / Hypoglycemia, ?UTI: Severe dysarthria/dysphagia/R facial droop  - CTH: negative for acute findings. CTA: no LVO. CTP: apparent perfusion abnormality (penumbra) within the brainstem and left posterior fossa with a total Tmax > 6s volume of 15cc. This may be artifactual  - Q4 neurochecks, vital signs  - Keep patient strictly NPO, until speech clears  - Permissive HTN  - Gentle hydration @ 60 cc/hr NS while NPO  - therapeutic Lovenox for now  - c/w ASA and statin   - A1C 6.3, Chol 154, LDL 83, Triglycerides 91  -  MRI brain non contrast < from: MR Head No Cont (08.11.22 @ 17:30) >  Motion degraded incomplete examination demonstrating small foci of acute infarct in the left temporal and parietal lobes.  Extensive chronic microsphere ischemic changes.  < end of copied text >  -  TTE=nl EF  - PT/OT/ST/rehab  - hold all Insulin products. FS q6  - treating possible UTI  - Cr better and might need ELiquis 5 BID on d/c    #Acute hypoxemic resp failure  -CXR w/ b/l opacities (?effusions, atelectasis, asp-n pneumonitis)  -8/ 12 s/p Lasix 20mg IVP x1, then reacess  -asp-n precautions  -on RA since 8/13  -will use Lasix PRN  -8/15 back on O2: recommend OOB, incentive spirometer. CXR reviewed by me: atelectasis, small pleural effusions  -resolved    #Somnolence on 8/17  -likely maltifactorial  -would try to ensure pt stays up during the day and sleeps at night  -asp-n precautions  -monitor  -delirium precautions    #Lt flank hematoma  -monitor CBC    #Dysphagia  -started on PO diet    HAGMA  -likely starvation ketoacidosis  -improving    #?Tongue swelling  - scoped by ENT: no evidence of angioedema. Significant secretions and pt appears not to be able to clear secretions  -suction pt  -resolved     #Possible UTI  - ceftriaxone 1000mg IV completed 5 days    #Chronic AFib on Eliquis   - Lovenox as above  - metoprolol 5-10mg IV push Q6 while NPO w/ holding parameters  - Cr better and might need ELiquis 5 BID on d/c    #HTN  - stable     #Dyslipidemia   - Chol 154, LDL 83, Triglycerides 91  - Continue Atorvastatin 40mg PO QHS once speech/swallow clears    #DM w/ hypoglycemia on admission  - Hypoglycemic on fingersticks requiring 1/2 D5  - Monitor FS  - On Lantus 40U in the AM and lispro 3U TID at home  - Hold all insulin for now given hypoglycemia    - Ordered glucagon 1mg IM PRN for BG <70   -A1c=6.3  -lower dose of Insulin on d/c    #MARCIN - Resolved, Cr 1.4 - 8/11/22  - baseline: 1.3 6/2020  -    #Misc  - DVT prophylaxis: Eliquis or Lovenox  - GI prophylaxis: Not indicated   - Diet: NPO   - Activity: IAT   - Disposition: stroke unit    High risk pt.     #Progress Note Handoff  Pending: Consults____Clinical improvement and stability__CBC, Resolution of lethargy__PT____x____  Pt/Family discussion: Pt/sons/daughter informed and agree with the current plan  Disposition: Home______/SNF_______/4A______/To be determined____x____    My note supersedes the residents note should a discrepancy arise.    Chart and notes personally reviewed.  Care Discussed with Consultants/Other Providers/ Housestaff [ x] YES [ ] NO   Radiology, labs, old records personally reviewed.    discussed w/ housestaff, nursing, case management, neuro team, daughter

## 2022-08-17 NOTE — PROGRESS NOTE ADULT - ASSESSMENT
87 yo F with history of CAD, HTN, diabetes, AFib on Eliquis presented to ED with CC of acute severe dysarthria/dysphagia/R facial droop possibly secondary to  multiple punctate infarcts in the L MCA territory involving the temporal, parietal lobes most likely cardioembolic in nature    #Left Temporal/Parietal punctate infarcts  - Initial NIHSS 4 for dysarthria, facial and LOC  - CTH: negative for acute findings  - CTA: no LVO  - CTP: apparent perfusion abnormality (penumbra) within the brainstem and left posterior fossa with a total Tmax > 6s volume of 15cc. This may be artifactual  - MRI:  2 small foci of restricted diffusion involving the left temporal and parietal lobe compatible with acute ischemic infarct. Confluent FLAIR signal in the subcortical and periventricular white matter suggestive of extensive chronic microvascular changes.  - Q4 neurochecks, vital signs  - s/p Speech eval: strict NPO for now  - s/p ENT bed-side scope: no airway obstruction, patent airway  - Stop IVF  - Start patient on eliquis 5mg BID  - c/w statin   - A1C 6.3, Chol 154, LDL 83, Triglycerides 91, B12 341  - TTE - 50-55, Mod enlarged right atrium, mod l. hypertrophy  - s/p PT/OT/ST/rehab - candidate for inpatient rehab  - s/p 3 days ceftriaxone 1000mg IV daily   - S/P FEES study today and modified barium swallow - minced and moist diet    #hospital delirium  - if agitated tonight, may give Haldol 1 mg IM PRN  - frequent reorientation  - c/w melatonin 10mg at HS    #Ecchymotic bruise in left lower abdomen most likely 2/2 lovenox injection  - Hgb stable  - will stop lovenox, and start eliquis 5mg BID  - cont. to monitor    #R/O Pleural Effusion/opacities -resolved  - CXR (pending final read); showing possible small effusions  - repeat CXR shows resolution of opacities  - Aspiration precautions    #AFib on Eliquis   - stop lovenox 90mg BID for now, switch to eliquis 5mg BID  - stop metoprolol 5mg IV push Q6 and start metoprolol 25mg TID    #HTN  - on Valsartan 320mg qd at home   - c/w metoprolol 25mg TID, hold for SBP <110, HR <70     #Dyslipidemia   - Chol 154, LDL 83, Triglycerides 91  - Continue Atorvastatin 80mg PO QHS once speech/swallow clears    #DM   - Hypoglycemic on fingersticks requiring 1/2 D5  - Monitor FS  - On Lantus 40U in the AM and lispro 3U TID at home  - Hold insulin for now given hypoglycemia   - Discontinue ISS for now given hypoglycemia   - Ordered glucagon 1mg IM PRN for BG <70     #MARCIN - Cr 1.6  - baseline: 1.3 6/2020  - continue ivf: iv ns    #Misc  - DVT prophylaxis: Eliquis   - GI prophylaxis: Not indicated   - Diet: Mince and moist  - Activity: IAT   - Disposition: 3E from home    Pending: Dispo planning

## 2022-08-17 NOTE — PROGRESS NOTE ADULT - SUBJECTIVE AND OBJECTIVE BOX
GARY NUNEZ  88y  Female    Patient is a 88y old  Female who presents with a chief complaint of slurred speech and right sided facial droop. (10 Aug 2022 22:15)      HPI:  This is a 88 F hx of CAD, Diabetes, Hyperlipemia, Hypertension, Afib on Eliquis presents to ED BIBA from home for slurred speech and right sided facial droop. Patient went to bed last night around ~10PM at baseline. Patient woke up this morning around ~11Am which is late for her. Daughter noted she was not herself,  checked her sugar and it was ~45. Noted the slurred speech. Also noted patient had trouble swallowing. Patient took her Eliquis dose this morning.  No history of strokes.     ED vitals T(F): 97.5, HR: 75, BP: 163/85, RR: 18, SpO2: 99%   Labs show Hb 12.4, HCT 36.7, WBC 6.65, , Trops neg x1, TPro  7.3  /  Alb  4.3  /  TBili  0.3  /  DBili  x   /  AST  14  /  ALT  11  /  AlkPhos  48  08-10  141  |  106  |  36<H>  ----------------------------<  104<H>  4.8   |  24  |  1.6<H>    Ca    9.7      10 Aug 2022 15:35    CT Angio Brain Stroke Protocol  w/ IV Cont, No evidence of major vascular stenosis or occlusion. Scattered mild calcific plaque.  CT perfusion: Apparent perfusion abnormality (penumbra) within the brainstem and left posterior fossa with a total Tmax > 6s volume of 15   CT Brain Stroke Protocol shows no evidence of acute intracranial hemorrhage or large territory infarct.  Chronic-appearing left cerebellar infarct (new since 2010). Moderate/severe chronic microvascular changes and parenchymal atrophy (moderately progressed since 2010).           (10 Aug 2022 22:15)    S: Patient was examined and seen at bedside. This morning pt is resting in the chair. Daughter at bedside. Found to have Lt flank hematoma. Did not sleep overnight and somnolent this am but responds to verbal stimuli and following commands. Passed S&S.  ROS: denies CP, SOB, pain    PAST MEDICAL & SURGICAL HISTORY:  Diabetes      Hypertension      Hyperlipemia      CAD (coronary artery disease) of artery bypass graft      S/P total knee replacement      History of total hip replacement, bilateral        SOCIAL HISTORY:  Tobacco: denies  Illicit drugs: denies  Alcohol: social  Family history reviewed and otherwise non-contributory No clotting disorders, CVAs at early age.  ALLERGIES: codeine    General: NAD, AA0x1+. Looks stated age. +Mod Dysarthria  HEENT: clean oropharynx, EOMI, no LAD;   Neck: trachea midline, no thyromegaly  CV: nl S1 S2; no m/r/g  Resp: decreased breath sounds at base  GI: NT/ND/S +BS, Lt flank hematoma`  MS: no clubbing/cyanosis/edema, +pulses  Neuro: moves all extremities symmetrically but generalized weakness  Skin: no rashes, nl turgor      tele: afib, nonspecific changes (on my own evaluation of tele monitor)            MEDICATIONS  (STANDING):  atorvastatin 80 milliGRAM(s) Oral at bedtime  dextrose 5%. 1000 milliLiter(s) (50 mL/Hr) IV Continuous <Continuous>  dextrose 5%. 1000 milliLiter(s) (100 mL/Hr) IV Continuous <Continuous>  dextrose 50% Injectable 25 Gram(s) IV Push once  dextrose 50% Injectable 12.5 Gram(s) IV Push once  dextrose 50% Injectable 25 Gram(s) IV Push once  dextrose 50% Injectable 12.5 Gram(s) IV Push once  enoxaparin Injectable 90 milliGRAM(s) SubCutaneous every 12 hours  glucagon  Injectable 1 milliGRAM(s) IntraMuscular once  melatonin 10 milliGRAM(s) Oral at bedtime  metoprolol tartrate 25 milliGRAM(s) Oral every 8 hours  nystatin Powder 1 Application(s) Topical two times a day    MEDICATIONS  (PRN):  dextrose Oral Gel 15 Gram(s) Oral once PRN Blood Glucose LESS THAN 70 milliGRAM(s)/deciliter    Home Medications:  Crestor 40 mg oral tablet: 1 tab(s) orally once a day (06 Jun 2020 10:56)  Diovan 320 mg oral tablet: 1 tab(s) orally once a day (06 Jun 2020 10:56)  Eliquis 2.5 mg oral tablet: 1 tab(s) orally 2 times a day (10 Aug 2022 23:02)  Lantus 100 units/mL subcutaneous solution: 40 unit(s) subcutaneous once a day (in the morning) (10 Aug 2022 23:01)  NovoLOG 100 units/mL subcutaneous solution: 3 unit(s) subcutaneous 3 times a day (before meals) (10 Aug 2022 23:01)  oxybutynin 15 mg/24 hr oral tablet, extended release: 1 tab(s) orally once a day (10 Aug 2022 23:03)  Toprol- mg oral tablet, extended release: 1 tab(s) orally once a day (06 Jun 2020 10:56)    Vital Signs Last 24 Hrs  T(C): 35.9 (17 Aug 2022 13:22), Max: 36.7 (16 Aug 2022 15:30)  T(F): 96.7 (17 Aug 2022 13:22), Max: 98.1 (16 Aug 2022 20:55)  HR: 101 (17 Aug 2022 13:22) (70 - 101)  BP: 131/57 (17 Aug 2022 13:22) (131/57 - 168/77)  BP(mean): 100 (16 Aug 2022 15:30) (100 - 100)  RR: 20 (17 Aug 2022 13:22) (18 - 20)  SpO2: 98% (16 Aug 2022 22:29) (97% - 98%)    Parameters below as of 17 Aug 2022 13:22  Patient On (Oxygen Delivery Method): room air      CAPILLARY BLOOD GLUCOSE      POCT Blood Glucose.: 166 mg/dL (17 Aug 2022 12:22)  POCT Blood Glucose.: 165 mg/dL (17 Aug 2022 11:19)  POCT Blood Glucose.: 155 mg/dL (17 Aug 2022 06:24)  POCT Blood Glucose.: 157 mg/dL (16 Aug 2022 23:59)  POCT Blood Glucose.: 172 mg/dL (16 Aug 2022 16:59)    LABS:                        11.7   7.72  )-----------( 129      ( 17 Aug 2022 08:11 )             35.6     08-17    146  |  110  |  42<H>  ----------------------------<  172<H>  3.7   |  20  |  1.2    Ca    8.9      17 Aug 2022 08:11  Mg     2.1     08-17    TPro  6.5  /  Alb  3.6  /  TBili  0.5  /  DBili  x   /  AST  20  /  ALT  15  /  AlkPhos  42  08-17    LIVER FUNCTIONS - ( 17 Aug 2022 08:11 )  Alb: 3.6 g/dL / Pro: 6.5 g/dL / ALK PHOS: 42 U/L / ALT: 15 U/L / AST: 20 U/L / GGT: x               PT/INR - ( 16 Aug 2022 21:17 )   PT: 13.40 sec;   INR: 1.17 ratio         PTT - ( 16 Aug 2022 21:17 )  PTT:34.8 sec            Consultant Notes Reviewed:  [x ] YES  [ ] NO  Care Discussed with Consultants/Other Providers/ Housestaff [ x] YES  [ ] NO  Radiology, labs, new studies personally reviewed.                                                                               GARY NUNEZ  88y  Female    Patient is a 88y old  Female who presents with a chief complaint of slurred speech and right sided facial droop. (10 Aug 2022 22:15)      HPI:  This is a 88 F hx of CAD, Diabetes, Hyperlipemia, Hypertension, Afib on Eliquis presents to ED BIBA from home for slurred speech and right sided facial droop. Patient went to bed last night around ~10PM at baseline. Patient woke up this morning around ~11Am which is late for her. Daughter noted she was not herself,  checked her sugar and it was ~45. Noted the slurred speech. Also noted patient had trouble swallowing. Patient took her Eliquis dose this morning.  No history of strokes.     ED vitals T(F): 97.5, HR: 75, BP: 163/85, RR: 18, SpO2: 99%   Labs show Hb 12.4, HCT 36.7, WBC 6.65, , Trops neg x1, TPro  7.3  /  Alb  4.3  /  TBili  0.3  /  DBili  x   /  AST  14  /  ALT  11  /  AlkPhos  48  08-10  141  |  106  |  36<H>  ----------------------------<  104<H>  4.8   |  24  |  1.6<H>    Ca    9.7      10 Aug 2022 15:35    CT Angio Brain Stroke Protocol  w/ IV Cont, No evidence of major vascular stenosis or occlusion. Scattered mild calcific plaque.  CT perfusion: Apparent perfusion abnormality (penumbra) within the brainstem and left posterior fossa with a total Tmax > 6s volume of 15   CT Brain Stroke Protocol shows no evidence of acute intracranial hemorrhage or large territory infarct.  Chronic-appearing left cerebellar infarct (new since 2010). Moderate/severe chronic microvascular changes and parenchymal atrophy (moderately progressed since 2010).           (10 Aug 2022 22:15)    S: Patient was examined and seen at bedside. This morning pt is resting in the chair. Daughter at bedside. Found to have Lt flank hematoma. Did not sleep overnight and somnolent this am but responds to verbal stimuli and following commands. Passed S&S.  ROS: denies CP, SOB, pain    PAST MEDICAL & SURGICAL HISTORY:  Diabetes      Hypertension      Hyperlipemia      CAD (coronary artery disease) of artery bypass graft      S/P total knee replacement      History of total hip replacement, bilateral        SOCIAL HISTORY:  Tobacco: denies  Illicit drugs: denies  Alcohol: social  Family history reviewed and otherwise non-contributory No clotting disorders, CVAs at early age.  ALLERGIES: codeine    General: somnolent, AA0x1+. Looks stated age. +Mod Dysarthria  HEENT: clean oropharynx, EOMI, no LAD;   Neck: trachea midline, no thyromegaly  CV: nl S1 S2; no m/r/g  Resp: decreased breath sounds at base  GI: NT/ND/S +BS, Lt flank hematoma`  MS: no clubbing/cyanosis/edema, +pulses  Neuro: moves all extremities symmetrically but generalized weakness  Skin: no rashes, nl turgor. Lt flank hematoma      tele: afib, nonspecific changes (on my own evaluation of tele monitor)            MEDICATIONS  (STANDING):  atorvastatin 80 milliGRAM(s) Oral at bedtime  dextrose 5%. 1000 milliLiter(s) (50 mL/Hr) IV Continuous <Continuous>  dextrose 5%. 1000 milliLiter(s) (100 mL/Hr) IV Continuous <Continuous>  dextrose 50% Injectable 25 Gram(s) IV Push once  dextrose 50% Injectable 12.5 Gram(s) IV Push once  dextrose 50% Injectable 25 Gram(s) IV Push once  dextrose 50% Injectable 12.5 Gram(s) IV Push once  enoxaparin Injectable 90 milliGRAM(s) SubCutaneous every 12 hours  glucagon  Injectable 1 milliGRAM(s) IntraMuscular once  melatonin 10 milliGRAM(s) Oral at bedtime  metoprolol tartrate 25 milliGRAM(s) Oral every 8 hours  nystatin Powder 1 Application(s) Topical two times a day    MEDICATIONS  (PRN):  dextrose Oral Gel 15 Gram(s) Oral once PRN Blood Glucose LESS THAN 70 milliGRAM(s)/deciliter    Home Medications:  Crestor 40 mg oral tablet: 1 tab(s) orally once a day (06 Jun 2020 10:56)  Diovan 320 mg oral tablet: 1 tab(s) orally once a day (06 Jun 2020 10:56)  Eliquis 2.5 mg oral tablet: 1 tab(s) orally 2 times a day (10 Aug 2022 23:02)  Lantus 100 units/mL subcutaneous solution: 40 unit(s) subcutaneous once a day (in the morning) (10 Aug 2022 23:01)  NovoLOG 100 units/mL subcutaneous solution: 3 unit(s) subcutaneous 3 times a day (before meals) (10 Aug 2022 23:01)  oxybutynin 15 mg/24 hr oral tablet, extended release: 1 tab(s) orally once a day (10 Aug 2022 23:03)  Toprol- mg oral tablet, extended release: 1 tab(s) orally once a day (06 Jun 2020 10:56)    Vital Signs Last 24 Hrs  T(C): 35.9 (17 Aug 2022 13:22), Max: 36.7 (16 Aug 2022 15:30)  T(F): 96.7 (17 Aug 2022 13:22), Max: 98.1 (16 Aug 2022 20:55)  HR: 101 (17 Aug 2022 13:22) (70 - 101)  BP: 131/57 (17 Aug 2022 13:22) (131/57 - 168/77)  BP(mean): 100 (16 Aug 2022 15:30) (100 - 100)  RR: 20 (17 Aug 2022 13:22) (18 - 20)  SpO2: 98% (16 Aug 2022 22:29) (97% - 98%)    Parameters below as of 17 Aug 2022 13:22  Patient On (Oxygen Delivery Method): room air      CAPILLARY BLOOD GLUCOSE      POCT Blood Glucose.: 166 mg/dL (17 Aug 2022 12:22)  POCT Blood Glucose.: 165 mg/dL (17 Aug 2022 11:19)  POCT Blood Glucose.: 155 mg/dL (17 Aug 2022 06:24)  POCT Blood Glucose.: 157 mg/dL (16 Aug 2022 23:59)  POCT Blood Glucose.: 172 mg/dL (16 Aug 2022 16:59)    LABS:                        11.7   7.72  )-----------( 129      ( 17 Aug 2022 08:11 )             35.6     08-17    146  |  110  |  42<H>  ----------------------------<  172<H>  3.7   |  20  |  1.2    Ca    8.9      17 Aug 2022 08:11  Mg     2.1     08-17    TPro  6.5  /  Alb  3.6  /  TBili  0.5  /  DBili  x   /  AST  20  /  ALT  15  /  AlkPhos  42  08-17    LIVER FUNCTIONS - ( 17 Aug 2022 08:11 )  Alb: 3.6 g/dL / Pro: 6.5 g/dL / ALK PHOS: 42 U/L / ALT: 15 U/L / AST: 20 U/L / GGT: x               PT/INR - ( 16 Aug 2022 21:17 )   PT: 13.40 sec;   INR: 1.17 ratio         PTT - ( 16 Aug 2022 21:17 )  PTT:34.8 sec            Consultant Notes Reviewed:  [x ] YES  [ ] NO  Care Discussed with Consultants/Other Providers/ Housestaff [ x] YES  [ ] NO  Radiology, labs, new studies personally reviewed.

## 2022-08-17 NOTE — PROGRESS NOTE ADULT - ATTENDING COMMENTS
Patient seen and examined and agree with above except as noted.  Patients history, notes, labs, imaging, vitals and meds reviewed personally.  No new complaints  No SOB or headaches    Plan as above Patient seen and examined and agree with above except as noted.  Patients history, notes, labs, imaging, vitals and meds reviewed personally.  Hematoma in abdomen at site of lovenox injection and it was held.  She was able to swallow modified diet and liquids so will plan to restart ora anticoagulation  No SOB or complaints of pain    Plan as above

## 2022-08-18 ENCOUNTER — TRANSCRIPTION ENCOUNTER (OUTPATIENT)
Age: 87
End: 2022-08-18

## 2022-08-18 ENCOUNTER — INPATIENT (INPATIENT)
Facility: HOSPITAL | Age: 87
LOS: 12 days | Discharge: HOME HEALTH | End: 2022-08-31
Attending: PHYSICAL MEDICINE & REHABILITATION | Admitting: PHYSICAL MEDICINE & REHABILITATION

## 2022-08-18 VITALS
SYSTOLIC BLOOD PRESSURE: 147 MMHG | HEIGHT: 63 IN | WEIGHT: 197.31 LBS | TEMPERATURE: 97 F | DIASTOLIC BLOOD PRESSURE: 75 MMHG | HEART RATE: 102 BPM | RESPIRATION RATE: 18 BRPM

## 2022-08-18 VITALS — WEIGHT: 195.99 LBS

## 2022-08-18 DIAGNOSIS — Z96.643 PRESENCE OF ARTIFICIAL HIP JOINT, BILATERAL: Chronic | ICD-10-CM

## 2022-08-18 DIAGNOSIS — Z96.659 PRESENCE OF UNSPECIFIED ARTIFICIAL KNEE JOINT: Chronic | ICD-10-CM

## 2022-08-18 DIAGNOSIS — I25.810 ATHEROSCLEROSIS OF CORONARY ARTERY BYPASS GRAFT(S) WITHOUT ANGINA PECTORIS: Chronic | ICD-10-CM

## 2022-08-18 LAB
ALBUMIN SERPL ELPH-MCNC: 3.6 G/DL — SIGNIFICANT CHANGE UP (ref 3.5–5.2)
ALP SERPL-CCNC: 39 U/L — SIGNIFICANT CHANGE UP (ref 30–115)
ALT FLD-CCNC: 16 U/L — SIGNIFICANT CHANGE UP (ref 0–41)
ANION GAP SERPL CALC-SCNC: 8 MMOL/L — SIGNIFICANT CHANGE UP (ref 7–14)
AST SERPL-CCNC: 17 U/L — SIGNIFICANT CHANGE UP (ref 0–41)
BASOPHILS # BLD AUTO: 0.02 K/UL — SIGNIFICANT CHANGE UP (ref 0–0.2)
BASOPHILS NFR BLD AUTO: 0.3 % — SIGNIFICANT CHANGE UP (ref 0–1)
BILIRUB SERPL-MCNC: 0.5 MG/DL — SIGNIFICANT CHANGE UP (ref 0.2–1.2)
BUN SERPL-MCNC: 36 MG/DL — HIGH (ref 10–20)
CALCIUM SERPL-MCNC: 9 MG/DL — SIGNIFICANT CHANGE UP (ref 8.5–10.1)
CHLORIDE SERPL-SCNC: 110 MMOL/L — SIGNIFICANT CHANGE UP (ref 98–110)
CO2 SERPL-SCNC: 28 MMOL/L — SIGNIFICANT CHANGE UP (ref 17–32)
CREAT SERPL-MCNC: 1.4 MG/DL — SIGNIFICANT CHANGE UP (ref 0.7–1.5)
EGFR: 36 ML/MIN/1.73M2 — LOW
EOSINOPHIL # BLD AUTO: 0.2 K/UL — SIGNIFICANT CHANGE UP (ref 0–0.7)
EOSINOPHIL NFR BLD AUTO: 2.6 % — SIGNIFICANT CHANGE UP (ref 0–8)
GLUCOSE BLDC GLUCOMTR-MCNC: 186 MG/DL — HIGH (ref 70–99)
GLUCOSE BLDC GLUCOMTR-MCNC: 207 MG/DL — HIGH (ref 70–99)
GLUCOSE BLDC GLUCOMTR-MCNC: 228 MG/DL — HIGH (ref 70–99)
GLUCOSE BLDC GLUCOMTR-MCNC: 269 MG/DL — HIGH (ref 70–99)
GLUCOSE SERPL-MCNC: 196 MG/DL — HIGH (ref 70–99)
HCT VFR BLD CALC: 34.4 % — LOW (ref 37–47)
HGB BLD-MCNC: 11.3 G/DL — LOW (ref 12–16)
IMM GRANULOCYTES NFR BLD AUTO: 0.4 % — HIGH (ref 0.1–0.3)
LYMPHOCYTES # BLD AUTO: 1.46 K/UL — SIGNIFICANT CHANGE UP (ref 1.2–3.4)
LYMPHOCYTES # BLD AUTO: 19.3 % — LOW (ref 20.5–51.1)
MAGNESIUM SERPL-MCNC: 2 MG/DL — SIGNIFICANT CHANGE UP (ref 1.8–2.4)
MCHC RBC-ENTMCNC: 30.2 PG — SIGNIFICANT CHANGE UP (ref 27–31)
MCHC RBC-ENTMCNC: 32.8 G/DL — SIGNIFICANT CHANGE UP (ref 32–37)
MCV RBC AUTO: 92 FL — SIGNIFICANT CHANGE UP (ref 81–99)
MONOCYTES # BLD AUTO: 0.7 K/UL — HIGH (ref 0.1–0.6)
MONOCYTES NFR BLD AUTO: 9.2 % — SIGNIFICANT CHANGE UP (ref 1.7–9.3)
NEUTROPHILS # BLD AUTO: 5.17 K/UL — SIGNIFICANT CHANGE UP (ref 1.4–6.5)
NEUTROPHILS NFR BLD AUTO: 68.2 % — SIGNIFICANT CHANGE UP (ref 42.2–75.2)
NRBC # BLD: 0 /100 WBCS — SIGNIFICANT CHANGE UP (ref 0–0)
PLATELET # BLD AUTO: 122 K/UL — LOW (ref 130–400)
POTASSIUM SERPL-MCNC: 4.3 MMOL/L — SIGNIFICANT CHANGE UP (ref 3.5–5)
POTASSIUM SERPL-SCNC: 4.3 MMOL/L — SIGNIFICANT CHANGE UP (ref 3.5–5)
PROT SERPL-MCNC: 6 G/DL — SIGNIFICANT CHANGE UP (ref 6–8)
RBC # BLD: 3.74 M/UL — LOW (ref 4.2–5.4)
RBC # FLD: 14 % — SIGNIFICANT CHANGE UP (ref 11.5–14.5)
SODIUM SERPL-SCNC: 146 MMOL/L — SIGNIFICANT CHANGE UP (ref 135–146)
WBC # BLD: 7.58 K/UL — SIGNIFICANT CHANGE UP (ref 4.8–10.8)
WBC # FLD AUTO: 7.58 K/UL — SIGNIFICANT CHANGE UP (ref 4.8–10.8)

## 2022-08-18 PROCEDURE — 99232 SBSQ HOSP IP/OBS MODERATE 35: CPT

## 2022-08-18 PROCEDURE — 93010 ELECTROCARDIOGRAM REPORT: CPT

## 2022-08-18 PROCEDURE — 99239 HOSP IP/OBS DSCHRG MGMT >30: CPT

## 2022-08-18 RX ORDER — NYSTATIN CREAM 100000 [USP'U]/G
1 CREAM TOPICAL
Qty: 0 | Refills: 0 | DISCHARGE
Start: 2022-08-18

## 2022-08-18 RX ORDER — APIXABAN 2.5 MG/1
1 TABLET, FILM COATED ORAL
Qty: 0 | Refills: 0 | DISCHARGE
Start: 2022-08-18

## 2022-08-18 RX ORDER — OXYBUTYNIN CHLORIDE 5 MG
1 TABLET ORAL
Qty: 0 | Refills: 0 | DISCHARGE

## 2022-08-18 RX ORDER — APIXABAN 2.5 MG/1
1 TABLET, FILM COATED ORAL
Qty: 0 | Refills: 0 | DISCHARGE

## 2022-08-18 RX ORDER — SENNA PLUS 8.6 MG/1
2 TABLET ORAL AT BEDTIME
Refills: 0 | Status: DISCONTINUED | OUTPATIENT
Start: 2022-08-18 | End: 2022-08-31

## 2022-08-18 RX ORDER — ACETAMINOPHEN 500 MG
650 TABLET ORAL EVERY 6 HOURS
Refills: 0 | Status: DISCONTINUED | OUTPATIENT
Start: 2022-08-18 | End: 2022-08-29

## 2022-08-18 RX ORDER — LANOLIN ALCOHOL/MO/W.PET/CERES
10 CREAM (GRAM) TOPICAL AT BEDTIME
Refills: 0 | Status: DISCONTINUED | OUTPATIENT
Start: 2022-08-18 | End: 2022-08-31

## 2022-08-18 RX ORDER — METOPROLOL TARTRATE 50 MG
1 TABLET ORAL
Qty: 0 | Refills: 0 | DISCHARGE
Start: 2022-08-18

## 2022-08-18 RX ORDER — LANOLIN ALCOHOL/MO/W.PET/CERES
1 CREAM (GRAM) TOPICAL
Qty: 0 | Refills: 0 | DISCHARGE
Start: 2022-08-18

## 2022-08-18 RX ORDER — ATORVASTATIN CALCIUM 80 MG/1
80 TABLET, FILM COATED ORAL AT BEDTIME
Refills: 0 | Status: DISCONTINUED | OUTPATIENT
Start: 2022-08-18 | End: 2022-08-31

## 2022-08-18 RX ORDER — ROSUVASTATIN CALCIUM 5 MG/1
1 TABLET ORAL
Qty: 0 | Refills: 0 | DISCHARGE

## 2022-08-18 RX ORDER — METOPROLOL TARTRATE 50 MG
25 TABLET ORAL EVERY 8 HOURS
Refills: 0 | Status: DISCONTINUED | OUTPATIENT
Start: 2022-08-18 | End: 2022-08-18

## 2022-08-18 RX ORDER — ATORVASTATIN CALCIUM 80 MG/1
1 TABLET, FILM COATED ORAL
Qty: 0 | Refills: 0 | DISCHARGE
Start: 2022-08-18

## 2022-08-18 RX ORDER — METOPROLOL TARTRATE 50 MG
100 TABLET ORAL DAILY
Refills: 0 | Status: DISCONTINUED | OUTPATIENT
Start: 2022-08-18 | End: 2022-08-31

## 2022-08-18 RX ORDER — MAGNESIUM HYDROXIDE 400 MG/1
30 TABLET, CHEWABLE ORAL DAILY
Refills: 0 | Status: DISCONTINUED | OUTPATIENT
Start: 2022-08-18 | End: 2022-08-31

## 2022-08-18 RX ORDER — NYSTATIN CREAM 100000 [USP'U]/G
1 CREAM TOPICAL EVERY 12 HOURS
Refills: 0 | Status: DISCONTINUED | OUTPATIENT
Start: 2022-08-18 | End: 2022-08-31

## 2022-08-18 RX ORDER — INSULIN ASPART 100 [IU]/ML
3 INJECTION, SOLUTION SUBCUTANEOUS
Qty: 0 | Refills: 0 | DISCHARGE

## 2022-08-18 RX ORDER — VALSARTAN 80 MG/1
1 TABLET ORAL
Qty: 0 | Refills: 0 | DISCHARGE

## 2022-08-18 RX ORDER — INSULIN GLARGINE 100 [IU]/ML
40 INJECTION, SOLUTION SUBCUTANEOUS
Qty: 0 | Refills: 0 | DISCHARGE

## 2022-08-18 RX ORDER — APIXABAN 2.5 MG/1
5 TABLET, FILM COATED ORAL EVERY 12 HOURS
Refills: 0 | Status: DISCONTINUED | OUTPATIENT
Start: 2022-08-18 | End: 2022-08-31

## 2022-08-18 RX ADMIN — Medication 25 MILLIGRAM(S): at 18:25

## 2022-08-18 RX ADMIN — Medication 25 MILLIGRAM(S): at 05:31

## 2022-08-18 RX ADMIN — ATORVASTATIN CALCIUM 80 MILLIGRAM(S): 80 TABLET, FILM COATED ORAL at 21:32

## 2022-08-18 RX ADMIN — APIXABAN 5 MILLIGRAM(S): 2.5 TABLET, FILM COATED ORAL at 05:31

## 2022-08-18 RX ADMIN — NYSTATIN CREAM 1 APPLICATION(S): 100000 CREAM TOPICAL at 18:25

## 2022-08-18 RX ADMIN — APIXABAN 5 MILLIGRAM(S): 2.5 TABLET, FILM COATED ORAL at 18:24

## 2022-08-18 RX ADMIN — NYSTATIN CREAM 1 APPLICATION(S): 100000 CREAM TOPICAL at 05:32

## 2022-08-18 RX ADMIN — Medication 10 MILLIGRAM(S): at 21:32

## 2022-08-18 NOTE — H&P ADULT - TIME BILLING
chart review, comprehensive teaching history and physical  with resident, patient and family counseling and education regarding disease risk factors and expected outcome and the inpatient rehab process, coordination with nursing, ACP and therapists and discussion with discharging team regarding handoff of the patient's condition and hospital course. chart review, comprehensive history and physical, patient and family counseling and education regarding disease risk factors and expected outcome and the inpatient rehab process, coordination with nursing, ACP and therapists and discussion with discharging team regarding handoff of the patient's condition and hospital course.

## 2022-08-18 NOTE — H&P ADULT - HISTORY OF PRESENT ILLNESS
This is a 88 F hx of CAD, Diabetes, Hyperlipemia, Hypertension, Afib on Eliquis presents to ED BIBA from home for slurred speech and right sided facial droop. Patient went to bed that night around ~10PM at baseline. Patient woke up the next morning around ~11Am which is late for her. Daughter noted she was not herself,  checked her sugar and it was ~45. Noted the slurred speech. Also noted patient had trouble swallowing. Patient took her Eliquis dose this morning.  No history of strokes. CT Angio Brain Stroke Protocol  w/ IV Cont, No evidence of major vascular stenosis or occlusion. Scattered mild calcific plaque.  CT perfusion: Apparent perfusion abnormality (penumbra) within the brainstem and left posterior fossa with a total Tmax > 6s volume of 15 CT Brain Stroke Protocol shows no evidence of acute intracranial hemorrhage or large territory infarct. Chronic-appearing left cerebellar infarct (new since 2010). Moderate/severe chronic microvascular changes and parenchymal atrophy (moderately progressed since 2010). < from: MR Head No Cont (08.11.22 @ 17:30) > Motion degraded incomplete examination demonstrating small foci of acute infarct in the left temporal and parietal lobes. Extensive chronic microsphere ischemic changes. Her course was complicated by uncontrolled DM2 an MARCIN on CKD, now medically stable. She was placed on therapeutic Lovenox and then back onto Eliquis. She has been medically stable.     She was seen for a swallow assessment and cleared for Minced and Moist diet with mildly thickened liquids. She was seen by PT and OT and requires max assistance to stand and for bed mobility and min assistance to ambulate 15 ft. with a RW and mod assistance for UE dressing and max assistance for LE dressing. PTA, she was fully independent in all activities, using a straight cane or walker at times. She was evaluated by me on the Neuro Service and was found to be a good candidate for acute inpatient rehab. Her son-in-law at bedside states that she has had some cognitive deficits for a while and that it is near baseline.

## 2022-08-18 NOTE — DISCHARGE NOTE PROVIDER - HOSPITAL COURSE
Hospital course:  This is an 89 yo F with history of CAD, HTN, diabetes, AFib on Eliquis presented to ED with CC of acute severe dysarthria/dysphagia/R facial droop secondary to  multiple punctate infarcts in the L MCA territory involving the temporal, parietal lobes most likely cardioembolic in nature. While in the hospital, course was complicated by delirium and lung opacities. Patient completed a 3 day course of antibiotics. Patient was also noted to have a left lower abd bruise secondary to lovenox injection. Hgb remained stable during this time. Patient is stable and ready to be discharged.     During this hospital course, patient had multiple ischemic strokes located in the left teporal, parietal lobes as seen on MRI  The stroke etiology is likely secondary to:  [X] Atrial fibrillation  [] Small vessel disease from atherosclerotic risk factors  [] Other:  [] Etiology workup still in progress    Patient had the following workup done in house:  CT Head: negative for acute findings  MR Head Non Contrast: 2 small foci of restricted diffusion involving the left temporal and parietal lobe compatible with acute ischemic infarct. Confluent FLAIR signal in the subcortical and periventricular white matter suggestive of extensive chronic microvascular changes.  CT Angio Head and Neck: no LVO  Echo:  50-55, Mod enlarged right atrium, mod l. hypertrophy    Physical exam at discharge:  General: No acute distress, awake and alert  Eyes: Anicteric sclerae, moist conjunctivae, see below for CNs  Neck: trachea midline, FROM, supple  Cardiovascular: Regular rate and rhythm, no murmurs  Pulmonary: Anterior breath sounds clear bilaterally, no crackles or wheezing. No use of accessory muscles  GI: Abdomen soft, non-distended, non-tender, ecchymotic bruise in left lower abdomen from lovenox shot  Extremities: Radial and DP pulses +2, no edema    Neurologic:  -Mental status: Awake, alert, oriented to person and place.  Speech is fluent with intact naming, repetition, and comprehension, dysarthria present. Recent and remote memory intact. Follows commands. Attention/concentration intact. Fund of knowledge appropriate.  -Cranial nerves:   II: Visual fields are full to confrontation.  III, IV, VI: Extraocular movements are intact without nystagmus. Pupils equally round and reactive to light  V:  Facial sensation V1-V3 equal and intact   VII: Face is symmetric with normal eye closure and smile  VIII: Hearing is bilaterally intact to finger rub  IX, X: Uvula is midline and soft palate rises symmetrically  XI: Head turning and shoulder shrug are intact.  XII: Tongue protrudes midline, no edema  Motor: Normal bulk and tone. No pronator drift. Strength bilateral upper extremity 4/5, bilateral lower extremities 4/5.  Rapid alternating movements intact and symmetric  Sensation: Intact to light touch bilaterally. No neglect or extinction on double simultaneous testing.  Coordination: No dysmetria on finger-to-nose and heel-to-shin bilaterally  Reflexes: Downgoing toes bilaterally   Gait: Deferred  NIH 2 dysarthria    New medications on discharge: Eliquis increased to 5mg BID         Hospital course:  This is an 89 yo F with history of CAD, HTN, diabetes, AFib on Eliquis presented to ED with CC of acute severe dysarthria/dysphagia/R facial droop secondary to  multiple punctate infarcts in the L MCA territory involving the temporal, parietal lobes most likely cardioembolic in nature. While in the hospital, course was complicated by delirium and lung opacities. Patient completed a 3 day course of antibiotics. Patient was also noted to have a left lower abd bruise secondary to lovenox injection. Hgb remained stable during this time. Patient is stable and ready to be discharged.     During this hospital course, patient had multiple ischemic strokes located in the left teporal, parietal lobes as seen on MRI  The stroke etiology is likely secondary to:  [X] Atrial fibrillation  [] Small vessel disease from atherosclerotic risk factors  [] Other:  [] Etiology workup still in progress    Patient had the following workup done in house:  CT Head: negative for acute findings  MR Head Non Contrast: 2 small foci of restricted diffusion involving the left temporal and parietal lobe compatible with acute ischemic infarct. Confluent FLAIR signal in the subcortical and periventricular white matter suggestive of extensive chronic microvascular changes.  CT Angio Head and Neck: no LVO  Echo:  50-55, Mod enlarged right atrium, mod l. hypertrophy    Physical exam at discharge:  General: No acute distress, awake and alert  Eyes: Anicteric sclerae, moist conjunctivae, see below for CNs  Neck: trachea midline, FROM, supple  Cardiovascular: Regular rate and rhythm, no murmurs  Pulmonary: Anterior breath sounds clear bilaterally, no crackles or wheezing. No use of accessory muscles  GI: Abdomen soft, non-distended, non-tender, ecchymotic bruise in left lower abdomen from lovenox shot  Extremities: Radial and DP pulses +2, no edema    Neurologic:  -Mental status: Awake, alert, oriented to person and place.  Speech is fluent with intact naming, repetition, and comprehension, dysarthria present. Recent and remote memory intact. Follows commands. Attention/concentration intact. Fund of knowledge appropriate.  -Cranial nerves:   II: Visual fields are full to confrontation.  III, IV, VI: Extraocular movements are intact without nystagmus. Pupils equally round and reactive to light  V:  Facial sensation V1-V3 equal and intact   VII: Face is symmetric with normal eye closure and smile  VIII: Hearing is bilaterally intact to finger rub  IX, X: Uvula is midline and soft palate rises symmetrically  XI: Head turning and shoulder shrug are intact.  XII: Tongue protrudes midline, no edema  Motor: Normal bulk and tone. No pronator drift. Strength bilateral upper extremity 4/5, bilateral lower extremities 4/5.  Rapid alternating movements intact and symmetric  Sensation: Intact to light touch bilaterally. No neglect or extinction on double simultaneous testing.  Coordination: No dysmetria on finger-to-nose and heel-to-shin bilaterally  Reflexes: Downgoing toes bilaterally   Gait: Deferred  NIH 2 dysarthria    New medications on discharge: Eliquis increased to 5mg BID    Attending Attestation:  Patient seen and examined and found to have new stroke.  Etiology thought to be due too failure of eliquis due too subtherapeutic dose.  Dose increased to 5mg BID  Swallowing improved  f/u in stroke clinic in 2-3 weeks

## 2022-08-18 NOTE — H&P ADULT - NSHPPHYSICALEXAM_GEN_ALL_CORE
PHYSICAL EXAMINATION   VItals: T(C): 35.7 (08-18-22 @ 05:41), Max: 35.8 (08-17-22 @ 21:10)  HR: 94 (08-18-22 @ 05:41) (85 - 94)  BP: 176/78 (08-18-22 @ 05:41) (113/66 - 176/78)  RR: 18 (08-18-22 @ 05:41) (18 - 18)  SpO2: --    General:[  x ] NAD, Resting Comfortable,   [   ] other:                                HEENT: [  x ] NC/AT, EOMI, PERRL , Normal Conjunctivae,   [   ] other:  Cardio: [  x ] RRR, no murmer,   [   ] other:                              Pulm: [ x  ] No Respiratory Distress,  Lungs CTAB,   [   ] other:                       Abdomen: [  x ]ND/NT, Soft,   [   ] other:    : [  x ] NO REYNA CATHETER, [   ] REYNA CATHETER- no meatal tear, no discharge, [   ] other:                                            MSK: [ x  ] No joint swelling,   [  x ] other:  healed bilat TKR scars, severe limited PROM left shoulder with pain                                    Ext: [  x ]No C/C/E, No calf tenderness,   [   ]other:    Skin: [ x  ]intact,   [   ] other:                                                                   Neurological Examination:  Cognitive: [    ] AAO x 3,   [  x  ]  other: oriented to self and event, not month or year                                                                     Attention:  [ x   ] intact,   [    ]  other:                            Memory: [  x  ] grossly intact,  3 items in 5 mins with delay  [    ]  other:     Mood/Affect: [ x   ] wnl,    [    ]  other:                                                                             Communication: [    ]Fluent, no dysarthria, following commands:  [  x  ] other: mild to mod dysarthria. Follows commands/ conversation well. Fluent though word substitution errors  CN II - XII:  [ x   ] intact,  [    ] other:                                                                                        Motor:   RIGHT UE: [  x ] WNL,  [   ] other:  LEFT    UE: [ x  ] WNL,  [   ] other:  RIGHT LE: [  x ] WNL,  [   ] other:   LEFT    LE: [ x  ] WNL,  [   ] other:    Tone: [  x  ] wnl,   [    ]  other:  DTRs: [  x ]symmetric, [   ] other:  Coordination:   [  x  ] intact,   [    ] other:                                                                           Sensory: [ x   ] Intact to light touch,   [    ] other:

## 2022-08-18 NOTE — DISCHARGE NOTE NURSING/CASE MANAGEMENT/SOCIAL WORK - PATIENT PORTAL LINK FT
You can access the FollowMyHealth Patient Portal offered by [facility] by registering at the following website: [email or web address]. By joining [employer]’s FollowMyHealth portal, you will also be able to view your health information using other applications (apps) compatible with our system.

## 2022-08-18 NOTE — DISCHARGE NOTE PROVIDER - NSDCCPCAREPLAN_GEN_ALL_CORE_FT
PRINCIPAL DISCHARGE DIAGNOSIS  Diagnosis: Stroke  Assessment and Plan of Treatment: During this hospital admission, you had an ischemic stroke. During an ischemic stroke, blood stops flowing to part of your brain because of a blockage in the blood vessel. This can damage areas in the brain that control other parts of the body.  Please take your eliquis for blood thinning and Atorvastatin for cholesterol medication/blood vessel protection as prescribed to prevent further strokes. Do not skip doses and do not run low on your medication. If you run low on your medication, please contact your doctor.  You will follow up outpatient with the stroke Nurse Practitioner as scheduled below.  Doing your regular tasks may be difficult after you've had a stroke, but you can learn new ways to manage your daily activities. In fact, doing daily activities may help you to regain muscle strength. Be patient, give yourself time to adjust, and appreciate the progress you make. For example, when showering or bathing, test the water temperature with a hand or foot that was not affected by the stroke, use grab bars, a shower seat, a hand-held showerhead, etc. It is normal to feel fatigue after a stroke, while some days may be worse than others, you will continue to improve.  Call 911 right away if you have any of the following symptoms of another stroke:  B: Balance: Sudden: Dizziness, loss of balance, or a sense of falling, difficulty with coordinating movement  E: Eyes: Sudden double vision or trouble seeing in one or both eyes  F: Face: Sudden uneven face  A: Arms (Legs): Sudden weakness, tingling, or loss of feeling on one side of your face or body  S: Speech: Sudden trouble talking or slurred speech, sudden difficulty understanding others  T: Time: Please call 911 right away and go to the emergency room  •Sudden, severe headache  •Blackouts or seizures

## 2022-08-18 NOTE — PROGRESS NOTE ADULT - NS ATTEST RISK PROBLEM GEN_ALL_CORE FT
acute stroke, hypoxemia, hypoglycemia, AMS
acute stroke, hypoxemia, hypoglycemia, AMS, dementia w/ sundowning, afib w/ RVR, dysphagia w/ NPO
acute stroke, hypoxemia, hypoglycemia, AMS
acute stroke, hypoxemia, hypoglycemia, AMS, dementia w/ sundowning, afib w/ RVR, dysphagia w/ NPO

## 2022-08-18 NOTE — DISCHARGE NOTE NURSING/CASE MANAGEMENT/SOCIAL WORK - NSDCPEFALRISK_GEN_ALL_CORE
For information on Fall & Injury Prevention, visit: [email or web address] OR  [email or web address] OR  https://www.cdc.gov/steadi/patient.html

## 2022-08-18 NOTE — PROGRESS NOTE ADULT - PROVIDER SPECIALTY LIST ADULT
Neurology
Neurology
Physiatry
Hospitalist
Neurology
Hospitalist
Neurology
ENT
Hospitalist

## 2022-08-18 NOTE — PROGRESS NOTE ADULT - REASON FOR ADMISSION
slurred speech and right sided facial droop.

## 2022-08-18 NOTE — H&P ADULT - ASSESSMENT
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   Patient requires an acute multidisciplinary rehab program including PT, OT and ST and neuropsych. floyd to maximize her function for a safe discharge home and to monitor her labile DM 2.    #Acute ischemic strokes / Hypoglycemia, ?UTI: Severe dysarthria/dysphagia/R facial droop  - CTH: negative for acute findings. CTA: no LVO. CTP: apparent perfusion abnormality (penumbra) within the brainstem and left posterior fossa with a total Tmax > 6s volume of 15cc. This may be artifactual  - Q4 neurochecks, vital signs  - Keep patient strictly NPO, until speech clears  - Permissive HTN  - Gentle hydration @ 60 cc/hr NS while NPO  - therapeutic Lovenox for now  - c/w ASA and statin   - A1C 6.3, Chol 154, LDL 83, Triglycerides 91  -  MRI brain non contrast < from: MR Head No Cont (08.11.22 @ 17:30) >  Motion degraded incomplete examination demonstrating small foci of acute infarct in the left temporal and parietal lobes.  Extensive chronic microsphere ischemic changes.  < end of copied text >  -  TTE=nl EF  - PT/OT/ST/rehab  - hold all Insulin products. FS q6  - treating possible UTI  - Cr better and might need ELiquis 5 BID on d/c    #Acute hypoxemic resp failure  -CXR w/ b/l opacities (?effusions, atelectasis, asp-n pneumonitis)  -8/ 12 s/p Lasix 20mg IVP x1, then reacess  -asp-n precautions  -on RA since 8/13  -will use Lasix PRN  -8/15 back on O2: recommend OOB, incentive spirometer. CXR reviewed by me: atelectasis, small pleural effusions  -resolved    #Somnolence on 8/17  -likely maltifactorial  -would try to ensure pt stays up during the day and sleeps at night  -asp-n precautions  -monitor  -delirium precautions    #Lt flank hematoma  -monitor CBC    #Dysphagia  -started on PO diet    HAGMA  -likely starvation ketoacidosis  -improving    #?Tongue swelling  - scoped by ENT: no evidence of angioedema. Significant secretions and pt appears not to be able to clear secretions  -suction pt  -resolved     #Possible UTI  - ceftriaxone 1000mg IV completed 5 days    #Chronic AFib on Eliquis   - Lovenox as above  - metoprolol 5-10mg IV push Q6 while NPO w/ holding parameters  - Cr better and might need ELiquis 5 BID on d/c    #HTN  - stable     #Dyslipidemia   - Chol 154, LDL 83, Triglycerides 91  - Continue Atorvastatin 40mg PO QHS once speech/swallow clears    #DM w/ hypoglycemia on admission  - Hypoglycemic on fingersticks requiring 1/2 D5  - Monitor FS  - On Lantus 40U in the AM and lispro 3U TID at home  - Hold all insulin for now given hypoglycemia    - Ordered glucagon 1mg IM PRN for BG <70   -A1c=6.3  -lower dose of Insulin on d/c    #MARCIN - Resolved, Cr 1.4 - 8/11/22  - baseline: 1.3 6/2020       -Pain control:     -GI/Bowel Mgmt:    -Bladder management:      -Skin:  No active issues at this time    -FEN     - Diet:           Precautions / PROPHYLAXIS:      - Falls    - Ortho: Weight bearing status:       - DVT prophylaxis:      MEDICAL PROGNOSIS: GOOD            REHAB POTENTIAL: GOOD             ESTIMATED DISPOSITION: HOME WITH HOME CARE       [ x ]  The goals of the IRF admission were discussed with the patient and or family member, who agreed             ELOS:  [     ] 7-14    [    ]  14-21    [    ]  Other    THERAPY ORDERS and INITIAL INDIVIDUALIZED PLAN OF CARE:  This initial individualized interdisciplinary plan of care, which was established by me (the attending physiatrist), is based on elements from the post admission evaluation. The interdisciplinary therapy program is to be at least 3 hrs a day, at least 5 days per week from from physical, occupational and/ or speech therapies as ordered by me below.      [ x  ] P.T. 90 mins. /day at least 5 out of 7 days:  [  x ] superficial  modalities prn, [ x  ] A/AAROM, [ x  ] PREs, [ x  ] transfer training,            [ x  ] progressive ambulation, [x   ] stairs                                               [ x  ] O.T. 90 mins. /day at least 5 out of 7 days::  [ x  ] modalities prn,  [ x  ]A/AAROM, [ x  ] PREs, [  x ] functional transfer training, [ x  ] ADL training           [   ] cognitive/ perceptual eval and training, [   ] splint eval                                                  [   ] S.L.P:  [   ] speech eval, [   ] swallow eval     [   ] Neuropsychology eval     [ x  ] Individualized rec. therapy      RATIONALE FOR INPATIENT ADMISSION - Patient demonstrates the following: (check all that apply)  [X] Medically appropriate for acute rehabilitation admission. Requires interdisiplinary therapy consisting of at least PT and OT, at least 3 hrs. a day at least 5 days a week  [X] Has attainable rehab goals with an appropriate initial discharge plan  [X] Has rehabilitation potential (expected to make a significant improvement within a reasonable period of time)  [X] Requires close medical management by a rehab physician, rehab nursing care,  and comprehensive interdisciplinary team (including PT, OT)    [X] Requires evaluation by a physiatrist at least 3 days a week to evaluate and manage and coordinate rehab and medical problems   ASSESSMENT/PLAN    Rehab of acute left MCA ischemic strokes with impaired balance, mobility and ADL skills and dysphagia and dysarthria.   Patient requires an acute multidisciplinary rehab program including PT, OT and ST and neuropsych. eval to maximize her function for a safe discharge home and to monitor her labile DM 2.    #Acute ischemic strokes   - CTH: negative for acute findings. CTA: no LVO. CTP: apparent perfusion abnormality (penumbra) within the brainstem and left posterior fossa with a total Tmax > 6s volume of 15cc. This may be artifactual  - Permissive HTN  - c/w ASA and statin   - A1C 6.3, Chol 154, LDL 83, Triglycerides 91  -  MRI brain non contrast < from: MR Head No Cont (08.11.22 @ 17:30) >  Motion degraded incomplete examination demonstrating small foci of acute infarct in the left temporal and parietal lobes.  Extensive chronic microsphere ischemic changes.  < end of copied text >  -  TTE=nl EF  - PT/OT/ST/Neuropsych eval  - added back ELiquis 5 BID     #Dysphagia  - Minced and Moist diet with mildly thickened liquids  - SLP f/u    #Dysarthria  - SLP f/u    #Cognitive deficits  - reportedly near baseline per family. 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 CXR reviewed by me: atelectasis, small pleural effusions  -resolved    #Lt flank hematoma  -monitor CBC    #?Tongue swelling on admission  - scoped by ENT: no evidence of angioedema. Significant secretions and pt appears not to be able to clear secretions  -resolved     #s/p Possible UTI  - ceftriaxone 1000mg IV completed 5 days    #Chronic AFib on Eliquis   - Eliquis resumed    #HTN  - stable     #Dyslipidemia   - Chol 154, LDL 83, Triglycerides 91  - Continue Atorvastatin 40mg PO QHS once speech/swallow clears    #DM w/ hypoglycemia on admission  - Hypoglycemic on fingersticks requiring 1/2 D5  - Monitor FS  - On Lantus 40U in the AM and lispro 3U TID at home  - Hold all insulin for now given hypoglycemia    - Ordered glucagon 1mg IM PRN for BG <70   -A1c=6.3  -lower dose of Insulin on d/c    #MARCIN on CKD   - Resolved, Cr 1.4 - 8/11/22  - baseline: 1.3 6/2020  - monitor     -Skin:  No active issues at this time       Precautions / PROPHYLAXIS:      - Falls    - Ortho: Weight bearing status: wbat      - DVT prophylaxis: Eliquis      MEDICAL PROGNOSIS: GOOD            REHAB POTENTIAL: GOOD             ESTIMATED DISPOSITION: HOME WITH HOME CARE       [ x ]  The goals of the IRF admission were discussed with the patient and family member, who agreed             ELOS:  [  x   ] 7-14    [    ]  14-21    [    ]  Other    THERAPY ORDERS and INITIAL INDIVIDUALIZED PLAN OF CARE:  This initial individualized interdisciplinary plan of care, which was established by me (the attending physiatrist), is based on elements from the post admission evaluation. The interdisciplinary therapy program is to be at least 3 hrs a day, at least 5 days per week from from physical, occupational and/ or speech therapies as ordered by me below.      [ x  ] P.T. 90 mins. /day at least 5 out of 7 days:  [  x ] superficial  modalities prn, [ x  ] A/AAROM, [ x  ] PREs, [ x  ] transfer training,            [ x  ] progressive ambulation, [x   ] stairs                                               [ x  ] O.T. 90 mins. /day at least 5 out of 7 days::  [ x  ] modalities prn,  [ x  ]A/AAROM, [ x  ] PREs, [  x ] functional transfer training, [ x  ] ADL training           [  x ] cognitive/ perceptual eval and training, [   ] splint eval                                                  [  x ] S.L.P:  [x   ] speech eval, [  x ] swallow eval     [  x ] Neuropsychology eval     [ x  ] Individualized rec. therapy      RATIONALE FOR INPATIENT ADMISSION - Patient demonstrates the following: (check all that apply)  [X] Medically appropriate for acute rehabilitation admission. Requires interdisiplinary therapy consisting of at least PT and OT, at least 3 hrs. a day at least 5 days a week  [X] Has attainable rehab goals with an appropriate initial discharge plan  [X] Has rehabilitation potential (expected to make a significant improvement within a reasonable period of time)  [X] Requires close medical management by a rehab physician, rehab nursing care,  and comprehensive interdisciplinary team (including PT, OT)    [X] Requires evaluation by a physiatrist at least 3 days a week to evaluate and manage and coordinate rehab and medical problems

## 2022-08-18 NOTE — PROGRESS NOTE ADULT - ASSESSMENT
89 yo F with history of CAD, HTN, diabetes, AFib on Eliquis presented to ED with CC of acute severe dysarthria/dysphagia/R facial droop noticed by daughter at which time blood sugar was 45. In ED, , not thrombolytic candidate on Eliquis.  Not thrombectomy candidate- no evidence of LVO.    #Acute ischemic strokes / Hypoglycemia, ?UTI: Severe dysarthria/dysphagia/R facial droop  - CTH: negative for acute findings. CTA: no LVO. CTP: apparent perfusion abnormality (penumbra) within the brainstem and left posterior fossa with a total Tmax > 6s volume of 15cc. This may be artifactual  - Q4 neurochecks, vital signs  - Keep patient strictly NPO, until speech clears  - Permissive HTN  - Gentle hydration @ 60 cc/hr NS while NPO  - therapeutic Lovenox for now  - c/w ASA and statin   - A1C 6.3, Chol 154, LDL 83, Triglycerides 91  -  MRI brain non contrast < from: MR Head No Cont (08.11.22 @ 17:30) >  Motion degraded incomplete examination demonstrating small foci of acute infarct in the left temporal and parietal lobes.  Extensive chronic microsphere ischemic changes.  < end of copied text >  -  TTE=nl EF  - PT/OT/ST/rehab  - hold all Insulin products. FS q6  - treating possible UTI  - Cr better and might need ELiquis 5 BID on d/c    #Acute hypoxemic resp failure  -CXR w/ b/l opacities (?effusions, atelectasis, asp-n pneumonitis)  -8/ 12 s/p Lasix 20mg IVP x1, then reacess  -asp-n precautions  -on RA since 8/13  -will use Lasix PRN  -8/15 back on O2: recommend OOB, incentive spirometer. CXR reviewed by me: atelectasis, small pleural effusions  -resolved    #Somnolence on 8/17  -likely maltifactorial  -would try to ensure pt stays up during the day and sleeps at night  -asp-n precautions  -monitor  -delirium precautions  -improved    #Lt flank hematoma  -monitor CBC  -Hgb stable    #Dysphagia  -started on PO diet    HAGMA  -likely starvation ketoacidosis  -improving    #?Tongue swelling  - scoped by ENT: no evidence of angioedema. Significant secretions and pt appears not to be able to clear secretions  -suction pt  -resolved     #Possible UTI  - ceftriaxone 1000mg IV completed 5 days    #Chronic AFib on Eliquis   - Lovenox as above  - metoprolol 5-10mg IV push Q6 while NPO w/ holding parameters  - Cr better and might need ELiquis 5 BID on d/c    #HTN  - stable     #Dyslipidemia   - Chol 154, LDL 83, Triglycerides 91  - Continue Atorvastatin 40mg PO QHS once speech/swallow clears    #DM w/ hypoglycemia on admission  - Hypoglycemic on fingersticks requiring 1/2 D5  - Monitor FS  - On Lantus 40U in the AM and lispro 3U TID at home  - Hold all insulin for now given hypoglycemia    - Ordered glucagon 1mg IM PRN for BG <70   -A1c=6.3  -lower dose of Insulin on d/c    #MARCIN - Resolved, Cr 1.4 - 8/11/22  - baseline: 1.3 6/2020  -    #Misc  - DVT prophylaxis: Eliquis or Lovenox  - GI prophylaxis: Not indicated   - Activity: IAT   - Disposition: stroke unit    High risk pt.     My note supersedes the residents note should a discrepancy arise.    Chart and notes personally reviewed.  Care Discussed with Consultants/Other Providers/ Housestaff [ x] YES [ ] NO   Radiology, labs, old records personally reviewed.    discussed w/ housestaff, nursing, case management, neuro team, son

## 2022-08-18 NOTE — PATIENT PROFILE ADULT - FALL HARM RISK - HARM RISK INTERVENTIONS
Assistance with ambulation/Assistance OOB with selected safe patient handling equipment/Communicate Risk of Fall with Harm to all staff/Discuss with provider need for PT consult/Monitor gait and stability/Reinforce activity limits and safety measures with patient and family/Review medications for side effects contributing to fall risk/Sit up slowly, dangle for a short time, stand at bedside before walking/Tailored Fall Risk Interventions/Toileting schedule using arm’s reach rule for commode and bathroom/Use of alarms - bed, chair and/or voice tab/Visual Cue: Yellow wristband and red socks/Enhanced supervision for Behavioral Health patients ONLY/Bed in lowest position, wheels locked, appropriate side rails in place/Call bell, personal items and telephone in reach/Instruct patient to call for assistance before getting out of bed or chair/Non-slip footwear when patient is out of bed/[place] to call system/Physically safe environment - no spills, clutter or unnecessary equipment/Purposeful Proactive Rounding/Room/bathroom lighting operational, light cord in reach

## 2022-08-18 NOTE — DISCHARGE NOTE PROVIDER - CARE PROVIDER_API CALL
Triston Caceres)  EEGEpilepsy; Neurology  [address] [place] [address], [address]  [place] [address]  Phone: [phone]  Fax: [phone]  Follow Up Time:

## 2022-08-18 NOTE — H&P ADULT - NSHPSOCIALHISTORY_GEN_ALL_CORE
Denies ETOH or smoking  Lives in house with chair lift, with daughter upstairs  Fully independent in ambulation and ADL PTA

## 2022-08-18 NOTE — PATIENT PROFILE ADULT - FUNCTIONAL SCREEN CURRENT LEVEL: COMMUNICATION, MLM
Thanks for seeing me,  Katy Culp PA-C   2 = difficulty speaking (not related to language barrier)

## 2022-08-18 NOTE — DISCHARGE NOTE PROVIDER - NSDCFUSCHEDAPPT_GEN_ALL_CORE_FT
[facility]  CARDIOLOGY [address]  Scheduled Appointment: 11/15/2022    Aaron Cheng  [facility]  CARDIOLOGY [address] [place] [address] Av  Scheduled Appointment: 11/15/2022

## 2022-08-18 NOTE — DISCHARGE NOTE PROVIDER - NSDCMRMEDTOKEN_GEN_ALL_CORE_FT
apixaban 5 mg oral tablet: 1 tab(s) orally 2 times a day  atorvastatin 80 mg oral tablet: 1 tab(s) orally once a day (at bedtime)  melatonin 10 mg oral tablet: 1 tab(s) orally once a day (at bedtime)  nystatin 100,000 units/g topical powder: 1 application topically 2 times a day  Toprol- mg oral tablet, extended release: 1 tab(s) orally once a day

## 2022-08-18 NOTE — H&P ADULT - NSHPREVIEWOFSYSTEMS_GEN_ALL_CORE
Constiutional:    [  x ] WNL           [   ] poor appetite   [   ] insomnia   [   ] tired   Cardio:                [ x  ] WNL           [   ] CP   [   ] SILVERMAN   [   ] palpitations               Resp:                   [ x  ] WNL           [  x ] SOB at times at home  [   ] cough   [   ] wheezing   GI:                        [ x  ] WNL           [   ] constipation   [   ] diarrhea   [   ] abdominal pain   [   ] nausea   [   ] emesis                                :                      [   ] WNL           [   ] REYNA  [   ] dysuria   [   ] difficulty voiding  [ x ] incontinence           Endo:                   [ x  ] WNL          [   ] polyuria   [   ] temperature intolerance                 Skin:                     [ x  ] WNL          [   ] pain   [   ] wound   [   ] rash   MSK:                    [    ] WNL          [   ] muscle pain   [ x  ] joint pain/ stiffness - prior left shoulder injury with contracture   [   ] muscle tenderness   [   ] swelling   Neuro:                 [ x  ] WNL except as per HPI         [   ] HA   [   ] change in vision   [   ] tremor   [   ] weakness   [   ]dysphagia              Cognitive:           [   ] WNL           [ x  ] some impairment PTA    Psych:                  [  x ] WNL           [   ] hallucinations   [   ]agitation   [   ] delusion   [   ]depression

## 2022-08-19 LAB
ALBUMIN SERPL ELPH-MCNC: 3.3 G/DL — LOW (ref 3.5–5.2)
ALP SERPL-CCNC: 39 U/L — SIGNIFICANT CHANGE UP (ref 30–115)
ALT FLD-CCNC: 17 U/L — SIGNIFICANT CHANGE UP (ref 0–41)
ANION GAP SERPL CALC-SCNC: 11 MMOL/L — SIGNIFICANT CHANGE UP (ref 7–14)
AST SERPL-CCNC: 17 U/L — SIGNIFICANT CHANGE UP (ref 0–41)
BASOPHILS # BLD AUTO: 0.03 K/UL — SIGNIFICANT CHANGE UP (ref 0–0.2)
BASOPHILS NFR BLD AUTO: 0.4 % — SIGNIFICANT CHANGE UP (ref 0–1)
BILIRUB SERPL-MCNC: 0.5 MG/DL — SIGNIFICANT CHANGE UP (ref 0.2–1.2)
BUN SERPL-MCNC: 25 MG/DL — HIGH (ref 10–20)
CALCIUM SERPL-MCNC: 8.6 MG/DL — SIGNIFICANT CHANGE UP (ref 8.5–10.1)
CHLORIDE SERPL-SCNC: 106 MMOL/L — SIGNIFICANT CHANGE UP (ref 98–110)
CO2 SERPL-SCNC: 23 MMOL/L — SIGNIFICANT CHANGE UP (ref 17–32)
CREAT SERPL-MCNC: 1.2 MG/DL — SIGNIFICANT CHANGE UP (ref 0.7–1.5)
EGFR: 44 ML/MIN/1.73M2 — LOW
EOSINOPHIL # BLD AUTO: 0.23 K/UL — SIGNIFICANT CHANGE UP (ref 0–0.7)
EOSINOPHIL NFR BLD AUTO: 3 % — SIGNIFICANT CHANGE UP (ref 0–8)
GLUCOSE BLDC GLUCOMTR-MCNC: 217 MG/DL — HIGH (ref 70–99)
GLUCOSE BLDC GLUCOMTR-MCNC: 219 MG/DL — HIGH (ref 70–99)
GLUCOSE BLDC GLUCOMTR-MCNC: 229 MG/DL — HIGH (ref 70–99)
GLUCOSE BLDC GLUCOMTR-MCNC: 262 MG/DL — HIGH (ref 70–99)
GLUCOSE SERPL-MCNC: 228 MG/DL — HIGH (ref 70–99)
HCT VFR BLD CALC: 33.4 % — LOW (ref 37–47)
HGB BLD-MCNC: 11.1 G/DL — LOW (ref 12–16)
IMM GRANULOCYTES NFR BLD AUTO: 0.4 % — HIGH (ref 0.1–0.3)
LYMPHOCYTES # BLD AUTO: 1.68 K/UL — SIGNIFICANT CHANGE UP (ref 1.2–3.4)
LYMPHOCYTES # BLD AUTO: 21.9 % — SIGNIFICANT CHANGE UP (ref 20.5–51.1)
MAGNESIUM SERPL-MCNC: 1.7 MG/DL — LOW (ref 1.8–2.4)
MCHC RBC-ENTMCNC: 30.4 PG — SIGNIFICANT CHANGE UP (ref 27–31)
MCHC RBC-ENTMCNC: 33.2 G/DL — SIGNIFICANT CHANGE UP (ref 32–37)
MCV RBC AUTO: 91.5 FL — SIGNIFICANT CHANGE UP (ref 81–99)
MONOCYTES # BLD AUTO: 0.73 K/UL — HIGH (ref 0.1–0.6)
MONOCYTES NFR BLD AUTO: 9.5 % — HIGH (ref 1.7–9.3)
NEUTROPHILS # BLD AUTO: 4.97 K/UL — SIGNIFICANT CHANGE UP (ref 1.4–6.5)
NEUTROPHILS NFR BLD AUTO: 64.8 % — SIGNIFICANT CHANGE UP (ref 42.2–75.2)
NRBC # BLD: 0 /100 WBCS — SIGNIFICANT CHANGE UP (ref 0–0)
PLATELET # BLD AUTO: 122 K/UL — LOW (ref 130–400)
POTASSIUM SERPL-MCNC: 3.7 MMOL/L — SIGNIFICANT CHANGE UP (ref 3.5–5)
POTASSIUM SERPL-SCNC: 3.7 MMOL/L — SIGNIFICANT CHANGE UP (ref 3.5–5)
PROT SERPL-MCNC: 5.8 G/DL — LOW (ref 6–8)
RBC # BLD: 3.65 M/UL — LOW (ref 4.2–5.4)
RBC # FLD: 13.7 % — SIGNIFICANT CHANGE UP (ref 11.5–14.5)
SODIUM SERPL-SCNC: 140 MMOL/L — SIGNIFICANT CHANGE UP (ref 135–146)
WBC # BLD: 7.67 K/UL — SIGNIFICANT CHANGE UP (ref 4.8–10.8)
WBC # FLD AUTO: 7.67 K/UL — SIGNIFICANT CHANGE UP (ref 4.8–10.8)

## 2022-08-19 RX ORDER — INSULIN LISPRO 100/ML
3 VIAL (ML) SUBCUTANEOUS
Refills: 0 | Status: DISCONTINUED | OUTPATIENT
Start: 2022-08-19 | End: 2022-08-19

## 2022-08-19 RX ORDER — MAGNESIUM SULFATE 500 MG/ML
2 VIAL (ML) INJECTION ONCE
Refills: 0 | Status: COMPLETED | OUTPATIENT
Start: 2022-08-19 | End: 2022-08-19

## 2022-08-19 RX ORDER — INSULIN GLARGINE 100 [IU]/ML
10 INJECTION, SOLUTION SUBCUTANEOUS
Refills: 0 | Status: DISCONTINUED | OUTPATIENT
Start: 2022-08-19 | End: 2022-08-20

## 2022-08-19 RX ORDER — DEXTROSE 50 % IN WATER 50 %
25 SYRINGE (ML) INTRAVENOUS ONCE
Refills: 0 | Status: DISCONTINUED | OUTPATIENT
Start: 2022-08-19 | End: 2022-08-31

## 2022-08-19 RX ORDER — DEXTROSE 50 % IN WATER 50 %
12.5 SYRINGE (ML) INTRAVENOUS ONCE
Refills: 0 | Status: DISCONTINUED | OUTPATIENT
Start: 2022-08-19 | End: 2022-08-31

## 2022-08-19 RX ORDER — DEXTROSE 50 % IN WATER 50 %
15 SYRINGE (ML) INTRAVENOUS ONCE
Refills: 0 | Status: DISCONTINUED | OUTPATIENT
Start: 2022-08-19 | End: 2022-08-31

## 2022-08-19 RX ORDER — INSULIN LISPRO 100/ML
3 VIAL (ML) SUBCUTANEOUS
Refills: 0 | Status: DISCONTINUED | OUTPATIENT
Start: 2022-08-19 | End: 2022-08-20

## 2022-08-19 RX ORDER — SODIUM CHLORIDE 9 MG/ML
1000 INJECTION, SOLUTION INTRAVENOUS
Refills: 0 | Status: DISCONTINUED | OUTPATIENT
Start: 2022-08-19 | End: 2022-08-31

## 2022-08-19 RX ORDER — GLUCAGON INJECTION, SOLUTION 0.5 MG/.1ML
1 INJECTION, SOLUTION SUBCUTANEOUS ONCE
Refills: 0 | Status: DISCONTINUED | OUTPATIENT
Start: 2022-08-19 | End: 2022-08-31

## 2022-08-19 RX ADMIN — Medication 100 MILLIGRAM(S): at 06:33

## 2022-08-19 RX ADMIN — NYSTATIN CREAM 1 APPLICATION(S): 100000 CREAM TOPICAL at 06:33

## 2022-08-19 RX ADMIN — APIXABAN 5 MILLIGRAM(S): 2.5 TABLET, FILM COATED ORAL at 06:33

## 2022-08-19 RX ADMIN — Medication 10 MILLIGRAM(S): at 22:06

## 2022-08-19 RX ADMIN — NYSTATIN CREAM 1 APPLICATION(S): 100000 CREAM TOPICAL at 17:34

## 2022-08-19 RX ADMIN — INSULIN GLARGINE 10 UNIT(S): 100 INJECTION, SOLUTION SUBCUTANEOUS at 11:53

## 2022-08-19 RX ADMIN — Medication 3 UNIT(S): at 11:52

## 2022-08-19 RX ADMIN — APIXABAN 5 MILLIGRAM(S): 2.5 TABLET, FILM COATED ORAL at 17:30

## 2022-08-19 RX ADMIN — ATORVASTATIN CALCIUM 80 MILLIGRAM(S): 80 TABLET, FILM COATED ORAL at 22:07

## 2022-08-19 RX ADMIN — Medication 3 UNIT(S): at 16:30

## 2022-08-19 RX ADMIN — Medication 25 GRAM(S): at 11:53

## 2022-08-19 NOTE — PROGRESS NOTE ADULT - ASSESSMENT
Primary Contact Name: Stephen Hopkins                                                  Relationship: Son     Pertinent Social History: According to her son, Ms. Hopkins lives in her home that she owns with her daughter. Her  passed away 7 years prior. 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 She retired from this job 30 years ago.     Premorbid Functioning:     · ADLs: Occasional falls getting out of bed; otherwise independent     · IADLs: Dependent     · Psychiatric History/Treatment: Denied     · Previous Coping: Spending time with family     · Cognition: Memory problems beginning about 2 months ago, including forgetting names, asking about relatives who previously .     · Substance Use: Quit smoking 50 years ago (prior to quitting, 1.5 packs per day); occasional alcohol use     Current Status:     Mood Changes: Yes-irritable upon initial arrival, but improving     Cognition Changes: Her son notices a slight worsening in her memory problems since her admission. For example, she initially referred to him as her brother instead of her son when he visited.     Behavior Changes: Her son stated that upon admission she was hostile which is very unlike her; however, this has since improved.      Patient’s Primary Language: English     a) Changes in understanding primary language after Neurological event. No      b) Preferred language for health care information? English     Brain Injury / Cognitive Behavioral Protocol Education Provided: Not Applicable      Family Education: Ms. Hopkins’s son was educated about the rehabilitation process, current status, and family education sessions.      Family Concerns: None expressed at this time     Family Goals: Support positive coping and cognitive assessment

## 2022-08-19 NOTE — PROGRESS NOTE ADULT - SUBJECTIVE AND OBJECTIVE BOX
Patient is a 88y old  Female who presents with a chief complaint of Stroke (18 Aug 2022 13:20)      HPI:  This is a 88 F hx of CAD, Diabetes, Hyperlipemia, Hypertension, Afib on Eliquis presents to ED BIBA from home for slurred speech and right sided facial droop. Patient went to bed that night around ~10PM at baseline. Patient woke up the next morning around ~11Am which is late for her. Daughter noted she was not herself,  checked her sugar and it was ~45. Noted the slurred speech. Also noted patient had trouble swallowing. Patient took her Eliquis dose this morning.  No history of strokes. CT Angio Brain Stroke Protocol  w/ IV Cont, No evidence of major vascular stenosis or occlusion. Scattered mild calcific plaque.  CT perfusion: Apparent perfusion abnormality (penumbra) within the brainstem and left posterior fossa with a total Tmax > 6s volume of 15 CT Brain Stroke Protocol shows no evidence of acute intracranial hemorrhage or large territory infarct. Chronic-appearing left cerebellar infarct (new since 2010). Moderate/severe chronic microvascular changes and parenchymal atrophy (moderately progressed since 2010). < from: MR Head No Cont (08.11.22 @ 17:30) > Motion degraded incomplete examination demonstrating small foci of acute infarct in the left temporal and parietal lobes. Extensive chronic microsphere ischemic changes. Her course was complicated by uncontrolled DM2 an MARCIN on CKD, now medically stable. She was placed on therapeutic Lovenox and then back onto Eliquis. She has been medically stable.     She was seen for a swallow assessment and cleared for Minced and Moist diet with mildly thickened liquids. She was seen by PT and OT and requires max assistance to stand and for bed mobility and min assistance to ambulate 15 ft. with a RW and mod assistance for UE dressing and max assistance for LE dressing. PTA, she was fully independent in all activities, using a straight cane or walker at times. She was evaluated by me on the Neuro Service and was found to be a good candidate for acute inpatient rehab. Her son-in-law at bedside states that she has had some cognitive deficits for a while and that it is near baseline.     (18 Aug 2022 13:20)      I examined the patient and reviewed the chart. There have been no significant changes since my history and physical except where documented below.    TODAY'S SUBJECTIVE & REVIEW OF SYMPTOMS:  Patient was seen and assessed at bedside. No overnight events. Patient denies any complaints at this time. Tolerating PT/OT. Tolerating oral diet. Voiding and passing stool spontaneously. Vital signs are stable.      Constiutional:    [  x ] WNL           [   ] poor appetite   [   ] insomnia   [   ] tired   Cardio:                [ x  ] WNL           [   ] CP   [   ] SILVERMAN   [   ] palpitations               Resp:                   [ x  ] WNL           [  x ] SOB at times at home  [   ] cough   [   ] wheezing   GI:                        [ x  ] WNL           [   ] constipation   [   ] diarrhea   [   ] abdominal pain   [   ] nausea   [   ] emesis                                :                      [   ] WNL           [   ] REYNA  [   ] dysuria   [   ] difficulty voiding  [ x ] incontinence           Endo:                   [ x  ] WNL          [   ] polyuria   [   ] temperature intolerance                 Skin:                     [ x  ] WNL          [   ] pain   [   ] wound   [   ] rash   MSK:                    [    ] WNL          [   ] muscle pain   [ x  ] joint pain/ stiffness - prior left shoulder injury with contracture   [   ] muscle tenderness   [   ] swelling   Neuro:                 [ x  ] WNL except as per HPI         [   ] HA   [   ] change in vision   [   ] tremor   [   ] weakness   [   ]dysphagia              Cognitive:           [   ] WNL           [ x  ] some impairment PTA    Psych:                  [  x ] WNL           [   ] hallucinations   [   ]agitation   [   ] delusion   [   ]depressio    PHYSICAL EXAM    ICU Vital Signs Last 24 Hrs  T(C): 36.3 (19 Aug 2022 06:15), Max: 37.1 (18 Aug 2022 22:02)  T(F): 97.3 (19 Aug 2022 06:15), Max: 98.7 (18 Aug 2022 22:02)  HR: 84 (19 Aug 2022 06:15) (84 - 102)  BP: 162/91 (19 Aug 2022 06:15) (87/44 - 162/91)  BP(mean): 92 (18 Aug 2022 22:02) (92 - 92)  ABP: --  ABP(mean): --  RR: 18 (19 Aug 2022 06:15) (18 - 18)  SpO2: 97% (19 Aug 2022 06:15) (97% - 97%)    O2 Parameters below as of 19 Aug 2022 06:15  Patient On (Oxygen Delivery Method): room air        General:[  x ] NAD, Resting Comfortable,   [   ] other:                                HEENT: [  x ] NC/AT, EOMI, PERRL , Normal Conjunctivae,   [   ] other:  Cardio: [  x ] RRR, no murmer,   [   ] other:                              Pulm: [ x  ] No Respiratory Distress,  Lungs CTAB,   [   ] other:                       Abdomen: [  x ]ND/NT, Soft,   [   ] other:    : [  x ] NO REYNA CATHETER, [   ] REYNA CATHETER- no meatal tear, no discharge, [   ] other:                                            MSK: [ x  ] No joint swelling,   [  x ] other:  healed bilat TKR scars, severe limited PROM left shoulder with pain                                    Ext: [  x ]No C/C/E, No calf tenderness,   [   ]other:    Skin: [ x  ]intact,   [   ] other:                                                                   Neurological Examination:  Cognitive: [    ] AAO x 3,   [  x  ]  other: oriented to self and event, not month or year                                                                     Attention:  [ x   ] intact,   [    ]  other:                            Memory: [  x  ] grossly intact,  3 items in 5 mins with delay  [    ]  other:     Mood/Affect: [ x   ] wnl,    [    ]  other:                                                                             Communication: [    ]Fluent, no dysarthria, following commands:  [  x  ] other: mild to mod dysarthria. Follows commands/ conversation well. Fluent though word substitution errors  CN II - XII:  [ x   ] intact,  [    ] other:                                                                                        Motor:   RIGHT UE: [  x ] WNL,  [   ] other:  LEFT    UE: [ x  ] WNL,  [   ] other:  RIGHT LE: [  x ] WNL,  [   ] other:   LEFT    LE: [ x  ] WNL,  [   ] other:    Tone: [  x  ] wnl,   [    ]  other:  DTRs: [  x ]symmetric, [   ] other:  Coordination:   [  x  ] intact,   [    ] other:                                                                           Sensory: [ x   ] Intact to light touch,   [    ] other:      acetaminophen     Tablet .. 650 milliGRAM(s) Oral every 6 hours PRN  aluminum hydroxide/magnesium hydroxide/simethicone Suspension 30 milliLiter(s) Oral every 4 hours PRN  apixaban 5 milliGRAM(s) Oral every 12 hours  atorvastatin 80 milliGRAM(s) Oral at bedtime  dextrose 5%. 1000 milliLiter(s) IV Continuous <Continuous>  dextrose 5%. 1000 milliLiter(s) IV Continuous <Continuous>  dextrose 50% Injectable 25 Gram(s) IV Push once  dextrose 50% Injectable 12.5 Gram(s) IV Push once  dextrose 50% Injectable 25 Gram(s) IV Push once  dextrose Oral Gel 15 Gram(s) Oral once PRN  glucagon  Injectable 1 milliGRAM(s) IntraMuscular once  insulin glargine Injectable (LANTUS) 10 Unit(s) SubCutaneous <User Schedule>  insulin lispro Injectable (ADMELOG) 3 Unit(s) SubCutaneous three times a day before meals  magnesium hydroxide Suspension 30 milliLiter(s) Oral daily PRN  magnesium sulfate  IVPB 2 Gram(s) IV Intermittent once  melatonin 10 milliGRAM(s) Oral at bedtime  metoprolol succinate  milliGRAM(s) Oral daily  nystatin Powder 1 Application(s) Topical every 12 hours  senna 2 Tablet(s) Oral at bedtime PRN      RECENT LABS/IMAGING                        11.1   7.67  )-----------( 122      ( 19 Aug 2022 05:35 )             33.4     08-19    140  |  106  |  25<H>  ----------------------------<  228<H>  3.7   |  23  |  1.2    Ca    8.6      19 Aug 2022 05:35  Mg     1.7     08-19    TPro  5.8<L>  /  Alb  3.3<L>  /  TBili  0.5  /  DBili  x   /  AST  17  /  ALT  17  /  AlkPhos  39  08-19   Patient is a 88y old  Female who presents with a chief complaint of Stroke (18 Aug 2022 13:20)      HPI:  This is a 88 F hx of CAD, Diabetes, Hyperlipemia, Hypertension, Afib on Eliquis presents to ED BIBA from home for slurred speech and right sided facial droop. Patient went to bed that night around ~10PM at baseline. Patient woke up the next morning around ~11Am which is late for her. Daughter noted she was not herself,  checked her sugar and it was ~45. Noted the slurred speech. Also noted patient had trouble swallowing. Patient took her Eliquis dose this morning.  No history of strokes. CT Angio Brain Stroke Protocol  w/ IV Cont, No evidence of major vascular stenosis or occlusion. Scattered mild calcific plaque.  CT perfusion: Apparent perfusion abnormality (penumbra) within the brainstem and left posterior fossa with a total Tmax > 6s volume of 15 CT Brain Stroke Protocol shows no evidence of acute intracranial hemorrhage or large territory infarct. Chronic-appearing left cerebellar infarct (new since 2010). Moderate/severe chronic microvascular changes and parenchymal atrophy (moderately progressed since 2010). < from: MR Head No Cont (08.11.22 @ 17:30) > Motion degraded incomplete examination demonstrating small foci of acute infarct in the left temporal and parietal lobes. Extensive chronic microsphere ischemic changes. Her course was complicated by uncontrolled DM2 an MARCIN on CKD, now medically stable. She was placed on therapeutic Lovenox and then back onto Eliquis. She has been medically stable.     She was seen for a swallow assessment and cleared for Minced and Moist diet with mildly thickened liquids. She was seen by PT and OT and requires max assistance to stand and for bed mobility and min assistance to ambulate 15 ft. with a RW and mod assistance for UE dressing and max assistance for LE dressing. PTA, she was fully independent in all activities, using a straight cane or walker at times. She was evaluated by me on the Neuro Service and was found to be a good candidate for acute inpatient rehab. Her son-in-law at bedside states that she has had some cognitive deficits for a while and that it is near baseline.     (18 Aug 2022 13:20)      I examined the patient and reviewed the chart. There have been no significant changes since my history and physical except where documented below.    TODAY'S SUBJECTIVE & REVIEW OF SYMPTOMS:  Patient was seen and assessed at bedside. No overnight events. Patient denies any complaints at this time. Tolerating PT/OT. Tolerating oral diet. Voiding and passing stool spontaneously. Vital signs are stable.      Constiutional:    [  x ] WNL           [   ] poor appetite   [   ] insomnia   [   ] tired   Cardio:                [ x  ] WNL           [   ] CP   [   ] SILVERMAN   [   ] palpitations               Resp:                   [ x  ] WNL           [  x ] SOB at times at home  [   ] cough   [   ] wheezing   GI:                        [ x  ] WNL           [   ] constipation   [   ] diarrhea   [   ] abdominal pain   [   ] nausea   [   ] emesis                                :                      [   ] WNL           [   ] REYNA  [   ] dysuria   [   ] difficulty voiding  [ x ] incontinence           Endo:                   [ x  ] WNL          [   ] polyuria   [   ] temperature intolerance                 Skin:                     [ x  ] WNL          [   ] pain   [   ] wound   [   ] rash   MSK:                    [    ] WNL          [   ] muscle pain   [ x  ] joint pain/ stiffness - prior left shoulder injury with contracture   [   ] muscle tenderness   [   ] swelling   Neuro:                 [ x  ] WNL except as per HPI         [   ] HA   [   ] change in vision   [   ] tremor   [   ] weakness   [   ]dysphagia              Cognitive:           [   ] WNL           [ x  ] some impairment PTA    Psych:                  [  x ] WNL           [   ] hallucinations   [   ]agitation   [   ] delusion   [   ]depression    PHYSICAL EXAM    ICU Vital Signs Last 24 Hrs  T(C): 36.3 (19 Aug 2022 06:15), Max: 37.1 (18 Aug 2022 22:02)  T(F): 97.3 (19 Aug 2022 06:15), Max: 98.7 (18 Aug 2022 22:02)  HR: 84 (19 Aug 2022 06:15) (84 - 102)  BP: 162/91 (19 Aug 2022 06:15) (87/44 - 162/91)  BP(mean): 92 (18 Aug 2022 22:02) (92 - 92)  ABP: --  ABP(mean): --  RR: 18 (19 Aug 2022 06:15) (18 - 18)  SpO2: 97% (19 Aug 2022 06:15) (97% - 97%)    O2 Parameters below as of 19 Aug 2022 06:15  Patient On (Oxygen Delivery Method): room air        General:[  x ] NAD, Resting Comfortable,   [   ] other:                                HEENT: [  x ] NC/AT, EOMI, PERRL , Normal Conjunctivae,   [   ] other:  Cardio: [  x ] RRR, no murmer,   [   ] other:                              Pulm: [ x  ] No Respiratory Distress,  Lungs CTAB,   [   ] other:                       Abdomen: [  x ]ND/NT, Soft,   [   ] other:    : [  x ] NO REYNA CATHETER, [   ] REYNA CATHETER- no meatal tear, no discharge, [   ] other:                                            MSK: [ x  ] No joint swelling,   [  x ] other:  healed bilat TKR scars, severe limited PROM left shoulder with pain                                    Ext: [  x ]No C/C/E, No calf tenderness,   [   ]other:    Skin: [ x  ]intact,   [   ] other:                                                                   Neurological Examination:  Cognitive: [    ] AAO x 3,   [  x  ]  other: oriented to self and event, not month or year                                                                     Attention:  [ x   ] intact,   [    ]  other:                            Memory: [  x  ] grossly intact,  3 items in 5 mins with delay  [    ]  other:     Mood/Affect: [ x   ] wnl,    [    ]  other:                                                                             Communication: [    ]Fluent, no dysarthria, following commands:  [  x  ] other: mild to mod dysarthria. Follows commands/ conversation well. Fluent though word substitution errors  CN II - XII:  [ x   ] intact,  [    ] other:                                                                                        Motor:   RIGHT UE: [  x ] WNL,  [   ] other:  LEFT    UE: [ x  ] WNL,  [   ] other:  RIGHT LE: [  x ] WNL,  [   ] other:   LEFT    LE: [ x  ] WNL,  [   ] other:    Tone: [  x  ] wnl,   [    ]  other:  DTRs: [  x ]symmetric, [   ] other:  Coordination:   [  x  ] intact,   [    ] other:                                                                           Sensory: [ x   ] Intact to light touch,   [    ] other:      acetaminophen     Tablet .. 650 milliGRAM(s) Oral every 6 hours PRN  aluminum hydroxide/magnesium hydroxide/simethicone Suspension 30 milliLiter(s) Oral every 4 hours PRN  apixaban 5 milliGRAM(s) Oral every 12 hours  atorvastatin 80 milliGRAM(s) Oral at bedtime  dextrose 5%. 1000 milliLiter(s) IV Continuous <Continuous>  dextrose 5%. 1000 milliLiter(s) IV Continuous <Continuous>  dextrose 50% Injectable 25 Gram(s) IV Push once  dextrose 50% Injectable 12.5 Gram(s) IV Push once  dextrose 50% Injectable 25 Gram(s) IV Push once  dextrose Oral Gel 15 Gram(s) Oral once PRN  glucagon  Injectable 1 milliGRAM(s) IntraMuscular once  insulin glargine Injectable (LANTUS) 10 Unit(s) SubCutaneous <User Schedule>  insulin lispro Injectable (ADMELOG) 3 Unit(s) SubCutaneous three times a day before meals  magnesium hydroxide Suspension 30 milliLiter(s) Oral daily PRN  magnesium sulfate  IVPB 2 Gram(s) IV Intermittent once  melatonin 10 milliGRAM(s) Oral at bedtime  metoprolol succinate  milliGRAM(s) Oral daily  nystatin Powder 1 Application(s) Topical every 12 hours  senna 2 Tablet(s) Oral at bedtime PRN      RECENT LABS/IMAGING                        11.1   7.67  )-----------( 122      ( 19 Aug 2022 05:35 )             33.4     08-19    140  |  106  |  25<H>  ----------------------------<  228<H>  3.7   |  23  |  1.2    Ca    8.6      19 Aug 2022 05:35  Mg     1.7     08-19    TPro  5.8<L>  /  Alb  3.3<L>  /  TBili  0.5  /  DBili  x   /  AST  17  /  ALT  17  /  AlkPhos  39  08-19

## 2022-08-19 NOTE — PROGRESS NOTE ADULT - ASSESSMENT
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   Patient requires an acute multidisciplinary rehab program including PT, OT and ST and neuropsych. eval to maximize her function for a safe discharge home and to monitor her labile DM 2.    #Acute ischemic strokes   - CTH: negative for acute findings. CTA: no LVO. CTP: apparent perfusion abnormality (penumbra) within the brainstem and left posterior fossa with a total Tmax > 6s volume of 15cc. This may be artifactual  - Permissive HTN  - c/w ASA and statin   - A1C 6.3, Chol 154, LDL 83, Triglycerides 91  -  MRI brain non contrast < from: MR Head No Cont (08.11.22 @ 17:30) >  Motion degraded incomplete examination demonstrating small foci of acute infarct in the left temporal and parietal lobes.  Extensive chronic microsphere ischemic changes.  < end of copied text >  -  TTE=nl EF  - PT/OT/ST/Neuropsych eval  - added back Eliquis 5 BID     #Dysphagia  - Minced and Moist diet with mildly thickened liquids  - SLP f/u    #Dysarthria  - SLP f/u    #Cognitive deficits  - reportedly near baseline per family. 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 CXR reviewed by me: atelectasis, small pleural effusions  -resolved    #Lt flank hematoma  -monitor CBC    #?Tongue swelling on admission  - scoped by ENT: no evidence of angioedema. Significant secretions and pt appears not to be able to clear secretions  -resolved     #s/p Possible UTI  - ceftriaxone 1000mg IV completed 5 days    #Chronic AFib on Eliquis   - c/w Eliquis     #HTN  - stable   -monitor vitals, adjust medications as necessary    #Dyslipidemia   - Chol 154, LDL 83, Triglycerides 91  - Continue Atorvastatin 40mg PO QHS once speech/swallow clears    #DM w/ hypoglycemia on admission  - Hypoglycemic on fingersticks requiring 1/2 D5  - Monitor FS  - On Lantus 40U in the AM and lispro 3U TID at home  - Hold all insulin for now given hypoglycemia    - Ordered glucagon 1mg IM PRN for BG <70   -A1c=6.3  -lower dose of Insulin on d/c    #MARCIN on CKD   - Resolved, Cr 1.4 - 8/11/22  - baseline: 1.3 6/2020  - monitor     -Skin:  No active issues at this time       Precautions / PROPHYLAXIS:      - Falls    - Ortho: Weight bearing status: wbat      - DVT prophylaxis: Eliquis   ASSESSMENT/PLAN    Rehab of acute left MCA ischemic strokes with impaired balance, mobility and ADL skills and dysphagia and dysarthria.   Patient requires an acute multidisciplinary rehab program including PT, OT and ST and neuropsych. eval to maximize her function for a safe discharge home and to monitor her labile DM 2.    #Acute ischemic strokes   - CTH: negative for acute findings. CTA: no LVO. CTP: apparent perfusion abnormality (penumbra) within the brainstem and left posterior fossa with a total Tmax > 6s volume of 15cc. This may be artifactual  - Permissive HTN  - c/w ASA and statin   - A1C 6.3, Chol 154, LDL 83, Triglycerides 91  -  MRI brain non contrast < from: MR Head No Cont (08.11.22 @ 17:30) >  Motion degraded incomplete examination demonstrating small foci of acute infarct in the left temporal and parietal lobes.  Extensive chronic microsphere ischemic changes.  < end of copied text >  -  TTE=nl EF  - PT/OT/ST/Neuropsych eval  - added back and increased Eliquis from home dose of 2.5 BID to Eliquis 5 BID     #Dysphagia  - Minced and Moist diet with mildly thickened liquids  - SLP f/u    #Dysarthria  - SLP f/u    #Cognitive deficits  - reportedly near baseline per family. 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 CXR reviewed by me: atelectasis, small pleural effusions  -resolved    #Lt flank hematoma  -monitor CBC    #?Tongue swelling on admission  - scoped by ENT: no evidence of angioedema. Significant secretions and pt appears not to be able to clear secretions  -resolved     #s/p Possible UTI  - ceftriaxone 1000mg IV completed 5 days    #Chronic AFib on Eliquis   - c/w Eliquis     #HTN  - stable   -monitor vitals, adjust medications as necessary    #Dyslipidemia   - Chol 154, LDL 83, Triglycerides 91  - Continue Atorvastatin 40mg PO QHS once speech/swallow clears    #DM w/ hypoglycemia on admission  - Hypoglycemic on fingersticks requiring 1/2 D5  - Monitor FS  - On Lantus 40U in the AM and lispro 3U TID at home  - Hold all insulin for now given hypoglycemia    - Ordered glucagon 1mg IM PRN for BG <70   -A1c=6.3  -lower dose of Insulin on d/c    #MARCIN on CKD  - baseline: 1.3 6/2020  - monitor     -Skin:  No active issues at this time       Precautions / PROPHYLAXIS:      - Falls    - Ortho: Weight bearing status: wbat      - DVT prophylaxis: Eliquis

## 2022-08-20 LAB
GLUCOSE BLDC GLUCOMTR-MCNC: 197 MG/DL — HIGH (ref 70–99)
GLUCOSE BLDC GLUCOMTR-MCNC: 206 MG/DL — HIGH (ref 70–99)
GLUCOSE BLDC GLUCOMTR-MCNC: 223 MG/DL — HIGH (ref 70–99)
GLUCOSE BLDC GLUCOMTR-MCNC: 243 MG/DL — HIGH (ref 70–99)

## 2022-08-20 RX ORDER — INSULIN GLARGINE 100 [IU]/ML
15 INJECTION, SOLUTION SUBCUTANEOUS EVERY MORNING
Refills: 0 | Status: DISCONTINUED | OUTPATIENT
Start: 2022-08-21 | End: 2022-08-31

## 2022-08-20 RX ORDER — INSULIN LISPRO 100/ML
5 VIAL (ML) SUBCUTANEOUS
Refills: 0 | Status: DISCONTINUED | OUTPATIENT
Start: 2022-08-20 | End: 2022-08-31

## 2022-08-20 RX ADMIN — APIXABAN 5 MILLIGRAM(S): 2.5 TABLET, FILM COATED ORAL at 05:21

## 2022-08-20 RX ADMIN — NYSTATIN CREAM 1 APPLICATION(S): 100000 CREAM TOPICAL at 05:21

## 2022-08-20 RX ADMIN — Medication 100 MILLIGRAM(S): at 05:21

## 2022-08-20 RX ADMIN — NYSTATIN CREAM 1 APPLICATION(S): 100000 CREAM TOPICAL at 17:18

## 2022-08-20 RX ADMIN — Medication 10 MILLIGRAM(S): at 21:21

## 2022-08-20 RX ADMIN — Medication 3 UNIT(S): at 08:13

## 2022-08-20 RX ADMIN — APIXABAN 5 MILLIGRAM(S): 2.5 TABLET, FILM COATED ORAL at 18:23

## 2022-08-20 RX ADMIN — Medication 3 UNIT(S): at 11:59

## 2022-08-20 RX ADMIN — Medication 650 MILLIGRAM(S): at 18:45

## 2022-08-20 RX ADMIN — Medication 5 UNIT(S): at 16:30

## 2022-08-20 RX ADMIN — ATORVASTATIN CALCIUM 80 MILLIGRAM(S): 80 TABLET, FILM COATED ORAL at 21:21

## 2022-08-20 RX ADMIN — Medication 650 MILLIGRAM(S): at 19:15

## 2022-08-20 RX ADMIN — INSULIN GLARGINE 10 UNIT(S): 100 INJECTION, SOLUTION SUBCUTANEOUS at 08:13

## 2022-08-20 NOTE — PROGRESS NOTE ADULT - SUBJECTIVE AND OBJECTIVE BOX
T(C): 36.7 (08-20-22 @ 05:36), Max: 36.7 (08-20-22 @ 05:36)  HR: 77 (08-20-22 @ 05:36) (66 - 85)  BP: 108/58 (08-20-22 @ 05:36) (108/58 - 146/62)  RR: 18 (08-20-22 @ 05:36) (18 - 18)  SpO2: --      Patient was stable overnight and expresses no new complaints     PE:    Alert   LUNGS- clear  COR- RRR  ABD- SOFT, NT  EXTR- w/o edema  NEURO- stable    08-19    140  |  106  |  25<H>  ----------------------------<  228<H>  3.7   |  23  |  1.2    Ca    8.6      19 Aug 2022 05:35  Mg     1.7     08-19    TPro  5.8<L>  /  Alb  3.3<L>  /  TBili  0.5  /  DBili  x   /  AST  17  /  ALT  17  /  AlkPhos  39  08-19                            11.1   7.67  )-----------( 122      ( 19 Aug 2022 05:35 )             33.4

## 2022-08-21 LAB
GLUCOSE BLDC GLUCOMTR-MCNC: 144 MG/DL — HIGH (ref 70–99)
GLUCOSE BLDC GLUCOMTR-MCNC: 204 MG/DL — HIGH (ref 70–99)
GLUCOSE BLDC GLUCOMTR-MCNC: 207 MG/DL — HIGH (ref 70–99)

## 2022-08-21 RX ADMIN — NYSTATIN CREAM 1 APPLICATION(S): 100000 CREAM TOPICAL at 09:43

## 2022-08-21 RX ADMIN — Medication 5 UNIT(S): at 08:25

## 2022-08-21 RX ADMIN — ATORVASTATIN CALCIUM 80 MILLIGRAM(S): 80 TABLET, FILM COATED ORAL at 21:14

## 2022-08-21 RX ADMIN — Medication 100 MILLIGRAM(S): at 06:18

## 2022-08-21 RX ADMIN — Medication 5 UNIT(S): at 12:46

## 2022-08-21 RX ADMIN — Medication 5 UNIT(S): at 17:27

## 2022-08-21 RX ADMIN — APIXABAN 5 MILLIGRAM(S): 2.5 TABLET, FILM COATED ORAL at 06:18

## 2022-08-21 RX ADMIN — APIXABAN 5 MILLIGRAM(S): 2.5 TABLET, FILM COATED ORAL at 17:28

## 2022-08-21 RX ADMIN — NYSTATIN CREAM 1 APPLICATION(S): 100000 CREAM TOPICAL at 17:14

## 2022-08-21 RX ADMIN — INSULIN GLARGINE 15 UNIT(S): 100 INJECTION, SOLUTION SUBCUTANEOUS at 08:03

## 2022-08-21 RX ADMIN — Medication 10 MILLIGRAM(S): at 21:14

## 2022-08-21 NOTE — PROGRESS NOTE ADULT - SUBJECTIVE AND OBJECTIVE BOX
T(C): 35.7 (08-21-22 @ 06:10), Max: 36.2 (08-20-22 @ 14:08)  HR: 79 (08-21-22 @ 06:10) (79 - 96)  BP: 164/72 (08-21-22 @ 06:10) (127/76 - 164/72)  RR: 18 (08-21-22 @ 06:10) (18 - 18)  SpO2: 98% (08-21-22 @ 06:10) (98% - 98%)      Patient was stable overnight and expresses no new complaints     PE:ptn seen and exam  ion  the  gym  doing  well no new  c.o     Alert   LUNGS- clear  COR- RRR  ABD- SOFT, NT  EXTR- w/o edema  NEURO- stable      Rehab of _ cva  stable      Continue acute rehab program. pt  ot [facility]

## 2022-08-22 LAB
ALBUMIN SERPL ELPH-MCNC: 3.6 G/DL — SIGNIFICANT CHANGE UP (ref 3.5–5.2)
ALP SERPL-CCNC: 45 U/L — SIGNIFICANT CHANGE UP (ref 30–115)
ALT FLD-CCNC: 16 U/L — SIGNIFICANT CHANGE UP (ref 0–41)
ANION GAP SERPL CALC-SCNC: 9 MMOL/L — SIGNIFICANT CHANGE UP (ref 7–14)
AST SERPL-CCNC: 16 U/L — SIGNIFICANT CHANGE UP (ref 0–41)
BASOPHILS # BLD AUTO: 0.03 K/UL — SIGNIFICANT CHANGE UP (ref 0–0.2)
BASOPHILS NFR BLD AUTO: 0.4 % — SIGNIFICANT CHANGE UP (ref 0–1)
BILIRUB SERPL-MCNC: 0.5 MG/DL — SIGNIFICANT CHANGE UP (ref 0.2–1.2)
BUN SERPL-MCNC: 22 MG/DL — HIGH (ref 10–20)
CALCIUM SERPL-MCNC: 8.9 MG/DL — SIGNIFICANT CHANGE UP (ref 8.5–10.1)
CHLORIDE SERPL-SCNC: 105 MMOL/L — SIGNIFICANT CHANGE UP (ref 98–110)
CO2 SERPL-SCNC: 28 MMOL/L — SIGNIFICANT CHANGE UP (ref 17–32)
CREAT SERPL-MCNC: 1.4 MG/DL — SIGNIFICANT CHANGE UP (ref 0.7–1.5)
EGFR: 36 ML/MIN/1.73M2 — LOW
EOSINOPHIL # BLD AUTO: 0.24 K/UL — SIGNIFICANT CHANGE UP (ref 0–0.7)
EOSINOPHIL NFR BLD AUTO: 3.3 % — SIGNIFICANT CHANGE UP (ref 0–8)
GLUCOSE BLDC GLUCOMTR-MCNC: 131 MG/DL — HIGH (ref 70–99)
GLUCOSE BLDC GLUCOMTR-MCNC: 146 MG/DL — HIGH (ref 70–99)
GLUCOSE BLDC GLUCOMTR-MCNC: 192 MG/DL — HIGH (ref 70–99)
GLUCOSE BLDC GLUCOMTR-MCNC: 208 MG/DL — HIGH (ref 70–99)
GLUCOSE SERPL-MCNC: 181 MG/DL — HIGH (ref 70–99)
HCT VFR BLD CALC: 32.5 % — LOW (ref 37–47)
HGB BLD-MCNC: 10.8 G/DL — LOW (ref 12–16)
IMM GRANULOCYTES NFR BLD AUTO: 0.4 % — HIGH (ref 0.1–0.3)
LYMPHOCYTES # BLD AUTO: 1.55 K/UL — SIGNIFICANT CHANGE UP (ref 1.2–3.4)
LYMPHOCYTES # BLD AUTO: 21.3 % — SIGNIFICANT CHANGE UP (ref 20.5–51.1)
MAGNESIUM SERPL-MCNC: 1.9 MG/DL — SIGNIFICANT CHANGE UP (ref 1.8–2.4)
MCHC RBC-ENTMCNC: 30.6 PG — SIGNIFICANT CHANGE UP (ref 27–31)
MCHC RBC-ENTMCNC: 33.2 G/DL — SIGNIFICANT CHANGE UP (ref 32–37)
MCV RBC AUTO: 92.1 FL — SIGNIFICANT CHANGE UP (ref 81–99)
MONOCYTES # BLD AUTO: 0.61 K/UL — HIGH (ref 0.1–0.6)
MONOCYTES NFR BLD AUTO: 8.4 % — SIGNIFICANT CHANGE UP (ref 1.7–9.3)
NEUTROPHILS # BLD AUTO: 4.82 K/UL — SIGNIFICANT CHANGE UP (ref 1.4–6.5)
NEUTROPHILS NFR BLD AUTO: 66.2 % — SIGNIFICANT CHANGE UP (ref 42.2–75.2)
NRBC # BLD: 0 /100 WBCS — SIGNIFICANT CHANGE UP (ref 0–0)
PLATELET # BLD AUTO: 142 K/UL — SIGNIFICANT CHANGE UP (ref 130–400)
POTASSIUM SERPL-MCNC: 4.4 MMOL/L — SIGNIFICANT CHANGE UP (ref 3.5–5)
POTASSIUM SERPL-SCNC: 4.4 MMOL/L — SIGNIFICANT CHANGE UP (ref 3.5–5)
PROT SERPL-MCNC: 6.1 G/DL — SIGNIFICANT CHANGE UP (ref 6–8)
RAPID RVP RESULT: SIGNIFICANT CHANGE UP
RBC # BLD: 3.53 M/UL — LOW (ref 4.2–5.4)
RBC # FLD: 14.6 % — HIGH (ref 11.5–14.5)
SARS-COV-2 RNA SPEC QL NAA+PROBE: SIGNIFICANT CHANGE UP
SODIUM SERPL-SCNC: 142 MMOL/L — SIGNIFICANT CHANGE UP (ref 135–146)
WBC # BLD: 7.28 K/UL — SIGNIFICANT CHANGE UP (ref 4.8–10.8)
WBC # FLD AUTO: 7.28 K/UL — SIGNIFICANT CHANGE UP (ref 4.8–10.8)

## 2022-08-22 PROCEDURE — 71046 X-RAY EXAM CHEST 2 VIEWS: CPT | Mod: 26

## 2022-08-22 RX ADMIN — Medication 5 UNIT(S): at 12:31

## 2022-08-22 RX ADMIN — Medication 650 MILLIGRAM(S): at 16:57

## 2022-08-22 RX ADMIN — INSULIN GLARGINE 15 UNIT(S): 100 INJECTION, SOLUTION SUBCUTANEOUS at 08:39

## 2022-08-22 RX ADMIN — Medication 5 UNIT(S): at 16:53

## 2022-08-22 RX ADMIN — ATORVASTATIN CALCIUM 80 MILLIGRAM(S): 80 TABLET, FILM COATED ORAL at 22:20

## 2022-08-22 RX ADMIN — APIXABAN 5 MILLIGRAM(S): 2.5 TABLET, FILM COATED ORAL at 17:36

## 2022-08-22 RX ADMIN — Medication 5 UNIT(S): at 08:39

## 2022-08-22 RX ADMIN — NYSTATIN CREAM 1 APPLICATION(S): 100000 CREAM TOPICAL at 08:40

## 2022-08-22 RX ADMIN — Medication 10 MILLIGRAM(S): at 22:19

## 2022-08-22 RX ADMIN — NYSTATIN CREAM 1 APPLICATION(S): 100000 CREAM TOPICAL at 17:37

## 2022-08-22 RX ADMIN — Medication 100 MILLIGRAM(S): at 05:46

## 2022-08-22 RX ADMIN — APIXABAN 5 MILLIGRAM(S): 2.5 TABLET, FILM COATED ORAL at 05:46

## 2022-08-22 NOTE — PROGRESS NOTE ADULT - SUBJECTIVE AND OBJECTIVE BOX
Patient is a 88y old  Female who presents with a chief complaint of Stroke (18 Aug 2022 13:20)      HPI:  This is a 88 F hx of CAD, Diabetes, Hyperlipemia, Hypertension, Afib on Eliquis presents to ED BIBA from home for slurred speech and right sided facial droop. Patient went to bed that night around ~10PM at baseline. Patient woke up the next morning around ~11Am which is late for her. Daughter noted she was not herself,  checked her sugar and it was ~45. Noted the slurred speech. Also noted patient had trouble swallowing. Patient took her Eliquis dose this morning.  No history of strokes. CT Angio Brain Stroke Protocol  w/ IV Cont, No evidence of major vascular stenosis or occlusion. Scattered mild calcific plaque.  CT perfusion: Apparent perfusion abnormality (penumbra) within the brainstem and left posterior fossa with a total Tmax > 6s volume of 15 CT Brain Stroke Protocol shows no evidence of acute intracranial hemorrhage or large territory infarct. Chronic-appearing left cerebellar infarct (new since 2010). Moderate/severe chronic microvascular changes and parenchymal atrophy (moderately progressed since 2010). < from: MR Head No Cont (08.11.22 @ 17:30) > Motion degraded incomplete examination demonstrating small foci of acute infarct in the left temporal and parietal lobes. Extensive chronic microsphere ischemic changes. Her course was complicated by uncontrolled DM2 an MARCIN on CKD, now medically stable. She was placed on therapeutic Lovenox and then back onto Eliquis. She has been medically stable.     She was seen for a swallow assessment and cleared for Minced and Moist diet with mildly thickened liquids. She was seen by PT and OT and requires max assistance to stand and for bed mobility and min assistance to ambulate 15 ft. with a RW and mod assistance for UE dressing and max assistance for LE dressing. PTA, she was fully independent in all activities, using a straight cane or walker at times. She was evaluated by me on the Neuro Service and was found to be a good candidate for acute inpatient rehab. Her son-in-law at bedside states that she has had some cognitive deficits for a while and that it is near baseline.     (18 Aug 2022 13:20)      I examined the patient and reviewed the chart. There have been no significant changes since my history and physical except where documented below.    TODAY'S SUBJECTIVE & REVIEW OF SYMPTOMS:  Patient was seen and assessed at bedside. No overnight events. Patient denies any complaints at this time. Tolerating PT/OT. Tolerating oral diet. Voiding and passing stool spontaneously. Vital signs are stable.      Constiutional:    [  x ] WNL           [   ] poor appetite   [   ] insomnia   [   ] tired   Cardio:                [ x  ] WNL           [   ] CP   [   ] SILVERMAN   [   ] palpitations               Resp:                   [ x  ] WNL           [  x ] SOB at times at home  [   ] cough   [   ] wheezing   GI:                        [ x  ] WNL           [   ] constipation   [   ] diarrhea   [   ] abdominal pain   [   ] nausea   [   ] emesis                                :                      [   ] WNL           [   ] REYNA  [   ] dysuria   [   ] difficulty voiding  [ x ] incontinence           Endo:                   [ x  ] WNL          [   ] polyuria   [   ] temperature intolerance                 Skin:                     [ x  ] WNL          [   ] pain   [   ] wound   [   ] rash   MSK:                    [    ] WNL          [   ] muscle pain   [ x  ] joint pain/ stiffness - prior left shoulder injury with contracture   [   ] muscle tenderness   [   ] swelling   Neuro:                 [ x  ] WNL except as per HPI         [   ] HA   [   ] change in vision   [   ] tremor   [   ] weakness   [   ]dysphagia              Cognitive:           [   ] WNL           [ x  ] some impairment PTA    Psych:                  [  x ] WNL           [   ] hallucinations   [   ]agitation   [   ] delusion   [   ]depression    PHYSICAL EXAM    ICU Vital Signs Last 24 Hrs  T(C): 35.7 (22 Aug 2022 05:26), Max: 36.2 (21 Aug 2022 12:00)  T(F): 96.3 (22 Aug 2022 05:26), Max: 97.1 (21 Aug 2022 12:00)  HR: 85 (22 Aug 2022 05:26) (77 - 88)  BP: 113/60 (22 Aug 2022 05:26) (109/53 - 116/55)  BP(mean): --  ABP: --  ABP(mean): --  RR: 20 (22 Aug 2022 05:26) (18 - 20)  SpO2: --        General:[  x ] NAD, Resting Comfortable,   [   ] other:                                HEENT: [  x ] NC/AT, EOMI, PERRL , Normal Conjunctivae,   [   ] other:  Cardio: [  x ] RRR, no murmer,   [   ] other:                              Pulm: [ x  ] No Respiratory Distress,  Lungs CTAB,   [   ] other:                       Abdomen: [  x ]ND/NT, Soft,   [   ] other:    : [  x ] NO REYNA CATHETER, [   ] REYNA CATHETER- no meatal tear, no discharge, [   ] other:                                            MSK: [ x  ] No joint swelling,   [  x ] other:  healed bilat TKR scars, severe limited PROM left shoulder with pain                                    Ext: [  x ]No C/C/E, No calf tenderness,   [   ]other:    Skin: [ x  ]intact,   [   ] other:                                                                   Neurological Examination:  Cognitive: [    ] AAO x 3,   [  x  ]  other: oriented to self and event, not month or year                                                                     Attention:  [ x   ] intact,   [    ]  other:                            Memory: [  x  ] grossly intact,  3 items in 5 mins with delay  [    ]  other:     Mood/Affect: [ x   ] wnl,    [    ]  other:                                                                             Communication: [    ]Fluent, no dysarthria, following commands:  [  x  ] other: mild to mod dysarthria. Follows commands/ conversation well. Fluent though word substitution errors  CN II - XII:  [ x   ] intact,  [    ] other:                                                                                        Motor:   RIGHT UE: [  x ] WNL,  [   ] other:  LEFT    UE: [ x  ] WNL,  [   ] other:  RIGHT LE: [  x ] WNL,  [   ] other:   LEFT    LE: [ x  ] WNL,  [   ] other:    Tone: [  x  ] wnl,   [    ]  other:  DTRs: [  x ]symmetric, [   ] other:  Coordination:   [  x  ] intact,   [    ] other:                                                                           Sensory: [ x   ] Intact to light touch,   [    ] other:      MEDICATIONS  (STANDING):  apixaban 5 milliGRAM(s) Oral every 12 hours  atorvastatin 80 milliGRAM(s) Oral at bedtime  dextrose 5%. 1000 milliLiter(s) (50 mL/Hr) IV Continuous <Continuous>  dextrose 5%. 1000 milliLiter(s) (100 mL/Hr) IV Continuous <Continuous>  dextrose 50% Injectable 25 Gram(s) IV Push once  dextrose 50% Injectable 12.5 Gram(s) IV Push once  dextrose 50% Injectable 25 Gram(s) IV Push once  glucagon  Injectable 1 milliGRAM(s) IntraMuscular once  insulin glargine Injectable (LANTUS) 15 Unit(s) SubCutaneous every morning  insulin lispro Injectable (ADMELOG) 5 Unit(s) SubCutaneous three times a day before meals  melatonin 10 milliGRAM(s) Oral at bedtime  metoprolol succinate  milliGRAM(s) Oral daily  nystatin Powder 1 Application(s) Topical every 12 hours    MEDICATIONS  (PRN):  acetaminophen     Tablet .. 650 milliGRAM(s) Oral every 6 hours PRN Temp greater or equal to 38C (100.4F), Mild Pain (1 - 3)  aluminum hydroxide/magnesium hydroxide/simethicone Suspension 30 milliLiter(s) Oral every 4 hours PRN Dyspepsia  dextrose Oral Gel 15 Gram(s) Oral once PRN Blood Glucose LESS THAN 70 milliGRAM(s)/deciliter  magnesium hydroxide Suspension 30 milliLiter(s) Oral daily PRN Constipation  senna 2 Tablet(s) Oral at bedtime PRN Constipation        RECENT LABS/IMAGING      POCT Blood Glucose.: 204 mg/dL (08-21-22 @ 16:22)  POCT Blood Glucose.: 144 mg/dL (08-21-22 @ 12:11)  POCT Blood Glucose.: 207 mg/dL (08-21-22 @ 07:55)  POCT Blood Glucose.: 197 mg/dL (08-20-22 @ 16:29)  POCT Blood Glucose.: 223 mg/dL (08-20-22 @ 11:54)  POCT Blood Glucose.: 243 mg/dL (08-20-22 @ 09:39)  POCT Blood Glucose.: 206 mg/dL (08-20-22 @ 08:10)  POCT Blood Glucose.: 229 mg/dL (08-19-22 @ 20:59)  POCT Blood Glucose.: 217 mg/dL (08-19-22 @ 16:10)  POCT Blood Glucose.: 262 mg/dL (08-19-22 @ 11:21)  POCT Blood Glucose.: 219 mg/dL (08-19-22 @ 07:12)  POCT Blood Glucose.: 228 mg/dL (08-18-22 @ 22:35)   Patient is a 88y old  Female who presents with a chief complaint of Stroke (18 Aug 2022 13:20)      HPI:  This is a 88 F hx of CAD, Diabetes, Hyperlipemia, Hypertension, Afib on Eliquis presents to ED BIBA from home for slurred speech and right sided facial droop. Patient went to bed that night around ~10PM at baseline. Patient woke up the next morning around ~11Am which is late for her. Daughter noted she was not herself,  checked her sugar and it was ~45. Noted the slurred speech. Also noted patient had trouble swallowing. Patient took her Eliquis dose this morning.  No history of strokes. CT Angio Brain Stroke Protocol  w/ IV Cont, No evidence of major vascular stenosis or occlusion. Scattered mild calcific plaque.  CT perfusion: Apparent perfusion abnormality (penumbra) within the brainstem and left posterior fossa with a total Tmax > 6s volume of 15 CT Brain Stroke Protocol shows no evidence of acute intracranial hemorrhage or large territory infarct. Chronic-appearing left cerebellar infarct (new since 2010). Moderate/severe chronic microvascular changes and parenchymal atrophy (moderately progressed since 2010). < from: MR Head No Cont (08.11.22 @ 17:30) > Motion degraded incomplete examination demonstrating small foci of acute infarct in the left temporal and parietal lobes. Extensive chronic microsphere ischemic changes. Her course was complicated by uncontrolled DM2 an MARCIN on CKD, now medically stable. She was placed on therapeutic Lovenox and then back onto Eliquis. She has been medically stable.     She was seen for a swallow assessment and cleared for Minced and Moist diet with mildly thickened liquids. She was seen by PT and OT and requires max assistance to stand and for bed mobility and min assistance to ambulate 15 ft. with a RW and mod assistance for UE dressing and max assistance for LE dressing. PTA, she was fully independent in all activities, using a straight cane or walker at times. She was evaluated by me on the Neuro Service and was found to be a good candidate for acute inpatient rehab. Her son-in-law at bedside states that she has had some cognitive deficits for a while and that it is near baseline.    TODAY'S SUBJECTIVE & REVIEW OF SYMPTOMS:  Patient was seen and assessed at bedside. No overnight events. Patient denies any complaints at this time. Tolerating PT/OT. Tolerating oral diet. Voiding and passing stool spontaneously. Vital signs are stable.      Constiutional:    [  x ] WNL           [   ] poor appetite   [   ] insomnia   [   ] tired   Cardio:                [ x  ] WNL           [   ] CP   [   ] SILVERMAN   [   ] palpitations               Resp:                   [ x  ] WNL           [  x ] SOB at times at home  [   ] cough   [   ] wheezing   GI:                        [ x  ] WNL           [   ] constipation   [   ] diarrhea   [   ] abdominal pain   [   ] nausea   [   ] emesis                                :                      [   ] WNL           [   ] REYNA  [   ] dysuria   [   ] difficulty voiding  [ x ] incontinence           Endo:                   [ x  ] WNL          [   ] polyuria   [   ] temperature intolerance                 Skin:                     [ x  ] WNL          [   ] pain   [   ] wound   [   ] rash   MSK:                    [    ] WNL          [   ] muscle pain   [ x  ] joint pain/ stiffness - prior left shoulder injury with contracture   [   ] muscle tenderness   [   ] swelling   Neuro:                 [ x  ] WNL except as per HPI         [   ] HA   [   ] change in vision   [   ] tremor   [   ] weakness   [   ]dysphagia              Cognitive:           [   ] WNL           [ x  ] some impairment PTA    Psych:                  [  x ] WNL           [   ] hallucinations   [   ]agitation   [   ] delusion   [   ]depression    PHYSICAL EXAM    ICU Vital Signs Last 24 Hrs  T(C): 35.7 (22 Aug 2022 05:26), Max: 36.2 (21 Aug 2022 12:00)  T(F): 96.3 (22 Aug 2022 05:26), Max: 97.1 (21 Aug 2022 12:00)  HR: 85 (22 Aug 2022 05:26) (77 - 88)  BP: 113/60 (22 Aug 2022 05:26) (109/53 - 116/55)  BP(mean): --  ABP: --  ABP(mean): --  RR: 20 (22 Aug 2022 05:26) (18 - 20)  SpO2: --        General:[  x ] NAD, Resting Comfortable,   [   ] other:                                HEENT: [  x ] NC/AT, EOMI, PERRL , Normal Conjunctivae,   [   ] other:  Cardio: [  x ] RRR, no murmer,   [   ] other:                              Pulm: [ x  ] No Respiratory Distress,  Lungs CTAB,   [   ] other:                       Abdomen: [  x ]ND/NT, Soft,   [   ] other:    : [  x ] NO REYNA CATHETER, [   ] REYNA CATHETER- no meatal tear, no discharge, [   ] other:                                            MSK: [ x  ] No joint swelling,   [  x ] other:  healed bilat TKR scars, severe limited PROM left shoulder with pain                                    Ext: [  x ]No C/C/E, No calf tenderness,   [   ]other:    Skin: [ x  ]intact,   [   ] other:                                                                   Neurological Examination:  Cognitive: [    ] AAO x 3,   [  x  ]  other: oriented to self and event, not month or year                                                                     Attention:  [ x   ] intact,   [    ]  other:                            Memory: [  x  ] grossly intact,  3 items in 5 mins with delay  [    ]  other:     Mood/Affect: [ x   ] wnl,    [    ]  other:                                                                             Communication: [    ]Fluent, no dysarthria, following commands:  [  x  ] other: mild to mod dysarthria. Follows commands/ conversation well. Fluent though word substitution errors  CN II - XII:  [ x   ] intact,  [    ] other:                                                                                        Motor:   RIGHT UE: [  x ] WNL,  [   ] other:  LEFT    UE: [ x  ] WNL,  [   ] other:  RIGHT LE: [  x ] WNL,  [   ] other:   LEFT    LE: [ x  ] WNL,  [   ] other:    Tone: [  x  ] wnl,   [    ]  other:  DTRs: [  x ]symmetric, [   ] other:  Coordination:   [  x  ] intact,   [    ] other:                                                                           Sensory: [ x   ] Intact to light touch,   [    ] other:      MEDICATIONS  (STANDING):  apixaban 5 milliGRAM(s) Oral every 12 hours  atorvastatin 80 milliGRAM(s) Oral at bedtime  dextrose 5%. 1000 milliLiter(s) (50 mL/Hr) IV Continuous <Continuous>  dextrose 5%. 1000 milliLiter(s) (100 mL/Hr) IV Continuous <Continuous>  dextrose 50% Injectable 25 Gram(s) IV Push once  dextrose 50% Injectable 12.5 Gram(s) IV Push once  dextrose 50% Injectable 25 Gram(s) IV Push once  glucagon  Injectable 1 milliGRAM(s) IntraMuscular once  insulin glargine Injectable (LANTUS) 15 Unit(s) SubCutaneous every morning  insulin lispro Injectable (ADMELOG) 5 Unit(s) SubCutaneous three times a day before meals  melatonin 10 milliGRAM(s) Oral at bedtime  metoprolol succinate  milliGRAM(s) Oral daily  nystatin Powder 1 Application(s) Topical every 12 hours    MEDICATIONS  (PRN):  acetaminophen     Tablet .. 650 milliGRAM(s) Oral every 6 hours PRN Temp greater or equal to 38C (100.4F), Mild Pain (1 - 3)  aluminum hydroxide/magnesium hydroxide/simethicone Suspension 30 milliLiter(s) Oral every 4 hours PRN Dyspepsia  dextrose Oral Gel 15 Gram(s) Oral once PRN Blood Glucose LESS THAN 70 milliGRAM(s)/deciliter  magnesium hydroxide Suspension 30 milliLiter(s) Oral daily PRN Constipation  senna 2 Tablet(s) Oral at bedtime PRN Constipation        RECENT LABS/IMAGING                          10.8   7.28  )-----------( 142      ( 22 Aug 2022 06:33 )             32.5     08-22    142  |  105  |  22<H>  ----------------------------<  181<H>  4.4   |  28  |  1.4    Ca    8.9      22 Aug 2022 06:33  Mg     1.9     08-22    TPro  6.1  /  Alb  3.6  /  TBili  0.5  /  DBili  x   /  AST  16  /  ALT  16  /  AlkPhos  45  08-22        POCT Blood Glucose.: 208 mg/dL (08-22-22 @ 11:37)  POCT Blood Glucose.: 192 mg/dL (08-22-22 @ 08:17)  POCT Blood Glucose.: 204 mg/dL (08-21-22 @ 16:22)  POCT Blood Glucose.: 144 mg/dL (08-21-22 @ 12:11)  POCT Blood Glucose.: 207 mg/dL (08-21-22 @ 07:55)  POCT Blood Glucose.: 197 mg/dL (08-20-22 @ 16:29)  POCT Blood Glucose.: 223 mg/dL (08-20-22 @ 11:54)  POCT Blood Glucose.: 243 mg/dL (08-20-22 @ 09:39)  POCT Blood Glucose.: 206 mg/dL (08-20-22 @ 08:10)  POCT Blood Glucose.: 229 mg/dL (08-19-22 @ 20:59)  POCT Blood Glucose.: 217 mg/dL (08-19-22 @ 16:10)  POCT Blood Glucose.: 262 mg/dL (08-19-22 @ 11:21)

## 2022-08-22 NOTE — PROGRESS NOTE ADULT - ASSESSMENT
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   Patient requires an acute multidisciplinary rehab program including PT, OT and ST and neuropsych. eval to maximize her function for a safe discharge home and to monitor her labile DM 2.    #Acute ischemic strokes   - CTH: negative for acute findings. CTA: no LVO. CTP: apparent perfusion abnormality (penumbra) within the brainstem and left posterior fossa with a total Tmax > 6s volume of 15cc. This may be artifactual  - Permissive HTN  - c/w ASA and statin   - A1C 6.3, Chol 154, LDL 83, Triglycerides 91  -  MRI brain non contrast < from: MR Head No Cont (08.11.22 @ 17:30) >  Motion degraded incomplete examination demonstrating small foci of acute infarct in the left temporal and parietal lobes.  Extensive chronic microsphere ischemic changes.  < end of copied text >  -  TTE=nl EF  - PT/OT/ST/Neuropsych eval  - added back and increased Eliquis from home dose of 2.5 BID to Eliquis 5 BID     #Dysphagia  - Minced and Moist diet with mildly thickened liquids  - SLP f/u    #Dysarthria  - SLP f/u    #Cognitive deficits  - reportedly near baseline per family. 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 CXR reviewed by me: atelectasis, small pleural effusions  -resolved    #Lt flank hematoma  -monitor CBC    #?Tongue swelling on admission  - scoped by ENT: no evidence of angioedema. Significant secretions and pt appears not to be able to clear secretions  -resolved     #s/p Possible UTI  - ceftriaxone 1000mg IV completed 5 days    #Chronic AFib on Eliquis   - c/w Eliquis     #HTN  - stable   -monitor vitals, adjust medications as necessary    #Dyslipidemia   - Chol 154, LDL 83, Triglycerides 91  - Continue Atorvastatin 40mg PO QHS once speech/swallow clears    #DM w/ hypoglycemia on admission  - Hypoglycemic on fingersticks requiring 1/2 D5  - Monitor FS  - On Lantus 40U in the AM and lispro 3U TID at home  - Hold all insulin for now given hypoglycemia    - Ordered glucagon 1mg IM PRN for BG <70   -A1c=6.3  -lower dose of Insulin on d/c    #MARCIN on CKD  - baseline: 1.3 6/2020  - monitor     -Skin:  No active issues at this time       Precautions / PROPHYLAXIS:      - Falls    - Ortho: Weight bearing status: wbat      - DVT prophylaxis: Eliquis   ASSESSMENT/PLAN    Rehab of acute left MCA ischemic strokes with impaired balance, mobility and ADL skills and dysphagia and dysarthria.   Patient requires an acute multidisciplinary rehab program including PT, OT and ST and neuropsych. eval to maximize her function for a safe discharge home and to monitor her labile DM 2.    #Acute ischemic strokes   - CTH: negative for acute findings. CTA: no LVO. CTP: apparent perfusion abnormality (penumbra) within the brainstem and left posterior fossa with a total Tmax > 6s volume of 15cc. This may be artifactual  - Permissive HTN  - c/w ASA and statin   - A1C 6.3, Chol 154, LDL 83, Triglycerides 91  -  MRI brain non contrast < from: MR Head No Cont (08.11.22 @ 17:30) >  Motion degraded incomplete examination demonstrating small foci of acute infarct in the left temporal and parietal lobes.  Extensive chronic microsphere ischemic changes.  < end of copied text >  -  TTE=nl EF  - PT/OT/ST/Neuropsych eval  - added back and increased Eliquis from home dose of 2.5 BID to Eliquis 5 BID     #Dysphagia  - Minced and Moist diet with mildly thickened liquids  - SLP f/u    #Dysarthria  - SLP f/u    #Cognitive deficits  - reportedly near baseline per family. 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 CXR  atelectasis, small pleural effusions  -resolved    #Lt flank hematoma  -monitor CBC    #?Tongue swelling on admission  - scoped by ENT: no evidence of angioedema. Significant secretions and pt appears not to be able to clear secretions  -resolved     #s/p Possible UTI  - ceftriaxone 1000mg IV completed 5 days    #Chronic AFib on Eliquis   - c/w Eliquis     #HTN  - stable   -monitor vitals, adjust medications as necessary    #Dyslipidemia   - Chol 154, LDL 83, Triglycerides 91  - Continue Atorvastatin 40mg PO QHS once speech/swallow clears    #DM w/ hypoglycemia on admission  - Hypoglycemic on fingersticks requiring 1/2 D5  - Monitor FS  - On Lantus 40U in the AM and lispro 3U TID at home  - Ordered glucagon 1mg IM PRN for BG <70   -A1c=6.3  -lower dose of Insulin resumed. FS in low to mid 200s. Titrate up insulin    #s/p MARCIN on CKD  - baseline: 1.3 6/2020. Back at baseline  - monitor     -Skin:  No active issues at this time       Precautions / PROPHYLAXIS:      - Falls    - Ortho: Weight bearing status: wbat      - DVT prophylaxis: Eliquis

## 2022-08-23 DIAGNOSIS — I48.20 CHRONIC ATRIAL FIBRILLATION, UNSPECIFIED: ICD-10-CM

## 2022-08-23 DIAGNOSIS — F05 DELIRIUM DUE TO KNOWN PHYSIOLOGICAL CONDITION: ICD-10-CM

## 2022-08-23 DIAGNOSIS — N39.0 URINARY TRACT INFECTION, SITE NOT SPECIFIED: ICD-10-CM

## 2022-08-23 DIAGNOSIS — I63.512 CEREBRAL INFARCTION DUE TO UNSPECIFIED OCCLUSION OR STENOSIS OF LEFT MIDDLE CEREBRAL ARTERY: ICD-10-CM

## 2022-08-23 DIAGNOSIS — I10 ESSENTIAL (PRIMARY) HYPERTENSION: ICD-10-CM

## 2022-08-23 DIAGNOSIS — E11.649 TYPE 2 DIABETES MELLITUS WITH HYPOGLYCEMIA WITHOUT COMA: ICD-10-CM

## 2022-08-23 DIAGNOSIS — E78.5 HYPERLIPIDEMIA, UNSPECIFIED: ICD-10-CM

## 2022-08-23 DIAGNOSIS — R47.81 SLURRED SPEECH: ICD-10-CM

## 2022-08-23 DIAGNOSIS — N17.9 ACUTE KIDNEY FAILURE, UNSPECIFIED: ICD-10-CM

## 2022-08-23 DIAGNOSIS — R29.704 NIHSS SCORE 4: ICD-10-CM

## 2022-08-23 DIAGNOSIS — J96.01 ACUTE RESPIRATORY FAILURE WITH HYPOXIA: ICD-10-CM

## 2022-08-23 DIAGNOSIS — I25.10 ATHEROSCLEROTIC HEART DISEASE OF NATIVE CORONARY ARTERY WITHOUT ANGINA PECTORIS: ICD-10-CM

## 2022-08-23 LAB
ALBUMIN SERPL ELPH-MCNC: 3.5 G/DL — SIGNIFICANT CHANGE UP (ref 3.5–5.2)
ALP SERPL-CCNC: 43 U/L — SIGNIFICANT CHANGE UP (ref 30–115)
ALT FLD-CCNC: 15 U/L — SIGNIFICANT CHANGE UP (ref 0–41)
ANION GAP SERPL CALC-SCNC: 12 MMOL/L — SIGNIFICANT CHANGE UP (ref 7–14)
AST SERPL-CCNC: 16 U/L — SIGNIFICANT CHANGE UP (ref 0–41)
BASOPHILS # BLD AUTO: 0.02 K/UL — SIGNIFICANT CHANGE UP (ref 0–0.2)
BASOPHILS NFR BLD AUTO: 0.2 % — SIGNIFICANT CHANGE UP (ref 0–1)
BILIRUB SERPL-MCNC: 0.7 MG/DL — SIGNIFICANT CHANGE UP (ref 0.2–1.2)
BUN SERPL-MCNC: 18 MG/DL — SIGNIFICANT CHANGE UP (ref 10–20)
CALCIUM SERPL-MCNC: 8.9 MG/DL — SIGNIFICANT CHANGE UP (ref 8.5–10.1)
CHLORIDE SERPL-SCNC: 104 MMOL/L — SIGNIFICANT CHANGE UP (ref 98–110)
CO2 SERPL-SCNC: 26 MMOL/L — SIGNIFICANT CHANGE UP (ref 17–32)
CREAT SERPL-MCNC: 1.3 MG/DL — SIGNIFICANT CHANGE UP (ref 0.7–1.5)
D DIMER BLD IA.RAPID-MCNC: 251 NG/ML DDU — HIGH (ref 0–230)
EGFR: 40 ML/MIN/1.73M2 — LOW
EOSINOPHIL # BLD AUTO: 0.1 K/UL — SIGNIFICANT CHANGE UP (ref 0–0.7)
EOSINOPHIL NFR BLD AUTO: 1.1 % — SIGNIFICANT CHANGE UP (ref 0–8)
FERRITIN SERPL-MCNC: 193 NG/ML — HIGH (ref 15–150)
GLUCOSE BLDC GLUCOMTR-MCNC: 140 MG/DL — HIGH (ref 70–99)
GLUCOSE BLDC GLUCOMTR-MCNC: 155 MG/DL — HIGH (ref 70–99)
GLUCOSE BLDC GLUCOMTR-MCNC: 157 MG/DL — HIGH (ref 70–99)
GLUCOSE BLDC GLUCOMTR-MCNC: 187 MG/DL — HIGH (ref 70–99)
GLUCOSE SERPL-MCNC: 148 MG/DL — HIGH (ref 70–99)
HCT VFR BLD CALC: 32.6 % — LOW (ref 37–47)
HGB BLD-MCNC: 10.6 G/DL — LOW (ref 12–16)
IMM GRANULOCYTES NFR BLD AUTO: 0.3 % — SIGNIFICANT CHANGE UP (ref 0.1–0.3)
LYMPHOCYTES # BLD AUTO: 1.76 K/UL — SIGNIFICANT CHANGE UP (ref 1.2–3.4)
LYMPHOCYTES # BLD AUTO: 20.1 % — LOW (ref 20.5–51.1)
MAGNESIUM SERPL-MCNC: 1.9 MG/DL — SIGNIFICANT CHANGE UP (ref 1.8–2.4)
MCHC RBC-ENTMCNC: 30 PG — SIGNIFICANT CHANGE UP (ref 27–31)
MCHC RBC-ENTMCNC: 32.5 G/DL — SIGNIFICANT CHANGE UP (ref 32–37)
MCV RBC AUTO: 92.4 FL — SIGNIFICANT CHANGE UP (ref 81–99)
MONOCYTES # BLD AUTO: 0.66 K/UL — HIGH (ref 0.1–0.6)
MONOCYTES NFR BLD AUTO: 7.5 % — SIGNIFICANT CHANGE UP (ref 1.7–9.3)
NEUTROPHILS # BLD AUTO: 6.2 K/UL — SIGNIFICANT CHANGE UP (ref 1.4–6.5)
NEUTROPHILS NFR BLD AUTO: 70.8 % — SIGNIFICANT CHANGE UP (ref 42.2–75.2)
NRBC # BLD: 0 /100 WBCS — SIGNIFICANT CHANGE UP (ref 0–0)
PLATELET # BLD AUTO: 50 K/UL — LOW (ref 130–400)
POTASSIUM SERPL-MCNC: 4.7 MMOL/L — SIGNIFICANT CHANGE UP (ref 3.5–5)
POTASSIUM SERPL-SCNC: 4.7 MMOL/L — SIGNIFICANT CHANGE UP (ref 3.5–5)
PROCALCITONIN SERPL-MCNC: 1.13 NG/ML — HIGH (ref 0.02–0.1)
PROT SERPL-MCNC: 6.1 G/DL — SIGNIFICANT CHANGE UP (ref 6–8)
RBC # BLD: 3.53 M/UL — LOW (ref 4.2–5.4)
RBC # FLD: 14.3 % — SIGNIFICANT CHANGE UP (ref 11.5–14.5)
SODIUM SERPL-SCNC: 142 MMOL/L — SIGNIFICANT CHANGE UP (ref 135–146)
WBC # BLD: 8.77 K/UL — SIGNIFICANT CHANGE UP (ref 4.8–10.8)
WBC # FLD AUTO: 8.77 K/UL — SIGNIFICANT CHANGE UP (ref 4.8–10.8)

## 2022-08-23 RX ADMIN — APIXABAN 5 MILLIGRAM(S): 2.5 TABLET, FILM COATED ORAL at 17:30

## 2022-08-23 RX ADMIN — Medication 5 UNIT(S): at 07:52

## 2022-08-23 RX ADMIN — Medication 30 MILLILITER(S): at 14:14

## 2022-08-23 RX ADMIN — INSULIN GLARGINE 15 UNIT(S): 100 INJECTION, SOLUTION SUBCUTANEOUS at 07:52

## 2022-08-23 RX ADMIN — APIXABAN 5 MILLIGRAM(S): 2.5 TABLET, FILM COATED ORAL at 05:59

## 2022-08-23 RX ADMIN — Medication 10 MILLIGRAM(S): at 21:44

## 2022-08-23 RX ADMIN — Medication 5 UNIT(S): at 17:30

## 2022-08-23 RX ADMIN — NYSTATIN CREAM 1 APPLICATION(S): 100000 CREAM TOPICAL at 05:58

## 2022-08-23 RX ADMIN — Medication 5 UNIT(S): at 12:01

## 2022-08-23 RX ADMIN — ATORVASTATIN CALCIUM 80 MILLIGRAM(S): 80 TABLET, FILM COATED ORAL at 21:43

## 2022-08-23 RX ADMIN — Medication 100 MILLIGRAM(S): at 05:59

## 2022-08-23 RX ADMIN — NYSTATIN CREAM 1 APPLICATION(S): 100000 CREAM TOPICAL at 21:43

## 2022-08-23 NOTE — PROGRESS NOTE ADULT - SUBJECTIVE AND OBJECTIVE BOX
Patient is a 88y old  Female who presents with a chief complaint of Stroke (18 Aug 2022 13:20)      HPI:  This is a 88 F hx of CAD, Diabetes, Hyperlipemia, Hypertension, Afib on Eliquis presents to ED BIBA from home for slurred speech and right sided facial droop. Patient went to bed that night around ~10PM at baseline. Patient woke up the next morning around ~11Am which is late for her. Daughter noted she was not herself,  checked her sugar and it was ~45. Noted the slurred speech. Also noted patient had trouble swallowing. Patient took her Eliquis dose this morning.  No history of strokes. CT Angio Brain Stroke Protocol  w/ IV Cont, No evidence of major vascular stenosis or occlusion. Scattered mild calcific plaque.  CT perfusion: Apparent perfusion abnormality (penumbra) within the brainstem and left posterior fossa with a total Tmax > 6s volume of 15 CT Brain Stroke Protocol shows no evidence of acute intracranial hemorrhage or large territory infarct. Chronic-appearing left cerebellar infarct (new since 2010). Moderate/severe chronic microvascular changes and parenchymal atrophy (moderately progressed since 2010). < from: MR Head No Cont (08.11.22 @ 17:30) > Motion degraded incomplete examination demonstrating small foci of acute infarct in the left temporal and parietal lobes. Extensive chronic microsphere ischemic changes. Her course was complicated by uncontrolled DM2 an MARCIN on CKD, now medically stable. She was placed on therapeutic Lovenox and then back onto Eliquis. She has been medically stable.     She was seen for a swallow assessment and cleared for Minced and Moist diet with mildly thickened liquids. She was seen by PT and OT and requires max assistance to stand and for bed mobility and min assistance to ambulate 15 ft. with a RW and mod assistance for UE dressing and max assistance for LE dressing. PTA, she was fully independent in all activities, using a straight cane or walker at times. She was evaluated by me on the Neuro Service and was found to be a good candidate for acute inpatient rehab. Her son-in-law at bedside states that she has had some cognitive deficits for a while and that it is near baseline.    TODAY'S SUBJECTIVE & REVIEW OF SYMPTOMS:  Patient was seen and assessed at bedside. No overnight events. Yesterday patient felt chills and tired, with . COVID-19 was negative and patient feels better today. Patient denies any complaints at this time. Tolerating PT/OT. Tolerating oral diet. Voiding and passing stool spontaneously. Vital signs are stable.      Constiutional:    [  x ] WNL           [   ] poor appetite   [   ] insomnia   [   ] tired   Cardio:                [ x  ] WNL           [   ] CP   [   ] SILVERMAN   [   ] palpitations               Resp:                   [ x  ] WNL           [  x ] SOB at times at home  [   ] cough   [   ] wheezing   GI:                        [ x  ] WNL           [   ] constipation   [   ] diarrhea   [   ] abdominal pain   [   ] nausea   [   ] emesis                                :                      [   ] WNL           [   ] REYNA  [   ] dysuria   [   ] difficulty voiding  [ x ] incontinence           Endo:                   [ x  ] WNL          [   ] polyuria   [   ] temperature intolerance                 Skin:                     [ x  ] WNL          [   ] pain   [   ] wound   [   ] rash   MSK:                    [    ] WNL          [   ] muscle pain   [ x  ] joint pain/ stiffness - prior left shoulder injury with contracture   [   ] muscle tenderness   [   ] swelling   Neuro:                 [ x  ] WNL except as per HPI         [   ] HA   [   ] change in vision   [   ] tremor   [   ] weakness   [   ]dysphagia              Cognitive:           [   ] WNL           [ x  ] some impairment PTA    Psych:                  [  x ] WNL           [   ] hallucinations   [   ]agitation   [   ] delusion   [   ]depression    PHYSICAL EXAM    ICU Vital Signs Last 24 Hrs  T(C): 36.9 (23 Aug 2022 05:04), Max: 37.4 (22 Aug 2022 17:00)  T(F): 98.4 (23 Aug 2022 05:04), Max: 99.4 (22 Aug 2022 17:00)  HR: 75 (23 Aug 2022 05:04) (75 - 109)  BP: 114/60 (23 Aug 2022 05:04) (114/60 - 144/71)  BP(mean): --  ABP: --  ABP(mean): --  RR: 20 (23 Aug 2022 05:04) (20 - 20)  SpO2: --        General:[  x ] NAD, Resting Comfortable,   [   ] other:                                HEENT: [  x ] NC/AT, EOMI, PERRL , Normal Conjunctivae,   [   ] other:  Cardio: [  x ] RRR, no murmer,   [   ] other:                              Pulm: [ x  ] No Respiratory Distress,  Lungs CTAB,   [   ] other:                       Abdomen: [  x ]ND/NT, Soft,   [   ] other:    : [  x ] NO REYNA CATHETER, [   ] REYNA CATHETER- no meatal tear, no discharge, [   ] other:                                            MSK: [ x  ] No joint swelling,   [  x ] other:  healed bilat TKR scars, severe limited PROM left shoulder with pain                                    Ext: [  x ]No C/C/E, No calf tenderness,   [   ]other:    Skin: [ x  ]intact,   [   ] other:                                                                   Neurological Examination:  Cognitive: [    ] AAO x 3,   [  x  ]  other: oriented to self and event, not month or year                                                                     Attention:  [ x   ] intact,   [    ]  other:                            Memory: [  x  ] grossly intact,  3 items in 5 mins with delay  [    ]  other:     Mood/Affect: [ x   ] wnl,    [    ]  other:                                                                             Communication: [    ]Fluent, no dysarthria, following commands:  [  x  ] other: mild to mod dysarthria. Follows commands/ conversation well. Fluent though word substitution errors  CN II - XII:  [ x   ] intact,  [    ] other:                                                                                        Motor:   RIGHT UE: [  x ] WNL,  [   ] other:  LEFT    UE: [ x  ] WNL,  [   ] other:  RIGHT LE: [  x ] WNL,  [   ] other:   LEFT    LE: [ x  ] WNL,  [   ] other:    Tone: [  x  ] wnl,   [    ]  other:  DTRs: [  x ]symmetric, [   ] other:  Coordination:   [  x  ] intact,   [    ] other:                                                                           Sensory: [ x   ] Intact to light touch,   [    ] other:    Current Level of Function:  Bed Mobility: partial assist  Transfers: partial assist  Gait: touch assist 50 feet with RW  Toileting: touch assist  Functional mobility: touch assist      MEDICATIONS  (STANDING):  apixaban 5 milliGRAM(s) Oral every 12 hours  atorvastatin 80 milliGRAM(s) Oral at bedtime  dextrose 5%. 1000 milliLiter(s) (50 mL/Hr) IV Continuous <Continuous>  dextrose 5%. 1000 milliLiter(s) (100 mL/Hr) IV Continuous <Continuous>  dextrose 50% Injectable 25 Gram(s) IV Push once  dextrose 50% Injectable 25 Gram(s) IV Push once  dextrose 50% Injectable 12.5 Gram(s) IV Push once  glucagon  Injectable 1 milliGRAM(s) IntraMuscular once  insulin glargine Injectable (LANTUS) 15 Unit(s) SubCutaneous every morning  insulin lispro Injectable (ADMELOG) 5 Unit(s) SubCutaneous three times a day before meals  melatonin 10 milliGRAM(s) Oral at bedtime  metoprolol succinate  milliGRAM(s) Oral daily  nystatin Powder 1 Application(s) Topical every 12 hours    MEDICATIONS  (PRN):  acetaminophen     Tablet .. 650 milliGRAM(s) Oral every 6 hours PRN Temp greater or equal to 38C (100.4F), Mild Pain (1 - 3)  aluminum hydroxide/magnesium hydroxide/simethicone Suspension 30 milliLiter(s) Oral every 4 hours PRN Dyspepsia  dextrose Oral Gel 15 Gram(s) Oral once PRN Blood Glucose LESS THAN 70 milliGRAM(s)/deciliter  magnesium hydroxide Suspension 30 milliLiter(s) Oral daily PRN Constipation  senna 2 Tablet(s) Oral at bedtime PRN Constipation          RECENT LABS/IMAGING                        10.6   8.77  )-----------( 50       ( 23 Aug 2022 05:15 )             32.6     08-23    142  |  104  |  18  ----------------------------<  148<H>  4.7   |  26  |  1.3    Ca    8.9      23 Aug 2022 05:15  Mg     1.9     08-23    TPro  6.1  /  Alb  3.5  /  TBili  0.7  /  DBili  x   /  AST  16  /  ALT  15  /  AlkPhos  43  08-23        POCT Blood Glucose.: 140 mg/dL (08-23-22 @ 07:26)  POCT Blood Glucose.: 131 mg/dL (08-22-22 @ 21:50)  POCT Blood Glucose.: 146 mg/dL (08-22-22 @ 16:26)  POCT Blood Glucose.: 208 mg/dL (08-22-22 @ 11:37)  POCT Blood Glucose.: 192 mg/dL (08-22-22 @ 08:17)  POCT Blood Glucose.: 204 mg/dL (08-21-22 @ 16:22)  POCT Blood Glucose.: 144 mg/dL (08-21-22 @ 12:11)  POCT Blood Glucose.: 207 mg/dL (08-21-22 @ 07:55)  POCT Blood Glucose.: 197 mg/dL (08-20-22 @ 16:29)  POCT Blood Glucose.: 223 mg/dL (08-20-22 @ 11:54)  POCT Blood Glucose.: 243 mg/dL (08-20-22 @ 09:39)  POCT Blood Glucose.: 206 mg/dL (08-20-22 @ 08:10)

## 2022-08-23 NOTE — PROGRESS NOTE ADULT - ASSESSMENT
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   Patient requires an acute multidisciplinary rehab program including PT, OT and ST and neuropsych. eval to maximize her function for a safe discharge home and to monitor her labile DM 2.    #Acute ischemic strokes   - CTH: negative for acute findings. CTA: no LVO. CTP: apparent perfusion abnormality (penumbra) within the brainstem and left posterior fossa with a total Tmax > 6s volume of 15cc. This may be artifactual  - Permissive HTN  - c/w ASA and statin   - A1C 6.3, Chol 154, LDL 83, Triglycerides 91  -  MRI brain non contrast < from: MR Head No Cont (08.11.22 @ 17:30) >  Motion degraded incomplete examination demonstrating small foci of acute infarct in the left temporal and parietal lobes.  Extensive chronic microsphere ischemic changes.  < end of copied text >  -  TTE=nl EF  - PT/OT/ST/Neuropsych eval  - added back and increased Eliquis from home dose of 2.5 BID to Eliquis 5 BID     #Dysphagia  - Minced and Moist diet with mildly thickened liquids  - SLP f/u    #Dysarthria  - SLP f/u    #Cognitive deficits  - reportedly near baseline per family. 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 CXR  atelectasis, small pleural effusions  -resolved    #Lt flank hematoma  -monitor CBC    #?Tongue swelling on admission  - scoped by ENT: no evidence of angioedema. Significant secretions and pt appears not to be able to clear secretions  -resolved     #s/p Possible UTI  - ceftriaxone 1000mg IV completed 5 days    #Chronic AFib on Eliquis   - c/w Eliquis     #HTN  - stable   -monitor vitals, adjust medications as necessary    #Dyslipidemia   - Chol 154, LDL 83, Triglycerides 91  - Continue Atorvastatin 40mg PO QHS once speech/swallow clears    #DM w/ hypoglycemia on admission  - Hypoglycemic on fingersticks requiring 1/2 D5  - Monitor FS  - On Lantus 40U in the AM and lispro 3U TID at home  - Ordered glucagon 1mg IM PRN for BG <70   -A1c=6.3  -lower dose of Insulin resumed. FS in low to mid 200s. Titrate up insulin    #s/p MARCIN on CKD  - baseline: 1.3 6/2020. 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   Patient requires an acute multidisciplinary rehab program including PT, OT and ST and neuropsych. eval to maximize her function for a safe discharge home and to monitor her labile DM 2.    #Acute ischemic strokes   - CTH: negative for acute findings. CTA: no LVO. CTP: apparent perfusion abnormality (penumbra) within the brainstem and left posterior fossa with a total Tmax > 6s volume of 15cc. This may be artifactual  - Permissive HTN  - c/w ASA and statin   - A1C 6.3, Chol 154, LDL 83, Triglycerides 91  -  MRI brain non contrast < from: MR Head No Cont (08.11.22 @ 17:30) >  Motion degraded incomplete examination demonstrating small foci of acute infarct in the left temporal and parietal lobes.  Extensive chronic microsphere ischemic changes.  < end of copied text >  -  TTE=nl EF  - PT/OT/ST/Neuropsych eval  - added back and increased Eliquis from home dose of 2.5 BID to Eliquis 5 BID     #Thrombocytopenia  - Plt count 50K 8/23. Recheck in am. Not on Heparin    #Dysphagia  - Minced and Moist diet with mildly thickened liquids  - SLP f/u    #Dysarthria  - SLP f/u    #Cognitive deficits  - reportedly near baseline per family. 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 CXR  atelectasis, small pleural effusions  -resolved    #Lt flank hematoma  -monitor CBC    #?Tongue swelling on admission  - scoped by ENT: no evidence of angioedema. Significant secretions and pt appears not to be able to clear secretions  -resolved     #s/p Possible UTI  - ceftriaxone 1000mg IV completed 5 days    #Chronic AFib on Eliquis   - c/w Eliquis     #HTN  - stable   -monitor vitals, adjust medications as necessary    #Dyslipidemia   - Chol 154, LDL 83, Triglycerides 91  - Continue Atorvastatin 40mg PO QHS once speech/swallow clears    #DM w/ hypoglycemia on admission  - Hypoglycemic on fingersticks requiring 1/2 D5  - Monitor FS  - On Lantus 40U in the AM and lispro 3U TID at home  - Ordered glucagon 1mg IM PRN for BG <70   -A1c=6.3  -lower dose of Insulin resumed. FS in low to mid 200s. Titrate up insulin    #s/p MARCIN on CKD  - baseline: 1.3 6/2020. Back at baseline  - monitor     -Skin:  No active issues at this time       Precautions / PROPHYLAXIS:      - Falls    - Ortho: Weight bearing status: wbat      - DVT prophylaxis: Eliquis

## 2022-08-24 LAB
GLUCOSE BLDC GLUCOMTR-MCNC: 117 MG/DL — HIGH (ref 70–99)
GLUCOSE BLDC GLUCOMTR-MCNC: 131 MG/DL — HIGH (ref 70–99)
GLUCOSE BLDC GLUCOMTR-MCNC: 134 MG/DL — HIGH (ref 70–99)
GLUCOSE BLDC GLUCOMTR-MCNC: 176 MG/DL — HIGH (ref 70–99)
HCT VFR BLD CALC: 32.3 % — LOW (ref 37–47)
HEPARIN-PF4 AB RESULT: <0.6 U/ML — SIGNIFICANT CHANGE UP (ref 0–0.9)
HGB BLD-MCNC: 10.5 G/DL — LOW (ref 12–16)
MCHC RBC-ENTMCNC: 29.8 PG — SIGNIFICANT CHANGE UP (ref 27–31)
MCHC RBC-ENTMCNC: 32.5 G/DL — SIGNIFICANT CHANGE UP (ref 32–37)
MCV RBC AUTO: 91.8 FL — SIGNIFICANT CHANGE UP (ref 81–99)
NRBC # BLD: 0 /100 WBCS — SIGNIFICANT CHANGE UP (ref 0–0)
PF4 HEPARIN CMPLX AB SER-ACNC: NEGATIVE — SIGNIFICANT CHANGE UP
PLATELET # BLD AUTO: 107 K/UL — LOW (ref 130–400)
RBC # BLD: 3.52 M/UL — LOW (ref 4.2–5.4)
RBC # FLD: 14.3 % — SIGNIFICANT CHANGE UP (ref 11.5–14.5)
WBC # BLD: 7.01 K/UL — SIGNIFICANT CHANGE UP (ref 4.8–10.8)
WBC # FLD AUTO: 7.01 K/UL — SIGNIFICANT CHANGE UP (ref 4.8–10.8)

## 2022-08-24 RX ADMIN — Medication 5 UNIT(S): at 16:49

## 2022-08-24 RX ADMIN — Medication 5 UNIT(S): at 11:53

## 2022-08-24 RX ADMIN — NYSTATIN CREAM 1 APPLICATION(S): 100000 CREAM TOPICAL at 05:24

## 2022-08-24 RX ADMIN — ATORVASTATIN CALCIUM 80 MILLIGRAM(S): 80 TABLET, FILM COATED ORAL at 22:20

## 2022-08-24 RX ADMIN — INSULIN GLARGINE 15 UNIT(S): 100 INJECTION, SOLUTION SUBCUTANEOUS at 08:15

## 2022-08-24 RX ADMIN — Medication 5 UNIT(S): at 08:00

## 2022-08-24 RX ADMIN — APIXABAN 5 MILLIGRAM(S): 2.5 TABLET, FILM COATED ORAL at 05:25

## 2022-08-24 RX ADMIN — Medication 10 MILLIGRAM(S): at 22:20

## 2022-08-24 RX ADMIN — NYSTATIN CREAM 1 APPLICATION(S): 100000 CREAM TOPICAL at 22:19

## 2022-08-24 RX ADMIN — Medication 100 MILLIGRAM(S): at 05:25

## 2022-08-24 RX ADMIN — APIXABAN 5 MILLIGRAM(S): 2.5 TABLET, FILM COATED ORAL at 17:49

## 2022-08-24 NOTE — PROGRESS NOTE ADULT - ASSESSMENT
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   Patient requires an acute multidisciplinary rehab program including PT, OT and ST and neuropsych. eval to maximize her function for a safe discharge home and to monitor her labile DM 2.    #Acute ischemic strokes   - CTH: negative for acute findings. CTA: no LVO. CTP: apparent perfusion abnormality (penumbra) within the brainstem and left posterior fossa with a total Tmax > 6s volume of 15cc. This may be artifactual  - Permissive HTN  - c/w ASA and statin   - A1C 6.3, Chol 154, LDL 83, Triglycerides 91  -  MRI brain non contrast < from: MR Head No Cont (08.11.22 @ 17:30) >  Motion degraded incomplete examination demonstrating small foci of acute infarct in the left temporal and parietal lobes.  Extensive chronic microsphere ischemic changes.  < end of copied text >  -  TTE=nl EF  - PT/OT/ST/Neuropsych eval  - added back and increased Eliquis from home dose of 2.5 BID to Eliquis 5 BID     #Thrombocytopenia  - Plt count 50K 8/23. Recheck in am. Not on Heparin    #Dysphagia  - Minced and Moist diet with mildly thickened liquids  - SLP f/u    #Dysarthria  - SLP f/u    #Cognitive deficits  - reportedly near baseline per family. 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 CXR  atelectasis, small pleural effusions  -resolved    #Lt flank hematoma  -monitor CBC    #?Tongue swelling on admission  - scoped by ENT: no evidence of angioedema. Significant secretions and pt appears not to be able to clear secretions  -resolved     #s/p Possible UTI  - ceftriaxone 1000mg IV completed 5 days    #Chronic AFib on Eliquis   - c/w Eliquis     #HTN  - stable   -monitor vitals, adjust medications as necessary    #Dyslipidemia   - Chol 154, LDL 83, Triglycerides 91  - Continue Atorvastatin 40mg PO QHS once speech/swallow clears    #DM w/ hypoglycemia on admission  - Hypoglycemic on fingersticks requiring 1/2 D5  - Monitor FS  - On Lantus 40U in the AM and lispro 3U TID at home  - Ordered glucagon 1mg IM PRN for BG <70   -A1c=6.3  -lower dose of Insulin resumed. FS in low to mid 200s. Titrate up insulin    #s/p MARCIN on CKD  - baseline: 1.3 6/2020. 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   Patient requires an acute multidisciplinary rehab program including PT, OT and ST and neuropsych. eval to maximize her function for a safe discharge home and to monitor her labile DM 2.    #Acute ischemic strokes   - CTH: negative for acute findings. CTA: no LVO. CTP: apparent perfusion abnormality (penumbra) within the brainstem and left posterior fossa with a total Tmax > 6s volume of 15cc. This may be artifactual  - Permissive HTN  - c/w ASA and statin   - A1C 6.3, Chol 154, LDL 83, Triglycerides 91  -  MRI brain non contrast < from: MR Head No Cont (08.11.22 @ 17:30) >  Motion degraded incomplete examination demonstrating small foci of acute infarct in the left temporal and parietal lobes.  Extensive chronic microsphere ischemic changes.  < end of copied text >  -  TTE=nl EF  - PT/OT/ST/Neuropsych eval  - added back and increased Eliquis from home dose of 2.5 BID to Eliquis 5 BID     #Thrombocytopenia  - Plt count 50K 8/23. Improved to 107K 8/23  - Etiol unclear. Not on Heparin products  - Heme consult requested    #Dysphagia  - Minced and Moist diet with mildly thickened liquids  - SLP f/u    #Dysarthria  - SLP f/u    #Cognitive deficits  - reportedly near baseline per family. 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 CXR  atelectasis, small pleural effusions  -resolved    #Lt flank hematoma  -monitor CBC    #?Tongue swelling on admission  - scoped by ENT: no evidence of angioedema. Significant secretions and pt appears not to be able to clear secretions  -resolved     #s/p Possible UTI  - ceftriaxone 1000mg IV completed 5 days    #Chronic AFib on Eliquis   - c/w Eliquis     #HTN  - stable   -monitor vitals, adjust medications as necessary    #Dyslipidemia   - Chol 154, LDL 83, Triglycerides 91  - Continue Atorvastatin 40mg PO QHS once speech/swallow clears    #DM w/ hypoglycemia on admission  - Hypoglycemic on fingersticks requiring 1/2 D5  - Monitor FS  - On Lantus 40U in the AM and lispro 3U TID at home  - Ordered glucagon 1mg IM PRN for BG <70   -A1c=6.3  -lower dose of Insulin resumed. FS in low to mid 200s. Titrate up insulin    #s/p MARCIN on CKD  - baseline: 1.3 6/2020. Back at baseline  - monitor     -Skin:  No active issues at this time       Precautions / PROPHYLAXIS:      - Falls    - Ortho: Weight bearing status: wbat      - DVT prophylaxis: Eliquis

## 2022-08-24 NOTE — PROGRESS NOTE ADULT - ASSESSMENT
Pain: Denied Location: N/A Ratin/10 Intervention: N/A     Pain rating after Intervention: N/A     Orientation: Ms. Hopkins was oriented to person and place. She was not oriented to event, day, date, or year, even with multiple choice cues.      Arousal Level: Lethargic     Behavior: Cooperative     Affect Range: WFL      Needed: No     Attention: [facility]     Insight into illness/deficits: Poor     Treatment Session Focused on Coping/Cognition     Patient was seen for neuropsychological testing was completed and the following measures were given: subtests from [facility] Cognitive Assessment (MOCA)     Orientation/Memory: Ms. Hopkins was orientated to person but not place, date, or event. She did not show insight with regard to her deficits, stating only that she “did not feel well” and that is why she is in the hospital. Ms. Hopkins showed an amnestic memory impairment. Although she was able to learn 3/5 words presented to her, she was unable to recall any of them after a delay, and was not helped by cues.     Visuospatial: Both clock drawing and figure drawing were negatively impacted by weakness in her hands. However, Ms. Hopkins was able to replicate the general features of a figure, suggesting intact visuospatial skills.      Language: Spontaneous speech was fluent and prosodic, although confrontation naming was impaired. Comprehension appeared generally intact, though she occasionally required repetition due to hearing problems.  Repetition was intact for a shorter sentence, but impaired for a longer sentence. This likely reflects impaired working memory (see Executive Functioning) rather than a primary language deficit.     Executive Functioning: On the clock drawing task, Ms. Hopkins showed perseverations and neglected to include any of the numbers. The Trail-making test was administered orally due to her motor impairments; however, she made persistent set-loss errors even after redirection and clarification of instructions. Although her auditory attention was intact (Digit Span Forward), she was unable to state a series of digits in reverse order (Digit Span Backward). She was also unable to mentally perform simple calculations (Serial 7s). Together, these results suggest impaired working memory. Overall, these results suggest broadly impaired executive functioning, consistent with her current level of functioning (dependent for all IADLS).     Summary: Per her son, Ms. Hopkins had been experiencing memory and orientation deficits for 2 months prior to her admission, and while these deficits initially worsened, she is now at her premorbid level of functioning. Her cognitive impairments across multiple domains, combined with multiple vascular risk factors and extensive microvascular ischemic changes found on imaging (see MRI .; CT 8.), are consistent with vascular etiology possible dementia. It is possible that recent medical events, especially acute respiratory failure earlier this month, have contributed to worsening of her cognitive functioning. We will continue to monitor her for cognitive changes, and will do so in the morning if possible to avoid the influence of fatigue.      Goals: Facilitate Positive Coping Family Support / Education and further Cognitive Assessment     Plan: 1-2 times a week 30-60 minutes

## 2022-08-24 NOTE — PROGRESS NOTE ADULT - SUBJECTIVE AND OBJECTIVE BOX
Patient is a 88y old  Female who presents with a chief complaint of Stroke (18 Aug 2022 13:20)      HPI:  This is a 88 F hx of CAD, Diabetes, Hyperlipemia, Hypertension, Afib on Eliquis presents to ED BIBA from home for slurred speech and right sided facial droop. Patient went to bed that night around ~10PM at baseline. Patient woke up the next morning around ~11Am which is late for her. Daughter noted she was not herself,  checked her sugar and it was ~45. Noted the slurred speech. Also noted patient had trouble swallowing. Patient took her Eliquis dose this morning.  No history of strokes. CT Angio Brain Stroke Protocol  w/ IV Cont, No evidence of major vascular stenosis or occlusion. Scattered mild calcific plaque.  CT perfusion: Apparent perfusion abnormality (penumbra) within the brainstem and left posterior fossa with a total Tmax > 6s volume of 15 CT Brain Stroke Protocol shows no evidence of acute intracranial hemorrhage or large territory infarct. Chronic-appearing left cerebellar infarct (new since 2010). Moderate/severe chronic microvascular changes and parenchymal atrophy (moderately progressed since 2010). < from: MR Head No Cont (08.11.22 @ 17:30) > Motion degraded incomplete examination demonstrating small foci of acute infarct in the left temporal and parietal lobes. Extensive chronic microsphere ischemic changes. Her course was complicated by uncontrolled DM2 an MARCIN on CKD, now medically stable. She was placed on therapeutic Lovenox and then back onto Eliquis. She has been medically stable.     She was seen for a swallow assessment and cleared for Minced and Moist diet with mildly thickened liquids. She was seen by PT and OT and requires max assistance to stand and for bed mobility and min assistance to ambulate 15 ft. with a RW and mod assistance for UE dressing and max assistance for LE dressing. PTA, she was fully independent in all activities, using a straight cane or walker at times. She was evaluated by me on the Neuro Service and was found to be a good candidate for acute inpatient rehab. Her son-in-law at bedside states that she has had some cognitive deficits for a while and that it is near baseline.    TODAY'S SUBJECTIVE & REVIEW OF SYMPTOMS:  Patient was seen and assessed at bedside. No overnight events. Tolerating PT/OT. Tolerating oral diet. Voiding and passing stool spontaneously. Vital signs are stable.      Constiutional:    [  x ] WNL           [   ] poor appetite   [   ] insomnia   [   ] tired   Cardio:                [ x  ] WNL           [   ] CP   [   ] SILVERMAN   [   ] palpitations               Resp:                   [ x  ] WNL           [  x ] SOB at times at home  [   ] cough   [   ] wheezing   GI:                        [ x  ] WNL           [   ] constipation   [   ] diarrhea   [   ] abdominal pain   [   ] nausea   [   ] emesis                                :                      [   ] WNL           [   ] REYNA  [   ] dysuria   [   ] difficulty voiding  [ x ] incontinence           Endo:                   [ x  ] WNL          [   ] polyuria   [   ] temperature intolerance                 Skin:                     [ x  ] WNL          [   ] pain   [   ] wound   [   ] rash   MSK:                    [    ] WNL          [   ] muscle pain   [ x  ] joint pain/ stiffness - prior left shoulder injury with contracture   [   ] muscle tenderness   [   ] swelling   Neuro:                 [ x  ] WNL except as per HPI         [   ] HA   [   ] change in vision   [   ] tremor   [   ] weakness   [   ]dysphagia              Cognitive:           [   ] WNL           [ x  ] some impairment PTA    Psych:                  [  x ] WNL           [   ] hallucinations   [   ]agitation   [   ] delusion   [   ]depression    PHYSICAL EXAM    ICU Vital Signs Last 24 Hrs  T(C): 36 (24 Aug 2022 05:13), Max: 36.3 (23 Aug 2022 19:56)  T(F): 96.8 (24 Aug 2022 05:13), Max: 97.3 (23 Aug 2022 19:56)  HR: 75 (24 Aug 2022 05:13) (75 - 75)  BP: 145/63 (24 Aug 2022 05:13) (109/55 - 145/63)  BP(mean): --  ABP: --  ABP(mean): --  RR: 20 (24 Aug 2022 05:13) (19 - 20)  SpO2: --        General:[  x ] NAD, Resting Comfortable,   [   ] other:                                HEENT: [  x ] NC/AT, EOMI, PERRL , Normal Conjunctivae,   [   ] other:  Cardio: [  x ] RRR, no murmer,   [   ] other:                              Pulm: [ x  ] No Respiratory Distress,  Lungs CTAB,   [   ] other:                       Abdomen: [  x ]ND/NT, Soft,   [   ] other:    : [  x ] NO REYNA CATHETER, [   ] REYNA CATHETER- no meatal tear, no discharge, [   ] other:                                            MSK: [ x  ] No joint swelling,   [  x ] other:  healed bilat TKR scars, severe limited PROM left shoulder with pain                                    Ext: [  x ]No C/C/E, No calf tenderness,   [   ]other:    Skin: [ x  ]intact,   [   ] other:                                                                   Neurological Examination:  Cognitive: [    ] AAO x 3,   [  x  ]  other: oriented to self and event, not month or year                                                                     Attention:  [ x   ] intact,   [    ]  other:                            Memory: [  x  ] grossly intact,  3 items in 5 mins with delay  [    ]  other:     Mood/Affect: [ x   ] wnl,    [    ]  other:                                                                             Communication: [    ]Fluent, no dysarthria, following commands:  [  x  ] other: mild to mod dysarthria. Follows commands/ conversation well. Fluent though word substitution errors  CN II - XII:  [ x   ] intact,  [    ] other:                                                                                        Motor:   RIGHT UE: [  x ] WNL,  [   ] other:  LEFT    UE: [ x  ] WNL,  [   ] other:  RIGHT LE: [  x ] WNL,  [   ] other:   LEFT    LE: [ x  ] WNL,  [   ] other:    Tone: [  x  ] wnl,   [    ]  other:  DTRs: [  x ]symmetric, [   ] other:  Coordination:   [  x  ] intact,   [    ] other:                                                                           Sensory: [ x   ] Intact to light touch,   [    ] other:    Current Level of Function:  Bed Mobility: partial assist  Transfers: partial assist  Gait: touch assist 50 feet with RW  Toileting: touch assist  Functional mobility: touch assist      MEDICATIONS  (STANDING):  apixaban 5 milliGRAM(s) Oral every 12 hours  atorvastatin 80 milliGRAM(s) Oral at bedtime  dextrose 5%. 1000 milliLiter(s) (50 mL/Hr) IV Continuous <Continuous>  dextrose 5%. 1000 milliLiter(s) (100 mL/Hr) IV Continuous <Continuous>  dextrose 50% Injectable 25 Gram(s) IV Push once  dextrose 50% Injectable 12.5 Gram(s) IV Push once  dextrose 50% Injectable 25 Gram(s) IV Push once  glucagon  Injectable 1 milliGRAM(s) IntraMuscular once  insulin glargine Injectable (LANTUS) 15 Unit(s) SubCutaneous every morning  insulin lispro Injectable (ADMELOG) 5 Unit(s) SubCutaneous three times a day before meals  melatonin 10 milliGRAM(s) Oral at bedtime  metoprolol succinate  milliGRAM(s) Oral daily  nystatin Powder 1 Application(s) Topical every 12 hours    MEDICATIONS  (PRN):  acetaminophen     Tablet .. 650 milliGRAM(s) Oral every 6 hours PRN Temp greater or equal to 38C (100.4F), Mild Pain (1 - 3)  aluminum hydroxide/magnesium hydroxide/simethicone Suspension 30 milliLiter(s) Oral every 4 hours PRN Dyspepsia  dextrose Oral Gel 15 Gram(s) Oral once PRN Blood Glucose LESS THAN 70 milliGRAM(s)/deciliter  magnesium hydroxide Suspension 30 milliLiter(s) Oral daily PRN Constipation  senna 2 Tablet(s) Oral at bedtime PRN Constipation          RECENT LABS/IMAGING                        10.6   8.77  )-----------( 50       ( 23 Aug 2022 05:15 )             32.6     08-23    142  |  104  |  18  ----------------------------<  148<H>  4.7   |  26  |  1.3    Ca    8.9      23 Aug 2022 05:15  Mg     1.9     08-23    TPro  6.1  /  Alb  3.5  /  TBili  0.7  /  DBili  x   /  AST  16  /  ALT  15  /  AlkPhos  43  08-23        POCT Blood Glucose.: 157 mg/dL (08-23-22 @ 21:32)  POCT Blood Glucose.: 187 mg/dL (08-23-22 @ 16:35)  POCT Blood Glucose.: 155 mg/dL (08-23-22 @ 11:13)  POCT Blood Glucose.: 140 mg/dL (08-23-22 @ 07:26)  POCT Blood Glucose.: 131 mg/dL (08-22-22 @ 21:50)  POCT Blood Glucose.: 146 mg/dL (08-22-22 @ 16:26)  POCT Blood Glucose.: 208 mg/dL (08-22-22 @ 11:37)  POCT Blood Glucose.: 192 mg/dL (08-22-22 @ 08:17)  POCT Blood Glucose.: 204 mg/dL (08-21-22 @ 16:22)  POCT Blood Glucose.: 144 mg/dL (08-21-22 @ 12:11)  POCT Blood Glucose.: 207 mg/dL (08-21-22 @ 07:55)  POCT Blood Glucose.: 197 mg/dL (08-20-22 @ 16:29)   Patient is a 88y old  Female who presents with a chief complaint of Stroke (18 Aug 2022 13:20)      HPI:  This is a 88 F hx of CAD, Diabetes, Hyperlipemia, Hypertension, Afib on Eliquis presents to ED BIBA from home for slurred speech and right sided facial droop. Patient went to bed that night around ~10PM at baseline. Patient woke up the next morning around ~11Am which is late for her. Daughter noted she was not herself,  checked her sugar and it was ~45. Noted the slurred speech. Also noted patient had trouble swallowing. Patient took her Eliquis dose this morning.  No history of strokes. CT Angio Brain Stroke Protocol  w/ IV Cont, No evidence of major vascular stenosis or occlusion. Scattered mild calcific plaque.  CT perfusion: Apparent perfusion abnormality (penumbra) within the brainstem and left posterior fossa with a total Tmax > 6s volume of 15 CT Brain Stroke Protocol shows no evidence of acute intracranial hemorrhage or large territory infarct. Chronic-appearing left cerebellar infarct (new since 2010). Moderate/severe chronic microvascular changes and parenchymal atrophy (moderately progressed since 2010). < from: MR Head No Cont (08.11.22 @ 17:30) > Motion degraded incomplete examination demonstrating small foci of acute infarct in the left temporal and parietal lobes. Extensive chronic microsphere ischemic changes. Her course was complicated by uncontrolled DM2 an MARCIN on CKD, now medically stable. She was placed on therapeutic Lovenox and then back onto Eliquis. She has been medically stable.     She was seen for a swallow assessment and cleared for Minced and Moist diet with mildly thickened liquids. She was seen by PT and OT and requires max assistance to stand and for bed mobility and min assistance to ambulate 15 ft. with a RW and mod assistance for UE dressing and max assistance for LE dressing. PTA, she was fully independent in all activities, using a straight cane or walker at times. She was evaluated by me on the Neuro Service and was found to be a good candidate for acute inpatient rehab. Her son-in-law at bedside states that she has had some cognitive deficits for a while and that it is near baseline.    TODAY'S SUBJECTIVE & REVIEW OF SYMPTOMS:  Patient was seen and assessed at bedside. No overnight events. Tolerating PT/OT. Tolerating oral diet. Voiding and passing stool spontaneously. Vital signs are stable.      Constiutional:    [  x ] WNL           [   ] poor appetite   [   ] insomnia   [   ] tired   Cardio:                [ x  ] WNL           [   ] CP   [   ] SILVERMAN   [   ] palpitations               Resp:                   [ x  ] WNL           [  x ] SOB at times at home  [   ] cough   [   ] wheezing   GI:                        [ x  ] WNL           [   ] constipation   [   ] diarrhea   [   ] abdominal pain   [   ] nausea   [   ] emesis                                :                      [   ] WNL           [   ] REYNA  [   ] dysuria   [   ] difficulty voiding  [ x ] incontinence           Endo:                   [ x  ] WNL          [   ] polyuria   [   ] temperature intolerance                 Skin:                     [ x  ] WNL          [   ] pain   [   ] wound   [   ] rash   MSK:                    [    ] WNL          [   ] muscle pain   [ x  ] joint pain/ stiffness - prior left shoulder injury with contracture   [   ] muscle tenderness   [   ] swelling   Neuro:                 [ x  ] WNL except as per HPI         [   ] HA   [   ] change in vision   [   ] tremor   [   ] weakness   [   ]dysphagia              Cognitive:           [   ] WNL           [ x  ] some impairment PTA    Psych:                  [  x ] WNL           [   ] hallucinations   [   ]agitation   [   ] delusion   [   ]depression    PHYSICAL EXAM    ICU Vital Signs Last 24 Hrs  T(C): 36 (24 Aug 2022 05:13), Max: 36.3 (23 Aug 2022 19:56)  T(F): 96.8 (24 Aug 2022 05:13), Max: 97.3 (23 Aug 2022 19:56)  HR: 75 (24 Aug 2022 05:13) (75 - 75)  BP: 145/63 (24 Aug 2022 05:13) (109/55 - 145/63)  BP(mean): --  ABP: --  ABP(mean): --  RR: 20 (24 Aug 2022 05:13) (19 - 20)  SpO2: --        General:[  x ] NAD, Resting Comfortable,   [   ] other:                                HEENT: [  x ] NC/AT, EOMI, PERRL , Normal Conjunctivae,   [   ] other:  Cardio: [  x ] RRR, no murmer,   [   ] other:                              Pulm: [ x  ] No Respiratory Distress,  Lungs CTAB,   [   ] other:                       Abdomen: [  x ]ND/NT, Soft,   [   ] other:    : [  x ] NO REYNA CATHETER, [   ] REYNA CATHETER- no meatal tear, no discharge, [   ] other:                                            MSK: [ x  ] No joint swelling,   [  x ] other:  healed bilat TKR scars, severe limited PROM left shoulder with pain                                    Ext: [  x ]No C/C/E, No calf tenderness,   [   ]other:    Skin: [ x  ]intact,   [   ] other:                                                                   Neurological Examination:  Cognitive: [    ] AAO x 3,   [  x  ]  other: oriented to self and event, not month or year                                                                     Attention:  [ x   ] intact,   [    ]  other:                            Memory: [  x  ] grossly intact,  3 items in 5 mins with delay  [    ]  other:     Mood/Affect: [ x   ] wnl,    [    ]  other:                                                                             Communication: [    ]Fluent, no dysarthria, following commands:  [  x  ] other: mild to mod dysarthria. Follows commands/ conversation well. Fluent though word substitution errors  CN II - XII:  [ x   ] intact,  [    ] other:                                                                                        Motor:   RIGHT UE: [  x ] WNL,  [   ] other:  LEFT    UE: [ x  ] WNL,  [   ] other:  RIGHT LE: [  x ] WNL,  [   ] other:   LEFT    LE: [ x  ] WNL,  [   ] other:    Tone: [  x  ] wnl,   [    ]  other:  DTRs: [  x ]symmetric, [   ] other:  Coordination:   [  x  ] intact,   [    ] other:                                                                           Sensory: [ x   ] Intact to light touch,   [    ] other:    Current Level of Function:  Bed Mobility: steady assist  Transfers: steady assist  Gait: steady assist 65 feet with RW  Upper body dressing: mod assist  Lower body dressing: max assist      MEDICATIONS  (STANDING):  apixaban 5 milliGRAM(s) Oral every 12 hours  atorvastatin 80 milliGRAM(s) Oral at bedtime  dextrose 5%. 1000 milliLiter(s) (50 mL/Hr) IV Continuous <Continuous>  dextrose 5%. 1000 milliLiter(s) (100 mL/Hr) IV Continuous <Continuous>  dextrose 50% Injectable 25 Gram(s) IV Push once  dextrose 50% Injectable 12.5 Gram(s) IV Push once  dextrose 50% Injectable 25 Gram(s) IV Push once  glucagon  Injectable 1 milliGRAM(s) IntraMuscular once  insulin glargine Injectable (LANTUS) 15 Unit(s) SubCutaneous every morning  insulin lispro Injectable (ADMELOG) 5 Unit(s) SubCutaneous three times a day before meals  melatonin 10 milliGRAM(s) Oral at bedtime  metoprolol succinate  milliGRAM(s) Oral daily  nystatin Powder 1 Application(s) Topical every 12 hours    MEDICATIONS  (PRN):  acetaminophen     Tablet .. 650 milliGRAM(s) Oral every 6 hours PRN Temp greater or equal to 38C (100.4F), Mild Pain (1 - 3)  aluminum hydroxide/magnesium hydroxide/simethicone Suspension 30 milliLiter(s) Oral every 4 hours PRN Dyspepsia  dextrose Oral Gel 15 Gram(s) Oral once PRN Blood Glucose LESS THAN 70 milliGRAM(s)/deciliter  magnesium hydroxide Suspension 30 milliLiter(s) Oral daily PRN Constipation  senna 2 Tablet(s) Oral at bedtime PRN Constipation          RECENT LABS/IMAGING                        10.6   8.77  )-----------( 50       ( 23 Aug 2022 05:15 )             32.6     08-23    142  |  104  |  18  ----------------------------<  148<H>  4.7   |  26  |  1.3    Ca    8.9      23 Aug 2022 05:15  Mg     1.9     08-23    TPro  6.1  /  Alb  3.5  /  TBili  0.7  /  DBili  x   /  AST  16  /  ALT  15  /  AlkPhos  43  08-23        POCT Blood Glucose.: 157 mg/dL (08-23-22 @ 21:32)  POCT Blood Glucose.: 187 mg/dL (08-23-22 @ 16:35)  POCT Blood Glucose.: 155 mg/dL (08-23-22 @ 11:13)  POCT Blood Glucose.: 140 mg/dL (08-23-22 @ 07:26)  POCT Blood Glucose.: 131 mg/dL (08-22-22 @ 21:50)  POCT Blood Glucose.: 146 mg/dL (08-22-22 @ 16:26)  POCT Blood Glucose.: 208 mg/dL (08-22-22 @ 11:37)  POCT Blood Glucose.: 192 mg/dL (08-22-22 @ 08:17)  POCT Blood Glucose.: 204 mg/dL (08-21-22 @ 16:22)  POCT Blood Glucose.: 144 mg/dL (08-21-22 @ 12:11)  POCT Blood Glucose.: 207 mg/dL (08-21-22 @ 07:55)  POCT Blood Glucose.: 197 mg/dL (08-20-22 @ 16:29)   Patient is a 88y old  Female who presents with a chief complaint of Stroke (18 Aug 2022 13:20)      HPI:  This is a 88 F hx of CAD, Diabetes, Hyperlipemia, Hypertension, Afib on Eliquis presents to ED BIBA from home for slurred speech and right sided facial droop. Patient went to bed that night around ~10PM at baseline. Patient woke up the next morning around ~11Am which is late for her. Daughter noted she was not herself,  checked her sugar and it was ~45. Noted the slurred speech. Also noted patient had trouble swallowing. Patient took her Eliquis dose this morning.  No history of strokes. CT Angio Brain Stroke Protocol  w/ IV Cont, No evidence of major vascular stenosis or occlusion. Scattered mild calcific plaque.  CT perfusion: Apparent perfusion abnormality (penumbra) within the brainstem and left posterior fossa with a total Tmax > 6s volume of 15 CT Brain Stroke Protocol shows no evidence of acute intracranial hemorrhage or large territory infarct. Chronic-appearing left cerebellar infarct (new since 2010). Moderate/severe chronic microvascular changes and parenchymal atrophy (moderately progressed since 2010). < from: MR Head No Cont (08.11.22 @ 17:30) > Motion degraded incomplete examination demonstrating small foci of acute infarct in the left temporal and parietal lobes. Extensive chronic microsphere ischemic changes. Her course was complicated by uncontrolled DM2 an MARCIN on CKD, now medically stable. She was placed on therapeutic Lovenox and then back onto Eliquis. She has been medically stable.     She was seen for a swallow assessment and cleared for Minced and Moist diet with mildly thickened liquids. She was seen by PT and OT and requires max assistance to stand and for bed mobility and min assistance to ambulate 15 ft. with a RW and mod assistance for UE dressing and max assistance for LE dressing. PTA, she was fully independent in all activities, using a straight cane or walker at times. She was evaluated by me on the Neuro Service and was found to be a good candidate for acute inpatient rehab. Her son-in-law at bedside states that she has had some cognitive deficits for a while and that it is near baseline.    TODAY'S SUBJECTIVE & REVIEW OF SYMPTOMS:  Patient was seen and assessed at bedside. No overnight events. Tolerating PT/OT. Tolerating oral diet. Voiding and passing stool spontaneously. Vital signs are stable.      Constiutional:    [  x ] WNL           [   ] poor appetite   [   ] insomnia   [   ] tired   Cardio:                [ x  ] WNL           [   ] CP   [   ] SILVERMAN   [   ] palpitations               Resp:                   [ x  ] WNL           [  x ] SOB at times at home  [   ] cough   [   ] wheezing   GI:                        [ x  ] WNL           [   ] constipation   [   ] diarrhea   [   ] abdominal pain   [   ] nausea   [   ] emesis                                :                      [   ] WNL           [   ] REYNA  [   ] dysuria   [   ] difficulty voiding  [ x ] incontinence           Endo:                   [ x  ] WNL          [   ] polyuria   [   ] temperature intolerance                 Skin:                     [ x  ] WNL          [   ] pain   [   ] wound   [   ] rash   MSK:                    [    ] WNL          [   ] muscle pain   [ x  ] joint pain/ stiffness - prior left shoulder injury with contracture   [   ] muscle tenderness   [   ] swelling   Neuro:                 [ x  ] WNL except as per HPI         [   ] HA   [   ] change in vision   [   ] tremor   [   ] weakness   [   ]dysphagia              Cognitive:           [   ] WNL           [ x  ] some impairment PTA    Psych:                  [  x ] WNL           [   ] hallucinations   [   ]agitation   [   ] delusion   [   ]depression    PHYSICAL EXAM    ICU Vital Signs Last 24 Hrs  T(C): 36 (24 Aug 2022 05:13), Max: 36.3 (23 Aug 2022 19:56)  T(F): 96.8 (24 Aug 2022 05:13), Max: 97.3 (23 Aug 2022 19:56)  HR: 75 (24 Aug 2022 05:13) (75 - 75)  BP: 145/63 (24 Aug 2022 05:13) (109/55 - 145/63)  BP(mean): --  ABP: --  ABP(mean): --  RR: 20 (24 Aug 2022 05:13) (19 - 20)  SpO2: --        General:[  x ] NAD, Resting Comfortable,   [   ] other:                                HEENT: [  x ] NC/AT, EOMI, PERRL , Normal Conjunctivae,   [   ] other:  Cardio: [  x ] RRR, no murmer,   [   ] other:                              Pulm: [ x  ] No Respiratory Distress,  Lungs CTAB,   [   ] other:                       Abdomen: [  x ]ND/NT, Soft,   [   ] other:    : [  x ] NO REYNA CATHETER, [   ] REYNA CATHETER- no meatal tear, no discharge, [   ] other:                                            MSK: [ x  ] No joint swelling,   [  x ] other:  healed bilat TKR scars, severe limited PROM left shoulder with pain                                    Ext: [  x ]No C/C/E, No calf tenderness,   [   ]other:    Skin: [ x  ]intact,   [   ] other:                                                                   Neurological Examination:  Cognitive: [    ] AAO x 3,   [  x  ]  other: oriented to self and event, not month or year                                                                     Attention:  [ x   ] intact,   [    ]  other:                            Memory: [    ] grossly intact    [  x  ]  other:   impaired  Mood/Affect: [ x   ] wnl,    [    ]  other:                                                                             Communication: [    ]Fluent, no dysarthria, following commands:  [  x  ] other: mild dysarthria. Follows commands/ conversation well. Fluent though word substitution errors  CN II - XII:  [ x   ] intact,  [    ] other:                                                                                        Motor:   RIGHT UE: [  x ] WNL,  [   ] other:  LEFT    UE: [ x  ] WNL,  [   ] other:  RIGHT LE: [  x ] WNL,  [   ] other:   LEFT    LE: [ x  ] WNL,  [   ] other:    Tone: [  x  ] wnl,   [    ]  other:  DTRs: [  x ]symmetric, [   ] other:  Coordination:   [  x  ] intact,   [    ] other:                                                                           Sensory: [ x   ] Intact to light touch,   [    ] other:    Current Level of Function:  Bed Mobility: steady assist  Transfers: steady assist  Gait: steady assist 65 feet with RW  Upper body dressing: mod assist  Lower body dressing: max assist      MEDICATIONS  (STANDING):  apixaban 5 milliGRAM(s) Oral every 12 hours  atorvastatin 80 milliGRAM(s) Oral at bedtime  dextrose 5%. 1000 milliLiter(s) (50 mL/Hr) IV Continuous <Continuous>  dextrose 5%. 1000 milliLiter(s) (100 mL/Hr) IV Continuous <Continuous>  dextrose 50% Injectable 25 Gram(s) IV Push once  dextrose 50% Injectable 12.5 Gram(s) IV Push once  dextrose 50% Injectable 25 Gram(s) IV Push once  glucagon  Injectable 1 milliGRAM(s) IntraMuscular once  insulin glargine Injectable (LANTUS) 15 Unit(s) SubCutaneous every morning  insulin lispro Injectable (ADMELOG) 5 Unit(s) SubCutaneous three times a day before meals  melatonin 10 milliGRAM(s) Oral at bedtime  metoprolol succinate  milliGRAM(s) Oral daily  nystatin Powder 1 Application(s) Topical every 12 hours    MEDICATIONS  (PRN):  acetaminophen     Tablet .. 650 milliGRAM(s) Oral every 6 hours PRN Temp greater or equal to 38C (100.4F), Mild Pain (1 - 3)  aluminum hydroxide/magnesium hydroxide/simethicone Suspension 30 milliLiter(s) Oral every 4 hours PRN Dyspepsia  dextrose Oral Gel 15 Gram(s) Oral once PRN Blood Glucose LESS THAN 70 milliGRAM(s)/deciliter  magnesium hydroxide Suspension 30 milliLiter(s) Oral daily PRN Constipation  senna 2 Tablet(s) Oral at bedtime PRN Constipation          RECENT LABS/IMAGING  08-23                          10.5   7.01  )-----------( 107      ( 24 Aug 2022 06:29 )             32.3                10.6   8.77  )-----------( 50       ( 23 Aug 2022 05:15 )             32.6     08-23    142  |  104  |  18  ----------------------------<  148<H>  4.7   |  26  |  1.3    Ca    8.9      23 Aug 2022 05:15  Mg     1.9     08-23    TPro  6.1  /  Alb  3.5  /  TBili  0.7  /  DBili  x   /  AST  16  /  ALT  15  /  AlkPhos  43  08-23        POCT Blood Glucose.: 157 mg/dL (08-23-22 @ 21:32)  POCT Blood Glucose.: 187 mg/dL (08-23-22 @ 16:35)  POCT Blood Glucose.: 155 mg/dL (08-23-22 @ 11:13)  POCT Blood Glucose.: 140 mg/dL (08-23-22 @ 07:26)  POCT Blood Glucose.: 131 mg/dL (08-22-22 @ 21:50)  POCT Blood Glucose.: 146 mg/dL (08-22-22 @ 16:26)  POCT Blood Glucose.: 208 mg/dL (08-22-22 @ 11:37)  POCT Blood Glucose.: 192 mg/dL (08-22-22 @ 08:17)  POCT Blood Glucose.: 204 mg/dL (08-21-22 @ 16:22)  POCT Blood Glucose.: 144 mg/dL (08-21-22 @ 12:11)  POCT Blood Glucose.: 207 mg/dL (08-21-22 @ 07:55)  POCT Blood Glucose.: 197 mg/dL (08-20-22 @ 16:29)

## 2022-08-25 LAB
GLUCOSE BLDC GLUCOMTR-MCNC: 126 MG/DL — HIGH (ref 70–99)
GLUCOSE BLDC GLUCOMTR-MCNC: 164 MG/DL — HIGH (ref 70–99)
GLUCOSE BLDC GLUCOMTR-MCNC: 183 MG/DL — HIGH (ref 70–99)
GLUCOSE BLDC GLUCOMTR-MCNC: 98 MG/DL — SIGNIFICANT CHANGE UP (ref 70–99)

## 2022-08-25 PROCEDURE — 99222 1ST HOSP IP/OBS MODERATE 55: CPT

## 2022-08-25 RX ADMIN — Medication 650 MILLIGRAM(S): at 09:20

## 2022-08-25 RX ADMIN — Medication 5 UNIT(S): at 08:12

## 2022-08-25 RX ADMIN — NYSTATIN CREAM 1 APPLICATION(S): 100000 CREAM TOPICAL at 06:38

## 2022-08-25 RX ADMIN — INSULIN GLARGINE 15 UNIT(S): 100 INJECTION, SOLUTION SUBCUTANEOUS at 08:12

## 2022-08-25 RX ADMIN — ATORVASTATIN CALCIUM 80 MILLIGRAM(S): 80 TABLET, FILM COATED ORAL at 21:28

## 2022-08-25 RX ADMIN — Medication 10 MILLIGRAM(S): at 21:28

## 2022-08-25 RX ADMIN — APIXABAN 5 MILLIGRAM(S): 2.5 TABLET, FILM COATED ORAL at 06:38

## 2022-08-25 RX ADMIN — Medication 650 MILLIGRAM(S): at 08:50

## 2022-08-25 RX ADMIN — NYSTATIN CREAM 1 APPLICATION(S): 100000 CREAM TOPICAL at 17:18

## 2022-08-25 RX ADMIN — APIXABAN 5 MILLIGRAM(S): 2.5 TABLET, FILM COATED ORAL at 17:18

## 2022-08-25 RX ADMIN — Medication 5 UNIT(S): at 12:26

## 2022-08-25 RX ADMIN — Medication 100 MILLIGRAM(S): at 06:38

## 2022-08-25 NOTE — CONSULT NOTE ADULT - ATTENDING COMMENTS
We were asked to see the patient for mild thrombocytopenia there was a single value of 50,000 yesterday but that resolved may be allowed overall her trend demonstrated mild stable chronic thrombocytopenia for quite some time.  Patient nor the daughter were really interested in any aggressive work-up such as a bone marrow biopsy and I agree.  I explained that the mild thrombocytopenia may just simply be her baseline less likely to medication effect, possibly chronic ITP or less likely very low risk mild dysplastic syndrome but regardless in number does not require any sort of interventions at this time and she could remain on Eliquis as long as platelets are greater than 50,000.  I recommended they follow-up with me if platelet counts continue to decrease or if other cytopenias develop.  She does have very mild anemia and work-up so far has been negative and she can also follow-up with us if anemia worsens and we will proceed from there.  They dont wish to be udnergo any aggresive work up nor is one need at this point in my opinion

## 2022-08-25 NOTE — PROGRESS NOTE ADULT - SUBJECTIVE AND OBJECTIVE BOX
Patient is a 88y old  Female who presents with a chief complaint of Stroke (18 Aug 2022 13:20)      HPI:  This is a 88 F hx of CAD, Diabetes, Hyperlipemia, Hypertension, Afib on Eliquis presents to ED BIBA from home for slurred speech and right sided facial droop. Patient went to bed that night around ~10PM at baseline. Patient woke up the next morning around ~11Am which is late for her. Daughter noted she was not herself,  checked her sugar and it was ~45. Noted the slurred speech. Also noted patient had trouble swallowing. Patient took her Eliquis dose this morning.  No history of strokes. CT Angio Brain Stroke Protocol  w/ IV Cont, No evidence of major vascular stenosis or occlusion. Scattered mild calcific plaque.  CT perfusion: Apparent perfusion abnormality (penumbra) within the brainstem and left posterior fossa with a total Tmax > 6s volume of 15 CT Brain Stroke Protocol shows no evidence of acute intracranial hemorrhage or large territory infarct. Chronic-appearing left cerebellar infarct (new since 2010). Moderate/severe chronic microvascular changes and parenchymal atrophy (moderately progressed since 2010). < from: MR Head No Cont (08.11.22 @ 17:30) > Motion degraded incomplete examination demonstrating small foci of acute infarct in the left temporal and parietal lobes. Extensive chronic microsphere ischemic changes. Her course was complicated by uncontrolled DM2 an MARCIN on CKD, now medically stable. She was placed on therapeutic Lovenox and then back onto Eliquis. She has been medically stable.     She was seen for a swallow assessment and cleared for Minced and Moist diet with mildly thickened liquids. She was seen by PT and OT and requires max assistance to stand and for bed mobility and min assistance to ambulate 15 ft. with a RW and mod assistance for UE dressing and max assistance for LE dressing. PTA, she was fully independent in all activities, using a straight cane or walker at times. She was evaluated by me on the Neuro Service and was found to be a good candidate for acute inpatient rehab. Her son-in-law at bedside states that she has had some cognitive deficits for a while and that it is near baseline.    TODAY'S SUBJECTIVE & REVIEW OF SYMPTOMS:  Patient was seen and assessed at bedside. No overnight events. Tolerating PT/OT. Tolerating oral diet. Voiding and passing stool spontaneously. Vital signs are stable.      Constiutional:    [  x ] WNL           [   ] poor appetite   [   ] insomnia   [   ] tired   Cardio:                [ x  ] WNL           [   ] CP   [   ] SILVERMAN   [   ] palpitations               Resp:                   [ x  ] WNL           [  x ] SOB at times at home  [   ] cough   [   ] wheezing   GI:                        [ x  ] WNL           [   ] constipation   [   ] diarrhea   [   ] abdominal pain   [   ] nausea   [   ] emesis                                :                      [   ] WNL           [   ] REYNA  [   ] dysuria   [   ] difficulty voiding  [ x ] incontinence           Endo:                   [ x  ] WNL          [   ] polyuria   [   ] temperature intolerance                 Skin:                     [ x  ] WNL          [   ] pain   [   ] wound   [   ] rash   MSK:                    [    ] WNL          [   ] muscle pain   [ x  ] joint pain/ stiffness - prior left shoulder injury with contracture   [   ] muscle tenderness   [   ] swelling   Neuro:                 [ x  ] WNL except as per HPI         [   ] HA   [   ] change in vision   [   ] tremor   [   ] weakness   [   ]dysphagia              Cognitive:           [   ] WNL           [ x  ] some impairment PTA    Psych:                  [  x ] WNL           [   ] hallucinations   [   ]agitation   [   ] delusion   [   ]depression    PHYSICAL EXAM    ICU Vital Signs Last 24 Hrs  T(C): 36.2 (25 Aug 2022 05:57), Max: 37 (24 Aug 2022 13:19)  T(F): 97.1 (25 Aug 2022 05:57), Max: 98.6 (24 Aug 2022 13:19)  HR: 81 (25 Aug 2022 05:57) (81 - 87)  BP: 145/71 (25 Aug 2022 05:57) (103/50 - 145/71)  BP(mean): --  ABP: --  ABP(mean): --  RR: 18 (25 Aug 2022 05:57) (17 - 18)  SpO2: --      General:[  x ] NAD, Resting Comfortable,   [   ] other:                                HEENT: [  x ] NC/AT, EOMI, PERRL , Normal Conjunctivae,   [   ] other:  Cardio: [  x ] RRR, no murmer,   [   ] other:                              Pulm: [ x  ] No Respiratory Distress,  Lungs CTAB,   [   ] other:                       Abdomen: [  x ]ND/NT, Soft,   [   ] other:    : [  x ] NO REYNA CATHETER, [   ] REYNA CATHETER- no meatal tear, no discharge, [   ] other:                                            MSK: [ x  ] No joint swelling,   [  x ] other:  healed bilat TKR scars, severe limited PROM left shoulder with pain                                    Ext: [  x ]No C/C/E, No calf tenderness,   [   ]other:    Skin: [ x  ]intact,   [   ] other:                                                                   Neurological Examination:  Cognitive: [    ] AAO x 3,   [  x  ]  other: oriented to self and event, not month or year                                                                     Attention:  [ x   ] intact,   [    ]  other:                            Memory: [    ] grossly intact    [  x  ]  other:   impaired  Mood/Affect: [ x   ] wnl,    [    ]  other:                                                                             Communication: [    ]Fluent, no dysarthria, following commands:  [  x  ] other: mild dysarthria. Follows commands/ conversation well. Fluent though word substitution errors  CN II - XII:  [ x   ] intact,  [    ] other:                                                                                        Motor:   RIGHT UE: [  x ] WNL,  [   ] other:  LEFT    UE: [ x  ] WNL,  [   ] other:  RIGHT LE: [  x ] WNL,  [   ] other:   LEFT    LE: [ x  ] WNL,  [   ] other:    Tone: [  x  ] wnl,   [    ]  other:  DTRs: [  x ]symmetric, [   ] other:  Coordination:   [  x  ] intact,   [    ] other:                                                                           Sensory: [ x   ] Intact to light touch,   [    ] other:    Current Level of Function:  Bed Mobility: steady assist  Transfers: steady assist  Gait: steady assist 65 feet with RW  Upper body dressing: mod assist  Lower body dressing: max assist      MEDICATIONS  (STANDING):  apixaban 5 milliGRAM(s) Oral every 12 hours  atorvastatin 80 milliGRAM(s) Oral at bedtime  dextrose 5%. 1000 milliLiter(s) (50 mL/Hr) IV Continuous <Continuous>  dextrose 5%. 1000 milliLiter(s) (100 mL/Hr) IV Continuous <Continuous>  dextrose 50% Injectable 25 Gram(s) IV Push once  dextrose 50% Injectable 12.5 Gram(s) IV Push once  dextrose 50% Injectable 25 Gram(s) IV Push once  glucagon  Injectable 1 milliGRAM(s) IntraMuscular once  insulin glargine Injectable (LANTUS) 15 Unit(s) SubCutaneous every morning  insulin lispro Injectable (ADMELOG) 5 Unit(s) SubCutaneous three times a day before meals  melatonin 10 milliGRAM(s) Oral at bedtime  metoprolol succinate  milliGRAM(s) Oral daily  nystatin Powder 1 Application(s) Topical every 12 hours    MEDICATIONS  (PRN):  acetaminophen     Tablet .. 650 milliGRAM(s) Oral every 6 hours PRN Temp greater or equal to 38C (100.4F), Mild Pain (1 - 3)  aluminum hydroxide/magnesium hydroxide/simethicone Suspension 30 milliLiter(s) Oral every 4 hours PRN Dyspepsia  dextrose Oral Gel 15 Gram(s) Oral once PRN Blood Glucose LESS THAN 70 milliGRAM(s)/deciliter  magnesium hydroxide Suspension 30 milliLiter(s) Oral daily PRN Constipation  senna 2 Tablet(s) Oral at bedtime PRN Constipation            RECENT LABS/IMAGING                        10.5   7.01  )-----------( 107      ( 24 Aug 2022 06:29 )             32.3       POCT Blood Glucose.: 126 mg/dL (08-25-22 @ 07:12)  POCT Blood Glucose.: 117 mg/dL (08-24-22 @ 22:08)  POCT Blood Glucose.: 131 mg/dL (08-24-22 @ 16:06)  POCT Blood Glucose.: 176 mg/dL (08-24-22 @ 11:12)  POCT Blood Glucose.: 134 mg/dL (08-24-22 @ 07:51)  POCT Blood Glucose.: 157 mg/dL (08-23-22 @ 21:32)  POCT Blood Glucose.: 187 mg/dL (08-23-22 @ 16:35)  POCT Blood Glucose.: 155 mg/dL (08-23-22 @ 11:13)  POCT Blood Glucose.: 140 mg/dL (08-23-22 @ 07:26)  POCT Blood Glucose.: 131 mg/dL (08-22-22 @ 21:50)  POCT Blood Glucose.: 146 mg/dL (08-22-22 @ 16:26)  POCT Blood Glucose.: 208 mg/dL (08-22-22 @ 11:37)   Patient is a 88y old  Female who presents with a chief complaint of Stroke (18 Aug 2022 13:20)      HPI:  This is a 88 F hx of CAD, Diabetes, Hyperlipemia, Hypertension, Afib on Eliquis presents to ED BIBA from home for slurred speech and right sided facial droop. Patient went to bed that night around ~10PM at baseline. Patient woke up the next morning around ~11Am which is late for her. Daughter noted she was not herself,  checked her sugar and it was ~45. Noted the slurred speech. Also noted patient had trouble swallowing. Patient took her Eliquis dose this morning.  No history of strokes. CT Angio Brain Stroke Protocol  w/ IV Cont, No evidence of major vascular stenosis or occlusion. Scattered mild calcific plaque.  CT perfusion: Apparent perfusion abnormality (penumbra) within the brainstem and left posterior fossa with a total Tmax > 6s volume of 15 CT Brain Stroke Protocol shows no evidence of acute intracranial hemorrhage or large territory infarct. Chronic-appearing left cerebellar infarct (new since 2010). Moderate/severe chronic microvascular changes and parenchymal atrophy (moderately progressed since 2010). < from: MR Head No Cont (08.11.22 @ 17:30) > Motion degraded incomplete examination demonstrating small foci of acute infarct in the left temporal and parietal lobes. Extensive chronic microsphere ischemic changes. Her course was complicated by uncontrolled DM2 an MARCIN on CKD, now medically stable. She was placed on therapeutic Lovenox and then back onto Eliquis. She has been medically stable.     She was seen for a swallow assessment and cleared for Minced and Moist diet with mildly thickened liquids. She was seen by PT and OT and requires max assistance to stand and for bed mobility and min assistance to ambulate 15 ft. with a RW and mod assistance for UE dressing and max assistance for LE dressing. PTA, she was fully independent in all activities, using a straight cane or walker at times. She was evaluated by me on the Neuro Service and was found to be a good candidate for acute inpatient rehab. Her son-in-law at bedside states that she has had some cognitive deficits for a while and that it is near baseline.    TODAY'S SUBJECTIVE & REVIEW OF SYMPTOMS:  Patient was seen and assessed at bedside. No overnight events. Patient endorsed poor sleep last night. Tolerating PT/OT. Tolerating oral diet. Voiding and passing stool spontaneously. Vital signs are stable.      Constiutional:    [  x ] WNL           [   ] poor appetite   [   ] insomnia   [   ] tired   Cardio:                [ x  ] WNL           [   ] CP   [   ] SILVERMAN   [   ] palpitations               Resp:                   [ x  ] WNL           [  x ] SOB at times at home  [   ] cough   [   ] wheezing   GI:                        [ x  ] WNL           [   ] constipation   [   ] diarrhea   [   ] abdominal pain   [   ] nausea   [   ] emesis                                :                      [   ] WNL           [   ] REYNA  [   ] dysuria   [   ] difficulty voiding  [ x ] incontinence           Endo:                   [ x  ] WNL          [   ] polyuria   [   ] temperature intolerance                 Skin:                     [ x  ] WNL          [   ] pain   [   ] wound   [   ] rash   MSK:                    [    ] WNL          [   ] muscle pain   [ x  ] joint pain/ stiffness - prior left shoulder injury with contracture   [   ] muscle tenderness   [   ] swelling   Neuro:                 [ x  ] WNL except as per HPI         [   ] HA   [   ] change in vision   [   ] tremor   [   ] weakness   [   ]dysphagia              Cognitive:           [   ] WNL           [ x  ] some impairment PTA    Psych:                  [  x ] WNL           [   ] hallucinations   [   ]agitation   [   ] delusion   [   ]depression    PHYSICAL EXAM    ICU Vital Signs Last 24 Hrs  T(C): 36.2 (25 Aug 2022 05:57), Max: 37 (24 Aug 2022 13:19)  T(F): 97.1 (25 Aug 2022 05:57), Max: 98.6 (24 Aug 2022 13:19)  HR: 81 (25 Aug 2022 05:57) (81 - 87)  BP: 145/71 (25 Aug 2022 05:57) (103/50 - 145/71)  BP(mean): --  ABP: --  ABP(mean): --  RR: 18 (25 Aug 2022 05:57) (17 - 18)  SpO2: --      General:[  x ] NAD, Resting Comfortable,   [   ] other:                                HEENT: [  x ] NC/AT, EOMI, PERRL , Normal Conjunctivae,   [   ] other:  Cardio: [  x ] RRR, no murmer,   [   ] other:                              Pulm: [ x  ] No Respiratory Distress,  Lungs CTAB,   [   ] other:                       Abdomen: [  x ]ND/NT, Soft,   [   ] other:    : [  x ] NO REYNA CATHETER, [   ] REYNA CATHETER- no meatal tear, no discharge, [   ] other:                                            MSK: [ x  ] No joint swelling,   [  x ] other:  healed bilat TKR scars, severe limited PROM left shoulder with pain                                    Ext: [  x ]No C/C/E, No calf tenderness,   [   ]other:    Skin: [ x  ]intact,   [   ] other:                                                                   Neurological Examination:  Cognitive: [    ] AAO x 3,   [  x  ]  other: oriented to self and event, not month or year                                                                     Attention:  [ x   ] intact,   [    ]  other:                            Memory: [    ] grossly intact    [  x  ]  other:   impaired  Mood/Affect: [ x   ] wnl,    [    ]  other:                                                                             Communication: [    ]Fluent, no dysarthria, following commands:  [  x  ] other: mild dysarthria. Follows commands/ conversation well. Fluent though word substitution errors  CN II - XII:  [ x   ] intact,  [    ] other:                                                                                        Motor:   RIGHT UE: [  x ] WNL,  [   ] other:  LEFT    UE: [ x  ] WNL,  [   ] other:  RIGHT LE: [  x ] WNL,  [   ] other:   LEFT    LE: [ x  ] WNL,  [   ] other:    Tone: [  x  ] wnl,   [    ]  other:  DTRs: [  x ]symmetric, [   ] other:  Coordination:   [  x  ] intact,   [    ] other:                                                                           Sensory: [ x   ] Intact to light touch,   [    ] other:    Current Level of Function:  Bed Mobility: steady assist  Transfers: steady assist  Gait: steady assist 65 feet with RW  Upper body dressing: mod assist  Lower body dressing: max assist      MEDICATIONS  (STANDING):  apixaban 5 milliGRAM(s) Oral every 12 hours  atorvastatin 80 milliGRAM(s) Oral at bedtime  dextrose 5%. 1000 milliLiter(s) (50 mL/Hr) IV Continuous <Continuous>  dextrose 5%. 1000 milliLiter(s) (100 mL/Hr) IV Continuous <Continuous>  dextrose 50% Injectable 25 Gram(s) IV Push once  dextrose 50% Injectable 12.5 Gram(s) IV Push once  dextrose 50% Injectable 25 Gram(s) IV Push once  glucagon  Injectable 1 milliGRAM(s) IntraMuscular once  insulin glargine Injectable (LANTUS) 15 Unit(s) SubCutaneous every morning  insulin lispro Injectable (ADMELOG) 5 Unit(s) SubCutaneous three times a day before meals  melatonin 10 milliGRAM(s) Oral at bedtime  metoprolol succinate  milliGRAM(s) Oral daily  nystatin Powder 1 Application(s) Topical every 12 hours    MEDICATIONS  (PRN):  acetaminophen     Tablet .. 650 milliGRAM(s) Oral every 6 hours PRN Temp greater or equal to 38C (100.4F), Mild Pain (1 - 3)  aluminum hydroxide/magnesium hydroxide/simethicone Suspension 30 milliLiter(s) Oral every 4 hours PRN Dyspepsia  dextrose Oral Gel 15 Gram(s) Oral once PRN Blood Glucose LESS THAN 70 milliGRAM(s)/deciliter  magnesium hydroxide Suspension 30 milliLiter(s) Oral daily PRN Constipation  senna 2 Tablet(s) Oral at bedtime PRN Constipation            RECENT LABS/IMAGING                        10.5   7.01  )-----------( 107      ( 24 Aug 2022 06:29 )             32.3       POCT Blood Glucose.: 126 mg/dL (08-25-22 @ 07:12)  POCT Blood Glucose.: 117 mg/dL (08-24-22 @ 22:08)  POCT Blood Glucose.: 131 mg/dL (08-24-22 @ 16:06)  POCT Blood Glucose.: 176 mg/dL (08-24-22 @ 11:12)  POCT Blood Glucose.: 134 mg/dL (08-24-22 @ 07:51)  POCT Blood Glucose.: 157 mg/dL (08-23-22 @ 21:32)  POCT Blood Glucose.: 187 mg/dL (08-23-22 @ 16:35)  POCT Blood Glucose.: 155 mg/dL (08-23-22 @ 11:13)  POCT Blood Glucose.: 140 mg/dL (08-23-22 @ 07:26)  POCT Blood Glucose.: 131 mg/dL (08-22-22 @ 21:50)  POCT Blood Glucose.: 146 mg/dL (08-22-22 @ 16:26)  POCT Blood Glucose.: 208 mg/dL (08-22-22 @ 11:37)   Patient is a 88y old  Female who presents with a chief complaint of Stroke (18 Aug 2022 13:20)      HPI:  This is a 88 F hx of CAD, Diabetes, Hyperlipemia, Hypertension, Afib on Eliquis presents to ED BIBA from home for slurred speech and right sided facial droop. Patient went to bed that night around ~10PM at baseline. Patient woke up the next morning around ~11Am which is late for her. Daughter noted she was not herself,  checked her sugar and it was ~45. Noted the slurred speech. Also noted patient had trouble swallowing. Patient took her Eliquis dose this morning.  No history of strokes. CT Angio Brain Stroke Protocol  w/ IV Cont, No evidence of major vascular stenosis or occlusion. Scattered mild calcific plaque.  CT perfusion: Apparent perfusion abnormality (penumbra) within the brainstem and left posterior fossa with a total Tmax > 6s volume of 15 CT Brain Stroke Protocol shows no evidence of acute intracranial hemorrhage or large territory infarct. Chronic-appearing left cerebellar infarct (new since 2010). Moderate/severe chronic microvascular changes and parenchymal atrophy (moderately progressed since 2010). < from: MR Head No Cont (08.11.22 @ 17:30) > Motion degraded incomplete examination demonstrating small foci of acute infarct in the left temporal and parietal lobes. Extensive chronic microsphere ischemic changes. Her course was complicated by uncontrolled DM2 an MARCIN on CKD, now medically stable. She was placed on therapeutic Lovenox and then back onto Eliquis. She has been medically stable.     She was seen for a swallow assessment and cleared for Minced and Moist diet with mildly thickened liquids. She was seen by PT and OT and requires max assistance to stand and for bed mobility and min assistance to ambulate 15 ft. with a RW and mod assistance for UE dressing and max assistance for LE dressing. PTA, she was fully independent in all activities, using a straight cane or walker at times. She was evaluated by me on the Neuro Service and was found to be a good candidate for acute inpatient rehab. Her son-in-law at bedside states that she has had some cognitive deficits for a while and that it is near baseline.    TODAY'S SUBJECTIVE & REVIEW OF SYMPTOMS:  Patient was seen and assessed at bedside. No overnight events. Patient endorsed poor sleep last night. Tolerating PT/OT. Tolerating oral diet. Voiding and passing stool spontaneously. Vital signs are stable.      Constiutional:    [  x ] WNL           [   ] poor appetite   [   ] insomnia   [   ] tired   Cardio:                [ x  ] WNL           [   ] CP   [   ] SILVERMAN   [   ] palpitations               Resp:                   [ x  ] WNL           [  x ] SOB at times at home  [   ] cough   [   ] wheezing   GI:                        [ x  ] WNL           [   ] constipation   [   ] diarrhea   [   ] abdominal pain   [   ] nausea   [   ] emesis                                :                      [   ] WNL           [   ] REYNA  [   ] dysuria   [   ] difficulty voiding  [ x ] incontinence           Endo:                   [ x  ] WNL          [   ] polyuria   [   ] temperature intolerance                 Skin:                     [ x  ] WNL          [   ] pain   [   ] wound   [   ] rash   MSK:                    [    ] WNL          [   ] muscle pain   [ x  ] joint pain/ stiffness - prior left shoulder injury with contracture   [   ] muscle tenderness   [   ] swelling   Neuro:                 [ x  ] WNL except as per HPI         [   ] HA   [   ] change in vision   [   ] tremor   [   ] weakness   [   ]dysphagia              Cognitive:           [   ] WNL           [ x  ] some impairment PTA    Psych:                  [  x ] WNL           [   ] hallucinations   [   ]agitation   [   ] delusion   [   ]depression    PHYSICAL EXAM    ICU Vital Signs Last 24 Hrs  T(C): 36.2 (25 Aug 2022 05:57), Max: 37 (24 Aug 2022 13:19)  T(F): 97.1 (25 Aug 2022 05:57), Max: 98.6 (24 Aug 2022 13:19)  HR: 81 (25 Aug 2022 05:57) (81 - 87)  BP: 145/71 (25 Aug 2022 05:57) (103/50 - 145/71)  BP(mean): --  ABP: --  ABP(mean): --  RR: 18 (25 Aug 2022 05:57) (17 - 18)  SpO2: --      General:[  x ] NAD, Resting Comfortable,   [   ] other:                                HEENT: [  x ] NC/AT, EOMI, PERRL , Normal Conjunctivae,   [   ] other:  Cardio: [  x ] RRR, no murmer,   [   ] other:                              Pulm: [ x  ] No Respiratory Distress,  Lungs CTAB,   [   ] other:                       Abdomen: [  x ]ND/NT, Soft,   [   ] other:    : [  x ] NO REYNA CATHETER, [   ] REYNA CATHETER- no meatal tear, no discharge, [   ] other:                                            MSK: [ x  ] No joint swelling,   [  x ] other:  healed bilat TKR scars, severe limited PROM left shoulder with pain                                    Ext: [  x ]No C/C/E, No calf tenderness,   [   ]other:    Skin: [ x  ]intact,   [   ] other:                                                                   Neurological Examination:  Cognitive: [    ] AAO x 3,   [  x  ]  other: oriented to self and event, not month or year                                                                     Attention:  [ x   ] intact,   [    ]  other:                            Memory: [    ] grossly intact    [  x  ]  other:   impaired  Mood/Affect: [ x   ] wnl,    [    ]  other:                                                                             Communication: [    ]Fluent, no dysarthria, following commands:  [  x  ] other: mild dysarthria. Follows commands/ conversation well. Fluent though word substitution errors  CN II - XII:  [ x   ] intact,  [    ] other:                                                                                        Motor:   RIGHT UE: [  x ] WNL,  [   ] other:  LEFT    UE: [ x  ] WNL,  [   ] other:  RIGHT LE: [  x ] WNL,  [   ] other:   LEFT    LE: [ x  ] WNL,  [   ] other:    Tone: [  x  ] wnl,   [    ]  other:  DTRs: [  x ]symmetric, [   ] other:  Coordination:   [  x  ] intact,   [    ] other:                                                                           Sensory: [ x   ] Intact to light touch,   [    ] other:    Current Level of Function:  Bed Mobility: steady assist  Transfers: steady assist  Gait: steady assist 65 feet with RW  Upper body dressing: mod assist  Lower body dressing: max assist      MEDICATIONS  (STANDING):  apixaban 5 milliGRAM(s) Oral every 12 hours  atorvastatin 80 milliGRAM(s) Oral at bedtime  dextrose 5%. 1000 milliLiter(s) (50 mL/Hr) IV Continuous <Continuous>  dextrose 5%. 1000 milliLiter(s) (100 mL/Hr) IV Continuous <Continuous>  dextrose 50% Injectable 25 Gram(s) IV Push once  dextrose 50% Injectable 12.5 Gram(s) IV Push once  dextrose 50% Injectable 25 Gram(s) IV Push once  glucagon  Injectable 1 milliGRAM(s) IntraMuscular once  insulin glargine Injectable (LANTUS) 15 Unit(s) SubCutaneous every morning  insulin lispro Injectable (ADMELOG) 5 Unit(s) SubCutaneous three times a day before meals  melatonin 10 milliGRAM(s) Oral at bedtime  metoprolol succinate  milliGRAM(s) Oral daily  nystatin Powder 1 Application(s) Topical every 12 hours    MEDICATIONS  (PRN):  acetaminophen     Tablet .. 650 milliGRAM(s) Oral every 6 hours PRN Temp greater or equal to 38C (100.4F), Mild Pain (1 - 3)  aluminum hydroxide/magnesium hydroxide/simethicone Suspension 30 milliLiter(s) Oral every 4 hours PRN Dyspepsia  dextrose Oral Gel 15 Gram(s) Oral once PRN Blood Glucose LESS THAN 70 milliGRAM(s)/deciliter  magnesium hydroxide Suspension 30 milliLiter(s) Oral daily PRN Constipation  senna 2 Tablet(s) Oral at bedtime PRN Constipation        RECENT LABS/IMAGING                        10.5   7.01  )-----------( 107      ( 24 Aug 2022 06:29 )             32.3       POCT Blood Glucose.: 126 mg/dL (08-25-22 @ 07:12)  POCT Blood Glucose.: 117 mg/dL (08-24-22 @ 22:08)  POCT Blood Glucose.: 131 mg/dL (08-24-22 @ 16:06)  POCT Blood Glucose.: 176 mg/dL (08-24-22 @ 11:12)  POCT Blood Glucose.: 134 mg/dL (08-24-22 @ 07:51)  POCT Blood Glucose.: 157 mg/dL (08-23-22 @ 21:32)  POCT Blood Glucose.: 187 mg/dL (08-23-22 @ 16:35)  POCT Blood Glucose.: 155 mg/dL (08-23-22 @ 11:13)  POCT Blood Glucose.: 140 mg/dL (08-23-22 @ 07:26)  POCT Blood Glucose.: 131 mg/dL (08-22-22 @ 21:50)  POCT Blood Glucose.: 146 mg/dL (08-22-22 @ 16:26)  POCT Blood Glucose.: 208 mg/dL (08-22-22 @ 11:37)

## 2022-08-25 NOTE — PROGRESS NOTE ADULT - ASSESSMENT
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   Patient requires an acute multidisciplinary rehab program including PT, OT and ST and neuropsych. eval to maximize her function for a safe discharge home and to monitor her labile DM 2.    #Acute ischemic strokes   - CTH: negative for acute findings. CTA: no LVO. CTP: apparent perfusion abnormality (penumbra) within the brainstem and left posterior fossa with a total Tmax > 6s volume of 15cc. This may be artifactual  - Permissive HTN  - c/w ASA and statin   - A1C 6.3, Chol 154, LDL 83, Triglycerides 91  -  MRI brain non contrast < from: MR Head No Cont (08.11.22 @ 17:30) >  Motion degraded incomplete examination demonstrating small foci of acute infarct in the left temporal and parietal lobes.  Extensive chronic microsphere ischemic changes.  < end of copied text >  -  TTE=nl EF  - PT/OT/ST/Neuropsych eval  - added back and increased Eliquis from home dose of 2.5 BID to Eliquis 5 BID     #Thrombocytopenia  - Plt count 50K 8/23. Improved to 107K 8/23  - Etiol unclear. Not on Heparin products  - Heme consult requested    #Dysphagia  - Minced and Moist diet with mildly thickened liquids  - SLP f/u    #Dysarthria  - SLP f/u    #Cognitive deficits  - reportedly near baseline per family. 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 CXR  atelectasis, small pleural effusions  -resolved    #Lt flank hematoma  -monitor CBC    #?Tongue swelling on admission  - scoped by ENT: no evidence of angioedema. Significant secretions and pt appears not to be able to clear secretions  -resolved     #s/p Possible UTI  - ceftriaxone 1000mg IV completed 5 days    #Chronic AFib on Eliquis   - c/w Eliquis     #HTN  - stable   -monitor vitals, adjust medications as necessary    #Dyslipidemia   - Chol 154, LDL 83, Triglycerides 91  - Continue Atorvastatin 40mg PO QHS once speech/swallow clears    #DM w/ hypoglycemia on admission  - Hypoglycemic on fingersticks requiring 1/2 D5  - Monitor FS  - On Lantus 40U in the AM and lispro 3U TID at home  - Ordered glucagon 1mg IM PRN for BG <70   -A1c=6.3  -lower dose of Insulin resumed. FS in low to mid 200s. Titrate up insulin    #s/p MARCIN on CKD  - baseline: 1.3 6/2020. Back at baseline  - monitor     -Skin:  No active issues at this time       Precautions / PROPHYLAXIS:      - Falls    - Ortho: Weight bearing status: wbat      - DVT prophylaxis: Eliquis

## 2022-08-25 NOTE — CONSULT NOTE ADULT - SUBJECTIVE AND OBJECTIVE BOX
GARY NUNEZ 88y Female  MRN#: [id number]     Hematology Consult    HPI:  Pt is an 87 y/o F PMHx of CAD, Diabetes, Hyperlipemia, Hypertension, Afib on Eliquis presented 8/10 from home for slurred speech and right sided facial droop. Stroke code called, CTH negative for hemorrhage and pt not candidate for tPA. MRI showed small foci of acute infarct in the left temporal and parietal lobes and extensive chronic microsphere ischemic changes. During hospital course pt treated for CVA, UTI, and other comorbidities with near return to baseline status and then d/c to rehab on 8/18 for PT/OT. On 8/23 platelets dropped 142k to 50k but then returned to 107k. Pt also with mild anemia with decrease in hemoglobin from 13.7 on admission to 10.5. Hematology consulted for thrombocytopenia. She did not receive heparin products during hospital course. She denies history of personal or familial hematologic disorders. Denies history of easy bruising or bleeding. She had recent low grade fever 100.4 with reported chills; covid and RVP negative. Today she is afebrile and HD stable.        OBJECTIVE  PAST MEDICAL & SURGICAL HISTORY  Diabetes    Hypertension    Hyperlipemia    CAD (coronary artery disease) of artery bypass graft    S/P total knee replacement    History of total hip replacement, bilateral                                              -----------------------------------------------------------  ALLERGIES:  codeine (Unknown)                                            ------------------------------------------------------------    HOME MEDICATIONS  Home Medications:  apixaban 5 mg oral tablet: 1 tab(s) orally 2 times a day (18 Aug 2022 14:02)  atorvastatin 80 mg oral tablet: 1 tab(s) orally once a day (at bedtime) (18 Aug 2022 14:02)  melatonin 10 mg oral tablet: 1 tab(s) orally once a day (at bedtime) (18 Aug 2022 14:02)  nystatin 100,000 units/g topical powder: 1 application topically 2 times a day (18 Aug 2022 14:02)  Toprol- mg oral tablet, extended release: 1 tab(s) orally once a day (06 Jun 2020 10:56)                           MEDICATIONS:  STANDING MEDICATIONS  apixaban 5 milliGRAM(s) Oral every 12 hours  atorvastatin 80 milliGRAM(s) Oral at bedtime  dextrose 5%. 1000 milliLiter(s) IV Continuous <Continuous>  dextrose 5%. 1000 milliLiter(s) IV Continuous <Continuous>  dextrose 50% Injectable 25 Gram(s) IV Push once  dextrose 50% Injectable 12.5 Gram(s) IV Push once  dextrose 50% Injectable 25 Gram(s) IV Push once  glucagon  Injectable 1 milliGRAM(s) IntraMuscular once  insulin glargine Injectable (LANTUS) 15 Unit(s) SubCutaneous every morning  insulin lispro Injectable (ADMELOG) 5 Unit(s) SubCutaneous three times a day before meals  melatonin 10 milliGRAM(s) Oral at bedtime  metoprolol succinate  milliGRAM(s) Oral daily  nystatin Powder 1 Application(s) Topical every 12 hours    PRN MEDICATIONS  acetaminophen     Tablet .. 650 milliGRAM(s) Oral every 6 hours PRN  aluminum hydroxide/magnesium hydroxide/simethicone Suspension 30 milliLiter(s) Oral every 4 hours PRN  dextrose Oral Gel 15 Gram(s) Oral once PRN  magnesium hydroxide Suspension 30 milliLiter(s) Oral daily PRN  senna 2 Tablet(s) Oral at bedtime PRN                                            ------------------------------------------------------------  VITAL SIGNS: Last 24 Hours  T(C): 36.2 (25 Aug 2022 05:57), Max: 37 (24 Aug 2022 13:19)  T(F): 97.1 (25 Aug 2022 05:57), Max: 98.6 (24 Aug 2022 13:19)  HR: 81 (25 Aug 2022 05:57) (81 - 87)  BP: 145/71 (25 Aug 2022 05:57) (103/50 - 145/71)  BP(mean): --  RR: 18 (25 Aug 2022 05:57) (17 - 18)  SpO2: --      08-24-22 @ 07:01  -  08-25-22 @ 07:00  --------------------------------------------------------  IN: 300 mL / OUT: 0 mL / NET: 300 mL                                             --------------------------------------------------------------  LABS:                        10.5   7.01  )-----------( 107      ( 24 Aug 2022 06:29 )             32.3                                                                     -------------------------------------------------------------  RADIOLOGY:                                            --------------------------------------------------------------    PHYSICAL EXAM:  GENERAL: NAD, lying in bed comfortably  HEAD:  Atraumatic, normocephalic  EYES: EOMI, PERRLA, conjunctiva and sclera clear  ENT: Moist mucous membranes  NECK: Supple, no JVD  HEART: Regular rate and rhythm, no murmurs, rubs, or gallops  LUNGS: Unlabored respirations.  Clear to auscultation bilaterally, no crackles, wheezing, or rhonchi  ABDOMEN: Soft, nontender, nondistended, +BS  EXTREMITIES: 2+ peripheral pulses bilaterally. No clubbing, cyanosis, or edema  NERVOUS SYSTEM:  A&Ox3, no focal deficits   SKIN: No rashes or lesions GARY NUNEZ 88y Female  MRN#: [id number]     Hematology Consult    HPI:  Pt is an 87 y/o F PMHx of CAD, Diabetes, Hyperlipemia, Hypertension, Afib on Eliquis presented 8/10 from home for slurred speech and right sided facial droop. Stroke code called, CTH negative for hemorrhage and pt not candidate for tPA. MRI showed small foci of acute infarct in the left temporal and parietal lobes and extensive chronic microsphere ischemic changes. During hospital course pt treated for CVA, UTI, and other comorbidities with near return to baseline status and then d/c to rehab on 8/18 for PT/OT. On 8/23 platelets dropped 142k to 50k but then returned to 107k. Pt also with mild anemia with decrease in hemoglobin from 13.7 on admission to 10.5. Hematology consulted for thrombocytopenia. She did not receive heparin products during hospital course. She denies history of personal or familial hematologic disorders. Denies history of easy bruising or bleeding. She had recent low grade fever 100.4 with reported chills; covid and RVP negative. Today she is afebrile and HD stable.     REVIEW OF SYSTEMS:  CONSTITUTIONAL: No weakness, fevers or chills  EYES/ENT: No visual changes;  No vertigo or throat pain   NECK: No pain or stiffness  RESPIRATORY: No cough, wheezing, hemoptysis; No shortness of breath  CARDIOVASCULAR: No chest pain or palpitations  GASTROINTESTINAL: No abdominal or epigastric pain. No nausea, vomiting, or hematemesis; No diarrhea or constipation. No melena or hematochezia.  GENITOURINARY: No dysuria, frequency or hematuria  NEUROLOGICAL: No numbness or weakness  SKIN: No itching, rashes      OBJECTIVE  PAST MEDICAL & SURGICAL HISTORY  Diabetes    Hypertension    Hyperlipemia    CAD (coronary artery disease) of artery bypass graft    S/P total knee replacement    History of total hip replacement, bilateral                                              -----------------------------------------------------------  ALLERGIES:  codeine (Unknown)                                            ------------------------------------------------------------    HOME MEDICATIONS  Home Medications:  apixaban 5 mg oral tablet: 1 tab(s) orally 2 times a day (18 Aug 2022 14:02)  atorvastatin 80 mg oral tablet: 1 tab(s) orally once a day (at bedtime) (18 Aug 2022 14:02)  melatonin 10 mg oral tablet: 1 tab(s) orally once a day (at bedtime) (18 Aug 2022 14:02)  nystatin 100,000 units/g topical powder: 1 application topically 2 times a day (18 Aug 2022 14:02)  Toprol- mg oral tablet, extended release: 1 tab(s) orally once a day (06 Jun 2020 10:56)                           MEDICATIONS:  STANDING MEDICATIONS  apixaban 5 milliGRAM(s) Oral every 12 hours  atorvastatin 80 milliGRAM(s) Oral at bedtime  dextrose 5%. 1000 milliLiter(s) IV Continuous <Continuous>  dextrose 5%. 1000 milliLiter(s) IV Continuous <Continuous>  dextrose 50% Injectable 25 Gram(s) IV Push once  dextrose 50% Injectable 12.5 Gram(s) IV Push once  dextrose 50% Injectable 25 Gram(s) IV Push once  glucagon  Injectable 1 milliGRAM(s) IntraMuscular once  insulin glargine Injectable (LANTUS) 15 Unit(s) SubCutaneous every morning  insulin lispro Injectable (ADMELOG) 5 Unit(s) SubCutaneous three times a day before meals  melatonin 10 milliGRAM(s) Oral at bedtime  metoprolol succinate  milliGRAM(s) Oral daily  nystatin Powder 1 Application(s) Topical every 12 hours    PRN MEDICATIONS  acetaminophen     Tablet .. 650 milliGRAM(s) Oral every 6 hours PRN  aluminum hydroxide/magnesium hydroxide/simethicone Suspension 30 milliLiter(s) Oral every 4 hours PRN  dextrose Oral Gel 15 Gram(s) Oral once PRN  magnesium hydroxide Suspension 30 milliLiter(s) Oral daily PRN  senna 2 Tablet(s) Oral at bedtime PRN                                            ------------------------------------------------------------  VITAL SIGNS: Last 24 Hours  T(C): 36.2 (25 Aug 2022 05:57), Max: 37 (24 Aug 2022 13:19)  T(F): 97.1 (25 Aug 2022 05:57), Max: 98.6 (24 Aug 2022 13:19)  HR: 81 (25 Aug 2022 05:57) (81 - 87)  BP: 145/71 (25 Aug 2022 05:57) (103/50 - 145/71)  BP(mean): --  RR: 18 (25 Aug 2022 05:57) (17 - 18)  SpO2: --      08-24-22 @ 07:01  -  08-25-22 @ 07:00  --------------------------------------------------------  IN: 300 mL / OUT: 0 mL / NET: 300 mL                                             --------------------------------------------------------------  LABS:                        10.5   7.01  )-----------( 107      ( 24 Aug 2022 06:29 )             32.3                                                                     -------------------------------------------------------------  RADIOLOGY:                                            --------------------------------------------------------------    PHYSICAL EXAM:  GENERAL: NAD, lying in bed comfortably  HEAD:  Atraumatic, normocephalic  EYES: EOMI, conjunctiva and sclera clear  ENT: Moist mucous membranes  NECK: Supple, no JVD  HEART: Regular rate and rhythm  LUNGS: Unlabored respirations.  Clear to auscultation bilaterally  ABDOMEN: Soft, nontender, nondistended, +BS  EXTREMITIES: 2+ peripheral pulses bilaterally.  NERVOUS SYSTEM:  A&Ox3, no focal deficits   SKIN: No rashes or lesions GARY NUNEZ 88y Female  MRN#: [id number]     Hematology Consult    HPI:  Pt is an 89 y/o F PMHx of CAD, Diabetes, Hyperlipemia, Hypertension, Afib on Eliquis presented 8/10 from home for slurred speech and right sided facial droop. Stroke code called, CTH negative for hemorrhage and pt not candidate for tPA. MRI showed small foci of acute infarct in the left temporal and parietal lobes and extensive chronic microsphere ischemic changes. During hospital course pt treated for CVA, UTI, and other comorbidities with near return to baseline status and then d/c to rehab on 8/18 for PT/OT. On 8/23 platelets decreased from 142k to 50k but then returned to 107k the following day. Pt with history of chronic mild thrombocytopenia with baseline platelets ranging 120-130k also with mild anemia with decrease in hemoglobin from 13.7 on admission to 10.5. Hematology consulted for thrombocytopenia. She did not receive heparin products during hospital course. She denies history of personal or familial hematologic disorders. Denies history of easy bruising or bleeding. She had recent low grade fever 100.4 with reported chills; covid and RVP negative. Today she is afebrile and HD stable.     REVIEW OF SYSTEMS:  CONSTITUTIONAL: No weakness, fevers or chills  EYES/ENT: No visual changes;  No vertigo or throat pain   NECK: No pain or stiffness  RESPIRATORY: No cough, wheezing, hemoptysis; No shortness of breath  CARDIOVASCULAR: No chest pain or palpitations  GASTROINTESTINAL: No abdominal or epigastric pain. No nausea, vomiting, or hematemesis; No diarrhea or constipation. No melena or hematochezia.  GENITOURINARY: No dysuria, frequency or hematuria  NEUROLOGICAL: No numbness or weakness  SKIN: No itching, rashes      OBJECTIVE  PAST MEDICAL & SURGICAL HISTORY  Diabetes    Hypertension    Hyperlipemia    CAD (coronary artery disease) of artery bypass graft    S/P total knee replacement    History of total hip replacement, bilateral                                              -----------------------------------------------------------  ALLERGIES:  codeine (Unknown)                                            ------------------------------------------------------------    HOME MEDICATIONS  Home Medications:  apixaban 5 mg oral tablet: 1 tab(s) orally 2 times a day (18 Aug 2022 14:02)  atorvastatin 80 mg oral tablet: 1 tab(s) orally once a day (at bedtime) (18 Aug 2022 14:02)  melatonin 10 mg oral tablet: 1 tab(s) orally once a day (at bedtime) (18 Aug 2022 14:02)  nystatin 100,000 units/g topical powder: 1 application topically 2 times a day (18 Aug 2022 14:02)  Toprol- mg oral tablet, extended release: 1 tab(s) orally once a day (06 Jun 2020 10:56)                           MEDICATIONS:  STANDING MEDICATIONS  apixaban 5 milliGRAM(s) Oral every 12 hours  atorvastatin 80 milliGRAM(s) Oral at bedtime  dextrose 5%. 1000 milliLiter(s) IV Continuous <Continuous>  dextrose 5%. 1000 milliLiter(s) IV Continuous <Continuous>  dextrose 50% Injectable 25 Gram(s) IV Push once  dextrose 50% Injectable 12.5 Gram(s) IV Push once  dextrose 50% Injectable 25 Gram(s) IV Push once  glucagon  Injectable 1 milliGRAM(s) IntraMuscular once  insulin glargine Injectable (LANTUS) 15 Unit(s) SubCutaneous every morning  insulin lispro Injectable (ADMELOG) 5 Unit(s) SubCutaneous three times a day before meals  melatonin 10 milliGRAM(s) Oral at bedtime  metoprolol succinate  milliGRAM(s) Oral daily  nystatin Powder 1 Application(s) Topical every 12 hours    PRN MEDICATIONS  acetaminophen     Tablet .. 650 milliGRAM(s) Oral every 6 hours PRN  aluminum hydroxide/magnesium hydroxide/simethicone Suspension 30 milliLiter(s) Oral every 4 hours PRN  dextrose Oral Gel 15 Gram(s) Oral once PRN  magnesium hydroxide Suspension 30 milliLiter(s) Oral daily PRN  senna 2 Tablet(s) Oral at bedtime PRN                                            ------------------------------------------------------------  VITAL SIGNS: Last 24 Hours  T(C): 36.2 (25 Aug 2022 05:57), Max: 37 (24 Aug 2022 13:19)  T(F): 97.1 (25 Aug 2022 05:57), Max: 98.6 (24 Aug 2022 13:19)  HR: 81 (25 Aug 2022 05:57) (81 - 87)  BP: 145/71 (25 Aug 2022 05:57) (103/50 - 145/71)  BP(mean): --  RR: 18 (25 Aug 2022 05:57) (17 - 18)  SpO2: --      08-24-22 @ 07:01  -  08-25-22 @ 07:00  --------------------------------------------------------  IN: 300 mL / OUT: 0 mL / NET: 300 mL                                             --------------------------------------------------------------  LABS:                        10.5   7.01  )-----------( 107      ( 24 Aug 2022 06:29 )             32.3         RADIOLOGY:        PHYSICAL EXAM:  GENERAL: NAD, lying in bed comfortably  HEAD:  Atraumatic, normocephalic  EYES: EOMI, conjunctiva and sclera clear  ENT: Moist mucous membranes  NECK: Supple, no JVD  HEART: Regular rate and rhythm  LUNGS: Unlabored respirations.  Clear to auscultation bilaterally  ABDOMEN: Soft, nontender, nondistended, +BS  EXTREMITIES: 2+ peripheral pulses bilaterally.  NERVOUS SYSTEM:  A&Ox3, no focal deficits   SKIN: No rashes or lesions

## 2022-08-25 NOTE — CONSULT NOTE ADULT - ASSESSMENT
87 y/o F PMHx of CAD, Diabetes, Hyperlipemia, Hypertension, Afib on Eliquis presented 8/10 from home for slurred speech and right sided facial droop. Stroke code called, CTH negative for hemorrhage and pt not candidate for tPA. MRI showed small foci of acute infarct in the left temporal and parietal lobes and extensive chronic microsphere ischemic changes. During hospital course pt treated for CVA, UTI, and other comorbidities with near return to baseline status and then d/c to rehab on 8/18 for PT/OT. On 8/23 platelets decreased 142k to 50k but then returned to 107k. Hematology consulted for thrombocytopenia.     #Thrombocytopenia  - no heparin products during hospital course  - heparin PF4 negative  - Ddimer 251    #Normocytic anemia likely Anemia of Chronic Disease  - Hb 12.4 -> 13.7 -> 11.1 -> 10.5  - MCV 91.8,   - ferritin 193    RECOMMENDATIONS:    **INCOMPLETE pending discussion with attending** 87 y/o F PMHx of CAD, Diabetes, Hyperlipemia, Hypertension, Afib on Eliquis presented 8/10 from home for slurred speech and right sided facial droop. Stroke code called, CTH negative for hemorrhage and pt not candidate for tPA. MRI showed small foci of acute infarct in the left temporal and parietal lobes and extensive chronic microsphere ischemic changes. During hospital course pt treated for CVA, UTI, and other comorbidities with near return to baseline status and then d/c to rehab on 8/18 for PT/OT. On 8/23 platelets decreased 142k to 50k but then returned to 107k the next day. Hematology consulted for thrombocytopenia.     #Chronic Thrombocytopenia  - hx of chronic mild thrombocytopenia baseline -130k  - PL 142k -> 50k -> 107k; decrease to 50k likely lab error  - no heparin products received during hospital course  - heparin PF4 negative  - no clinical signs of DVT, abnormal bleeding or bruising    #Normocytic anemia likely Anemia of Chronic Disease  - Hb 12.4 -> 13.7 -> 11.1 -> 10.5  - MCV 91.8,   - ferritin 193    RECOMMENDATIONS:  - decrease in PL to 50k once with return to baseline likely lab error  - recheck CBC  - continue routine outpatient labs and f/u with heme as needed 87 y/o F PMHx of CAD, Diabetes, Hyperlipemia, Hypertension, Afib on Eliquis presented 8/10 from home for slurred speech and right sided facial droop. Stroke code called, CTH negative for hemorrhage and pt not candidate for tPA. MRI showed small foci of acute infarct in the left temporal and parietal lobes and extensive chronic microsphere ischemic changes. During hospital course pt treated for CVA, UTI, and other comorbidities with near return to baseline status and then d/c to rehab on 8/18 for PT/OT. On 8/23 platelets decreased 142k to 50k but then returned to 107k the next day. Hematology consulted for thrombocytopenia.     #Chronic Thrombocytopenia  - hx of chronic mild thrombocytopenia baseline -130k  - PL 142k -> 50k -> 107k; decrease to 50k likely lab error  - no heparin products received during hospital course  - heparin PF4 negative  - no clinical signs of DVT, abnormal bleeding or bruising    #Normocytic anemia likely Anemia of Chronic Disease  - Hb 12.4 -> 13.7 -> 11.1 -> 10.5  - MCV 91.8,   - ferritin 193    RECOMMENDATIONS:    - We reviewed her older labs, she has chronic thrombocytopenia and her Plt count ranges between 110-150K. decrease in PL to 50k once with return to baseline likely lab error. It would not be reasonable to do extensive work up in an 88 years old patient. We spoke about this to the patient her daughter she agrees with the plan.   - continue routine outpatient labs and f/u with heme as needed. If her platelet count drops again please call us back.     For any questions call [phone] or text via teams.

## 2022-08-26 LAB
GLUCOSE BLDC GLUCOMTR-MCNC: 119 MG/DL — HIGH (ref 70–99)
GLUCOSE BLDC GLUCOMTR-MCNC: 125 MG/DL — HIGH (ref 70–99)
GLUCOSE BLDC GLUCOMTR-MCNC: 127 MG/DL — HIGH (ref 70–99)
GLUCOSE BLDC GLUCOMTR-MCNC: 136 MG/DL — HIGH (ref 70–99)
HCT VFR BLD CALC: 32.6 % — LOW (ref 37–47)
HGB BLD-MCNC: 10.7 G/DL — LOW (ref 12–16)
MCHC RBC-ENTMCNC: 30.5 PG — SIGNIFICANT CHANGE UP (ref 27–31)
MCHC RBC-ENTMCNC: 32.8 G/DL — SIGNIFICANT CHANGE UP (ref 32–37)
MCV RBC AUTO: 92.9 FL — SIGNIFICANT CHANGE UP (ref 81–99)
NRBC # BLD: 0 /100 WBCS — SIGNIFICANT CHANGE UP (ref 0–0)
PLATELET # BLD AUTO: 154 K/UL — SIGNIFICANT CHANGE UP (ref 130–400)
RBC # BLD: 3.51 M/UL — LOW (ref 4.2–5.4)
RBC # FLD: 14.4 % — SIGNIFICANT CHANGE UP (ref 11.5–14.5)
WBC # BLD: 6.39 K/UL — SIGNIFICANT CHANGE UP (ref 4.8–10.8)
WBC # FLD AUTO: 6.39 K/UL — SIGNIFICANT CHANGE UP (ref 4.8–10.8)

## 2022-08-26 RX ADMIN — Medication 5 UNIT(S): at 16:51

## 2022-08-26 RX ADMIN — INSULIN GLARGINE 15 UNIT(S): 100 INJECTION, SOLUTION SUBCUTANEOUS at 08:53

## 2022-08-26 RX ADMIN — Medication 100 MILLIGRAM(S): at 05:48

## 2022-08-26 RX ADMIN — APIXABAN 5 MILLIGRAM(S): 2.5 TABLET, FILM COATED ORAL at 05:48

## 2022-08-26 RX ADMIN — Medication 5 UNIT(S): at 08:13

## 2022-08-26 RX ADMIN — Medication 5 UNIT(S): at 11:39

## 2022-08-26 RX ADMIN — Medication 5 MILLIGRAM(S): at 21:36

## 2022-08-26 RX ADMIN — ATORVASTATIN CALCIUM 80 MILLIGRAM(S): 80 TABLET, FILM COATED ORAL at 21:35

## 2022-08-26 RX ADMIN — NYSTATIN CREAM 1 APPLICATION(S): 100000 CREAM TOPICAL at 17:20

## 2022-08-26 RX ADMIN — APIXABAN 5 MILLIGRAM(S): 2.5 TABLET, FILM COATED ORAL at 17:20

## 2022-08-26 NOTE — PROGRESS NOTE ADULT - ASSESSMENT
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   Patient requires an acute multidisciplinary rehab program including PT, OT and ST and neuropsych. eval to maximize her function for a safe discharge home and to monitor her labile DM 2.    #Acute ischemic strokes   - CTH: negative for acute findings. CTA: no LVO. CTP: apparent perfusion abnormality (penumbra) within the brainstem and left posterior fossa with a total Tmax > 6s volume of 15cc. This may be artifactual  - Permissive HTN  - c/w ASA and statin   - A1C 6.3, Chol 154, LDL 83, Triglycerides 91  -  MRI brain non contrast < from: MR Head No Cont (08.11.22 @ 17:30) >  Motion degraded incomplete examination demonstrating small foci of acute infarct in the left temporal and parietal lobes.  Extensive chronic microsphere ischemic changes.  < end of copied text >  -  TTE=nl EF  - PT/OT/ST/Neuropsych eval  - added back and increased Eliquis from home dose of 2.5 BID to Eliquis 5 BID     #Thrombocytopenia  - Plt count 50K 8/23. Improved to 107K 8/23  - Etiol unclear. Not on Heparin products  - Heme consult requested    #Dysphagia  - Minced and Moist diet with mildly thickened liquids  - SLP f/u    #Dysarthria  - SLP f/u    #Cognitive deficits  - reportedly near baseline per family. 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 CXR  atelectasis, small pleural effusions  -resolved    #Lt flank hematoma  -monitor CBC    #?Tongue swelling on admission  - scoped by ENT: no evidence of angioedema. Significant secretions and pt appears not to be able to clear secretions  -resolved     #s/p Possible UTI  - ceftriaxone 1000mg IV completed 5 days    #Chronic AFib on Eliquis   - c/w Eliquis     #HTN  - stable   -monitor vitals, adjust medications as necessary    #Dyslipidemia   - Chol 154, LDL 83, Triglycerides 91  - Continue Atorvastatin 40mg PO QHS once speech/swallow clears    #DM w/ hypoglycemia on admission  - Hypoglycemic on fingersticks requiring 1/2 D5  - Monitor FS  - On Lantus 40U in the AM and lispro 3U TID at home  - Ordered glucagon 1mg IM PRN for BG <70   -A1c=6.3  -lower dose of Insulin resumed. FS in low to mid 200s. Titrate up insulin    #s/p MARCIN on CKD  - baseline: 1.3 6/2020. 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   Patient requires an acute multidisciplinary rehab program including PT, OT and ST and neuropsych. eval to maximize her function for a safe discharge home and to monitor her labile DM 2.    #Acute ischemic strokes   - CTH: negative for acute findings. CTA: no LVO. CTP: apparent perfusion abnormality (penumbra) within the brainstem and left posterior fossa with a total Tmax > 6s volume of 15cc. This may be artifactual  - Permissive HTN  - c/w ASA and statin   - A1C 6.3, Chol 154, LDL 83, Triglycerides 91  -  MRI brain non contrast < from: MR Head No Cont (08.11.22 @ 17:30) >  Motion degraded incomplete examination demonstrating small foci of acute infarct in the left temporal and parietal lobes.  Extensive chronic microsphere ischemic changes.  < end of copied text >  -  TTE=nl EF  - PT/OT/ST/Neuropsych eval  - added back and increased Eliquis from home dose of 2.5 BID to Eliquis 5 BID     #Thrombocytopenia  - Plt count 50K 8/23. Improved to 107K 8/23  - Etiol unclear. Not on Heparin products  - Heme consult appreciated: Plt 50k likely error, patient's baseline Plt is 110-150k. No workup needed at this time.  - will continue to monitor    #Dysphagia  - Minced and Moist diet with mildly thickened liquids  - SLP f/u    #Dysarthria  - SLP f/u    #Cognitive deficits  - reportedly near baseline per family. 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 CXR  atelectasis, small pleural effusions  -resolved    #Lt flank hematoma  -monitor CBC    #?Tongue swelling on admission  - scoped by ENT: no evidence of angioedema. Significant secretions and pt appears not to be able to clear secretions  -resolved     #s/p Possible UTI  - ceftriaxone 1000mg IV completed 5 days    #Chronic AFib on Eliquis   - c/w Eliquis     #HTN  - stable   -monitor vitals, adjust medications as necessary    #Dyslipidemia   - Chol 154, LDL 83, Triglycerides 91  - Continue Atorvastatin 40mg PO QHS once speech/swallow clears    #DM w/ hypoglycemia on admission  - Hypoglycemic on fingersticks requiring 1/2 D5  - Monitor FS  - On Lantus 40U in the AM and lispro 3U TID at home  - Ordered glucagon 1mg IM PRN for BG <70   -A1c=6.3  -lower dose of Insulin resumed. FS in low to mid 200s. Titrate up insulin    #s/p MARCIN on CKD  - baseline: 1.3 6/2020. 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   Patient requires an acute multidisciplinary rehab program including PT, OT and ST and neuropsych. eval to maximize her function for a safe discharge home and to monitor her labile DM 2.    #Acute ischemic strokes   - CTH: negative for acute findings. CTA: no LVO. CTP: apparent perfusion abnormality (penumbra) within the brainstem and left posterior fossa with a total Tmax > 6s volume of 15cc. This may be artifactual  - Permissive HTN  - c/w ASA and statin   - A1C 6.3, Chol 154, LDL 83, Triglycerides 91  -  MRI brain non contrast < from: MR Head No Cont (08.11.22 @ 17:30) >  Motion degraded incomplete examination demonstrating small foci of acute infarct in the left temporal and parietal lobes.  Extensive chronic microsphere ischemic changes.  < end of copied text >  -  TTE=nl EF  - PT/OT/ST/Neuropsych eval  - added back and increased Eliquis from home dose of 2.5 BID to Eliquis 5 BID     #Thrombocytopenia  - Plt count 50K 8/23. Improved to 107K 8/23  - Etiol unclear. Not on Heparin products  - Heme consult appreciated: Plt 50k likely error, patient's baseline Plt is 110-150k. No workup needed at this time.  - will continue to monitor    #Dysphagia  - Minced and Moist diet with mildly thickened liquids  - SLP f/u    #Dysarthria  - SLP f/u    #Cognitive deficits  - reportedly near baseline per family. 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 CXR  atelectasis, small pleural effusions  -resolved    #Lt flank hematoma  -monitor CBC    #?Tongue swelling on admission  - scoped by ENT: no evidence of angioedema. Significant secretions and pt appears not to be able to clear secretions  -resolved     #s/p Possible UTI  - ceftriaxone 1000mg IV completed 5 days    #Chronic AFib on Eliquis   - c/w Eliquis     #HTN  - stable   -monitor vitals, adjust medications as necessary    #Dyslipidemia   - Chol 154, LDL 83, Triglycerides 91  - Continue Atorvastatin 40mg PO QHS once speech/swallow clears    #DM 2 w/ hypoglycemia on admission  - Hypoglycemic on fingersticks requiring 1/2 D5  - Monitor FS  - On Lantus 40U in the AM and lispro 3U TID at home  - Ordered glucagon 1mg IM PRN for BG <70   -A1c=6.3  -lower dose of Insulin resumed. FS in low to mid 200s. Titrate up insulin    #s/p MARCIN on CKD  - baseline: 1.3 6/2020. Back at baseline  - monitor     -Skin:  No active issues at this time       Precautions / PROPHYLAXIS:      - Falls    - Ortho: Weight bearing status: wbat      - DVT prophylaxis: Eliquis

## 2022-08-26 NOTE — CHART NOTE - NSCHARTNOTEFT_GEN_A_CORE
Registered Dietitian Follow-Up     Patient Profile Reviewed                           Yes [x]   No []     Nutrition History Previously Obtained        Yes [x]  No []       Pertinent Subjective Information:  Pt has a fair appetite; consuming 50% of meals + a few bites of prosource gelatein 20 SF. Denies nausea or vomiting. Daughter states pt is a picky eater; will try every dish.     Pertinent Medical Information:  # Dysphagia  # s/p Acute hypoxemic resp failure  # HTN  # Dyslipidemia   # DM w/ hypoglycemia on admission  # s/p MARCIN on CKD     Diet order:  Diet, Minced and Moist:   Consistent Carbohydrate {Evening Snack}  DASH/TLC {Sodium & Cholesterol Restricted}  Mildly Thick Liquids (MILDTHICKLIQS)  Free Water Flush Instructions:  ***PT MUST SIT UPRIGHT  (90 degrees) DURING MEALS AND AT LEAST ONE HOUR AFTER; SMALL BITES AND SIPS; ALTERNATE BITES AND SIPS***  Thank you.  Prosource Gelatein 20 Sugar Free     Qty per Day:  3 (08-19-22 @ 14:49) [Active]     Anthropometrics:  - Ht: 160 cm  - Wt: 89.5 KG  - BMI: 35  - IBW: 50 KG     Pertinent Lab Data:  08/26 @ 07:47 - RBC 3.51; H/H 10.7/32.6  08/23 @ 05:!5 -     CAPILLARY BLOOD GLUCOSE:  POCT Blood Glucose.: 119 mg/dL (26 Aug 2022 11:19)  POCT Blood Glucose.: 127 mg/dL (26 Aug 2022 07:16)  POCT Blood Glucose.: 183 mg/dL (25 Aug 2022 21:31)  POCT Blood Glucose.: 98 mg/dL (25 Aug 2022 16:31)    Pertinent Meds:  MEDICATIONS  (STANDING):  apixaban 5 milliGRAM(s) Oral every 12 hours  atorvastatin 80 milliGRAM(s) Oral at bedtime  dextrose 5%. 1000 milliLiter(s) (50 mL/Hr) IV Continuous <Continuous>  dextrose 5%. 1000 milliLiter(s) (100 mL/Hr) IV Continuous <Continuous>  dextrose 50% Injectable 25 Gram(s) IV Push once  dextrose 50% Injectable 12.5 Gram(s) IV Push once  dextrose 50% Injectable 25 Gram(s) IV Push once  glucagon  Injectable 1 milliGRAM(s) IntraMuscular once  insulin glargine Injectable (LANTUS) 15 Unit(s) SubCutaneous every morning  insulin lispro Injectable (ADMELOG) 5 Unit(s) SubCutaneous three times a day before meals  melatonin 10 milliGRAM(s) Oral at bedtime  metoprolol succinate  milliGRAM(s) Oral daily  nystatin Powder 1 Application(s) Topical every 12 hours    MEDICATIONS  (PRN):  acetaminophen     Tablet .. 650 milliGRAM(s) Oral every 6 hours PRN Temp greater or equal to 38C (100.4F), Mild Pain (1 - 3)  aluminum hydroxide/magnesium hydroxide/simethicone Suspension 30 milliLiter(s) Oral every 4 hours PRN Dyspepsia  dextrose Oral Gel 15 Gram(s) Oral once PRN Blood Glucose LESS THAN 70 milliGRAM(s)/deciliter  magnesium hydroxide Suspension 30 milliLiter(s) Oral daily PRN Constipation  senna 2 Tablet(s) Oral at bedtime PRN Constipation     Physical Findings:  - Appearance: no edema noted per flowsheet   - GI function: last BM on  8/24 - 1x per daughter  - Tubes: n/a  - Oral/Mouth cavity: tolerating minced and moist texture  - Skin: intact; no pressure injuries  - Cognitive: disoriented per flowsheet      Nutrition Requirements  Weight Used:  ABW 89.5 KG - with consideration for age, obesity     Estimated Energy Needs    Continue [x]  Adjust []  ENERGY: 9228-2441 kcal/day (MSJ 1.1-1.2 SF)        Estimated Protein Needs    Continue [x]  Adjust []  PROTEIN: 89.5 g/day (1g/kg)     Estimated Fluid Needs        Continue [x]  Adjust []  FLUID: 1790 mL/day (20mL/kg)     Nutrient Intake: Consuming 50% of meals + a few bites of prosource gelatein 20 SF     [x] Previous Nutrition Diagnosis: Elevated BG              [x] Ongoing          [] Resolved    [] No active nutrition diagnosis identified at this time     Nutrition Intervention: meals, snacks, medical food supplements     Goal/Expected Outcome: pt to meet >75% of estimated energy needs     Indicator/Monitoring: diet order, PO intake, weights, labs, NFPF, body composition, BM and tolerance to medical food supplements    Recommendations:  1. Recommend to add Glucerna Shake 2X/DAILY to optimize kcal/pro intake -- will provide 440 kcal/day 20g pro/day total - MIX IN 2 PACKETS OF THICKENER PER SHAKE  2. Continue with current diet order  3. Encourage PO intake and assist during meals prn    Pt is at moderate nutrition risk; will f/u in 4-6 days.    RD remains available: [phone]
This is a 88 F hx of CAD, Diabetes, Hyperlipemia, Hypertension, Afib on Eliquis presented to [facility] from home on 8/10/22 for slurred speech and right sided facial droop. Patient went to bed that night around ~10PM at baseline. Patient woke up the next morning around ~11Am which is late for her. Daughter noted she was not herself,  checked her sugar and it was ~45. Noted the slurred speech. Also noted patient had trouble swallowing. CT Angio Brain Stroke Protocol  w/ IV Cont, No evidence of major vascular stenosis or occlusion. Scattered mild calcific plaque. CT perfusion: Apparent perfusion abnormality (penumbra) within the brainstem and left posterior fossa with a total Tmax > 6s volume of 15 CT Brain Stroke Protocol shows no evidence of acute intracranial hemorrhage or large territory infarct. Chronic-appearing left cerebellar infarct (new since 2010). Moderate/severe chronic microvascular changes and parenchymal atrophy (moderately progressed since 2010). < from: MR Head No Cont (08.11.22 @ 17:30) > Motion degraded incomplete examination demonstrating small foci of acute infarct in the left temporal and parietal lobes. Extensive chronic microsphere ischemic changes. Her course was complicated by uncontrolled DM2 an MARCIN on CKD, now medically stable. She was placed on therapeutic Lovenox and then back onto Eliquis. She has been medically stable.   She was admitted to Rehab medicine service on 8/18/22. She was seen for a swallow assessment and cleared for Minced and Moist diet with mildly thickened liquids. She was seen by PT and OT and requires max assistance to stand and for bed mobility and min assistance to ambulate 15 ft. with a RW and mod assistance for UE dressing and max assistance for LE dressing. PTA, she was fully independent in all activities, using a straight cane or walker at times.  Her son-in-law at bedside stated that she has had some cognitive deficits for a while and that she is near baseline.    At home she takes Lantus 40 units sc q AM and lispro 3 units SC q AC, but since she was hypoglycemic on admission and for the first few days after, insulin was held. For the past few days, including today, her FS glucoses have been going up, were 219 - 262 - 217 today, so she was started on low dose Lantus 10 units SC q AM today, with 3 units lispro q AC and hold the prandial insulin if her FS is less than 110. No correction scale is needed. Her A1C = 6.3 on 8/11.    Vital Signs Last 24 Hrs  T(C): 36.1 (19 Aug 2022 13:46), Max: 37.1 (18 Aug 2022 22:02)  T(F): 97 (19 Aug 2022 13:46), Max: 98.7 (18 Aug 2022 22:02)  HR: 85 (19 Aug 2022 13:46) (84 - 102)  BP: 146/62 (19 Aug 2022 13:46) (126/71 - 162/91)  BP(mean): 92 (18 Aug 2022 22:02) (92 - 92)  RR: 18 (19 Aug 2022 13:46) (18 - 18)  SpO2: 97% (19 Aug 2022 06:15) (97% - 97%)    Parameters below as of 19 Aug 2022 06:15  Patient On (Oxygen Delivery Method): room air      LABS:             11.1   7.67  )-----------( 122      ( 19 Aug 2022 05:35 )             33.4     08-19    140  |  106  |  25<H>  ----------------------------<  228<H>  3.7   |  23  |  1.2    Ca    8.6      19 Aug 2022 05:35  Mg     1.7     08-19    TPro  5.8<L>  /  Alb  3.3<L>  /  TBili  0.5  /  DBili  x   /  AST  17  /  ALT  17  /  AlkPhos  39  08-19    Was given one mag rider 2gm mag sulfate today for mg++=1.7.  Continue current meds and diet   Monitor labs, VS, FS  Discussed with Dr. Hdz
Pt had low grade temp 100.4 and she was having chills , hr is at 105 , bp stable , will get stat Covid swab .  to rule out covid,

## 2022-08-26 NOTE — PROGRESS NOTE ADULT - ASSESSMENT
Pain: Denied Location: N/A Ratin/10 Intervention: N/A     Pain rating after Intervention: N/A     Orientation: Ms. Hopkins was oriented to person, place, and year. She was partially oriented to event (see below). She was not oriented to day, date, or year, even with multiple choice cues.      Arousal Level: Alert     Behavior: Cooperative     Affect Range: WFL      Needed: No     Attention: WFL     Insight into illness/deficits: Fair     Treatment Session Focused on Coping/Cognition     Patient was seen briefly and asked to describe her daily activities and recent events. Compared to the previous evaluation on 22, Ms. Hopkins was noticeably more alert, likely due to having had less therapy appointments during the day. She was unable to correctly state the date or month, but she was able to state the year. Unlike her previous sessions, Ms. Hopkins was able to describe why she was in the hospital using descriptions related to the event (e.g., “my face was drooping so we went to the hospital”). She was unable to find the word “stroke” to describe the event that had taken place, although she did recognize this word when it was offered to her. Her speech was fluent and prosodic.       Ms. Hopkins showed variable episodic memory. She was able to state her anticipated date of discharge correctly, and was able to state that she had had therapy that morning “in the gym”. She was unable to report the specifics of her therapy but was aware of when it had taken place. She was also confused about what she had eaten for lunch that day, or whether she had skipped lunch and had a late breakfast. Ms. Hopkins also reported enjoying having her grandchildren visit her, though she was unable to recall how many grandchildren she has. Ms. Hopkins also made errors when describing her living situation, occasionally referring to her daughter as her “sister” before being corrected. These deficits may reflect word-finding errors rather than disorientation, as she recognizes her immediate family members by name. She also appeared to recognize and correct these mistakes when they were pointed out to her.         Per her son, Ms. Hopkins had been experiencing memory and orientation deficits for 2 months prior to her admission, and while these deficits initially worsened, she is now at her premorbid level of functioning. Compared to her evaluation on 22, Ms. Hopkins demonstrated more insight with regard to her cognitive impairments and why she is in the hospital. This may be due to increased alertness at the time of her evaluation, but it may also reflect a return to her baseline level of functioning, following a period of decline related to her hospitalization. Etiology of her cognitive impairments is likely vascular, given multiple vascular risk factors and extensive microvascular ischemic changes found on imaging (see MRI 8..; CT 8.). Her daughter currently manages IADLS for her, which should continue in the future in order to ensure her safety and well-being.      Goals: Facilitate Positive Coping Family Support / Education and further Cognitive Assessment     Plan: Feedback to patient and family

## 2022-08-26 NOTE — PROGRESS NOTE ADULT - SUBJECTIVE AND OBJECTIVE BOX
Patient is a 88y old  Female who presents with a chief complaint of Stroke (18 Aug 2022 13:20)      HPI:  This is a 88 F hx of CAD, Diabetes, Hyperlipemia, Hypertension, Afib on Eliquis presents to ED BIBA from home for slurred speech and right sided facial droop. Patient went to bed that night around ~10PM at baseline. Patient woke up the next morning around ~11Am which is late for her. Daughter noted she was not herself,  checked her sugar and it was ~45. Noted the slurred speech. Also noted patient had trouble swallowing. Patient took her Eliquis dose this morning.  No history of strokes. CT Angio Brain Stroke Protocol  w/ IV Cont, No evidence of major vascular stenosis or occlusion. Scattered mild calcific plaque.  CT perfusion: Apparent perfusion abnormality (penumbra) within the brainstem and left posterior fossa with a total Tmax > 6s volume of 15 CT Brain Stroke Protocol shows no evidence of acute intracranial hemorrhage or large territory infarct. Chronic-appearing left cerebellar infarct (new since 2010). Moderate/severe chronic microvascular changes and parenchymal atrophy (moderately progressed since 2010). < from: MR Head No Cont (08.11.22 @ 17:30) > Motion degraded incomplete examination demonstrating small foci of acute infarct in the left temporal and parietal lobes. Extensive chronic microsphere ischemic changes. Her course was complicated by uncontrolled DM2 an MARCIN on CKD, now medically stable. She was placed on therapeutic Lovenox and then back onto Eliquis. She has been medically stable.     She was seen for a swallow assessment and cleared for Minced and Moist diet with mildly thickened liquids. She was seen by PT and OT and requires max assistance to stand and for bed mobility and min assistance to ambulate 15 ft. with a RW and mod assistance for UE dressing and max assistance for LE dressing. PTA, she was fully independent in all activities, using a straight cane or walker at times. She was evaluated by me on the Neuro Service and was found to be a good candidate for acute inpatient rehab. Her son-in-law at bedside states that she has had some cognitive deficits for a while and that it is near baseline.    TODAY'S SUBJECTIVE & REVIEW OF SYMPTOMS:  Patient was seen and assessed at bedside. No overnight events. Tolerating PT/OT. Tolerating oral diet. Voiding and passing stool spontaneously. Vital signs are stable.      Constiutional:    [  x ] WNL           [   ] poor appetite   [   ] insomnia   [   ] tired   Cardio:                [ x  ] WNL           [   ] CP   [   ] SILVERMAN   [   ] palpitations               Resp:                   [ x  ] WNL           [  x ] SOB at times at home  [   ] cough   [   ] wheezing   GI:                        [ x  ] WNL           [   ] constipation   [   ] diarrhea   [   ] abdominal pain   [   ] nausea   [   ] emesis                                :                      [   ] WNL           [   ] REYNA  [   ] dysuria   [   ] difficulty voiding  [ x ] incontinence           Endo:                   [ x  ] WNL          [   ] polyuria   [   ] temperature intolerance                 Skin:                     [ x  ] WNL          [   ] pain   [   ] wound   [   ] rash   MSK:                    [    ] WNL          [   ] muscle pain   [ x  ] joint pain/ stiffness - prior left shoulder injury with contracture   [   ] muscle tenderness   [   ] swelling   Neuro:                 [ x  ] WNL except as per HPI         [   ] HA   [   ] change in vision   [   ] tremor   [   ] weakness   [   ]dysphagia              Cognitive:           [   ] WNL           [ x  ] some impairment PTA    Psych:                  [  x ] WNL           [   ] hallucinations   [   ]agitation   [   ] delusion   [   ]depression    PHYSICAL EXAM    ICU Vital Signs Last 24 Hrs  T(C): 36.3 (26 Aug 2022 05:36), Max: 36.7 (25 Aug 2022 20:48)  T(F): 97.3 (26 Aug 2022 05:36), Max: 98 (25 Aug 2022 20:48)  HR: 71 (26 Aug 2022 05:36) (71 - 83)  BP: 162/74 (26 Aug 2022 05:36) (113/78 - 162/74)  BP(mean): --  ABP: --  ABP(mean): --  RR: 17 (26 Aug 2022 05:36) (17 - 18)  SpO2: --          General:[  x ] NAD, Resting Comfortable,   [   ] other:                                HEENT: [  x ] NC/AT, EOMI, PERRL , Normal Conjunctivae,   [   ] other:  Cardio: [  x ] RRR, no murmer,   [   ] other:                              Pulm: [ x  ] No Respiratory Distress,  Lungs CTAB,   [   ] other:                       Abdomen: [  x ]ND/NT, Soft,   [   ] other:    : [  x ] NO REYNA CATHETER, [   ] REYNA CATHETER- no meatal tear, no discharge, [   ] other:                                            MSK: [ x  ] No joint swelling,   [  x ] other:  healed bilat TKR scars, severe limited PROM left shoulder with pain                                    Ext: [  x ]No C/C/E, No calf tenderness,   [   ]other:    Skin: [ x  ]intact,   [   ] other:                                                                   Neurological Examination:  Cognitive: [    ] AAO x 3,   [  x  ]  other: oriented to self and event, not month or year                                                                     Attention:  [ x   ] intact,   [    ]  other:                            Memory: [    ] grossly intact    [  x  ]  other:   impaired  Mood/Affect: [ x   ] wnl,    [    ]  other:                                                                             Communication: [    ]Fluent, no dysarthria, following commands:  [  x  ] other: mild dysarthria. Follows commands/ conversation well. Fluent though word substitution errors  CN II - XII:  [ x   ] intact,  [    ] other:                                                                                        Motor:   RIGHT UE: [  x ] WNL,  [   ] other:  LEFT    UE: [ x  ] WNL,  [   ] other:  RIGHT LE: [  x ] WNL,  [   ] other:   LEFT    LE: [ x  ] WNL,  [   ] other:    Tone: [  x  ] wnl,   [    ]  other:  DTRs: [  x ]symmetric, [   ] other:  Coordination:   [  x  ] intact,   [    ] other:                                                                           Sensory: [ x   ] Intact to light touch,   [    ] other:    Current Level of Function:  Bed Mobility: steady assist  Transfers: steady assist  Gait: steady assist 65 feet with RW  Upper body dressing: mod assist  Lower body dressing: max assist      MEDICATIONS  (STANDING):  apixaban 5 milliGRAM(s) Oral every 12 hours  atorvastatin 80 milliGRAM(s) Oral at bedtime  dextrose 5%. 1000 milliLiter(s) (50 mL/Hr) IV Continuous <Continuous>  dextrose 5%. 1000 milliLiter(s) (100 mL/Hr) IV Continuous <Continuous>  dextrose 50% Injectable 25 Gram(s) IV Push once  dextrose 50% Injectable 12.5 Gram(s) IV Push once  dextrose 50% Injectable 25 Gram(s) IV Push once  glucagon  Injectable 1 milliGRAM(s) IntraMuscular once  insulin glargine Injectable (LANTUS) 15 Unit(s) SubCutaneous every morning  insulin lispro Injectable (ADMELOG) 5 Unit(s) SubCutaneous three times a day before meals  melatonin 10 milliGRAM(s) Oral at bedtime  metoprolol succinate  milliGRAM(s) Oral daily  nystatin Powder 1 Application(s) Topical every 12 hours    MEDICATIONS  (PRN):  acetaminophen     Tablet .. 650 milliGRAM(s) Oral every 6 hours PRN Temp greater or equal to 38C (100.4F), Mild Pain (1 - 3)  aluminum hydroxide/magnesium hydroxide/simethicone Suspension 30 milliLiter(s) Oral every 4 hours PRN Dyspepsia  dextrose Oral Gel 15 Gram(s) Oral once PRN Blood Glucose LESS THAN 70 milliGRAM(s)/deciliter  magnesium hydroxide Suspension 30 milliLiter(s) Oral daily PRN Constipation  senna 2 Tablet(s) Oral at bedtime PRN Constipation          RECENT LABS/IMAGING        POCT Blood Glucose.: 127 mg/dL (08-26-22 @ 07:16)  POCT Blood Glucose.: 183 mg/dL (08-25-22 @ 21:31)  POCT Blood Glucose.: 98 mg/dL (08-25-22 @ 16:31)  POCT Blood Glucose.: 164 mg/dL (08-25-22 @ 10:40)  POCT Blood Glucose.: 126 mg/dL (08-25-22 @ 07:12)  POCT Blood Glucose.: 117 mg/dL (08-24-22 @ 22:08)  POCT Blood Glucose.: 131 mg/dL (08-24-22 @ 16:06)  POCT Blood Glucose.: 176 mg/dL (08-24-22 @ 11:12)  POCT Blood Glucose.: 134 mg/dL (08-24-22 @ 07:51)  POCT Blood Glucose.: 157 mg/dL (08-23-22 @ 21:32)  POCT Blood Glucose.: 187 mg/dL (08-23-22 @ 16:35)  POCT Blood Glucose.: 155 mg/dL (08-23-22 @ 11:13)   Patient is a 88y old  Female who presents with a chief complaint of Stroke (18 Aug 2022 13:20)      HPI:  This is a 88 F hx of CAD, Diabetes, Hyperlipemia, Hypertension, Afib on Eliquis presents to ED BIBA from home for slurred speech and right sided facial droop. Patient went to bed that night around ~10PM at baseline. Patient woke up the next morning around ~11Am which is late for her. Daughter noted she was not herself,  checked her sugar and it was ~45. Noted the slurred speech. Also noted patient had trouble swallowing. Patient took her Eliquis dose this morning.  No history of strokes. CT Angio Brain Stroke Protocol  w/ IV Cont, No evidence of major vascular stenosis or occlusion. Scattered mild calcific plaque.  CT perfusion: Apparent perfusion abnormality (penumbra) within the brainstem and left posterior fossa with a total Tmax > 6s volume of 15 CT Brain Stroke Protocol shows no evidence of acute intracranial hemorrhage or large territory infarct. Chronic-appearing left cerebellar infarct (new since 2010). Moderate/severe chronic microvascular changes and parenchymal atrophy (moderately progressed since 2010). < from: MR Head No Cont (08.11.22 @ 17:30) > Motion degraded incomplete examination demonstrating small foci of acute infarct in the left temporal and parietal lobes. Extensive chronic microsphere ischemic changes. Her course was complicated by uncontrolled DM2 an MARCIN on CKD, now medically stable. She was placed on therapeutic Lovenox and then back onto Eliquis. She has been medically stable.     She was seen for a swallow assessment and cleared for Minced and Moist diet with mildly thickened liquids. She was seen by PT and OT and requires max assistance to stand and for bed mobility and min assistance to ambulate 15 ft. with a RW and mod assistance for UE dressing and max assistance for LE dressing. PTA, she was fully independent in all activities, using a straight cane or walker at times. She was evaluated by me on the Neuro Service and was found to be a good candidate for acute inpatient rehab. Her son-in-law at bedside states that she has had some cognitive deficits for a while and that it is near baseline.    TODAY'S SUBJECTIVE & REVIEW OF SYMPTOMS:  Patient was seen and assessed at bedside. No overnight events. Tolerating PT/OT. Tolerating oral diet. Voiding and passing stool spontaneously. Vital signs are stable.      Constiutional:    [  x ] WNL           [   ] poor appetite   [   ] insomnia   [   ] tired   Cardio:                [ x  ] WNL           [   ] CP   [   ] SILVERMAN   [   ] palpitations               Resp:                   [ x  ] WNL           [  x ] SOB at times at home  [   ] cough   [   ] wheezing   GI:                        [ x  ] WNL           [   ] constipation   [   ] diarrhea   [   ] abdominal pain   [   ] nausea   [   ] emesis                                :                      [   ] WNL           [   ] REYNA  [   ] dysuria   [   ] difficulty voiding  [ x ] incontinence           Endo:                   [ x  ] WNL          [   ] polyuria   [   ] temperature intolerance                 Skin:                     [ x  ] WNL          [   ] pain   [   ] wound   [   ] rash   MSK:                    [    ] WNL          [   ] muscle pain   [ x  ] joint pain/ stiffness - prior left shoulder injury with contracture   [   ] muscle tenderness   [   ] swelling   Neuro:                 [ x  ] WNL except as per HPI         [   ] HA   [   ] change in vision   [   ] tremor   [   ] weakness   [   ]dysphagia              Cognitive:           [   ] WNL           [ x  ] some impairment PTA    Psych:                  [  x ] WNL           [   ] hallucinations   [   ]agitation   [   ] delusion   [   ]depression    PHYSICAL EXAM    ICU Vital Signs Last 24 Hrs  T(C): 36.3 (26 Aug 2022 05:36), Max: 36.7 (25 Aug 2022 20:48)  T(F): 97.3 (26 Aug 2022 05:36), Max: 98 (25 Aug 2022 20:48)  HR: 71 (26 Aug 2022 05:36) (71 - 83)  BP: 162/74 (26 Aug 2022 05:36) (113/78 - 162/74)  BP(mean): --  ABP: --  ABP(mean): --  RR: 17 (26 Aug 2022 05:36) (17 - 18)  SpO2: --      General:[  x ] NAD, Resting Comfortable,   [   ] other:                                HEENT: [  x ] NC/AT, EOMI, PERRL , Normal Conjunctivae,   [   ] other:  Cardio: [  x ] RRR, no murmer,   [   ] other:                              Pulm: [ x  ] No Respiratory Distress,  Lungs CTAB,   [   ] other:                       Abdomen: [  x ]ND/NT, Soft,   [   ] other:    : [  x ] NO REYNA CATHETER, [   ] REYNA CATHETER- no meatal tear, no discharge, [   ] other:                                            MSK: [ x  ] No joint swelling,   [  x ] other:  healed bilat TKR scars, severe limited PROM left shoulder with pain                                    Ext: [  x ]No C/C/E, No calf tenderness,   [   ]other:    Skin: [ x  ]intact,   [   ] other:                                                                   Neurological Examination:  Cognitive: [    ] AAO x 3,   [  x  ]  other: oriented to self and event, not month or year                                                                     Attention:  [ x   ] intact,   [    ]  other:                            Memory: [    ] grossly intact    [  x  ]  other:   impaired  Mood/Affect: [ x   ] wnl,    [    ]  other:                                                                             Communication: [    ]Fluent, no dysarthria, following commands:  [  x  ] other: mild dysarthria. Follows commands/ conversation well. Fluent though word substitution errors  CN II - XII:  [ x   ] intact,  [    ] other:                                                                                        Motor:   RIGHT UE: [  x ] WNL,  [   ] other:  LEFT    UE: [ x  ] WNL,  [   ] other:  RIGHT LE: [  x ] WNL,  [   ] other:   LEFT    LE: [ x  ] WNL,  [   ] other:    Tone: [  x  ] wnl,   [    ]  other:  DTRs: [  x ]symmetric, [   ] other:  Coordination:   [  x  ] intact,   [    ] other:                                                                           Sensory: [ x   ] Intact to light touch,   [    ] other:    Current Level of Function:  Bed Mobility: steady assist  Transfers: steady assist  Gait: steady assist 65 feet with RW  Upper body dressing: mod assist  Lower body dressing: max assist      MEDICATIONS  (STANDING):  apixaban 5 milliGRAM(s) Oral every 12 hours  atorvastatin 80 milliGRAM(s) Oral at bedtime  dextrose 5%. 1000 milliLiter(s) (50 mL/Hr) IV Continuous <Continuous>  dextrose 5%. 1000 milliLiter(s) (100 mL/Hr) IV Continuous <Continuous>  dextrose 50% Injectable 25 Gram(s) IV Push once  dextrose 50% Injectable 12.5 Gram(s) IV Push once  dextrose 50% Injectable 25 Gram(s) IV Push once  glucagon  Injectable 1 milliGRAM(s) IntraMuscular once  insulin glargine Injectable (LANTUS) 15 Unit(s) SubCutaneous every morning  insulin lispro Injectable (ADMELOG) 5 Unit(s) SubCutaneous three times a day before meals  melatonin 10 milliGRAM(s) Oral at bedtime  metoprolol succinate  milliGRAM(s) Oral daily  nystatin Powder 1 Application(s) Topical every 12 hours    MEDICATIONS  (PRN):  acetaminophen     Tablet .. 650 milliGRAM(s) Oral every 6 hours PRN Temp greater or equal to 38C (100.4F), Mild Pain (1 - 3)  aluminum hydroxide/magnesium hydroxide/simethicone Suspension 30 milliLiter(s) Oral every 4 hours PRN Dyspepsia  dextrose Oral Gel 15 Gram(s) Oral once PRN Blood Glucose LESS THAN 70 milliGRAM(s)/deciliter  magnesium hydroxide Suspension 30 milliLiter(s) Oral daily PRN Constipation  senna 2 Tablet(s) Oral at bedtime PRN Constipation      RECENT LABS/IMAGING  POCT Blood Glucose.: 127 mg/dL (08-26-22 @ 07:16)  POCT Blood Glucose.: 183 mg/dL (08-25-22 @ 21:31)  POCT Blood Glucose.: 98 mg/dL (08-25-22 @ 16:31)  POCT Blood Glucose.: 164 mg/dL (08-25-22 @ 10:40)  POCT Blood Glucose.: 126 mg/dL (08-25-22 @ 07:12)  POCT Blood Glucose.: 117 mg/dL (08-24-22 @ 22:08)  POCT Blood Glucose.: 131 mg/dL (08-24-22 @ 16:06)  POCT Blood Glucose.: 176 mg/dL (08-24-22 @ 11:12)  POCT Blood Glucose.: 134 mg/dL (08-24-22 @ 07:51)  POCT Blood Glucose.: 157 mg/dL (08-23-22 @ 21:32)  POCT Blood Glucose.: 187 mg/dL (08-23-22 @ 16:35)  POCT Blood Glucose.: 155 mg/dL (08-23-22 @ 11:13)

## 2022-08-27 LAB
GLUCOSE BLDC GLUCOMTR-MCNC: 110 MG/DL — HIGH (ref 70–99)
GLUCOSE BLDC GLUCOMTR-MCNC: 142 MG/DL — HIGH (ref 70–99)
GLUCOSE BLDC GLUCOMTR-MCNC: 153 MG/DL — HIGH (ref 70–99)

## 2022-08-27 RX ADMIN — Medication 5 UNIT(S): at 08:16

## 2022-08-27 RX ADMIN — ATORVASTATIN CALCIUM 80 MILLIGRAM(S): 80 TABLET, FILM COATED ORAL at 21:33

## 2022-08-27 RX ADMIN — Medication 650 MILLIGRAM(S): at 08:52

## 2022-08-27 RX ADMIN — Medication 650 MILLIGRAM(S): at 09:20

## 2022-08-27 RX ADMIN — APIXABAN 5 MILLIGRAM(S): 2.5 TABLET, FILM COATED ORAL at 05:25

## 2022-08-27 RX ADMIN — APIXABAN 5 MILLIGRAM(S): 2.5 TABLET, FILM COATED ORAL at 17:29

## 2022-08-27 RX ADMIN — Medication 5 UNIT(S): at 11:51

## 2022-08-27 RX ADMIN — NYSTATIN CREAM 1 APPLICATION(S): 100000 CREAM TOPICAL at 17:29

## 2022-08-27 RX ADMIN — INSULIN GLARGINE 15 UNIT(S): 100 INJECTION, SOLUTION SUBCUTANEOUS at 08:18

## 2022-08-27 RX ADMIN — Medication 5 UNIT(S): at 16:51

## 2022-08-27 RX ADMIN — NYSTATIN CREAM 1 APPLICATION(S): 100000 CREAM TOPICAL at 05:25

## 2022-08-27 RX ADMIN — Medication 50 MILLIGRAM(S): at 05:25

## 2022-08-27 RX ADMIN — Medication 10 MILLIGRAM(S): at 21:33

## 2022-08-27 NOTE — PROGRESS NOTE ADULT - SUBJECTIVE AND OBJECTIVE BOX
Patient is a 88y old  Female who presents with a chief complaint of Stroke (18 Aug 2022 13:20)      HPI:  This is a 88 F hx of CAD, Diabetes, Hyperlipemia, Hypertension, Afib on Eliquis presents to ED BIBA from home for slurred speech and right sided facial droop. Patient went to bed that night around ~10PM at baseline. Patient woke up the next morning around ~11Am which is late for her. Daughter noted she was not herself,  checked her sugar and it was ~45. Noted the slurred speech. Also noted patient had trouble swallowing. Patient took her Eliquis dose this morning.  No history of strokes. CT Angio Brain Stroke Protocol  w/ IV Cont, No evidence of major vascular stenosis or occlusion. Scattered mild calcific plaque.  CT perfusion: Apparent perfusion abnormality (penumbra) within the brainstem and left posterior fossa with a total Tmax > 6s volume of 15 CT Brain Stroke Protocol shows no evidence of acute intracranial hemorrhage or large territory infarct. Chronic-appearing left cerebellar infarct (new since 2010). Moderate/severe chronic microvascular changes and parenchymal atrophy (moderately progressed since 2010). < from: MR Head No Cont (08.11.22 @ 17:30) > Motion degraded incomplete examination demonstrating small foci of acute infarct in the left temporal and parietal lobes. Extensive chronic microsphere ischemic changes. Her course was complicated by uncontrolled DM2 an MARCIN on CKD, now medically stable. She was placed on therapeutic Lovenox and then back onto Eliquis. She has been medically stable.     She was seen for a swallow assessment and cleared for Minced and Moist diet with mildly thickened liquids. She was seen by PT and OT and requires max assistance to stand and for bed mobility and min assistance to ambulate 15 ft. with a RW and mod assistance for UE dressing and max assistance for LE dressing. PTA, she was fully independent in all activities, using a straight cane or walker at times. She was evaluated by me on the Neuro Service and was found to be a good candidate for acute inpatient rehab. Her son-in-law at bedside states that she has had some cognitive deficits for a while and that it is near baseline.    TODAY'S SUBJECTIVE & REVIEW OF SYMPTOMS:  Patient was seen and assessed at bedside. No overnight events. Tolerating PT/OT. Tolerating oral diet. Voiding and passing stool spontaneously. Vital signs are stable.      Constiutional:    [  x ] WNL           [   ] poor appetite   [   ] insomnia   [   ] tired   Cardio:                [ x  ] WNL           [   ] CP   [   ] SILVERMAN   [   ] palpitations               Resp:                   [ x  ] WNL           [  x ] SOB at times at home  [   ] cough   [   ] wheezing   GI:                        [ x  ] WNL           [   ] constipation   [   ] diarrhea   [   ] abdominal pain   [   ] nausea   [   ] emesis                                :                      [   ] WNL           [   ] REYNA  [   ] dysuria   [   ] difficulty voiding  [ x ] incontinence           Endo:                   [ x  ] WNL          [   ] polyuria   [   ] temperature intolerance                 Skin:                     [ x  ] WNL          [   ] pain   [   ] wound   [   ] rash   MSK:                    [    ] WNL          [   ] muscle pain   [ x  ] joint pain/ stiffness - prior left shoulder injury with contracture   [   ] muscle tenderness   [   ] swelling   Neuro:                 [ x  ] WNL except as per HPI         [   ] HA   [   ] change in vision   [   ] tremor   [   ] weakness   [   ]dysphagia              Cognitive:           [   ] WNL           [ x  ] some impairment PTA    Psych:                  [  x ] WNL           [   ] hallucinations   [   ]agitation   [   ] delusion   [   ]depression    PHYSICAL EXAM    ICU Vital Signs Last 24 Hrs  T(C): 35.9 (27 Aug 2022 06:04), Max: 35.9 (27 Aug 2022 06:04)  T(F): 96.7 (27 Aug 2022 06:04), Max: 96.7 (27 Aug 2022 06:04)  HR: 78 (27 Aug 2022 06:04) (62 - 78)  BP: 144/62 (27 Aug 2022 06:04) (116/73 - 144/62)  BP(mean): --  ABP: --  ABP(mean): --  RR: 18 (27 Aug 2022 06:04) (18 - 18)  SpO2: 98% (27 Aug 2022 06:04) (98% - 98%)    O2 Parameters below as of 27 Aug 2022 06:04  Patient On (Oxygen Delivery Method): room air      General:[  x ] NAD, Resting Comfortable,   [   ] other:                                HEENT: [  x ] NC/AT, EOMI, PERRL , Normal Conjunctivae,   [   ] other:  Cardio: [  x ] RRR, no murmer,   [   ] other:                              Pulm: [ x  ] No Respiratory Distress,  Lungs CTAB,   [   ] other:                       Abdomen: [  x ]ND/NT, Soft,   [   ] other:    : [  x ] NO REYNA CATHETER, [   ] REYNA CATHETER- no meatal tear, no discharge, [   ] other:                                            MSK: [ x  ] No joint swelling,   [  x ] other:  healed bilat TKR scars, severe limited PROM left shoulder with pain                                    Ext: [  x ]No C/C/E, No calf tenderness,   [   ]other:    Skin: [ x  ]intact,   [   ] other:                                                                   Neurological Examination:  Cognitive: [    ] AAO x 3,   [  x  ]  other: oriented to self and event, not month or year                                                                     Attention:  [ x   ] intact,   [    ]  other:                            Memory: [    ] grossly intact    [  x  ]  other:   impaired  Mood/Affect: [ x   ] wnl,    [    ]  other:                                                                             Communication: [    ]Fluent, no dysarthria, following commands:  [  x  ] other: mild dysarthria. Follows commands/ conversation well. Fluent though word substitution errors  CN II - XII:  [ x   ] intact,  [    ] other:                                                                                        Motor:   RIGHT UE: [  x ] WNL,  [   ] other:  LEFT    UE: [ x  ] WNL,  [   ] other:  RIGHT LE: [  x ] WNL,  [   ] other:   LEFT    LE: [ x  ] WNL,  [   ] other:    Tone: [  x  ] wnl,   [    ]  other:  DTRs: [  x ]symmetric, [   ] other:  Coordination:   [  x  ] intact,   [    ] other:                                                                           Sensory: [ x   ] Intact to light touch,   [    ] other:    Current Level of Function:  Bed Mobility: steady assist  Transfers: steady assist  Gait: steady assist 65 feet with RW  Upper body dressing: mod assist  Lower body dressing: max assist      MEDICATIONS  (STANDING):  apixaban 5 milliGRAM(s) Oral every 12 hours  atorvastatin 80 milliGRAM(s) Oral at bedtime  dextrose 5%. 1000 milliLiter(s) (50 mL/Hr) IV Continuous <Continuous>  dextrose 5%. 1000 milliLiter(s) (100 mL/Hr) IV Continuous <Continuous>  dextrose 50% Injectable 25 Gram(s) IV Push once  dextrose 50% Injectable 12.5 Gram(s) IV Push once  dextrose 50% Injectable 25 Gram(s) IV Push once  glucagon  Injectable 1 milliGRAM(s) IntraMuscular once  insulin glargine Injectable (LANTUS) 15 Unit(s) SubCutaneous every morning  insulin lispro Injectable (ADMELOG) 5 Unit(s) SubCutaneous three times a day before meals  melatonin 10 milliGRAM(s) Oral at bedtime  metoprolol succinate  milliGRAM(s) Oral daily  nystatin Powder 1 Application(s) Topical every 12 hours    MEDICATIONS  (PRN):  acetaminophen     Tablet .. 650 milliGRAM(s) Oral every 6 hours PRN Temp greater or equal to 38C (100.4F), Mild Pain (1 - 3)  aluminum hydroxide/magnesium hydroxide/simethicone Suspension 30 milliLiter(s) Oral every 4 hours PRN Dyspepsia  dextrose Oral Gel 15 Gram(s) Oral once PRN Blood Glucose LESS THAN 70 milliGRAM(s)/deciliter  magnesium hydroxide Suspension 30 milliLiter(s) Oral daily PRN Constipation  senna 2 Tablet(s) Oral at bedtime PRN Constipation      RECENT LABS/IMAGING

## 2022-08-27 NOTE — PROGRESS NOTE ADULT - ASSESSMENT
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   Patient requires an acute multidisciplinary rehab program including PT, OT and ST and neuropsych. eval to maximize her function for a safe discharge home and to monitor her labile DM 2.    #Acute ischemic strokes   - CTH: negative for acute findings. CTA: no LVO. CTP: apparent perfusion abnormality (penumbra) within the brainstem and left posterior fossa with a total Tmax > 6s volume of 15cc. This may be artifactual  - Permissive HTN  - c/w ASA and statin   - A1C 6.3, Chol 154, LDL 83, Triglycerides 91  -  MRI brain non contrast < from: MR Head No Cont (08.11.22 @ 17:30) >  Motion degraded incomplete examination demonstrating small foci of acute infarct in the left temporal and parietal lobes.  Extensive chronic microsphere ischemic changes.  < end of copied text >  -  TTE=nl EF  - PT/OT/ST/Neuropsych eval  - added back and increased Eliquis from home dose of 2.5 BID to Eliquis 5 BID     #Thrombocytopenia  - Plt count 50K 8/23. Improved to 107K 8/23  - Etiol unclear. Not on Heparin products  - Heme consult appreciated: Plt 50k likely error, patient's baseline Plt is 110-150k. No workup needed at this time.  - will continue to monitor    #Dysphagia  - Minced and Moist diet with mildly thickened liquids  - SLP f/u    #Dysarthria  - SLP f/u    #Cognitive deficits  - reportedly near baseline per family. 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 CXR  atelectasis, small pleural effusions  -resolved    #Lt flank hematoma  -monitor CBC    #?Tongue swelling on admission  - scoped by ENT: no evidence of angioedema. Significant secretions and pt appears not to be able to clear secretions  -resolved     #s/p Possible UTI  - ceftriaxone 1000mg IV completed 5 days    #Chronic AFib on Eliquis   - c/w Eliquis     #HTN  - stable   -monitor vitals, adjust medications as necessary    #Dyslipidemia   - Chol 154, LDL 83, Triglycerides 91  - Continue Atorvastatin 40mg PO QHS once speech/swallow clears    #DM 2 w/ hypoglycemia on admission  - Hypoglycemic on fingersticks requiring 1/2 D5  - Monitor FS  - On Lantus 40U in the AM and lispro 3U TID at home  - Ordered glucagon 1mg IM PRN for BG <70   -A1c=6.3  -lower dose of Insulin resumed. FS in low to mid 200s. Titrate up insulin    #s/p MARCIN on CKD  - baseline: 1.3 6/2020. Back at baseline  - monitor     -Skin:  No active issues at this time       Precautions / PROPHYLAXIS:      - Falls    - Ortho: Weight bearing status: wbat      - DVT prophylaxis: Eliquis

## 2022-08-28 LAB
GLUCOSE BLDC GLUCOMTR-MCNC: 115 MG/DL — HIGH (ref 70–99)
GLUCOSE BLDC GLUCOMTR-MCNC: 121 MG/DL — HIGH (ref 70–99)
GLUCOSE BLDC GLUCOMTR-MCNC: 189 MG/DL — HIGH (ref 70–99)

## 2022-08-28 RX ORDER — APIXABAN 2.5 MG/1
1 TABLET, FILM COATED ORAL
Qty: 0 | Refills: 0 | DISCHARGE
Start: 2022-08-28

## 2022-08-28 RX ORDER — INSULIN LISPRO 100/ML
5 VIAL (ML) SUBCUTANEOUS
Qty: 0 | Refills: 0 | DISCHARGE
Start: 2022-08-28

## 2022-08-28 RX ORDER — INSULIN GLARGINE 100 [IU]/ML
15 INJECTION, SOLUTION SUBCUTANEOUS
Qty: 0 | Refills: 0 | DISCHARGE
Start: 2022-08-28

## 2022-08-28 RX ORDER — ACETAMINOPHEN 500 MG
2 TABLET ORAL
Qty: 0 | Refills: 0 | DISCHARGE
Start: 2022-08-28

## 2022-08-28 RX ADMIN — Medication 5 UNIT(S): at 08:03

## 2022-08-28 RX ADMIN — INSULIN GLARGINE 15 UNIT(S): 100 INJECTION, SOLUTION SUBCUTANEOUS at 08:03

## 2022-08-28 RX ADMIN — Medication 5 UNIT(S): at 12:17

## 2022-08-28 RX ADMIN — Medication 5 UNIT(S): at 16:46

## 2022-08-28 RX ADMIN — Medication 650 MILLIGRAM(S): at 09:00

## 2022-08-28 RX ADMIN — Medication 650 MILLIGRAM(S): at 08:18

## 2022-08-28 RX ADMIN — Medication 100 MILLIGRAM(S): at 05:47

## 2022-08-28 RX ADMIN — Medication 10 MILLIGRAM(S): at 21:36

## 2022-08-28 RX ADMIN — APIXABAN 5 MILLIGRAM(S): 2.5 TABLET, FILM COATED ORAL at 05:46

## 2022-08-28 RX ADMIN — ATORVASTATIN CALCIUM 80 MILLIGRAM(S): 80 TABLET, FILM COATED ORAL at 21:36

## 2022-08-28 RX ADMIN — NYSTATIN CREAM 1 APPLICATION(S): 100000 CREAM TOPICAL at 18:01

## 2022-08-28 RX ADMIN — APIXABAN 5 MILLIGRAM(S): 2.5 TABLET, FILM COATED ORAL at 18:01

## 2022-08-28 RX ADMIN — NYSTATIN CREAM 1 APPLICATION(S): 100000 CREAM TOPICAL at 05:47

## 2022-08-28 NOTE — PROGRESS NOTE ADULT - SUBJECTIVE AND OBJECTIVE BOX
Patient is a 88y old  Female who presents with a chief complaint of Stroke (18 Aug 2022 13:20)      HPI:  This is a 88 F hx of CAD, Diabetes, Hyperlipemia, Hypertension, Afib on Eliquis presents to ED BIBA from home for slurred speech and right sided facial droop. Patient went to bed that night around ~10PM at baseline. Patient woke up the next morning around ~11Am which is late for her. Daughter noted she was not herself,  checked her sugar and it was ~45. Noted the slurred speech. Also noted patient had trouble swallowing. Patient took her Eliquis dose this morning.  No history of strokes. CT Angio Brain Stroke Protocol  w/ IV Cont, No evidence of major vascular stenosis or occlusion. Scattered mild calcific plaque.  CT perfusion: Apparent perfusion abnormality (penumbra) within the brainstem and left posterior fossa with a total Tmax > 6s volume of 15 CT Brain Stroke Protocol shows no evidence of acute intracranial hemorrhage or large territory infarct. Chronic-appearing left cerebellar infarct (new since 2010). Moderate/severe chronic microvascular changes and parenchymal atrophy (moderately progressed since 2010). < from: MR Head No Cont (08.11.22 @ 17:30) > Motion degraded incomplete examination demonstrating small foci of acute infarct in the left temporal and parietal lobes. Extensive chronic microsphere ischemic changes. Her course was complicated by uncontrolled DM2 an MARCIN on CKD, now medically stable. She was placed on therapeutic Lovenox and then back onto Eliquis. She has been medically stable.     She was seen for a swallow assessment and cleared for Minced and Moist diet with mildly thickened liquids. She was seen by PT and OT and requires max assistance to stand and for bed mobility and min assistance to ambulate 15 ft. with a RW and mod assistance for UE dressing and max assistance for LE dressing. PTA, she was fully independent in all activities, using a straight cane or walker at times. She was evaluated by me on the Neuro Service and was found to be a good candidate for acute inpatient rehab. Her son-in-law at bedside states that she has had some cognitive deficits for a while and that it is near baseline.    TODAY'S SUBJECTIVE & REVIEW OF SYMPTOMS:  Patient was seen and assessed at bedside. No overnight events. Tolerating PT/OT. Tolerating oral diet. Voiding and passing stool spontaneously. Vital signs are stable.      Constiutional:    [  x ] WNL           [   ] poor appetite   [   ] insomnia   [   ] tired   Cardio:                [ x  ] WNL           [   ] CP   [   ] SILVERMAN   [   ] palpitations               Resp:                   [ x  ] WNL           [  x ] SOB at times at home  [   ] cough   [   ] wheezing   GI:                        [ x  ] WNL           [   ] constipation   [   ] diarrhea   [   ] abdominal pain   [   ] nausea   [   ] emesis                                :                      [   ] WNL           [   ] REYNA  [   ] dysuria   [   ] difficulty voiding  [ x ] incontinence           Endo:                   [ x  ] WNL          [   ] polyuria   [   ] temperature intolerance                 Skin:                     [ x  ] WNL          [   ] pain   [   ] wound   [   ] rash   MSK:                    [    ] WNL          [   ] muscle pain   [ x  ] joint pain/ stiffness - prior left shoulder injury with contracture   [   ] muscle tenderness   [   ] swelling   Neuro:                 [ x  ] WNL except as per HPI         [   ] HA   [   ] change in vision   [   ] tremor   [   ] weakness   [   ]dysphagia              Cognitive:           [   ] WNL           [ x  ] some impairment PTA    Psych:                  [  x ] WNL           [   ] hallucinations   [   ]agitation   [   ] delusion   [   ]depression    PHYSICAL EXAM    Vital Signs Last 24 Hrs  T(C): 36.5 (28 Aug 2022 12:47), Max: 36.5 (28 Aug 2022 12:47)  T(F): 97.7 (28 Aug 2022 12:47), Max: 97.7 (28 Aug 2022 12:47)  HR: 88 (28 Aug 2022 12:47) (72 - 94)  BP: 112/66 (28 Aug 2022 12:47) (112/66 - 133/60)  BP(mean): --  RR: 18 (28 Aug 2022 12:47) (17 - 20)  SpO2: --      General:[  x ] NAD, Resting Comfortable,   [   ] other:                                HEENT: [  x ] NC/AT, EOMI, PERRL , Normal Conjunctivae,   [   ] other:  Cardio: [  x ] RRR, no murmer,   [   ] other:                              Pulm: [ x  ] No Respiratory Distress,  Lungs CTAB,   [   ] other:                       Abdomen: [  x ]ND/NT, Soft,   [   ] other:    : [  x ] NO REYNA CATHETER, [   ] REYNA CATHETER- no meatal tear, no discharge, [   ] other:                                            MSK: [ x  ] No joint swelling,   [  x ] other:  healed bilat TKR scars, severe limited PROM left shoulder with pain                                    Ext: [  x ]No C/C/E, No calf tenderness,   [   ]other:    Skin: [ x  ]intact,   [   ] other:                                                                   Neurological Examination:  Cognitive: [    ] AAO x 3,   [  x  ]  other: oriented to self and event, not month or year                                                                     Attention:  [ x   ] intact,   [    ]  other:                            Memory: [    ] grossly intact    [  x  ]  other:   impaired  Mood/Affect: [ x   ] wnl,    [    ]  other:                                                                             Communication: [    ]Fluent, no dysarthria, following commands:  [  x  ] other: mild dysarthria. Follows commands/ conversation well. Fluent though word substitution errors  CN II - XII:  [ x   ] intact,  [    ] other:                                                                                        Motor:   RIGHT UE: [  x ] WNL,  [   ] other:  LEFT    UE: [ x  ] WNL,  [   ] other:  RIGHT LE: [  x ] WNL,  [   ] other:   LEFT    LE: [ x  ] WNL,  [   ] other:    Tone: [  x  ] wnl,   [    ]  other:  DTRs: [  x ]symmetric, [   ] other:  Coordination:   [  x  ] intact,   [    ] other:                                                                           Sensory: [ x   ] Intact to light touch,   [    ] other:    Current Level of Function:  Bed Mobility: steady assist  Transfers: steady assist  Gait: steady assist 65 feet with RW  Upper body dressing: mod assist  Lower body dressing: max assist    MEDICATIONS  (STANDING):  apixaban 5 milliGRAM(s) Oral every 12 hours  atorvastatin 80 milliGRAM(s) Oral at bedtime  dextrose 5%. 1000 milliLiter(s) (100 mL/Hr) IV Continuous <Continuous>  dextrose 5%. 1000 milliLiter(s) (50 mL/Hr) IV Continuous <Continuous>  dextrose 50% Injectable 25 Gram(s) IV Push once  dextrose 50% Injectable 12.5 Gram(s) IV Push once  dextrose 50% Injectable 25 Gram(s) IV Push once  glucagon  Injectable 1 milliGRAM(s) IntraMuscular once  insulin glargine Injectable (LANTUS) 15 Unit(s) SubCutaneous every morning  insulin lispro Injectable (ADMELOG) 5 Unit(s) SubCutaneous three times a day before meals  melatonin 10 milliGRAM(s) Oral at bedtime  metoprolol succinate  milliGRAM(s) Oral daily  nystatin Powder 1 Application(s) Topical every 12 hours    MEDICATIONS  (PRN):  acetaminophen     Tablet .. 650 milliGRAM(s) Oral every 6 hours PRN Temp greater or equal to 38C (100.4F), Mild Pain (1 - 3)  aluminum hydroxide/magnesium hydroxide/simethicone Suspension 30 milliLiter(s) Oral every 4 hours PRN Dyspepsia  dextrose Oral Gel 15 Gram(s) Oral once PRN Blood Glucose LESS THAN 70 milliGRAM(s)/deciliter  magnesium hydroxide Suspension 30 milliLiter(s) Oral daily PRN Constipation  senna 2 Tablet(s) Oral at bedtime PRN Constipation      RECENT LABS/IMAGING    POCT Blood Glucose.: 115 mg/dL (08-28-22 @ 12:15)  POCT Blood Glucose.: 121 mg/dL (08-28-22 @ 07:52)  POCT Blood Glucose.: 142 mg/dL (08-27-22 @ 16:30)  POCT Blood Glucose.: 153 mg/dL (08-27-22 @ 11:06)  POCT Blood Glucose.: 110 mg/dL (08-27-22 @ 07:42)  POCT Blood Glucose.: 125 mg/dL (08-26-22 @ 21:33)  POCT Blood Glucose.: 136 mg/dL (08-26-22 @ 16:21)  POCT Blood Glucose.: 119 mg/dL (08-26-22 @ 11:19)  POCT Blood Glucose.: 127 mg/dL (08-26-22 @ 07:16)  POCT Blood Glucose.: 183 mg/dL (08-25-22 @ 21:31)  POCT Blood Glucose.: 98 mg/dL (08-25-22 @ 16:31)  POCT Blood Glucose.: 164 mg/dL (08-25-22 @ 10:40)

## 2022-08-28 NOTE — PROGRESS NOTE ADULT - ASSESSMENT
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   Patient requires an acute multidisciplinary rehab program including PT, OT and ST and neuropsych. eval to maximize her function for a safe discharge home and to monitor her labile DM 2.    #Left Acute ischemic strokes with impaired mobility and ADL function  - CTH: negative for acute findings. CTA: no LVO. CTP: apparent perfusion abnormality (penumbra) within the brainstem and left posterior fossa with a total Tmax > 6s volume of 15cc. This may be artifactual  < from: MR Head No Cont (08.11.22 @ 17:30) >  Motion degraded incomplete examination demonstrating small foci of acute   infarct in the left temporal and parietal lobes.  Extensive chronic microsphere ischemic changes.< end of copied text >    - Permissive HTN  - c/w ASA and statin   - A1C 6.3, Chol 154, LDL 83, Triglycerides 91  -  MRI brain non contrast < from: MR Head No Cont (08.11.22 @ 17:30) >  Motion degraded incomplete examination demonstrating small foci of acute infarct in the left temporal and parietal lobes.  Extensive chronic microsphere ischemic changes.  < end of copied text >  -  TTE=nl EF  - PT/OT/ST/Neuropsych eval  - added back and increased Eliquis from home dose of 2.5 BID to Eliquis 5 BID   - Making gains. Transfers and ambulates with RW with CG, mod A for UE dressing, max assist for LE    #Thrombocytopenia  - Plt count 50K 8/23. Improved to 107K 8/23  - Etiol unclear. Not on Heparin products  - Heme consult appreciated: Plt 50k likely error, patient's baseline Plt is 110-150k. No workup needed at this time.  - will continue to monitor    #Dysphagia  - Minced and Moist diet with mildly thickened liquids  - SLP f/u    #Dysarthria  - SLP f/u    #Cognitive deficits  - reportedly near baseline per family. 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 CXR  atelectasis, small pleural effusions  -resolved    #Lt flank hematoma  -monitor CBC    #?Tongue swelling on admission  - scoped by ENT: no evidence of angioedema. Significant secretions and pt appears not to be able to clear secretions  -resolved     #s/p Possible UTI  - ceftriaxone 1000mg IV completed 5 days    #Chronic AFib on Eliquis   - c/w Eliquis     #HTN  - stable   -monitor vitals, adjust medications as necessary    #Dyslipidemia   - Chol 154, LDL 83, Triglycerides 91  - Continue Atorvastatin 40mg PO QHS once speech/swallow clears    #DM 2 w/ hypoglycemia on admission - well controlled  - Hypoglycemic on fingersticks requiring 1/2 D5  - Monitor FS  - On Lantus 40U in the AM and lispro 3U TID at home  - Ordered glucagon 1mg IM PRN for BG <70   -A1c=6.3  -lower dose of Insulin resumed.     #s/p MARCIN on CKD  - baseline: 1.3 6/2020. Back at baseline  - monitor     -Skin:  No active issues at this time       Precautions / PROPHYLAXIS:      - Falls    - Ortho: Weight bearing status: wbat      - DVT prophylaxis: Eliquis

## 2022-08-29 LAB
ALBUMIN SERPL ELPH-MCNC: 3.6 G/DL — SIGNIFICANT CHANGE UP (ref 3.5–5.2)
ALP SERPL-CCNC: 39 U/L — SIGNIFICANT CHANGE UP (ref 30–115)
ALT FLD-CCNC: 15 U/L — SIGNIFICANT CHANGE UP (ref 0–41)
ANION GAP SERPL CALC-SCNC: 10 MMOL/L — SIGNIFICANT CHANGE UP (ref 7–14)
AST SERPL-CCNC: 17 U/L — SIGNIFICANT CHANGE UP (ref 0–41)
BASOPHILS # BLD AUTO: 0.03 K/UL — SIGNIFICANT CHANGE UP (ref 0–0.2)
BASOPHILS NFR BLD AUTO: 0.5 % — SIGNIFICANT CHANGE UP (ref 0–1)
BILIRUB SERPL-MCNC: 0.4 MG/DL — SIGNIFICANT CHANGE UP (ref 0.2–1.2)
BUN SERPL-MCNC: 16 MG/DL — SIGNIFICANT CHANGE UP (ref 10–20)
CALCIUM SERPL-MCNC: 8.7 MG/DL — SIGNIFICANT CHANGE UP (ref 8.5–10.1)
CHLORIDE SERPL-SCNC: 103 MMOL/L — SIGNIFICANT CHANGE UP (ref 98–110)
CO2 SERPL-SCNC: 25 MMOL/L — SIGNIFICANT CHANGE UP (ref 17–32)
CREAT SERPL-MCNC: 1.3 MG/DL — SIGNIFICANT CHANGE UP (ref 0.7–1.5)
EGFR: 40 ML/MIN/1.73M2 — LOW
EOSINOPHIL # BLD AUTO: 0.23 K/UL — SIGNIFICANT CHANGE UP (ref 0–0.7)
EOSINOPHIL NFR BLD AUTO: 3.9 % — SIGNIFICANT CHANGE UP (ref 0–8)
GLUCOSE BLDC GLUCOMTR-MCNC: 130 MG/DL — HIGH (ref 70–99)
GLUCOSE BLDC GLUCOMTR-MCNC: 141 MG/DL — HIGH (ref 70–99)
GLUCOSE BLDC GLUCOMTR-MCNC: 143 MG/DL — HIGH (ref 70–99)
GLUCOSE SERPL-MCNC: 115 MG/DL — HIGH (ref 70–99)
HCT VFR BLD CALC: 32.1 % — LOW (ref 37–47)
HGB BLD-MCNC: 10.3 G/DL — LOW (ref 12–16)
IMM GRANULOCYTES NFR BLD AUTO: 0.3 % — SIGNIFICANT CHANGE UP (ref 0.1–0.3)
LYMPHOCYTES # BLD AUTO: 1.55 K/UL — SIGNIFICANT CHANGE UP (ref 1.2–3.4)
LYMPHOCYTES # BLD AUTO: 26.5 % — SIGNIFICANT CHANGE UP (ref 20.5–51.1)
MAGNESIUM SERPL-MCNC: 1.8 MG/DL — SIGNIFICANT CHANGE UP (ref 1.8–2.4)
MCHC RBC-ENTMCNC: 29.8 PG — SIGNIFICANT CHANGE UP (ref 27–31)
MCHC RBC-ENTMCNC: 32.1 G/DL — SIGNIFICANT CHANGE UP (ref 32–37)
MCV RBC AUTO: 92.8 FL — SIGNIFICANT CHANGE UP (ref 81–99)
MONOCYTES # BLD AUTO: 0.62 K/UL — HIGH (ref 0.1–0.6)
MONOCYTES NFR BLD AUTO: 10.6 % — HIGH (ref 1.7–9.3)
NEUTROPHILS # BLD AUTO: 3.4 K/UL — SIGNIFICANT CHANGE UP (ref 1.4–6.5)
NEUTROPHILS NFR BLD AUTO: 58.2 % — SIGNIFICANT CHANGE UP (ref 42.2–75.2)
NRBC # BLD: 0 /100 WBCS — SIGNIFICANT CHANGE UP (ref 0–0)
PLATELET # BLD AUTO: 159 K/UL — SIGNIFICANT CHANGE UP (ref 130–400)
POTASSIUM SERPL-MCNC: 4.2 MMOL/L — SIGNIFICANT CHANGE UP (ref 3.5–5)
POTASSIUM SERPL-SCNC: 4.2 MMOL/L — SIGNIFICANT CHANGE UP (ref 3.5–5)
PROT SERPL-MCNC: 5.9 G/DL — LOW (ref 6–8)
RBC # BLD: 3.46 M/UL — LOW (ref 4.2–5.4)
RBC # FLD: 14.3 % — SIGNIFICANT CHANGE UP (ref 11.5–14.5)
SARS-COV-2 RNA SPEC QL NAA+PROBE: SIGNIFICANT CHANGE UP
SODIUM SERPL-SCNC: 138 MMOL/L — SIGNIFICANT CHANGE UP (ref 135–146)
WBC # BLD: 5.85 K/UL — SIGNIFICANT CHANGE UP (ref 4.8–10.8)
WBC # FLD AUTO: 5.85 K/UL — SIGNIFICANT CHANGE UP (ref 4.8–10.8)

## 2022-08-29 RX ORDER — ACETAMINOPHEN 500 MG
650 TABLET ORAL
Refills: 0 | Status: DISCONTINUED | OUTPATIENT
Start: 2022-08-29 | End: 2022-08-31

## 2022-08-29 RX ADMIN — APIXABAN 5 MILLIGRAM(S): 2.5 TABLET, FILM COATED ORAL at 17:38

## 2022-08-29 RX ADMIN — Medication 650 MILLIGRAM(S): at 14:08

## 2022-08-29 RX ADMIN — Medication 650 MILLIGRAM(S): at 09:22

## 2022-08-29 RX ADMIN — Medication 5 UNIT(S): at 17:38

## 2022-08-29 RX ADMIN — Medication 10 MILLIGRAM(S): at 21:30

## 2022-08-29 RX ADMIN — Medication 5 UNIT(S): at 08:43

## 2022-08-29 RX ADMIN — NYSTATIN CREAM 1 APPLICATION(S): 100000 CREAM TOPICAL at 17:39

## 2022-08-29 RX ADMIN — Medication 5 UNIT(S): at 12:14

## 2022-08-29 RX ADMIN — Medication 100 MILLIGRAM(S): at 06:10

## 2022-08-29 RX ADMIN — INSULIN GLARGINE 15 UNIT(S): 100 INJECTION, SOLUTION SUBCUTANEOUS at 08:42

## 2022-08-29 RX ADMIN — Medication 650 MILLIGRAM(S): at 08:46

## 2022-08-29 RX ADMIN — NYSTATIN CREAM 1 APPLICATION(S): 100000 CREAM TOPICAL at 06:11

## 2022-08-29 RX ADMIN — Medication 650 MILLIGRAM(S): at 17:05

## 2022-08-29 RX ADMIN — APIXABAN 5 MILLIGRAM(S): 2.5 TABLET, FILM COATED ORAL at 06:11

## 2022-08-29 RX ADMIN — ATORVASTATIN CALCIUM 80 MILLIGRAM(S): 80 TABLET, FILM COATED ORAL at 21:30

## 2022-08-29 NOTE — PROGRESS NOTE ADULT - SUBJECTIVE AND OBJECTIVE BOX
Patient is a 88y old  Female who presents with a chief complaint of Stroke (18 Aug 2022 13:20)      HPI:  This is a 88 F hx of CAD, Diabetes, Hyperlipemia, Hypertension, Afib on Eliquis presents to ED BIBA from home for slurred speech and right sided facial droop. Patient went to bed that night around ~10PM at baseline. Patient woke up the next morning around ~11Am which is late for her. Daughter noted she was not herself,  checked her sugar and it was ~45. Noted the slurred speech. Also noted patient had trouble swallowing. Patient took her Eliquis dose this morning.  No history of strokes. CT Angio Brain Stroke Protocol  w/ IV Cont, No evidence of major vascular stenosis or occlusion. Scattered mild calcific plaque.  CT perfusion: Apparent perfusion abnormality (penumbra) within the brainstem and left posterior fossa with a total Tmax > 6s volume of 15 CT Brain Stroke Protocol shows no evidence of acute intracranial hemorrhage or large territory infarct. Chronic-appearing left cerebellar infarct (new since 2010). Moderate/severe chronic microvascular changes and parenchymal atrophy (moderately progressed since 2010). < from: MR Head No Cont (08.11.22 @ 17:30) > Motion degraded incomplete examination demonstrating small foci of acute infarct in the left temporal and parietal lobes. Extensive chronic microsphere ischemic changes. Her course was complicated by uncontrolled DM2 an MARCIN on CKD, now medically stable. She was placed on therapeutic Lovenox and then back onto Eliquis. She has been medically stable.     She was seen for a swallow assessment and cleared for Minced and Moist diet with mildly thickened liquids. She was seen by PT and OT and requires max assistance to stand and for bed mobility and min assistance to ambulate 15 ft. with a RW and mod assistance for UE dressing and max assistance for LE dressing. PTA, she was fully independent in all activities, using a straight cane or walker at times. She was evaluated by me on the Neuro Service and was found to be a good candidate for acute inpatient rehab. Her son-in-law at bedside states that she has had some cognitive deficits for a while and that it is near baseline.    TODAY'S SUBJECTIVE & REVIEW OF SYMPTOMS:  Patient was seen and assessed at bedside. No overnight events. Tolerating PT/OT. Tolerating oral diet. Voiding and passing stool spontaneously. Vital signs are stable.      Constiutional:    [  x ] WNL           [   ] poor appetite   [   ] insomnia   [   ] tired   Cardio:                [ x  ] WNL           [   ] CP   [   ] SILVERMAN   [   ] palpitations               Resp:                   [ x  ] WNL           [  x ] SOB at times at home  [   ] cough   [   ] wheezing   GI:                        [ x  ] WNL           [   ] constipation   [   ] diarrhea   [   ] abdominal pain   [   ] nausea   [   ] emesis                                :                      [   ] WNL           [   ] REYNA  [   ] dysuria   [   ] difficulty voiding  [ x ] incontinence           Endo:                   [ x  ] WNL          [   ] polyuria   [   ] temperature intolerance                 Skin:                     [ x  ] WNL          [   ] pain   [   ] wound   [   ] rash   MSK:                    [    ] WNL          [   ] muscle pain   [ x  ] joint pain/ stiffness - prior left shoulder injury with contracture   [   ] muscle tenderness   [   ] swelling   Neuro:                 [ x  ] WNL except as per HPI         [   ] HA   [   ] change in vision   [   ] tremor   [   ] weakness   [   ]dysphagia              Cognitive:           [   ] WNL           [ x  ] some impairment PTA    Psych:                  [  x ] WNL           [   ] hallucinations   [   ]agitation   [   ] delusion   [   ]depression    PHYSICAL EXAM    ICU Vital Signs Last 24 Hrs  T(C): 36.3 (29 Aug 2022 06:15), Max: 36.6 (28 Aug 2022 20:29)  T(F): 97.4 (29 Aug 2022 06:15), Max: 97.9 (28 Aug 2022 20:29)  HR: 81 (29 Aug 2022 06:15) (67 - 88)  BP: 137/66 (29 Aug 2022 06:15) (112/66 - 145/60)  BP(mean): --  ABP: --  ABP(mean): --  RR: 18 (29 Aug 2022 06:15) (17 - 18)  SpO2: --      General:[  x ] NAD, Resting Comfortable,   [   ] other:                                HEENT: [  x ] NC/AT, EOMI, PERRL , Normal Conjunctivae,   [   ] other:  Cardio: [  x ] RRR, no murmer,   [   ] other:                              Pulm: [ x  ] No Respiratory Distress,  Lungs CTAB,   [   ] other:                       Abdomen: [  x ]ND/NT, Soft,   [   ] other:    : [  x ] NO REYNA CATHETER, [   ] REYNA CATHETER- no meatal tear, no discharge, [   ] other:                                            MSK: [ x  ] No joint swelling,   [  x ] other:  healed bilat TKR scars, severe limited PROM left shoulder with pain                                    Ext: [  x ]No C/C/E, No calf tenderness,   [   ]other:    Skin: [ x  ]intact,   [   ] other:                                                                   Neurological Examination:  Cognitive: [    ] AAO x 3,   [  x  ]  other: oriented to self and event, not month or year                                                                     Attention:  [ x   ] intact,   [    ]  other:                            Memory: [    ] grossly intact    [  x  ]  other:   impaired  Mood/Affect: [ x   ] wnl,    [    ]  other:                                                                             Communication: [    ]Fluent, no dysarthria, following commands:  [  x  ] other: mild dysarthria. Follows commands/ conversation well. Fluent though word substitution errors  CN II - XII:  [ x   ] intact,  [    ] other:                                                                                        Motor:   RIGHT UE: [  x ] WNL,  [   ] other:  LEFT    UE: [ x  ] WNL,  [   ] other:  RIGHT LE: [  x ] WNL,  [   ] other:   LEFT    LE: [ x  ] WNL,  [   ] other:    Tone: [  x  ] wnl,   [    ]  other:  DTRs: [  x ]symmetric, [   ] other:  Coordination:   [  x  ] intact,   [    ] other:                                                                           Sensory: [ x   ] Intact to light touch,   [    ] other:    Current Level of Function:  Bed Mobility: steady assist  Transfers: steady assist  Gait: steady assist 65 feet with RW  Upper body dressing: mod assist  Lower body dressing: max assist    MEDICATIONS  (STANDING):  apixaban 5 milliGRAM(s) Oral every 12 hours  atorvastatin 80 milliGRAM(s) Oral at bedtime  dextrose 5%. 1000 milliLiter(s) (100 mL/Hr) IV Continuous <Continuous>  dextrose 5%. 1000 milliLiter(s) (50 mL/Hr) IV Continuous <Continuous>  dextrose 50% Injectable 25 Gram(s) IV Push once  dextrose 50% Injectable 12.5 Gram(s) IV Push once  dextrose 50% Injectable 25 Gram(s) IV Push once  glucagon  Injectable 1 milliGRAM(s) IntraMuscular once  insulin glargine Injectable (LANTUS) 15 Unit(s) SubCutaneous every morning  insulin lispro Injectable (ADMELOG) 5 Unit(s) SubCutaneous three times a day before meals  melatonin 10 milliGRAM(s) Oral at bedtime  metoprolol succinate  milliGRAM(s) Oral daily  nystatin Powder 1 Application(s) Topical every 12 hours    MEDICATIONS  (PRN):  acetaminophen     Tablet .. 650 milliGRAM(s) Oral every 6 hours PRN Temp greater or equal to 38C (100.4F), Mild Pain (1 - 3)  aluminum hydroxide/magnesium hydroxide/simethicone Suspension 30 milliLiter(s) Oral every 4 hours PRN Dyspepsia  dextrose Oral Gel 15 Gram(s) Oral once PRN Blood Glucose LESS THAN 70 milliGRAM(s)/deciliter  magnesium hydroxide Suspension 30 milliLiter(s) Oral daily PRN Constipation  senna 2 Tablet(s) Oral at bedtime PRN Constipation      RECENT LABS/IMAGING              POCT Blood Glucose.: 189 mg/dL (08-28-22 @ 16:20)  POCT Blood Glucose.: 115 mg/dL (08-28-22 @ 12:15)  POCT Blood Glucose.: 121 mg/dL (08-28-22 @ 07:52)  POCT Blood Glucose.: 142 mg/dL (08-27-22 @ 16:30)  POCT Blood Glucose.: 153 mg/dL (08-27-22 @ 11:06)  POCT Blood Glucose.: 110 mg/dL (08-27-22 @ 07:42)  POCT Blood Glucose.: 125 mg/dL (08-26-22 @ 21:33)  POCT Blood Glucose.: 136 mg/dL (08-26-22 @ 16:21)  POCT Blood Glucose.: 119 mg/dL (08-26-22 @ 11:19)  POCT Blood Glucose.: 127 mg/dL (08-26-22 @ 07:16)  POCT Blood Glucose.: 183 mg/dL (08-25-22 @ 21:31)  POCT Blood Glucose.: 98 mg/dL (08-25-22 @ 16:31)   Patient is a 88y old  Female who presents with a chief complaint of Stroke (18 Aug 2022 13:20)      HPI:  This is a 88 F hx of CAD, Diabetes, Hyperlipemia, Hypertension, Afib on Eliquis presents to ED BIBA from home for slurred speech and right sided facial droop. Patient went to bed that night around ~10PM at baseline. Patient woke up the next morning around ~11Am which is late for her. Daughter noted she was not herself,  checked her sugar and it was ~45. Noted the slurred speech. Also noted patient had trouble swallowing. Patient took her Eliquis dose this morning.  No history of strokes. CT Angio Brain Stroke Protocol  w/ IV Cont, No evidence of major vascular stenosis or occlusion. Scattered mild calcific plaque.  CT perfusion: Apparent perfusion abnormality (penumbra) within the brainstem and left posterior fossa with a total Tmax > 6s volume of 15 CT Brain Stroke Protocol shows no evidence of acute intracranial hemorrhage or large territory infarct. Chronic-appearing left cerebellar infarct (new since 2010). Moderate/severe chronic microvascular changes and parenchymal atrophy (moderately progressed since 2010). < from: MR Head No Cont (08.11.22 @ 17:30) > Motion degraded incomplete examination demonstrating small foci of acute infarct in the left temporal and parietal lobes. Extensive chronic microsphere ischemic changes. Her course was complicated by uncontrolled DM2 an MARCIN on CKD, now medically stable. She was placed on therapeutic Lovenox and then back onto Eliquis. She has been medically stable.     She was seen for a swallow assessment and cleared for Minced and Moist diet with mildly thickened liquids. She was seen by PT and OT and requires max assistance to stand and for bed mobility and min assistance to ambulate 15 ft. with a RW and mod assistance for UE dressing and max assistance for LE dressing. PTA, she was fully independent in all activities, using a straight cane or walker at times. She was evaluated by me on the Neuro Service and was found to be a good candidate for acute inpatient rehab. Her son-in-law at bedside states that she has had some cognitive deficits for a while and that it is near baseline.    TODAY'S SUBJECTIVE & REVIEW OF SYMPTOMS:  Patient was seen and assessed at bedside. No overnight events. Endorses continued chronic shoulder pain this AM, will start Tylenol standing for pain. Tolerating PT/OT. Tolerating oral diet. Voiding and passing stool spontaneously. Vital signs are stable.      Constiutional:    [  x ] WNL           [   ] poor appetite   [   ] insomnia   [   ] tired   Cardio:                [ x  ] WNL           [   ] CP   [   ] SILVERMAN   [   ] palpitations               Resp:                   [ x  ] WNL           [  x ] SOB at times at home  [   ] cough   [   ] wheezing   GI:                        [ x  ] WNL           [   ] constipation   [   ] diarrhea   [   ] abdominal pain   [   ] nausea   [   ] emesis                                :                      [   ] WNL           [   ] REYNA  [   ] dysuria   [   ] difficulty voiding  [ x ] incontinence           Endo:                   [ x  ] WNL          [   ] polyuria   [   ] temperature intolerance                 Skin:                     [ x  ] WNL          [   ] pain   [   ] wound   [   ] rash   MSK:                    [    ] WNL          [   ] muscle pain   [ x  ] joint pain/ stiffness - prior left shoulder injury with contracture   [   ] muscle tenderness   [   ] swelling   Neuro:                 [ x  ] WNL except as per HPI         [   ] HA   [   ] change in vision   [   ] tremor   [   ] weakness   [   ]dysphagia              Cognitive:           [   ] WNL           [ x  ] some impairment PTA    Psych:                  [  x ] WNL           [   ] hallucinations   [   ]agitation   [   ] delusion   [   ]depression    PHYSICAL EXAM    ICU Vital Signs Last 24 Hrs  T(C): 36.3 (29 Aug 2022 06:15), Max: 36.6 (28 Aug 2022 20:29)  T(F): 97.4 (29 Aug 2022 06:15), Max: 97.9 (28 Aug 2022 20:29)  HR: 81 (29 Aug 2022 06:15) (67 - 88)  BP: 137/66 (29 Aug 2022 06:15) (112/66 - 145/60)  BP(mean): --  ABP: --  ABP(mean): --  RR: 18 (29 Aug 2022 06:15) (17 - 18)  SpO2: --      General:[  x ] NAD, Resting Comfortable,   [   ] other:                                HEENT: [  x ] NC/AT, EOMI, PERRL , Normal Conjunctivae,   [   ] other:  Cardio: [  x ] RRR, no murmer,   [   ] other:                              Pulm: [ x  ] No Respiratory Distress,  Lungs CTAB,   [   ] other:                       Abdomen: [  x ]ND/NT, Soft,   [   ] other:    : [  x ] NO REYNA CATHETER, [   ] REYNA CATHETER- no meatal tear, no discharge, [   ] other:                                            MSK: [ x  ] No joint swelling,   [  x ] other:  healed bilat TKR scars, severe limited PROM left shoulder with pain                                    Ext: [  x ]No C/C/E, No calf tenderness,   [   ]other:    Skin: [ x  ]intact,   [   ] other:                                                                   Neurological Examination:  Cognitive: [    ] AAO x 3,   [  x  ]  other: oriented to self and event, not month or year                                                                     Attention:  [ x   ] intact,   [    ]  other:                            Memory: [    ] grossly intact    [  x  ]  other:   impaired  Mood/Affect: [ x   ] wnl,    [    ]  other:                                                                             Communication: [    ]Fluent, no dysarthria, following commands:  [  x  ] other: mild dysarthria. Follows commands/ conversation well. Fluent though word substitution errors  CN II - XII:  [ x   ] intact,  [    ] other:                                                                                        Motor:   RIGHT UE: [  x ] WNL,  [   ] other:  LEFT    UE: [ x  ] WNL,  [   ] other:  RIGHT LE: [  x ] WNL,  [   ] other:   LEFT    LE: [ x  ] WNL,  [   ] other:    Tone: [  x  ] wnl,   [    ]  other:  DTRs: [  x ]symmetric, [   ] other:  Coordination:   [  x  ] intact,   [    ] other:                                                                           Sensory: [ x   ] Intact to light touch,   [    ] other:    Current Level of Function:  Bed Mobility: touch assist-partial assist  Transfers: touch assist  Gait: touch assist 100ft with RW  Lower body dressing: touch assist    MEDICATIONS  (STANDING):  apixaban 5 milliGRAM(s) Oral every 12 hours  atorvastatin 80 milliGRAM(s) Oral at bedtime  dextrose 5%. 1000 milliLiter(s) (100 mL/Hr) IV Continuous <Continuous>  dextrose 5%. 1000 milliLiter(s) (50 mL/Hr) IV Continuous <Continuous>  dextrose 50% Injectable 25 Gram(s) IV Push once  dextrose 50% Injectable 12.5 Gram(s) IV Push once  dextrose 50% Injectable 25 Gram(s) IV Push once  glucagon  Injectable 1 milliGRAM(s) IntraMuscular once  insulin glargine Injectable (LANTUS) 15 Unit(s) SubCutaneous every morning  insulin lispro Injectable (ADMELOG) 5 Unit(s) SubCutaneous three times a day before meals  melatonin 10 milliGRAM(s) Oral at bedtime  metoprolol succinate  milliGRAM(s) Oral daily  nystatin Powder 1 Application(s) Topical every 12 hours    MEDICATIONS  (PRN):  acetaminophen     Tablet .. 650 milliGRAM(s) Oral every 6 hours PRN Temp greater or equal to 38C (100.4F), Mild Pain (1 - 3)  aluminum hydroxide/magnesium hydroxide/simethicone Suspension 30 milliLiter(s) Oral every 4 hours PRN Dyspepsia  dextrose Oral Gel 15 Gram(s) Oral once PRN Blood Glucose LESS THAN 70 milliGRAM(s)/deciliter  magnesium hydroxide Suspension 30 milliLiter(s) Oral daily PRN Constipation  senna 2 Tablet(s) Oral at bedtime PRN Constipation      RECENT LABS/IMAGING                          10.3   5.85  )-----------( 159      ( 29 Aug 2022 07:20 )             32.1     08-29    138  |  103  |  16  ----------------------------<  115<H>  4.2   |  25  |  1.3    Ca    8.7      29 Aug 2022 07:20  Mg     1.8     08-29    TPro  5.9<L>  /  Alb  3.6  /  TBili  0.4  /  DBili  x   /  AST  17  /  ALT  15  /  AlkPhos  39  08-29        POCT Blood Glucose.: 141 mg/dL (08-29-22 @ 08:23)  POCT Blood Glucose.: 189 mg/dL (08-28-22 @ 16:20)  POCT Blood Glucose.: 115 mg/dL (08-28-22 @ 12:15)  POCT Blood Glucose.: 121 mg/dL (08-28-22 @ 07:52)  POCT Blood Glucose.: 142 mg/dL (08-27-22 @ 16:30)  POCT Blood Glucose.: 153 mg/dL (08-27-22 @ 11:06)  POCT Blood Glucose.: 110 mg/dL (08-27-22 @ 07:42)  POCT Blood Glucose.: 125 mg/dL (08-26-22 @ 21:33)  POCT Blood Glucose.: 136 mg/dL (08-26-22 @ 16:21)  POCT Blood Glucose.: 119 mg/dL (08-26-22 @ 11:19)  POCT Blood Glucose.: 127 mg/dL (08-26-22 @ 07:16)  POCT Blood Glucose.: 183 mg/dL (08-25-22 @ 21:31)   Patient is a 88y old  Female who presents with a chief complaint of Stroke (18 Aug 2022 13:20)      HPI:  This is a 88 F hx of CAD, Diabetes, Hyperlipemia, Hypertension, Afib on Eliquis presents to ED BIBA from home for slurred speech and right sided facial droop. Patient went to bed that night around ~10PM at baseline. Patient woke up the next morning around ~11Am which is late for her. Daughter noted she was not herself,  checked her sugar and it was ~45. Noted the slurred speech. Also noted patient had trouble swallowing. Patient took her Eliquis dose this morning.  No history of strokes. CT Angio Brain Stroke Protocol  w/ IV Cont, No evidence of major vascular stenosis or occlusion. Scattered mild calcific plaque.  CT perfusion: Apparent perfusion abnormality (penumbra) within the brainstem and left posterior fossa with a total Tmax > 6s volume of 15 CT Brain Stroke Protocol shows no evidence of acute intracranial hemorrhage or large territory infarct. Chronic-appearing left cerebellar infarct (new since 2010). Moderate/severe chronic microvascular changes and parenchymal atrophy (moderately progressed since 2010). < from: MR Head No Cont (08.11.22 @ 17:30) > Motion degraded incomplete examination demonstrating small foci of acute infarct in the left temporal and parietal lobes. Extensive chronic microsphere ischemic changes. Her course was complicated by uncontrolled DM2 an MARCIN on CKD, now medically stable. She was placed on therapeutic Lovenox and then back onto Eliquis. She has been medically stable.     She was seen for a swallow assessment and cleared for Minced and Moist diet with mildly thickened liquids. She was seen by PT and OT and requires max assistance to stand and for bed mobility and min assistance to ambulate 15 ft. with a RW and mod assistance for UE dressing and max assistance for LE dressing. PTA, she was fully independent in all activities, using a straight cane or walker at times. She was evaluated by me on the Neuro Service and was found to be a good candidate for acute inpatient rehab. Her son-in-law at bedside states that she has had some cognitive deficits for a while and that it is near baseline.    TODAY'S SUBJECTIVE & REVIEW OF SYMPTOMS:  Patient was seen and assessed at bedside. No overnight events. Endorses continued chronic shoulder pain this AM, will start Tylenol standing for pain. Tolerating PT/OT. Tolerating oral diet. Voiding and passing stool spontaneously. Vital signs are stable.      Constiutional:    [  x ] WNL           [   ] poor appetite   [   ] insomnia   [   ] tired   Cardio:                [ x  ] WNL           [   ] CP   [   ] SILVERMAN   [   ] palpitations               Resp:                   [ x  ] WNL           [  x ] SOB at times at home  [   ] cough   [   ] wheezing   GI:                        [ x  ] WNL           [   ] constipation   [   ] diarrhea   [   ] abdominal pain   [   ] nausea   [   ] emesis                                :                      [   ] WNL           [   ] REYNA  [   ] dysuria   [   ] difficulty voiding  [ x ] incontinence           Endo:                   [ x  ] WNL          [   ] polyuria   [   ] temperature intolerance                 Skin:                     [ x  ] WNL          [   ] pain   [   ] wound   [   ] rash   MSK:                    [    ] WNL          [   ] muscle pain   [ x  ] joint pain/ stiffness - prior left shoulder injury with contracture   [   ] muscle tenderness   [   ] swelling   Neuro:                 [ x  ] WNL except as per HPI         [   ] HA   [   ] change in vision   [   ] tremor   [   ] weakness   [   ]dysphagia              Cognitive:           [   ] WNL           [ x  ] some impairment PTA    Psych:                  [  x ] WNL           [   ] hallucinations   [   ]agitation   [   ] delusion   [   ]depression    PHYSICAL EXAM    ICU Vital Signs Last 24 Hrs  T(C): 36.3 (29 Aug 2022 06:15), Max: 36.6 (28 Aug 2022 20:29)  T(F): 97.4 (29 Aug 2022 06:15), Max: 97.9 (28 Aug 2022 20:29)  HR: 81 (29 Aug 2022 06:15) (67 - 88)  BP: 137/66 (29 Aug 2022 06:15) (112/66 - 145/60)  BP(mean): --  ABP: --  ABP(mean): --  RR: 18 (29 Aug 2022 06:15) (17 - 18)  SpO2: --      General:[  x ] NAD, Resting Comfortable,   [   ] other:                                HEENT: [  x ] NC/AT, EOMI, PERRL , Normal Conjunctivae,   [   ] other:  Cardio: [  x ] RRR, no murmer,   [   ] other:                              Pulm: [ x  ] No Respiratory Distress,  Lungs CTAB,   [   ] other:                       Abdomen: [  x ]ND/NT, Soft,   [   ] other:    : [  x ] NO REYNA CATHETER, [   ] REYNA CATHETER- no meatal tear, no discharge, [   ] other:                                            MSK: [ x  ] No joint swelling,   [  x ] other:  healed bilat TKR scars, severe limited PROM left shoulder with pain                                    Ext: [  x ]No C/C/E, No calf tenderness,   [   ]other:    Skin: [ x  ]intact,   [   ] other:                                                                   Neurological Examination:  Cognitive: [    ] AAO x 3,   [  x  ]  other: oriented to self and event, not month or year                                                                     Attention:  [ x   ] intact,   [    ]  other:                            Memory: [    ] grossly intact    [  x  ]  other:   impaired  Mood/Affect: [ x   ] wnl,    [    ]  other:                                                                             Communication: [    ]Fluent, no dysarthria, following commands:  [  x  ] other: mild dysarthria. Follows commands/ conversation well. Fluent though word substitution errors  CN II - XII:  [ x   ] intact,  [    ] other:                                                                                        Motor:   RIGHT UE: [  x ] WNL,  [   ] other:  LEFT    UE: [ x  ] WNL,  [   ] other: limited by left shoulder pain/ contracture  RIGHT LE: [  x ] WNL,  [   ] other:   LEFT    LE: [ x  ] WNL,  [   ] other:    Tone: [  x  ] wnl,   [    ]  other:  DTRs: [  x ]symmetric, [   ] other:  Coordination:   [  x  ] intact,   [    ] other:                                                                           Sensory: [ x   ] Intact to light touch,   [    ] other:    Current Level of Function:  Bed Mobility: touch assist-partial assist  Transfers: touch assist  Gait: touch assist 100ft with RW  Lower body dressing: touch assist    MEDICATIONS  (STANDING):  apixaban 5 milliGRAM(s) Oral every 12 hours  atorvastatin 80 milliGRAM(s) Oral at bedtime  dextrose 5%. 1000 milliLiter(s) (100 mL/Hr) IV Continuous <Continuous>  dextrose 5%. 1000 milliLiter(s) (50 mL/Hr) IV Continuous <Continuous>  dextrose 50% Injectable 25 Gram(s) IV Push once  dextrose 50% Injectable 12.5 Gram(s) IV Push once  dextrose 50% Injectable 25 Gram(s) IV Push once  glucagon  Injectable 1 milliGRAM(s) IntraMuscular once  insulin glargine Injectable (LANTUS) 15 Unit(s) SubCutaneous every morning  insulin lispro Injectable (ADMELOG) 5 Unit(s) SubCutaneous three times a day before meals  melatonin 10 milliGRAM(s) Oral at bedtime  metoprolol succinate  milliGRAM(s) Oral daily  nystatin Powder 1 Application(s) Topical every 12 hours    MEDICATIONS  (PRN):  acetaminophen     Tablet .. 650 milliGRAM(s) Oral every 6 hours PRN Temp greater or equal to 38C (100.4F), Mild Pain (1 - 3)  aluminum hydroxide/magnesium hydroxide/simethicone Suspension 30 milliLiter(s) Oral every 4 hours PRN Dyspepsia  dextrose Oral Gel 15 Gram(s) Oral once PRN Blood Glucose LESS THAN 70 milliGRAM(s)/deciliter  magnesium hydroxide Suspension 30 milliLiter(s) Oral daily PRN Constipation  senna 2 Tablet(s) Oral at bedtime PRN Constipation      RECENT LABS/IMAGING                          10.3   5.85  )-----------( 159      ( 29 Aug 2022 07:20 )             32.1     08-29    138  |  103  |  16  ----------------------------<  115<H>  4.2   |  25  |  1.3    Ca    8.7      29 Aug 2022 07:20  Mg     1.8     08-29    TPro  5.9<L>  /  Alb  3.6  /  TBili  0.4  /  DBili  x   /  AST  17  /  ALT  15  /  AlkPhos  39  08-29        POCT Blood Glucose.: 141 mg/dL (08-29-22 @ 08:23)  POCT Blood Glucose.: 189 mg/dL (08-28-22 @ 16:20)  POCT Blood Glucose.: 115 mg/dL (08-28-22 @ 12:15)  POCT Blood Glucose.: 121 mg/dL (08-28-22 @ 07:52)  POCT Blood Glucose.: 142 mg/dL (08-27-22 @ 16:30)  POCT Blood Glucose.: 153 mg/dL (08-27-22 @ 11:06)  POCT Blood Glucose.: 110 mg/dL (08-27-22 @ 07:42)  POCT Blood Glucose.: 125 mg/dL (08-26-22 @ 21:33)  POCT Blood Glucose.: 136 mg/dL (08-26-22 @ 16:21)  POCT Blood Glucose.: 119 mg/dL (08-26-22 @ 11:19)  POCT Blood Glucose.: 127 mg/dL (08-26-22 @ 07:16)  POCT Blood Glucose.: 183 mg/dL (08-25-22 @ 21:31)

## 2022-08-29 NOTE — PROGRESS NOTE ADULT - ASSESSMENT
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   Patient requires an acute multidisciplinary rehab program including PT, OT and ST and neuropsych. eval to maximize her function for a safe discharge home and to monitor her labile DM 2.    #Left Acute ischemic strokes with impaired mobility and ADL function  - CTH: negative for acute findings. CTA: no LVO. CTP: apparent perfusion abnormality (penumbra) within the brainstem and left posterior fossa with a total Tmax > 6s volume of 15cc. This may be artifactual  < from: MR Head No Cont (08.11.22 @ 17:30) >  Motion degraded incomplete examination demonstrating small foci of acute   infarct in the left temporal and parietal lobes.  Extensive chronic microsphere ischemic changes.< end of copied text >    - Permissive HTN  - c/w ASA and statin   - A1C 6.3, Chol 154, LDL 83, Triglycerides 91  -  MRI brain non contrast < from: MR Head No Cont (08.11.22 @ 17:30) >  Motion degraded incomplete examination demonstrating small foci of acute infarct in the left temporal and parietal lobes.  Extensive chronic microsphere ischemic changes.  < end of copied text >  -  TTE=nl EF  - PT/OT/ST/Neuropsych eval  - added back and increased Eliquis from home dose of 2.5 BID to Eliquis 5 BID   - Making gains. Transfers and ambulates with RW with CG, mod A for UE dressing, max assist for LE    #Thrombocytopenia  - Plt count 50K 8/23. Improved to 107K 8/23  - Etiol unclear. Not on Heparin products  - Heme consult appreciated: Plt 50k likely error, patient's baseline Plt is 110-150k. No workup needed at this time.  - will continue to monitor    #Dysphagia  - Minced and Moist diet with mildly thickened liquids  - SLP f/u    #Dysarthria  - SLP f/u    #Cognitive deficits  - reportedly near baseline per family. 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 CXR  atelectasis, small pleural effusions  -resolved    #Lt flank hematoma  -monitor CBC    #?Tongue swelling on admission  - scoped by ENT: no evidence of angioedema. Significant secretions and pt appears not to be able to clear secretions  -resolved     #s/p Possible UTI  - ceftriaxone 1000mg IV completed 5 days    #Chronic AFib on Eliquis   - c/w Eliquis     #HTN  - stable   -monitor vitals, adjust medications as necessary    #Dyslipidemia   - Chol 154, LDL 83, Triglycerides 91  - Continue Atorvastatin 40mg PO QHS once speech/swallow clears    #DM 2 w/ hypoglycemia on admission - well controlled  - Hypoglycemic on fingersticks requiring 1/2 D5  - Monitor FS  - On Lantus 40U in the AM and lispro 3U TID at home  - Ordered glucagon 1mg IM PRN for BG <70   -A1c=6.3  -lower dose of Insulin resumed.     #s/p MARCIN on CKD  - baseline: 1.3 6/2020. 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   Patient requires an acute multidisciplinary rehab program including PT, OT and ST and neuropsych. eval to maximize her function for a safe discharge home and to monitor her labile DM 2.    #Left Acute ischemic strokes with impaired mobility and ADL function  - CTH: negative for acute findings. CTA: no LVO. CTP: apparent perfusion abnormality (penumbra) within the brainstem and left posterior fossa with a total Tmax > 6s volume of 15cc. This may be artifactual  < from: MR Head No Cont (08.11.22 @ 17:30) >  Motion degraded incomplete examination demonstrating small foci of acute   infarct in the left temporal and parietal lobes.  Extensive chronic microsphere ischemic changes.< end of copied text >    - Permissive HTN  - c/w ASA and statin   - A1C 6.3, Chol 154, LDL 83, Triglycerides 91  -  MRI brain non contrast < from: MR Head No Cont (08.11.22 @ 17:30) >  Motion degraded incomplete examination demonstrating small foci of acute infarct in the left temporal and parietal lobes.  Extensive chronic microsphere ischemic changes.  < end of copied text >  -  TTE=nl EF  - PT/OT/ST/Neuropsych eval  - added back and increased Eliquis from home dose of 2.5 BID to Eliquis 5 BID   - Making gains. Transfers and ambulates with RW with CG, mod A for UE dressing, max assist for LE    #Chronic shoulder pain  Shoulder injury weeks before admission, improves with Tylenol at home  - Tylenol for pain    #Thrombocytopenia  - Plt count 50K 8/23. Improved to 107K 8/23  - Etiol unclear. Not on Heparin products  - Heme consult appreciated: Plt 50k likely error, patient's baseline Plt is 110-150k. No workup needed at this time.  - will continue to monitor    #Dysphagia  - Minced and Moist diet with mildly thickened liquids  - SLP f/u    #Dysarthria  - SLP f/u    #Cognitive deficits  - reportedly near baseline per family. 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 CXR  atelectasis, small pleural effusions  -resolved    #Lt flank hematoma  -monitor CBC    #?Tongue swelling on admission  - scoped by ENT: no evidence of angioedema. Significant secretions and pt appears not to be able to clear secretions  -resolved     #s/p Possible UTI  - ceftriaxone 1000mg IV completed 5 days    #Chronic AFib on Eliquis   - c/w Eliquis     #HTN  - stable   -monitor vitals, adjust medications as necessary    #Dyslipidemia   - Chol 154, LDL 83, Triglycerides 91  - Continue Atorvastatin 40mg PO QHS once speech/swallow clears    #DM 2 w/ hypoglycemia on admission - well controlled  - Hypoglycemic on fingersticks requiring 1/2 D5  - Monitor FS  - On Lantus 40U in the AM and lispro 3U TID at home  - Ordered glucagon 1mg IM PRN for BG <70   -A1c=6.3  -lower dose of Insulin resumed.     #s/p MARCIN on CKD  - baseline: 1.3 6/2020. 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   Patient requires an acute multidisciplinary rehab program including PT, OT and ST and neuropsych. eval to maximize her function for a safe discharge home and to monitor her labile DM 2.    #Left Acute ischemic strokes with impaired mobility and ADL function  - CTH: negative for acute findings. CTA: no LVO. CTP: apparent perfusion abnormality (penumbra) within the brainstem and left posterior fossa with a total Tmax > 6s volume of 15cc. This may be artifactual  < from: MR Head No Cont (08.11.22 @ 17:30) >  Motion degraded incomplete examination demonstrating small foci of acute   infarct in the left temporal and parietal lobes.  Extensive chronic microsphere ischemic changes.< end of copied text >    - Permissive HTN  - c/w ASA and statin   - A1C 6.3, Chol 154, LDL 83, Triglycerides 91  -  MRI brain non contrast < from: MR Head No Cont (08.11.22 @ 17:30) >  Motion degraded incomplete examination demonstrating small foci of acute infarct in the left temporal and parietal lobes.  Extensive chronic microsphere ischemic changes.  < end of copied text >  -  TTE=nl EF  - PT/OT/ST/Neuropsych eval  - added back and increased Eliquis from home dose of 2.5 BID to Eliquis 5 BID   - Making gains. Transfers and ambulates with RW with CG, mod A for UE dressing, max assist for LE    #Shoulder pain  Shoulder injury weeks before admission, improves with Tylenol at home  - Tylenol for pain    #Thrombocytopenia  - Plt count 50K 8/23. Improved to 107K 8/23  - Etiol unclear. Not on Heparin products  - Heme consult appreciated: Plt 50k likely error, patient's baseline Plt is 110-150k. No workup needed at this time.  - will continue to monitor    #Dysphagia  - Minced and Moist diet with mildly thickened liquids  - SLP f/u    #Dysarthria  - SLP f/u    #Cognitive deficits  - reportedly near baseline per family. 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 CXR  atelectasis, small pleural effusions  -resolved    #Lt flank hematoma  -monitor CBC    #?Tongue swelling on admission  - scoped by ENT: no evidence of angioedema. Significant secretions and pt appears not to be able to clear secretions  -resolved     #s/p Possible UTI  - ceftriaxone 1000mg IV completed 5 days    #Chronic AFib on Eliquis   - c/w Eliquis     #HTN  - stable   -monitor vitals, adjust medications as necessary    #Dyslipidemia   - Chol 154, LDL 83, Triglycerides 91  - Continue Atorvastatin 40mg PO QHS once speech/swallow clears    #DM 2 w/ hypoglycemia on admission - well controlled  - Hypoglycemic on fingersticks requiring 1/2 D5  - Monitor FS  - On Lantus 40U in the AM and lispro 3U TID at home  - Ordered glucagon 1mg IM PRN for BG <70   -A1c=6.3  -lower dose of Insulin resumed.     #s/p MARCIN on CKD  - baseline: 1.3 6/2020. 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   Patient requires an acute multidisciplinary rehab program including PT, OT and ST and neuropsych. eval to maximize her function for a safe discharge home and to monitor her labile DM 2.    #Left Acute ischemic strokes with impaired mobility and ADL function  - CTH: negative for acute findings. CTA: no LVO. CTP: apparent perfusion abnormality (penumbra) within the brainstem and left posterior fossa with a total Tmax > 6s volume of 15cc. This may be artifactual  < from: MR Head No Cont (08.11.22 @ 17:30) >  Motion degraded incomplete examination demonstrating small foci of acute   infarct in the left temporal and parietal lobes.  Extensive chronic microsphere ischemic changes.< end of copied text >  - c/w ASA and statin   - added back and increased Eliquis from home dose of 2.5 BID to Eliquis 5 BID   - A1C 6.3, Chol 154, LDL 83, Triglycerides 91  -  TTE=nl EF  - PT/OT/ST/Neuropsych   - Making gains. Transfers and ambulates with RW with CG, mod A for UE dressing, max assist for LE    #Shoulder pain  Shoulder injury weeks before admission, improves with Tylenol at home  - standing Tylenol for pain    #Thrombocytopenia  - Plt count 50K 8/23. Improved to 107K 8/23  - Etiol unclear. Not on Heparin products  - Heme consult appreciated: Plt 50k likely error, patient's baseline Plt is 110-150k. No workup needed at this time. Outpatient f/u  - will continue to monitor    #Dysphagia  - Minced and Moist diet with mildly thickened liquids  - SLP f/u    #Dysarthria  - SLP f/u    #Cognitive deficits  - reportedly near baseline per family. 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 CXR  atelectasis, small pleural effusions  -resolved    #Lt flank hematoma  -monitor CBC    #?Tongue swelling on admission  - scoped by ENT: no evidence of angioedema. Significant secretions and pt appears not to be able to clear secretions  -resolved     #s/p Possible UTI  - s/p ceftriaxone 1000mg IV completed 5 days    #Chronic AFib on Eliquis   - c/w Eliquis     #HTN  - stable   -monitor vitals, adjust medications as necessary    #Dyslipidemia   - Chol 154, LDL 83, Triglycerides 91  - Continue Atorvastatin 40mg PO QHS     #DM 2 w/ hypoglycemia on admission - well controlled  - Hypoglycemic on fingersticks requiring 1/2 D5  - Monitor FS  - On Lantus 40U in the AM and lispro 3U TID at home  - controlled on 15 units of Lantus and 5 of Lispro before meals  - Ordered glucagon 1mg IM PRN for BG <70   -A1c=6.3  -lower dose of Insulin resumed.     #s/p MARCIN on CKD  - baseline: 1.3 6/2020. Back at baseline  - monitor     -Skin:  No active issues at this time       Precautions / PROPHYLAXIS:      - Falls    - Ortho: Weight bearing status: wbat      - DVT prophylaxis: Eliquis

## 2022-08-30 ENCOUNTER — TRANSCRIPTION ENCOUNTER (OUTPATIENT)
Age: 87
End: 2022-08-30

## 2022-08-30 LAB
GLUCOSE BLDC GLUCOMTR-MCNC: 106 MG/DL — HIGH (ref 70–99)
GLUCOSE BLDC GLUCOMTR-MCNC: 130 MG/DL — HIGH (ref 70–99)
GLUCOSE BLDC GLUCOMTR-MCNC: 151 MG/DL — HIGH (ref 70–99)

## 2022-08-30 PROCEDURE — 74230 X-RAY XM SWLNG FUNCJ C+: CPT | Mod: 26

## 2022-08-30 RX ORDER — APIXABAN 2.5 MG/1
1 TABLET, FILM COATED ORAL
Qty: 60 | Refills: 0
Start: 2022-08-30 | End: 2022-09-28

## 2022-08-30 RX ORDER — ATORVASTATIN CALCIUM 80 MG/1
1 TABLET, FILM COATED ORAL
Qty: 30 | Refills: 0
Start: 2022-08-30 | End: 2022-09-28

## 2022-08-30 RX ADMIN — NYSTATIN CREAM 1 APPLICATION(S): 100000 CREAM TOPICAL at 05:54

## 2022-08-30 RX ADMIN — Medication 10 MILLIGRAM(S): at 21:44

## 2022-08-30 RX ADMIN — Medication 100 MILLIGRAM(S): at 06:02

## 2022-08-30 RX ADMIN — Medication 650 MILLIGRAM(S): at 12:26

## 2022-08-30 RX ADMIN — Medication 650 MILLIGRAM(S): at 08:30

## 2022-08-30 RX ADMIN — APIXABAN 5 MILLIGRAM(S): 2.5 TABLET, FILM COATED ORAL at 17:28

## 2022-08-30 RX ADMIN — Medication 5 UNIT(S): at 17:28

## 2022-08-30 RX ADMIN — INSULIN GLARGINE 15 UNIT(S): 100 INJECTION, SOLUTION SUBCUTANEOUS at 07:50

## 2022-08-30 RX ADMIN — APIXABAN 5 MILLIGRAM(S): 2.5 TABLET, FILM COATED ORAL at 06:02

## 2022-08-30 RX ADMIN — Medication 5 UNIT(S): at 07:50

## 2022-08-30 RX ADMIN — ATORVASTATIN CALCIUM 80 MILLIGRAM(S): 80 TABLET, FILM COATED ORAL at 21:44

## 2022-08-30 RX ADMIN — Medication 5 UNIT(S): at 11:30

## 2022-08-30 NOTE — DISCHARGE NOTE PROVIDER - CARE PROVIDERS DIRECT ADDRESSES
,DirectAddress_Unknown,DirectAddress_Unknown,[email or web address]

## 2022-08-30 NOTE — PROGRESS NOTE ADULT - TIME BILLING
patient cognitive assessment and feedback
60 mins pt and family contact
patient and family contact and feedback
patient contact and cognitive assessment

## 2022-08-30 NOTE — PROGRESS NOTE ADULT - SUBJECTIVE AND OBJECTIVE BOX
Patient is a 88y old  Female who presents with a chief complaint of Stroke (18 Aug 2022 13:20)      HPI:  This is a 88 F hx of CAD, Diabetes, Hyperlipemia, Hypertension, Afib on Eliquis presents to ED BIBA from home for slurred speech and right sided facial droop. Patient went to bed that night around ~10PM at baseline. Patient woke up the next morning around ~11Am which is late for her. Daughter noted she was not herself,  checked her sugar and it was ~45. Noted the slurred speech. Also noted patient had trouble swallowing. Patient took her Eliquis dose this morning.  No history of strokes. CT Angio Brain Stroke Protocol  w/ IV Cont, No evidence of major vascular stenosis or occlusion. Scattered mild calcific plaque.  CT perfusion: Apparent perfusion abnormality (penumbra) within the brainstem and left posterior fossa with a total Tmax > 6s volume of 15 CT Brain Stroke Protocol shows no evidence of acute intracranial hemorrhage or large territory infarct. Chronic-appearing left cerebellar infarct (new since 2010). Moderate/severe chronic microvascular changes and parenchymal atrophy (moderately progressed since 2010). < from: MR Head No Cont (08.11.22 @ 17:30) > Motion degraded incomplete examination demonstrating small foci of acute infarct in the left temporal and parietal lobes. Extensive chronic microsphere ischemic changes. Her course was complicated by uncontrolled DM2 an MARCIN on CKD, now medically stable. She was placed on therapeutic Lovenox and then back onto Eliquis. She has been medically stable.     She was seen for a swallow assessment and cleared for Minced and Moist diet with mildly thickened liquids. She was seen by PT and OT and requires max assistance to stand and for bed mobility and min assistance to ambulate 15 ft. with a RW and mod assistance for UE dressing and max assistance for LE dressing. PTA, she was fully independent in all activities, using a straight cane or walker at times. She was evaluated by me on the Neuro Service and was found to be a good candidate for acute inpatient rehab. Her son-in-law at bedside states that she has had some cognitive deficits for a while and that it is near baseline.    TODAY'S SUBJECTIVE & REVIEW OF SYMPTOMS:  Patient was seen and assessed at bedside. No overnight events. Patient has no complaints this morning. Tolerating PT/OT. Tolerating oral diet. Voiding and passing stool spontaneously. Vital signs are stable.      Constiutional:    [  x ] WNL           [   ] poor appetite   [   ] insomnia   [   ] tired   Cardio:                [ x  ] WNL           [   ] CP   [   ] SILVERMAN   [   ] palpitations               Resp:                   [ x  ] WNL           [  x ] SOB at times at home  [   ] cough   [   ] wheezing   GI:                        [ x  ] WNL           [   ] constipation   [   ] diarrhea   [   ] abdominal pain   [   ] nausea   [   ] emesis                                :                      [   ] WNL           [   ] REYNA  [   ] dysuria   [   ] difficulty voiding  [ x ] incontinence           Endo:                   [ x  ] WNL          [   ] polyuria   [   ] temperature intolerance                 Skin:                     [ x  ] WNL          [   ] pain   [   ] wound   [   ] rash   MSK:                    [    ] WNL          [   ] muscle pain   [ x  ] joint pain/ stiffness - prior left shoulder injury with contracture   [   ] muscle tenderness   [   ] swelling   Neuro:                 [ x  ] WNL except as per HPI         [   ] HA   [   ] change in vision   [   ] tremor   [   ] weakness   [   ]dysphagia              Cognitive:           [   ] WNL           [ x  ] some impairment PTA    Psych:                  [  x ] WNL           [   ] hallucinations   [   ]agitation   [   ] delusion   [   ]depression    PHYSICAL EXAM    ICU Vital Signs Last 24 Hrs  T(C): 36.4 (30 Aug 2022 05:40), Max: 36.4 (29 Aug 2022 13:01)  T(F): 97.6 (30 Aug 2022 05:40), Max: 97.6 (29 Aug 2022 13:01)  HR: 87 (30 Aug 2022 05:40) (78 - 95)  BP: 166/77 (30 Aug 2022 05:40) (100/50 - 166/77)  BP(mean): --  ABP: --  ABP(mean): --  RR: 17 (30 Aug 2022 05:40) (17 - 18)  SpO2: --    General:[  x ] NAD, Resting Comfortable,   [   ] other:                                HEENT: [  x ] NC/AT, EOMI, PERRL , Normal Conjunctivae,   [   ] other:  Cardio: [  x ] RRR, no murmer,   [   ] other:                              Pulm: [ x  ] No Respiratory Distress,  Lungs CTAB,   [   ] other:                       Abdomen: [  x ]ND/NT, Soft,   [   ] other:    : [  x ] NO REYNA CATHETER, [   ] REYNA CATHETER- no meatal tear, no discharge, [   ] other:                                            MSK: [ x  ] No joint swelling,   [  x ] other:  healed bilat TKR scars, severe limited PROM left shoulder with pain                                    Ext: [  x ]No C/C/E, No calf tenderness,   [   ]other:    Skin: [ x  ]intact,   [   ] other:                                                                   Neurological Examination:  Cognitive: [    ] AAO x 3,   [  x  ]  other: oriented to self and event, not month or year                                                                     Attention:  [ x   ] intact,   [    ]  other:                            Memory: [    ] grossly intact    [  x  ]  other:   impaired  Mood/Affect: [ x   ] wnl,    [    ]  other:                                                                             Communication: [    ]Fluent, no dysarthria, following commands:  [  x  ] other: mild dysarthria. Follows commands/ conversation well. Fluent though word substitution errors  CN II - XII:  [ x   ] intact,  [    ] other:                                                                                        Motor:   RIGHT UE: [  x ] WNL,  [   ] other:  LEFT    UE: [ x  ] WNL,  [   ] other: limited by left shoulder pain/ contracture  RIGHT LE: [  x ] WNL,  [   ] other:   LEFT    LE: [ x  ] WNL,  [   ] other:    Tone: [  x  ] wnl,   [    ]  other:  DTRs: [  x ]symmetric, [   ] other:  Coordination:   [  x  ] intact,   [    ] other:                                                                           Sensory: [ x   ] Intact to light touch,   [    ] other:    Current Level of Function:  Bed Mobility: touch assist-partial assist  Transfers: touch assist  Gait: touch assist 100ft with RW  Lower body dressing: touch assist    MEDICATIONS  (STANDING):  acetaminophen     Tablet .. 650 milliGRAM(s) Oral <User Schedule>  apixaban 5 milliGRAM(s) Oral every 12 hours  atorvastatin 80 milliGRAM(s) Oral at bedtime  dextrose 5%. 1000 milliLiter(s) (50 mL/Hr) IV Continuous <Continuous>  dextrose 5%. 1000 milliLiter(s) (100 mL/Hr) IV Continuous <Continuous>  dextrose 50% Injectable 25 Gram(s) IV Push once  dextrose 50% Injectable 12.5 Gram(s) IV Push once  dextrose 50% Injectable 25 Gram(s) IV Push once  glucagon  Injectable 1 milliGRAM(s) IntraMuscular once  insulin glargine Injectable (LANTUS) 15 Unit(s) SubCutaneous every morning  insulin lispro Injectable (ADMELOG) 5 Unit(s) SubCutaneous three times a day before meals  melatonin 10 milliGRAM(s) Oral at bedtime  metoprolol succinate  milliGRAM(s) Oral daily  nystatin Powder 1 Application(s) Topical every 12 hours    MEDICATIONS  (PRN):  aluminum hydroxide/magnesium hydroxide/simethicone Suspension 30 milliLiter(s) Oral every 4 hours PRN Dyspepsia  dextrose Oral Gel 15 Gram(s) Oral once PRN Blood Glucose LESS THAN 70 milliGRAM(s)/deciliter  magnesium hydroxide Suspension 30 milliLiter(s) Oral daily PRN Constipation  senna 2 Tablet(s) Oral at bedtime PRN Constipation        RECENT LABS/IMAGING                        10.3   5.85  )-----------( 159      ( 29 Aug 2022 07:20 )             32.1     08-29    138  |  103  |  16  ----------------------------<  115<H>  4.2   |  25  |  1.3    Ca    8.7      29 Aug 2022 07:20  Mg     1.8     08-29    TPro  5.9<L>  /  Alb  3.6  /  TBili  0.4  /  DBili  x   /  AST  17  /  ALT  15  /  AlkPhos  39  08-29        POCT Blood Glucose.: 130 mg/dL (08-30-22 @ 07:10)  POCT Blood Glucose.: 130 mg/dL (08-29-22 @ 16:42)  POCT Blood Glucose.: 143 mg/dL (08-29-22 @ 11:04)  POCT Blood Glucose.: 141 mg/dL (08-29-22 @ 08:23)  POCT Blood Glucose.: 189 mg/dL (08-28-22 @ 16:20)  POCT Blood Glucose.: 115 mg/dL (08-28-22 @ 12:15)  POCT Blood Glucose.: 121 mg/dL (08-28-22 @ 07:52)  POCT Blood Glucose.: 142 mg/dL (08-27-22 @ 16:30)  POCT Blood Glucose.: 153 mg/dL (08-27-22 @ 11:06)  POCT Blood Glucose.: 110 mg/dL (08-27-22 @ 07:42)  POCT Blood Glucose.: 125 mg/dL (08-26-22 @ 21:33)  POCT Blood Glucose.: 136 mg/dL (08-26-22 @ 16:21)

## 2022-08-30 NOTE — PROGRESS NOTE ADULT - ASSESSMENT
Treatment Session Focused on: Patient and Family Feedback      Ms. Hopkins’s son-in –law was contacted on 8/30/22 to review neurocognitive assessment results and to provide psychoeducation and recommendations. Ms. Hopkins’s cognitive functioning was discussed as well as recommendations for supervision during IADLs/ADLS, in order to ensure Ms. Hopkins’s safety.  Ms. Hopkins will be discharged from neuropsychology services; reconsult as needed.     Goals: No further goals at this time.      Plan: Discharge from neuropsychology services.      Brain Injury Protocol: No      Cognitive Behavioral Guidelines: No

## 2022-08-30 NOTE — DISCHARGE NOTE PROVIDER - PROVIDER TOKENS
PROVIDER:[TOKEN:[[id number]]],PROVIDER:[TOKEN:[[id number]]],PROVIDER:[TOKEN:[[id number]]]

## 2022-08-30 NOTE — DISCHARGE NOTE PROVIDER - HOSPITAL COURSE
HPI:  This is a 88 F hx of CAD, Diabetes, Hyperlipemia, Hypertension, Afib on Eliquis presents to ED BIBA from home for slurred speech and right sided facial droop. Patient went to bed that night around ~10PM at baseline. Patient woke up the next morning around ~11Am which is late for her. Daughter noted she was not herself,  checked her sugar and it was ~45. Noted the slurred speech. Also noted patient had trouble swallowing. Patient took her Eliquis dose this morning.  No history of strokes. CT Angio Brain Stroke Protocol  w/ IV Cont, No evidence of major vascular stenosis or occlusion. Scattered mild calcific plaque.  CT perfusion: Apparent perfusion abnormality (penumbra) within the brainstem and left posterior fossa with a total Tmax > 6s volume of 15 CT Brain Stroke Protocol shows no evidence of acute intracranial hemorrhage or large territory infarct. Chronic-appearing left cerebellar infarct (new since 2010). Moderate/severe chronic microvascular changes and parenchymal atrophy (moderately progressed since 2010). < from: MR Head No Cont (08.11.22 @ 17:30) > Motion degraded incomplete examination demonstrating small foci of acute infarct in the left temporal and parietal lobes. Extensive chronic microsphere ischemic changes. Her course was complicated by uncontrolled DM2 an MARCIN on CKD, now medically stable. She was placed on therapeutic Lovenox and then back onto Eliquis. She has been medically stable.     She was seen for a swallow assessment and cleared for Minced and Moist diet with mildly thickened liquids. She was seen by PT and OT and requires max assistance to stand and for bed mobility and min assistance to ambulate 15 ft. with a RW and mod assistance for UE dressing and max assistance for LE dressing. PTA, she was fully independent in all activities, using a straight cane or walker at times. She was evaluated by me on the Neuro Service and was found to be a good candidate for acute inpatient rehab. Her son-in-law at bedside states that she has had some cognitive deficits for a while and that it is near baseline.  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   Patient requires an acute multidisciplinary rehab program including PT, OT and ST and neuropsych. eval to maximize her function for a safe discharge home and to monitor her labile DM 2.    VSS. PE/ Neuro exam stable. Alert. Base line cognitive deficits. Transfers and ambulates with RW with Touch Assist. Continue full rehab program.    :  #Left Acute ischemic strokes with impaired mobility and ADL function  - CTH: negative for acute findings. CTA: no LVO. CTP: apparent perfusion abnormality (penumbra) within the brainstem and left posterior fossa with a total Tmax > 6s volume of 15cc. This may be artifactual  < from: MR Head No Cont (08.11.22 @ 17:30) >  Motion degraded incomplete examination demonstrating small foci of acute   infarct in the left temporal and parietal lobes.  Extensive chronic microsphere ischemic changes.< end of copied text >  - c/w ASA and statin   - added back and increased Eliquis from home dose of 2.5 BID to Eliquis 5 BID   - A1C 6.3, Chol 154, LDL 83, Triglycerides 91  -  TTE=nl EF  - PT/OT/ST/Neuropsych   - Making gains. Transfers and ambulates with RW with CG, mod A for UE dressing, max assist for LE    #Shoulder pain  Shoulder injury weeks before admission, improves with Tylenol at home  - standing Tylenol for pain    #Thrombocytopenia  - Plt count 50K 8/23. Improved to 107K 8/23  - Etiol unclear. Not on Heparin products  - Heme consult appreciated: Plt 50k likely error, patient's baseline Plt is 110-150k. No workup needed at this time. Outpatient f/u  - will continue to monitor    #Dysphagia  - Minced and Moist diet with mildly thickened liquids  - SLP f/u    #Dysarthria  - SLP f/u    #Cognitive deficits  - reportedly near baseline per family. 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 CXR  atelectasis, small pleural effusions  -resolved    #Lt flank hematoma  -monitor CBC    #?Tongue swelling on admission  - scoped by ENT: no evidence of angioedema. Significant secretions and pt appears not to be able to clear secretions  -resolved     #s/p Possible UTI  - s/p ceftriaxone 1000mg IV completed 5 days    #Chronic AFib on Eliquis   - c/w Eliquis     #HTN  - stable   -monitor vitals, adjust medications as necessary    #Dyslipidemia   - Chol 154, LDL 83, Triglycerides 91  - Continue Atorvastatin  PO QHS     #DM 2 w/ hypoglycemia on admission - well controlled  - Hypoglycemic on fingersticks requiring 1/2 D5  - Monitor FS  - On Lantus 40U in the AM and lispro 3U TID at home  - controlled on 15 units of Lantus and 5 of Lispro before meals  - Ordered glucagon 1mg IM PRN for BG <70   -A1c=6.3  -lower dose of Insulin resumed.     #s/p MARCIN on CKD  - baseline: 1.3 6/2020. Back at baseline  - monitor     -Skin:  No active issues at this time       Precautions / PROPHYLAXIS:

## 2022-08-30 NOTE — DISCHARGE NOTE PROVIDER - NSDCMRMEDTOKEN_GEN_ALL_CORE_FT
acetaminophen 325 mg oral tablet: 2 tab(s) orally every 6 hours, As needed, for pain or fever  aluminum hydroxide-magnesium hydroxide 200 mg-200 mg/5 mL oral suspension: 30 milliliter(s) orally every 6 hours, As Needed for indigestion, gas, heartburn  apixaban 5 mg oral tablet: 1 tab(s) orally every 12 hours  atorvastatin 80 mg oral tablet: 1 tab(s) orally once a day (at bedtime)  insulin glargine 100 units/mL subcutaneous solution: 15 unit(s) subcutaneous once a day (in the morning)  insulin lispro 100 units/mL injectable solution: 5 unit(s) subcutaneous 3 times a day (before meals), take just before first bite of your meal  melatonin 10 mg oral tablet: 1 tab(s) orally once a day (at bedtime), As Needed  nystatin 100,000 units/g topical powder: 1 application topically 2 times a day  Toprol- mg oral tablet, extended release: 1 tab(s) orally once a day

## 2022-08-30 NOTE — DISCHARGE NOTE PROVIDER - NSDCFUSCHEDAPPT_GEN_ALL_CORE_FT
[facility]  NEUROLOGY [address]  Scheduled Appointment: 09/15/2022    [facility]  CARDIOLOGY [address]  Scheduled Appointment: 11/15/2022    Aaron Cheng  [facility]  CARDIOLOGY [address]  Scheduled Appointment: 11/15/2022

## 2022-08-30 NOTE — DISCHARGE NOTE PROVIDER - INSTRUCTIONS
Glucerna Shakes  1 can 3 times a day  in addition to thin liquids, need consecutive  Swallows,  need 1 to 1   supervision with meals and  snacks

## 2022-08-30 NOTE — PROGRESS NOTE ADULT - ASSESSMENT
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   Patient requires an acute multidisciplinary rehab program including PT, OT and ST and neuropsych. eval to maximize her function for a safe discharge home and to monitor her labile DM 2.    #Left Acute ischemic strokes with impaired mobility and ADL function  - CTH: negative for acute findings. CTA: no LVO. CTP: apparent perfusion abnormality (penumbra) within the brainstem and left posterior fossa with a total Tmax > 6s volume of 15cc. This may be artifactual  < from: MR Head No Cont (08.11.22 @ 17:30) >  Motion degraded incomplete examination demonstrating small foci of acute   infarct in the left temporal and parietal lobes.  Extensive chronic microsphere ischemic changes.< end of copied text >  - c/w ASA and statin   - added back and increased Eliquis from home dose of 2.5 BID to Eliquis 5 BID   - A1C 6.3, Chol 154, LDL 83, Triglycerides 91  -  TTE=nl EF  - PT/OT/ST/Neuropsych   - Making gains. Transfers and ambulates with RW with CG, mod A for UE dressing, max assist for LE    #Shoulder pain  Shoulder injury weeks before admission, improves with Tylenol at home  - standing Tylenol for pain    #Thrombocytopenia  - Plt count 50K 8/23. Improved to 107K 8/23  - Etiol unclear. Not on Heparin products  - Heme consult appreciated: Plt 50k likely error, patient's baseline Plt is 110-150k. No workup needed at this time. Outpatient f/u  - will continue to monitor    #Dysphagia  - Minced and Moist diet with mildly thickened liquids  - SLP f/u    #Dysarthria  - SLP f/u    #Cognitive deficits  - reportedly near baseline per family. 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 CXR  atelectasis, small pleural effusions  -resolved    #Lt flank hematoma  -monitor CBC    #?Tongue swelling on admission  - scoped by ENT: no evidence of angioedema. Significant secretions and pt appears not to be able to clear secretions  -resolved     #s/p Possible UTI  - s/p ceftriaxone 1000mg IV completed 5 days    #Chronic AFib on Eliquis   - c/w Eliquis     #HTN  - stable   -monitor vitals, adjust medications as necessary    #Dyslipidemia   - Chol 154, LDL 83, Triglycerides 91  - Continue Atorvastatin 40mg PO QHS     #DM 2 w/ hypoglycemia on admission - well controlled  - Hypoglycemic on fingersticks requiring 1/2 D5  - Monitor FS  - On Lantus 40U in the AM and lispro 3U TID at home  - controlled on 15 units of Lantus and 5 of Lispro before meals  - Ordered glucagon 1mg IM PRN for BG <70   -A1c=6.3  -lower dose of Insulin resumed.     #s/p MARCIN on CKD  - baseline: 1.3 6/2020. Back at baseline  - monitor     -Skin:  No active issues at this time       Precautions / PROPHYLAXIS:      - Falls    - Ortho: Weight bearing status: wbat      - DVT prophylaxis: Eliquis

## 2022-08-30 NOTE — DISCHARGE NOTE PROVIDER - NSDCCPCAREPLAN_GEN_ALL_CORE_FT
PRINCIPAL DISCHARGE DIAGNOSIS  Diagnosis: Ischemic stroke  Assessment and Plan of Treatment: You were admitted with   diagnosis  of Stroke  Even though you were on eliquis possibly not enough strength to stop blood clotting. Your eliquis dose has been increased to 5 mg 2 times  a day. , You were seen and treated by a neurologist , please continue all meds as ordered and physical therapy, follow up with neurologist in 2 weeks .  Please follow up with your neurologist for docsage on statins since your cholesterol levels are within normal limits      SECONDARY DISCHARGE DIAGNOSES  Diagnosis: Oropharyngeal dysphagia  Assessment and Plan of Treatment: This difficulty with swallowing and talking may have been due to stroke, please continue the exercises taught by the Speech therapist , eat a siet  that can be swallowed easily , use all the strategies if any taught to you    Diagnosis: Chronic atrial fibrillation  Assessment and Plan of Treatment: Your heart rate is controlled on  Toprol xl 100 mg once  day . it also control your Blood pressure .    no othe rbp meds on board . please continue medical and cardiology follow up  in 2 to 4 weeks    Diagnosis: Diabetes mellitus  Assessment and Plan of Treatment: Your blood glucose level is well controlled on reduced dose of Lantus 15 units once a day  and Lispro 5 units  3 times a day with meals . . Please continue a diet control with carb consistent, low fat  low cholesterol diet .  it is reccomended to check your Blood glucose by a glucometer   daily  , document it and take it to Your doctor  when do follow up .    Diagnosis: Hematoma of flank  Assessment and Plan of Treatment: You had  a smalll collection of blood on your Flank area possibly from a fall  while on blood thinner , hematoma resolved now .   soraida rojas for bleeding on while on blood thinner     PRINCIPAL DISCHARGE DIAGNOSIS  Diagnosis: Ischemic stroke  Assessment and Plan of Treatment: You were admitted with   diagnosis  of Stroke  Even though you were on eliquis possibly not enough strength  was not sufficient.  Your eliquis dose has been increased to 5 mg 2 times  a day. , You were seen and treated by a neurologist , Please continue all meds as ordered and physical therapy, follow up with neurologist in 2 weeks .  Please follow up with your neurologist for dosage on statins since your cholesterol levels are within normal limits      SECONDARY DISCHARGE DIAGNOSES  Diagnosis: Oropharyngeal dysphagia  Assessment and Plan of Treatment: This difficulty with swallowing and talking may have been due to stroke, Please continue the exercises taught by the Speech therapist , eat a Diet  that can be swallowed easily , use all the strategies  taught by the   team .    Diagnosis: Chronic atrial fibrillation  Assessment and Plan of Treatment: Your heart rate is controlled on  Toprol xl 100 mg once  day . it also control your Blood pressure .    No other bp meds on board . please continue medical and cardiology follow up  in 2 to 4 weeks    Diagnosis: Diabetes mellitus  Assessment and Plan of Treatment: Your blood glucose level is well controlled on reduced dose of Lantus 15 units once a day  and Lispro 5 units  3 times a day with meals . . Please continue a diet control with carb consistent, low fat  low cholesterol diet .  It is reccomended to check your Blood glucose by a glucometer   daily  , document it and take it to Your doctor  when do follow up .    Diagnosis: Hematoma of flank  Assessment and Plan of Treatment: You had  a smalll collection of blood on your Flank area possibly from a fall  while on blood thinner , hematoma resolved now .   soraida rojas for bleeding on while on blood thinner

## 2022-08-31 ENCOUNTER — TRANSCRIPTION ENCOUNTER (OUTPATIENT)
Age: 87
End: 2022-08-31

## 2022-08-31 VITALS — HEART RATE: 82 BPM | TEMPERATURE: 98 F | RESPIRATION RATE: 19 BRPM

## 2022-08-31 LAB
GLUCOSE BLDC GLUCOMTR-MCNC: 104 MG/DL — HIGH (ref 70–99)
GLUCOSE BLDC GLUCOMTR-MCNC: 157 MG/DL — HIGH (ref 70–99)

## 2022-08-31 RX ADMIN — APIXABAN 5 MILLIGRAM(S): 2.5 TABLET, FILM COATED ORAL at 06:13

## 2022-08-31 RX ADMIN — INSULIN GLARGINE 15 UNIT(S): 100 INJECTION, SOLUTION SUBCUTANEOUS at 08:00

## 2022-08-31 RX ADMIN — Medication 650 MILLIGRAM(S): at 08:13

## 2022-08-31 RX ADMIN — Medication 100 MILLIGRAM(S): at 06:13

## 2022-08-31 RX ADMIN — Medication 5 UNIT(S): at 08:00

## 2022-08-31 RX ADMIN — NYSTATIN CREAM 1 APPLICATION(S): 100000 CREAM TOPICAL at 06:13

## 2022-08-31 NOTE — PROGRESS NOTE ADULT - PROVIDER SPECIALTY LIST ADULT
Neuropsychology
Physiatry
Rehab Medicine
Neuropsychology
Neuropsychology
Physiatry
Rehab Medicine
Neuropsychology

## 2022-08-31 NOTE — DISCHARGE NOTE NURSING/CASE MANAGEMENT/SOCIAL WORK - NSDCPEFALRISK_GEN_ALL_CORE
For information on Fall & Injury Prevention, visit: [email or web address] OR  [email or web address] OR  https://www.cdc.gov/steadi/patient.html

## 2022-08-31 NOTE — PROGRESS NOTE ADULT - ATTENDING COMMENTS
I reviewed the chart and examined the patient with the resident and we discussed the findings and treatment plan.  The patient is tolerating the rehab program well. I agree with the findings and treatment plan above, which I modified as indicated. The patient requires 3 hrs a day of acute inpatient rehab. Doing well. VSS. PE/ Neuro exam stable. Alert. Base line cognitive deficits. Transfers and ambulates with RW with Touch Assist. Continue full rehab program.    I read, edited and agree with the Assessment:  #Left Acute ischemic strokes with impaired mobility and ADL function  - CTH: negative for acute findings. CTA: no LVO. CTP: apparent perfusion abnormality (penumbra) within the brainstem and left posterior fossa with a total Tmax > 6s volume of 15cc. This may be artifactual  < from: MR Head No Cont (08.11.22 @ 17:30) >  Motion degraded incomplete examination demonstrating small foci of acute   infarct in the left temporal and parietal lobes.  Extensive chronic microsphere ischemic changes.< end of copied text >  - c/w ASA and statin   - added back and increased Eliquis from home dose of 2.5 BID to Eliquis 5 BID   - A1C 6.3, Chol 154, LDL 83, Triglycerides 91  -  TTE=nl EF  - PT/OT/ST/Neuropsych   - Making gains. Transfers and ambulates with RW with CG, mod A for UE dressing, max assist for LE    #Shoulder pain  Shoulder injury weeks before admission, improves with Tylenol at home  - standing Tylenol for pain    #Thrombocytopenia  - Plt count 50K 8/23. Improved to 107K 8/23  - Etiol unclear. Not on Heparin products  - Heme consult appreciated: Plt 50k likely error, patient's baseline Plt is 110-150k. No workup needed at this time. Outpatient f/u  - will continue to monitor    #Dysphagia  - Minced and Moist diet with mildly thickened liquids  - SLP f/u    #Dysarthria  - SLP f/u    #Cognitive deficits  - reportedly near baseline per family. 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 CXR  atelectasis, small pleural effusions  -resolved    #Lt flank hematoma  -monitor CBC    #?Tongue swelling on admission  - scoped by ENT: no evidence of angioedema. Significant secretions and pt appears not to be able to clear secretions  -resolved     #s/p Possible UTI  - s/p ceftriaxone 1000mg IV completed 5 days    #Chronic AFib on Eliquis   - c/w Eliquis     #HTN  - stable   -monitor vitals, adjust medications as necessary    #Dyslipidemia   - Chol 154, LDL 83, Triglycerides 91  - Continue Atorvastatin 40mg PO QHS     #DM 2 w/ hypoglycemia on admission - well controlled  - Hypoglycemic on fingersticks requiring 1/2 D5  - Monitor FS  - On Lantus 40U in the AM and lispro 3U TID at home  - controlled on 15 units of Lantus and 5 of Lispro before meals  - Ordered glucagon 1mg IM PRN for BG <70   -A1c=6.3  -lower dose of Insulin resumed.     #s/p MARCIN on CKD  - baseline: 1.3 6/2020. Back at baseline  - monitor     -Skin:  No active issues at this time       Precautions / PROPHYLAXIS:      - Falls    - Ortho: Weight bearing status: wbat      - DVT prophylaxis: Eliquis
I reviewed the chart and examined the patient with the resident and we discussed the findings and treatment plan.  The patient is tolerating the rehab program well. I agree with the findings and treatment plan above, which I modified as indicated. The patient requires 3 hrs a day of acute inpatient rehab. No new complaints. No further c/o of chills or fatigue. Covid PCR negative. No cough. Lungs clear. Platelets reported to be 50K today. Not on Heparin. No change in meds. Recheck in am. Min assist to stand. Ambulates with RW with CG. Continue rehab program.    I read, edited and agree with the Assessment:  #Acute ischemic strokes   - CTH: negative for acute findings. CTA: no LVO. CTP: apparent perfusion abnormality (penumbra) within the brainstem and left posterior fossa with a total Tmax > 6s volume of 15cc. This may be artifactual  - Permissive HTN  - c/w ASA and statin   - A1C 6.3, Chol 154, LDL 83, Triglycerides 91  -  MRI brain non contrast < from: MR Head No Cont (08.11.22 @ 17:30) >  Motion degraded incomplete examination demonstrating small foci of acute infarct in the left temporal and parietal lobes.  Extensive chronic microsphere ischemic changes.  < end of copied text >  -  TTE=nl EF  - PT/OT/ST/Neuropsych eval  - added back and increased Eliquis from home dose of 2.5 BID to Eliquis 5 BID     #Thrombocytopenia  - Plt count 50K 8/23. Recheck in am. Not on Heparin    #Dysphagia  - Minced and Moist diet with mildly thickened liquids  - SLP f/u    #Dysarthria  - SLP f/u    #Cognitive deficits  - reportedly near baseline per family. 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 CXR  atelectasis, small pleural effusions  -resolved    #Lt flank hematoma  -monitor CBC    #?Tongue swelling on admission  - scoped by ENT: no evidence of angioedema. Significant secretions and pt appears not to be able to clear secretions  -resolved     #s/p Possible UTI  - ceftriaxone 1000mg IV completed 5 days    #Chronic AFib on Eliquis   - c/w Eliquis     #HTN  - stable   -monitor vitals, adjust medications as necessary    #Dyslipidemia   - Chol 154, LDL 83, Triglycerides 91  - Continue Atorvastatin 40mg PO QHS once speech/swallow clears    #DM w/ hypoglycemia on admission  - Hypoglycemic on fingersticks requiring 1/2 D5  - Monitor FS  - On Lantus 40U in the AM and lispro 3U TID at home  - Ordered glucagon 1mg IM PRN for BG <70   -A1c=6.3  -lower dose of Insulin resumed. FS in low to mid 200s. Titrate up insulin    #s/p MARCIN on CKD  - baseline: 1.3 6/2020. Back at baseline  - monitor     -Skin:  No active issues at this time
Patient seen and examined with the resident. We discussed the case. I have directed the care. She requires acute rehab with 3 hours of daily therapies and close physiatry.  #Acute ischemic strokes   - CTH: negative for acute findings. CTA: no LVO. CTP: apparent perfusion abnormality (penumbra) within the brainstem and left posterior fossa with a total Tmax > 6s volume of 15cc. This may be artifactual  - Permissive HTN  - c/w ASA and statin   - A1C 6.3, Chol 154, LDL 83, Triglycerides 91  -  MRI brain non contrast < from: MR Head No Cont (08.11.22 @ 17:30) >  Motion degraded incomplete examination demonstrating small foci of acute infarct in the left temporal and parietal lobes.  Extensive chronic microsphere ischemic changes.  < end of copied text >  -  TTE=nl EF  - PT/OT/ST/Neuropsych eval  - added back and increased Eliquis from home dose of 2.5 BID to Eliquis 5 BID     #Thrombocytopenia  - Plt count 50K 8/23. Improved to 107K 8/23  - Etiol unclear. Not on Heparin products  - Heme consult appreciated: Plt 50k likely error, patient's baseline Plt is 110-150k. No workup needed at this time.  - will continue to monitor    #Dysphagia  - Minced and Moist diet with mildly thickened liquids  - SLP f/u    #Dysarthria  - SLP f/u    #Cognitive deficits  - reportedly near baseline per family. 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 CXR  atelectasis, small pleural effusions  -resolved    #Lt flank hematoma  -monitor CBC    #?Tongue swelling on admission  - scoped by ENT: no evidence of angioedema. Significant secretions and pt appears not to be able to clear secretions  -resolved     #s/p Possible UTI  - ceftriaxone 1000mg IV completed 5 days    #Chronic AFib on Eliquis   - c/w Eliquis     #HTN  - stable   -monitor vitals, adjust medications as necessary    #Dyslipidemia   - Chol 154, LDL 83, Triglycerides 91  - Continue Atorvastatin 40mg PO QHS once speech/swallow clears    #DM 2 w/ hypoglycemia on admission  - Hypoglycemic on fingersticks requiring 1/2 D5  - Monitor FS  - On Lantus 40U in the AM and lispro 3U TID at home  - Ordered glucagon 1mg IM PRN for BG <70   -A1c=6.3  -lower dose of Insulin resumed. FS in low to mid 200s. Titrate up insulin    #s/p MARCIN on CKD  - baseline: 1.3 6/2020. Back at baseline  - monitor
I reviewed the chart and examined the patient with the resident and we discussed the findings and treatment plan.  The patient is tolerating the rehab program well. I agree with the findings and treatment plan above, which I modified as indicated. The patient requires 3 hrs a day of acute inpatient rehab. Feeling well. VSS. Labs stable. F/U platelets in am. Transfers and ambulates with steady assist. Min/ mod assist for dressing. Continue rehab program.    I read, edited and agree with the Assessment:  #Acute ischemic strokes   - CTH: negative for acute findings. CTA: no LVO. CTP: apparent perfusion abnormality (penumbra) within the brainstem and left posterior fossa with a total Tmax > 6s volume of 15cc. This may be artifactual  - Permissive HTN  - c/w ASA and statin   - A1C 6.3, Chol 154, LDL 83, Triglycerides 91  -  MRI brain non contrast < from: MR Head No Cont (08.11.22 @ 17:30) >  Motion degraded incomplete examination demonstrating small foci of acute infarct in the left temporal and parietal lobes.  Extensive chronic microsphere ischemic changes.  < end of copied text >  -  TTE=nl EF  - PT/OT/ST/Neuropsych eval  - added back and increased Eliquis from home dose of 2.5 BID to Eliquis 5 BID     #Thrombocytopenia  - Plt count 50K 8/23. Improved to 107K 8/23  - Etiol unclear. Not on Heparin products  - Heme consult requested    #Dysphagia  - Minced and Moist diet with mildly thickened liquids  - SLP f/u    #Dysarthria  - SLP f/u    #Cognitive deficits  - reportedly near baseline per family. 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 CXR  atelectasis, small pleural effusions  -resolved    #Lt flank hematoma  -monitor CBC    #?Tongue swelling on admission  - scoped by ENT: no evidence of angioedema. Significant secretions and pt appears not to be able to clear secretions  -resolved     #s/p Possible UTI  - ceftriaxone 1000mg IV completed 5 days    #Chronic AFib on Eliquis   - c/w Eliquis     #HTN  - stable   -monitor vitals, adjust medications as necessary    #Dyslipidemia   - Chol 154, LDL 83, Triglycerides 91  - Continue Atorvastatin 40mg PO QHS once speech/swallow clears    #DM w/ hypoglycemia on admission  - Hypoglycemic on fingersticks requiring 1/2 D5  - Monitor FS  - On Lantus 40U in the AM and lispro 3U TID at home  - Ordered glucagon 1mg IM PRN for BG <70   -A1c=6.3  -lower dose of Insulin resumed. FS in low to mid 200s. Titrate up insulin    #s/p MARCIN on CKD  - baseline: 1.3 6/2020. Back at baseline  - monitor     -Skin:  No active issues at this time       Precautions / PROPHYLAXIS:      - Falls    - Ortho: Weight bearing status: wbat      - DVT prophylaxis: Eliquis
I reviewed the chart and examined the patient with the resident and we discussed the findings and treatment plan.  The patient is tolerating the rehab program well. I agree with the findings and treatment plan above, which I modified as indicated. The patient requires 3 hrs a day of acute inpatient rehab. No new complaints. Alert. Labs stable. VSS. Nuero exam stable. Ambulates with RW with min assistance. Continue full rehab program.     I read, edited and agree with the Assessment:  #Acute ischemic strokes   - CTH: negative for acute findings. CTA: no LVO. CTP: apparent perfusion abnormality (penumbra) within the brainstem and left posterior fossa with a total Tmax > 6s volume of 15cc. This may be artifactual  - Permissive HTN  - c/w ASA and statin   - A1C 6.3, Chol 154, LDL 83, Triglycerides 91  -  MRI brain non contrast < from: MR Head No Cont (08.11.22 @ 17:30) >  Motion degraded incomplete examination demonstrating small foci of acute infarct in the left temporal and parietal lobes.  Extensive chronic microsphere ischemic changes.  < end of copied text >  -  TTE=nl EF  - PT/OT/ST/Neuropsych eval  - added back and increased Eliquis from home dose of 2.5 BID to Eliquis 5 BID     #Dysphagia  - Minced and Moist diet with mildly thickened liquids  - SLP f/u    #Dysarthria  - SLP f/u    #Cognitive deficits  - reportedly near baseline per family. 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 CXR  atelectasis, small pleural effusions  -resolved    #Lt flank hematoma  -monitor CBC    #?Tongue swelling on admission  - scoped by ENT: no evidence of angioedema. Significant secretions and pt appears not to be able to clear secretions  -resolved     #s/p Possible UTI  - ceftriaxone 1000mg IV completed 5 days    #Chronic AFib on Eliquis   - c/w Eliquis     #HTN  - stable   -monitor vitals, adjust medications as necessary    #Dyslipidemia   - Chol 154, LDL 83, Triglycerides 91  - Continue Atorvastatin 40mg PO QHS once speech/swallow clears    #DM w/ hypoglycemia on admission  - Hypoglycemic on fingersticks requiring 1/2 D5  - Monitor FS  - On Lantus 40U in the AM and lispro 3U TID at home  - Ordered glucagon 1mg IM PRN for BG <70   -A1c=6.3  -lower dose of Insulin resumed. FS in low to mid 200s. Titrate up insulin    #s/p MARCIN on CKD  - baseline: 1.3 6/2020. Back at baseline  - monitor     -Skin:  No active issues at this time       Precautions / PROPHYLAXIS:      - Falls    - Ortho: Weight bearing status: wbat      - DVT prophylaxis: Eliquis
I reviewed the chart and examined the patient with the resident and we discussed the findings and treatment plan.  The patient is tolerating the rehab program well. I agree with the findings and treatment plan above, which I modified as indicated.   For d/c home today. No new complaints. Medically and neurologically stable. Transfers and ambulates with CG with RW.   VN home care HHA and therapies ordered. Cont low dose Eliquis and Lipitor and current insulin regimen.   F/U with PMD 1-3 weeks.   F/U Stroke clinic 3-4 weeks.   Continue soft and bite-sized diet  Activities as tolerated with assistance/ supervision.    I read, edited and agree with the Assessment.
I reviewed the chart and examined the patient with the resident and we discussed the findings and treatment plan.  The patient is tolerating the rehab program well. I agree with the findings and treatment plan above, which I modified as indicated. The patient requires 3 hrs a day of acute inpatient rehab. No new complaints. Alert. Neuro exam stable. Full rehab eval in progress. Labs and VSS. Continue full rehab program.    I read, edited and agree with the Assessment:  #Acute ischemic strokes   - CTH: negative for acute findings. CTA: no LVO. CTP: apparent perfusion abnormality (penumbra) within the brainstem and left posterior fossa with a total Tmax > 6s volume of 15cc. This may be artifactual  - Permissive HTN  - c/w ASA and statin   - A1C 6.3, Chol 154, LDL 83, Triglycerides 91  -  MRI brain non contrast < from: MR Head No Cont (08.11.22 @ 17:30) >  Motion degraded incomplete examination demonstrating small foci of acute infarct in the left temporal and parietal lobes.  Extensive chronic microsphere ischemic changes.  < end of copied text >  -  TTE=nl EF  - PT/OT/ST/Neuropsych eval  - added back and increased Eliquis from home dose of 2.5 BID to Eliquis 5 BID     #Dysphagia  - Minced and Moist diet with mildly thickened liquids  - SLP f/u    #Dysarthria  - SLP f/u    #Cognitive deficits  - reportedly near baseline per family. 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 CXR reviewed by me: atelectasis, small pleural effusions  -resolved    #Lt flank hematoma  -monitor CBC    #?Tongue swelling on admission  - scoped by ENT: no evidence of angioedema. Significant secretions and pt appears not to be able to clear secretions  -resolved     #s/p Possible UTI  - ceftriaxone 1000mg IV completed 5 days    #Chronic AFib on Eliquis   - c/w Eliquis     #HTN  - stable   -monitor vitals, adjust medications as necessary    #Dyslipidemia   - Chol 154, LDL 83, Triglycerides 91  - Continue Atorvastatin 40mg PO QHS once speech/swallow clears    #DM w/ hypoglycemia on admission  - Hypoglycemic on fingersticks requiring 1/2 D5  - Monitor FS  - On Lantus 40U in the AM and lispro 3U TID at home  - Hold all insulin for now given hypoglycemia    - Ordered glucagon 1mg IM PRN for BG <70   -A1c=6.3  -lower dose of Insulin on d/c    #MARCIN on CKD  - baseline: 1.3 6/2020  - monitor     -Skin:  No active issues at this time       Precautions / PROPHYLAXIS:      - Falls    - Ortho: Weight bearing status: wbat      - DVT prophylaxis: Eliquis
I reviewed the chart and examined the patient with the resident and we discussed the findings and treatment plan.  The patient is tolerating the rehab program well. I agree with the findings and treatment plan above, which I modified as indicated. The patient requires 3 hrs a day of acute inpatient rehab. No new complaints. No cough or dyspnea. Neuro exam stable. Platelets improved to 107K today. Reason for Thrombocytopenia unclear. Heme consult requested. Doing well in therapies. Transfers and ambulates with TA. Mod/ max assist for ADL. Tolerating current diet well. Discussed in Team Conference today. Continue rehab of acute stroke.     I read, edited and agree with the Assessment:  #Acute ischemic strokes   - CTH: negative for acute findings. CTA: no LVO. CTP: apparent perfusion abnormality (penumbra) within the brainstem and left posterior fossa with a total Tmax > 6s volume of 15cc. This may be artifactual  - Permissive HTN  - c/w ASA and statin   - A1C 6.3, Chol 154, LDL 83, Triglycerides 91  -  MRI brain non contrast < from: MR Head No Cont (08.11.22 @ 17:30) >  Motion degraded incomplete examination demonstrating small foci of acute infarct in the left temporal and parietal lobes.  Extensive chronic microsphere ischemic changes.  < end of copied text >  -  TTE=nl EF  - PT/OT/ST/Neuropsych eval  - added back and increased Eliquis from home dose of 2.5 BID to Eliquis 5 BID     #Thrombocytopenia  - Plt count 50K 8/23. Improved to 107K 8/23  - Etiol unclear. Not on Heparin products  - Heme consult requested    #Dysphagia  - Minced and Moist diet with mildly thickened liquids  - SLP f/u    #Dysarthria  - SLP f/u    #Cognitive deficits  - reportedly near baseline per family. 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 CXR  atelectasis, small pleural effusions  -resolved    #Lt flank hematoma  -monitor CBC    #?Tongue swelling on admission  - scoped by ENT: no evidence of angioedema. Significant secretions and pt appears not to be able to clear secretions  -resolved     #s/p Possible UTI  - ceftriaxone 1000mg IV completed 5 days    #Chronic AFib on Eliquis   - c/w Eliquis     #HTN  - stable   -monitor vitals, adjust medications as necessary    #Dyslipidemia   - Chol 154, LDL 83, Triglycerides 91  - Continue Atorvastatin 40mg PO QHS once speech/swallow clears    #DM w/ hypoglycemia on admission  - Hypoglycemic on fingersticks requiring 1/2 D5  - Monitor FS  - On Lantus 40U in the AM and lispro 3U TID at home  - Ordered glucagon 1mg IM PRN for BG <70   -A1c=6.3  -lower dose of Insulin resumed. FS in low to mid 200s. Titrate up insulin    #s/p MARCIN on CKD  - baseline: 1.3 6/2020. Back at baseline  - monitor
I reviewed the chart and examined the patient with the resident and we discussed the findings and treatment plan.  The patient is tolerating the rehab program well. I agree with the findings and treatment plan above, which I modified as indicated. The patient requires 3 hrs a day of acute inpatient rehab. No new complaints. VSS. Neuro exam stable. Doing well in therapies. Ambulates with RW wit TA. DM 2 well controlled. Continue rehab program.    I read, edited and agree with the Assessment:  #Acute ischemic strokes   - CTH: negative for acute findings. CTA: no LVO. CTP: apparent perfusion abnormality (penumbra) within the brainstem and left posterior fossa with a total Tmax > 6s volume of 15cc. This may be artifactual  - Permissive HTN  - c/w ASA and statin   - A1C 6.3, Chol 154, LDL 83, Triglycerides 91  -  MRI brain non contrast < from: MR Head No Cont (08.11.22 @ 17:30) >  Motion degraded incomplete examination demonstrating small foci of acute infarct in the left temporal and parietal lobes.  Extensive chronic microsphere ischemic changes.  < end of copied text >  -  TTE=nl EF  - PT/OT/ST/Neuropsych eval  - added back and increased Eliquis from home dose of 2.5 BID to Eliquis 5 BID     #Thrombocytopenia  - Plt count 50K 8/23. Improved to 107K 8/23  - Etiol unclear. Not on Heparin products  - Heme consult appreciated: Plt 50k likely error, patient's baseline Plt is 110-150k. No workup needed at this time.  - will continue to monitor    #Dysphagia  - Minced and Moist diet with mildly thickened liquids  - SLP f/u    #Dysarthria  - SLP f/u    #Cognitive deficits  - reportedly near baseline per family. 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 CXR  atelectasis, small pleural effusions  -resolved    #Lt flank hematoma  -monitor CBC    #?Tongue swelling on admission  - scoped by ENT: no evidence of angioedema. Significant secretions and pt appears not to be able to clear secretions  -resolved     #s/p Possible UTI  - ceftriaxone 1000mg IV completed 5 days    #Chronic AFib on Eliquis   - c/w Eliquis     #HTN  - stable   -monitor vitals, adjust medications as necessary    #Dyslipidemia   - Chol 154, LDL 83, Triglycerides 91  - Continue Atorvastatin 40mg PO QHS once speech/swallow clears    #DM 2 w/ hypoglycemia on admission  - Hypoglycemic on fingersticks requiring 1/2 D5  - Monitor FS  - On Lantus 40U in the AM and lispro 3U TID at home  - Ordered glucagon 1mg IM PRN for BG <70   -A1c=6.3  -lower dose of Insulin resumed. FS in low to mid 200s. Titrate up insulin    #s/p MARCIN on CKD  - baseline: 1.3 6/2020. Back at baseline  - monitor     -Skin:  No active issues at this time       Precautions / PROPHYLAXIS:      - Falls    - Ortho: Weight bearing status: wbat      - DVT prophylaxis: Eliquis
I reviewed the chart and examined the patient with the resident and we discussed the findings and treatment plan.  The patient is tolerating the rehab program well. I agree with the findings and treatment plan above, which I modified as indicated. The patient requires 3 hrs a day of acute inpatient rehab. Doing well. No complaint.s VSS. Neuro exam stable. Ambulates with RW with CG. Continue current rehab program. Continue current meds. Started standing Tylenol bid for shoulder pain.     I read, edited and agree with the Assessment:  #Left Acute ischemic strokes with impaired mobility and ADL function  - CTH: negative for acute findings. CTA: no LVO. CTP: apparent perfusion abnormality (penumbra) within the brainstem and left posterior fossa with a total Tmax > 6s volume of 15cc. This may be artifactual  < from: MR Head No Cont (08.11.22 @ 17:30) >  Motion degraded incomplete examination demonstrating small foci of acute   infarct in the left temporal and parietal lobes.  Extensive chronic microsphere ischemic changes.< end of copied text >  - c/w ASA and statin   - added back and increased Eliquis from home dose of 2.5 BID to Eliquis 5 BID   - A1C 6.3, Chol 154, LDL 83, Triglycerides 91  -  TTE=nl EF  - PT/OT/ST/Neuropsych   - Making gains. Transfers and ambulates with RW with CG, mod A for UE dressing, max assist for LE    #Shoulder pain  Shoulder injury weeks before admission, improves with Tylenol at home  - standing Tylenol for pain    #Thrombocytopenia  - Plt count 50K 8/23. Improved to 107K 8/23  - Etiol unclear. Not on Heparin products  - Heme consult appreciated: Plt 50k likely error, patient's baseline Plt is 110-150k. No workup needed at this time. Outpatient f/u  - will continue to monitor    #Dysphagia  - Minced and Moist diet with mildly thickened liquids  - SLP f/u    #Dysarthria  - SLP f/u    #Cognitive deficits  - reportedly near baseline per family. 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 CXR  atelectasis, small pleural effusions  -resolved    #Lt flank hematoma  -monitor CBC    #?Tongue swelling on admission  - scoped by ENT: no evidence of angioedema. Significant secretions and pt appears not to be able to clear secretions  -resolved     #s/p Possible UTI  - s/p ceftriaxone 1000mg IV completed 5 days    #Chronic AFib on Eliquis   - c/w Eliquis     #HTN  - stable   -monitor vitals, adjust medications as necessary    #Dyslipidemia   - Chol 154, LDL 83, Triglycerides 91  - Continue Atorvastatin 40mg PO QHS     #DM 2 w/ hypoglycemia on admission - well controlled  - Hypoglycemic on fingersticks requiring 1/2 D5  - Monitor FS  - On Lantus 40U in the AM and lispro 3U TID at home  - controlled on 15 units of Lantus and 5 of Lispro before meals  - Ordered glucagon 1mg IM PRN for BG <70   -A1c=6.3  -lower dose of Insulin resumed.     #s/p MARCIN on CKD  - baseline: 1.3 6/2020. Back at baseline  - monitor

## 2022-08-31 NOTE — PROGRESS NOTE ADULT - ASSESSMENT
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   Patient requires an acute multidisciplinary rehab program including PT, OT and ST and neuropsych. eval to maximize her function for a safe discharge home and to monitor her labile DM 2.    #Left Acute ischemic strokes with impaired mobility and ADL function  - CTH: negative for acute findings. CTA: no LVO. CTP: apparent perfusion abnormality (penumbra) within the brainstem and left posterior fossa with a total Tmax > 6s volume of 15cc. This may be artifactual  < from: MR Head No Cont (08.11.22 @ 17:30) >  Motion degraded incomplete examination demonstrating small foci of acute   infarct in the left temporal and parietal lobes.  Extensive chronic microsphere ischemic changes.< end of copied text >  - c/w ASA and statin   - added back and increased Eliquis from home dose of 2.5 BID to Eliquis 5 BID   - A1C 6.3, Chol 154, LDL 83, Triglycerides 91  -  TTE=nl EF  - PT/OT/ST/Neuropsych   - Making gains. Transfers and ambulates with RW with CG, mod A for UE dressing, max assist for LE    #Shoulder pain  Shoulder injury weeks before admission, improves with Tylenol at home  - standing Tylenol for pain    #Thrombocytopenia  - Plt count 50K 8/23. Improved to 107K 8/23  - Etiol unclear. Not on Heparin products  - Heme consult appreciated: Plt 50k likely error, patient's baseline Plt is 110-150k. No workup needed at this time. Outpatient f/u  - will continue to monitor    #Dysphagia  - Minced and Moist diet with mildly thickened liquids  - SLP f/u    #Dysarthria  - SLP f/u    #Cognitive deficits  - reportedly near baseline per family. 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 CXR  atelectasis, small pleural effusions  -resolved    #Lt flank hematoma  -monitor CBC    #?Tongue swelling on admission  - scoped by ENT: no evidence of angioedema. Significant secretions and pt appears not to be able to clear secretions  -resolved     #s/p Possible UTI  - s/p ceftriaxone 1000mg IV completed 5 days    #Chronic AFib on Eliquis   - c/w Eliquis     #HTN  - stable   -monitor vitals, adjust medications as necessary    #Dyslipidemia   - Chol 154, LDL 83, Triglycerides 91  - Continue Atorvastatin 80mg PO QHS     #DM 2 w/ hypoglycemia on admission - well controlled  - Hypoglycemic on fingersticks requiring 1/2 D5  - Monitor FS  - On Lantus 40U in the AM and lispro 3U TID at home  - controlled on 15 units of Lantus and 5 of Lispro before meals  - Ordered glucagon 1mg IM PRN for BG <70   -A1c=6.3  -lower dose of Insulin resumed.     #s/p MARCIN on CKD  - baseline: 1.3 6/2020. 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   Patient requires an acute multidisciplinary rehab program including PT, OT and ST and neuropsych. eval to maximize her function for a safe discharge home and to monitor her labile DM 2.    #Left Acute ischemic strokes with impaired mobility and ADL function  - CTH: negative for acute findings. CTA: no LVO. CTP: apparent perfusion abnormality (penumbra) within the brainstem and left posterior fossa with a total Tmax > 6s volume of 15cc. This may be artifactual  < from: MR Head No Cont (08.11.22 @ 17:30) >  Motion degraded incomplete examination demonstrating small foci of acute   infarct in the left temporal and parietal lobes.  Extensive chronic microsphere ischemic changes.< end of copied text >  - c/w ASA and statin   - added back and increased Eliquis from home dose of 2.5 BID to Eliquis 5 BID   - A1C 6.3, Chol 154, LDL 83, Triglycerides 91  -  TTE=nl EF  - PT/OT/ST/Neuropsych   - Making gains. Transfers and ambulates with RW with CG, mod A for UE dressing, max assist for LE  - D/c home today with family    #Shoulder pain  Shoulder injury weeks before admission, improves with Tylenol at home  - standing Tylenol for pain  - Ortho f/u as outpatient    #Thrombocytopenia  - Plt count 50K 8/23. Improved to 107K 8/23  - Etiol unclear. Not on Heparin products  - Heme consult appreciated: Plt 50k likely error, patient's baseline Plt is 110-150k. No workup needed at this time. Outpatient f/u    #Dysphagia/ Diet  - Diet, Soft and Bite Sized:   Consistent Carbohydrate Evening Snack  DASH/TLC Sodium & Cholesterol Restricted    #Dysarthria  - SLP f/u    #Cognitive deficits  - reportedly near baseline per family. 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 CXR  atelectasis, small pleural effusions  -resolved    #?Tongue swelling on admission  - scoped by ENT: no evidence of angioedema. Significant secretions and pt appears not to be able to clear secretions  -resolved     #s/p Possible UTI  - s/p ceftriaxone 1000mg IV completed 5 days  - asymptomatic    #Chronic AFib on Eliquis   - c/w Eliquis     #HTN  - stable     #Dyslipidemia   - Chol 154, LDL 83, Triglycerides 91  - Continue Atorvastatin 80mg PO QHS     #DM 2 w/ hypoglycemia on admission - well controlled  - Hypoglycemic on fingersticks requiring 1/2 D5  - Monitor FS  - On Lantus 40U in the AM and lispro 3U TID at home  - controlled on 15 units of Lantus and 5 of Lispro before meals  - Ordered glucagon 1mg IM PRN for BG <70   -A1c=6.3  -lower dose of Insulin resumed and FS glucose controlled. D/c on current insulin regimen and f/u with PMD    #s/p MARCIN on CKD  - baseline: 1.3 6/2020. Back at baseline  - monitor

## 2022-08-31 NOTE — DISCHARGE NOTE NURSING/CASE MANAGEMENT/SOCIAL WORK - PATIENT PORTAL LINK FT
You can access the FollowMyHealth Patient Portal offered by [facility] by registering at the following website: [email or web address]. By joining [facility]’s FollowMyHealth portal, you will also be able to view your health information using other applications (apps) compatible with our system.

## 2022-08-31 NOTE — PROGRESS NOTE ADULT - REASON FOR ADMISSION
Stroke

## 2022-09-08 DIAGNOSIS — I25.10 ATHEROSCLEROTIC HEART DISEASE OF NATIVE CORONARY ARTERY WITHOUT ANGINA PECTORIS: ICD-10-CM

## 2022-09-08 DIAGNOSIS — D63.8 ANEMIA IN OTHER CHRONIC DISEASES CLASSIFIED ELSEWHERE: ICD-10-CM

## 2022-09-08 DIAGNOSIS — I69.391 DYSPHAGIA FOLLOWING CEREBRAL INFARCTION: ICD-10-CM

## 2022-09-08 DIAGNOSIS — E11.649 TYPE 2 DIABETES MELLITUS WITH HYPOGLYCEMIA WITHOUT COMA: ICD-10-CM

## 2022-09-08 DIAGNOSIS — I69.315 COGNITIVE SOCIAL OR EMOTIONAL DEFICIT FOLLOWING CEREBRAL INFARCTION: ICD-10-CM

## 2022-09-08 DIAGNOSIS — Z79.4 LONG TERM (CURRENT) USE OF INSULIN: ICD-10-CM

## 2022-09-08 DIAGNOSIS — Z95.1 PRESENCE OF AORTOCORONARY BYPASS GRAFT: ICD-10-CM

## 2022-09-08 DIAGNOSIS — D69.6 THROMBOCYTOPENIA, UNSPECIFIED: ICD-10-CM

## 2022-09-08 DIAGNOSIS — I48.20 CHRONIC ATRIAL FIBRILLATION, UNSPECIFIED: ICD-10-CM

## 2022-09-08 DIAGNOSIS — I10 ESSENTIAL (PRIMARY) HYPERTENSION: ICD-10-CM
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
## 2022-09-08 DIAGNOSIS — E78.5 HYPERLIPIDEMIA, UNSPECIFIED: ICD-10-CM

## 2022-09-08 DIAGNOSIS — Z20.822 CONTACT WITH AND (SUSPECTED) EXPOSURE TO COVID-19: ICD-10-CM

## 2022-09-08 DIAGNOSIS — I69.322 DYSARTHRIA FOLLOWING CEREBRAL INFARCTION: ICD-10-CM

## 2022-09-08 DIAGNOSIS — Z79.01 LONG TERM (CURRENT) USE OF ANTICOAGULANTS: ICD-10-CM

## 2022-09-08 DIAGNOSIS — Z96.643 PRESENCE OF ARTIFICIAL HIP JOINT, BILATERAL: ICD-10-CM

## 2022-09-08 DIAGNOSIS — S20.222D CONTUSION OF LEFT BACK WALL OF THORAX, SUBSEQUENT ENCOUNTER: ICD-10-CM
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
## 2022-09-15 ENCOUNTER — APPOINTMENT (OUTPATIENT)
Dept: NEUROLOGY | Facility: CLINIC | Age: 87
End: 2022-09-15

## 2022-09-15 VITALS
TEMPERATURE: 97.5 F | OXYGEN SATURATION: 96 % | WEIGHT: 195 LBS | SYSTOLIC BLOOD PRESSURE: 134 MMHG | BODY MASS INDEX: 33.29 KG/M2 | HEART RATE: 65 BPM | DIASTOLIC BLOOD PRESSURE: 72 MMHG | HEIGHT: 64 IN

## 2022-09-15 PROCEDURE — 99215 OFFICE O/P EST HI 40 MIN: CPT

## 2022-09-15 NOTE — ASSESSMENT
[FreeTextEntry1] : Patient is 89 yo RH woman, remote smoking hx, with PMHx of HTN, DLD, AFib on Eliquis who presents as HFU from 8/10/22 for 2 small ischemic strokes of left temporal and parietal lobe compatible, etiology Afib w/subtherapeutic AC. Also with chronic Left cerebellar infarcts and extensive microvascular changes. Currentyl doing well with NIHSS 0 on full dose Eliquis. **ALL to ASA. \par \par PLAN: \par -C/w Eliquis 5 BID (>80, Cr. 1.3, weight 88.9 kg).  Spoke to Dr. Cheng and he agreed. He had decr her Eliquis to 2.5 BID bc of Cr changes previously. Left VM w/pt at 18:19 , 09/15/2022\par -Repeat lipid panel, c/w Lipitor 80 QHS for now; LDL goal btw 40 and 70, can be managed per PCP.  \par -C/w home PT/OT at home \par -F/u in 4 mo\par \par Aggressive risk factor modification should be continued for secondary stroke prevention, consisting of intensive blood pressure control and cholesterol monitoring. I encouraged the patient to discuss these important issues with her primary care doctor.  \par \par I have reviewed the goals of stroke risk factor modification. I counseled the patient on measures to reduce stroke risk, including the importance of medication compliance, risk factor control, exercise, healthy diet and avoidance of smoking. I reviewed stroke warning signs and symptoms and appropriate actions to take if such occur.\par

## 2022-09-15 NOTE — PHYSICAL EXAM
[FreeTextEntry1] : Focal neurological exam:\par \par MS: Awake, alert, oriented to person, place, situation and time. Normal affect. Follows commands. \par \par Language: Speech is clear, fluent with good repetition & comprehension. No dysarthria. \par \par CNs 2 - 12 intact. EOMI no nystagmus, no diplopia. VFF. No facial asymmetry b/l, full eye closure strength b/l. Hearing grossly normal. Head turning & shoulder shrug intact b/l. Tongue midline, normal movements, no atrophy.\par \par Motor: Normal muscle bulk & tone. No noticeable tremor or seizure. No pronator drift. Muscle strength of b/l UE and b/l LE 5/5. L shoulder with MSK weakness\par \par Reflexes: DTR of biceps, knee and ankle normal \par \par Sensation: Intact to LT, temperature and vibration b/l throughout\par \par Cortical: No extinction\par \par Coordination: No dysmetria to FTN.\par \par Gait: No postural instability. Normal stance and tandem gait.\par \par NIHSS 0\par \par \par

## 2022-09-15 NOTE — HISTORY OF PRESENT ILLNESS
[FreeTextEntry1] : Patient is 87 yo RH woman, remote smoking hx, with PMHx of HTN, DLD, AFib on Eliquis who presents as HFU from 8/10/22 for 2 small ischemic strokes of left temporal and parietal lobe compatible, etiology Afib w/subtherapeutic AC. Also with chronic Left cerebellar infarcts and extensive microvascular changes.\par \par She p/w acute severe dysarthria/dysphagia/R facial droop (?edentulous). \par \par Initial NIHSS 4  \par PTA: Came in on Eliquis 2.5 BID, sent home on Eliquis 5 BID  \par \par Cholesterol 154, TGL 91, HDL 53, LDL 81; B12 341 \par A1c 6.3  \par \par OTHER: Swelling of the tongue when hypoglycemic? (related to dry/irritated oral cavity for ENT) \par \par MRI H:   \par -Motion degraded incomplete examination demonstrating small foci of acute infarct in the left temporal and parietal lobes \par -EXTENSIVE chronic microsphere ischemic changes. \par \par TTE: LVEF 50-55%, mod LVH, mild enlarged LA  \par \par CTA H/N: NO evidence of major vascular stenosis or occlusion. Scattered mild calcific plaque. \par \par CT perfusion: Apparent perfusion abnormality (penumbra) within the brainstem and left posterior fossa with a total Tmax > 6s volume of 15 cc. This may be artifactual, recommend attention on follow-up CT or MRI. \par \par Initial CTH: \par -No evidence of acute intracranial hemorrhage or large territory infarct. \par -Chronic-appearing left cerebellar infarct (new since 2010) \par -Moderate/severe chronic microvascular changes and parenchymal atrophy (moderately progressed since 2010). \par --------------------------------------------------------\par Today, presents with daughter and says feels well, even better than PTA \par Lives with daughter, does all ADLs herself, just not cooking or managing bills\par \par \par

## 2022-11-15 ENCOUNTER — APPOINTMENT (OUTPATIENT)
Dept: CARDIOLOGY | Facility: CLINIC | Age: 87
End: 2022-11-15

## 2022-11-15 VITALS
HEIGHT: 64 IN | TEMPERATURE: 98 F | DIASTOLIC BLOOD PRESSURE: 70 MMHG | BODY MASS INDEX: 32.1 KG/M2 | OXYGEN SATURATION: 95 % | WEIGHT: 188 LBS | SYSTOLIC BLOOD PRESSURE: 138 MMHG | HEART RATE: 100 BPM

## 2022-11-15 DIAGNOSIS — Z95.1 PRESENCE OF AORTOCORONARY BYPASS GRAFT: ICD-10-CM

## 2022-11-15 DIAGNOSIS — Z78.9 OTHER SPECIFIED HEALTH STATUS: ICD-10-CM

## 2022-11-15 DIAGNOSIS — I35.1 NONRHEUMATIC AORTIC (VALVE) INSUFFICIENCY: ICD-10-CM

## 2022-11-15 PROCEDURE — 93000 ELECTROCARDIOGRAM COMPLETE: CPT

## 2022-11-15 PROCEDURE — 99213 OFFICE O/P EST LOW 20 MIN: CPT

## 2022-11-15 RX ORDER — ROSUVASTATIN CALCIUM 40 MG/1
40 TABLET, FILM COATED ORAL
Qty: 90 | Refills: 3 | Status: DISCONTINUED | COMMUNITY
Start: 2020-11-09 | End: 2022-11-15

## 2022-11-15 RX ORDER — ROSUVASTATIN CALCIUM 40 MG/1
40 TABLET, FILM COATED ORAL DAILY
Refills: 0 | Status: DISCONTINUED | COMMUNITY
End: 2022-11-15

## 2022-11-15 RX ORDER — INSULIN ASPART 100 [IU]/ML
100 INJECTION, SOLUTION INTRAVENOUS; SUBCUTANEOUS
Refills: 0 | Status: DISCONTINUED | COMMUNITY
End: 2022-11-15

## 2022-11-15 RX ORDER — APIXABAN 2.5 MG/1
2.5 TABLET, FILM COATED ORAL
Qty: 180 | Refills: 3 | Status: DISCONTINUED | COMMUNITY
Start: 2021-07-13 | End: 2022-11-15

## 2022-11-15 RX ORDER — PEN NEEDLE, DIABETIC 29 G X1/2"
31G X 5 MM NEEDLE, DISPOSABLE MISCELLANEOUS
Qty: 500 | Refills: 0 | Status: ACTIVE | COMMUNITY
Start: 2022-07-01

## 2022-11-15 RX ORDER — VALSARTAN 320 MG/1
320 TABLET ORAL DAILY
Refills: 0 | Status: DISCONTINUED | COMMUNITY
End: 2022-11-15

## 2022-11-15 NOTE — ASSESSMENT
[FreeTextEntry1] : Ms. Hopkins is an 88-year-old woman with history of AF on AC, ischemic CVA 8/2022 in the setting of subtherapeutic AC, CAD s/p CABG 2002, DM2, and HLD presenting for follow up.\par \par Impression:\par (1) Paroxysmal AF, CHADSVASc at least 8 (Age +2, F, CVA +2, DM2, HTN, CAD) on AC, stable\par (2) CVA 8/2022 in the setting of subtherapeutic AC (was on apixaban 2.5mg BID)\par (3) CAD s/p CABG, stable, LDL 81, goal <70\par (4) DM2, well controlled\par (5) HTN, well controlled\par \par Plan:\par - Continue Eliquis 5mg BID (Age >80, last Cr 1.3 from OSH, weight >60kg)\par - Continue metoprolol succinate 100mg \par - Continue valsartan 320mg daily\par - Repeat labs prior to next visit\par \par RTC 6 months.

## 2022-11-15 NOTE — HISTORY OF PRESENT ILLNESS
[FreeTextEntry1] : Ms. Hopkins is an 88-year-old woman with history of AF on AC, ischemic CVA 8/2022 in the setting of subtherapeutic AC, CAD s/p CABG 2002, DM2, and HLD presenting for follow up.\par \par Patient previously followed with Dr. Cheng and was last seen in office 5/2022.\par Today, feels well, denies active cardiopulmonary complaints.\par \par TTE 8/2022\par - EF 50-55%, moderate LVH, moderate RA enlargement, mild MR, mild AI\par \par Nuclear Stress 2009\par 1] IV Dipyridamole Dual Isotope Study which was negative with respect to symptoms and EKG changes. \par 2] There is no scan evidence of myocardial ischemia. The mild fixed perfusion defect involving the distal rhoda-apical segment is probably secondary to soft tissue attenuation artifact. \par 3] Normal left ventricular systolic function with normal wall motion and thickening. \par \par Labs:\par - Hgb 10.3, Plt 159\par - Cr 1.4, K 4.5\par - , TG 67, HDL 46, LDL 81, A1c 5.9%

## 2022-11-15 NOTE — PHYSICAL EXAM
[Well Developed] : well developed [Well Nourished] : well nourished [No Acute Distress] : no acute distress [Frail] : frail [Normal Conjunctiva] : normal conjunctiva [Normal Venous Pressure] : normal venous pressure [No Carotid Bruit] : no carotid bruit [Normal S1, S2] : normal S1, S2 [No Murmur] : no murmur [No Rub] : no rub [No Gallop] : no gallop [Clear Lung Fields] : clear lung fields [Good Air Entry] : good air entry [No Respiratory Distress] : no respiratory distress  [Soft] : abdomen soft [Non Tender] : non-tender [No Masses/organomegaly] : no masses/organomegaly [Normal Bowel Sounds] : normal bowel sounds [Normal Gait] : normal gait [No Edema] : no edema [No Cyanosis] : no cyanosis [No Clubbing] : no clubbing [No Varicosities] : no varicosities [No Rash] : no rash [No Skin Lesions] : no skin lesions [Moves all extremities] : moves all extremities [No Focal Deficits] : no focal deficits [Normal Speech] : normal speech [Alert and Oriented] : alert and oriented [Normal memory] : normal memory

## 2023-01-19 ENCOUNTER — APPOINTMENT (OUTPATIENT)
Dept: NEUROLOGY | Facility: CLINIC | Age: 88
End: 2023-01-19
Payer: MEDICARE

## 2023-01-19 ENCOUNTER — NON-APPOINTMENT (OUTPATIENT)
Age: 88
End: 2023-01-19

## 2023-01-19 VITALS
SYSTOLIC BLOOD PRESSURE: 135 MMHG | OXYGEN SATURATION: 99 % | HEIGHT: 64 IN | TEMPERATURE: 97.8 F | WEIGHT: 177 LBS | HEART RATE: 83 BPM | BODY MASS INDEX: 30.22 KG/M2 | DIASTOLIC BLOOD PRESSURE: 77 MMHG

## 2023-01-19 DIAGNOSIS — R63.0 ANOREXIA: ICD-10-CM

## 2023-01-19 DIAGNOSIS — R41.89 OTHER SYMPTOMS AND SIGNS INVOLVING COGNITIVE FUNCTIONS AND AWARENESS: ICD-10-CM

## 2023-01-19 DIAGNOSIS — F32.A DEPRESSION, UNSPECIFIED: ICD-10-CM

## 2023-01-19 PROCEDURE — 99214 OFFICE O/P EST MOD 30 MIN: CPT

## 2023-01-19 RX ORDER — ATORVASTATIN CALCIUM 80 MG/1
80 TABLET, FILM COATED ORAL DAILY
Refills: 0 | Status: DISCONTINUED | COMMUNITY

## 2023-01-19 NOTE — HISTORY OF PRESENT ILLNESS
[FreeTextEntry1] : Patient is 87 yo RH woman, remote smoking hx, with PMHx of HTN, DLD, AFib on Eliquis who presents as HFU from 8/10/22 for 2 small ischemic strokes of left temporal and parietal lobe compatible, etiology Afib w/subtherapeutic AC. Also with chronic Left cerebellar infarcts and extensive microvascular changes. Currently doing well with NIHSS 0 on full dose Eliquis. **ALL to ASA.  \par \par SINCE:\par -No new BEFAST episodes \par -Daughter says patient ruminates on old events and has confusion at times  \par -When she wakes up she talks about her mom, or says dinner was cooked by her mother \par -She sleeps well, but maybe too much  \par -Now, showering less frequently, has poor appetite, not drinking Ensure \par -Patient denies sadness/hopelessness \par -BW from 1/12---Uremia? low GFR; TSH wnl --> PCP ordered??? \par -A1c 5.8\par \par \par

## 2023-01-19 NOTE — ASSESSMENT
[FreeTextEntry1] : Patient is 89 yo RH woman, remote smoking hx, with PMHx of HTN, DLD, AFib on Eliquis who presents as HFU from 8/10/22 for 2 small ischemic strokes of left temporal and parietal lobe compatible, etiology Afib w/subtherapeutic AC. Also with chronic Left cerebellar infarcts and extensive microvascular changes. She seems to have more cognitive issues this visit, not oriented to mo or year, but following commands okay. Given BW from ENT I'm wondering if this is more acute or bc of dementia from vascular changes. ALL to ASA.  \par \par PLAN:  \par -C/w Eliquis 5 BID (>80, Cr. 1.3, weight 80  kg)\par -C/w Lipitor 40\par -Mirtazapine 7.5 QD for depression/appetite, but check with PCP First. Call me back if sx don't improve/worsen. \par -F/u with PCP for uremia/low GFR as can cause confusion also. Looks like BW was sent from ENT. Left Message with pt's number at [phone] regarding this.  Will fax my note to her PCP which does home visits. \par -Send for repeat CMP, UA, B12/Folate \par -C/w home PT/OT at home  \par -Get sunlight daily, Mediterranean diet \par -F/u in 3 mo w/ Dr. Ag  \par \par Aggressive risk factor modification should be continued for secondary stroke prevention, consisting of intensive blood pressure control and cholesterol monitoring. I encouraged the patient to discuss these important issues with her primary care doctor.  \par \par I have reviewed the goals of stroke risk factor modification. I counseled the patient on measures to reduce stroke risk, including the importance of medication compliance, risk factor control, exercise, healthy diet and avoidance of smoking. I reviewed stroke warning signs and symptoms and appropriate actions to take if such occur.\par \par Instructed patient to call 911 or report to ED if any acute neurological changes occur, such as having severe, sudden, unusual headache or BEFAST symptoms\par

## 2023-01-20 ENCOUNTER — NON-APPOINTMENT (OUTPATIENT)
Age: 88
End: 2023-01-20

## 2023-03-15 ENCOUNTER — RX RENEWAL (OUTPATIENT)
Age: 88
End: 2023-03-15

## 2023-04-28 ENCOUNTER — APPOINTMENT (OUTPATIENT)
Dept: NEUROLOGY | Facility: CLINIC | Age: 88
End: 2023-04-28

## 2023-05-15 ENCOUNTER — APPOINTMENT (OUTPATIENT)
Dept: CARDIOLOGY | Facility: CLINIC | Age: 88
End: 2023-05-15
Payer: MEDICARE

## 2023-05-15 VITALS
HEIGHT: 64 IN | HEART RATE: 83 BPM | DIASTOLIC BLOOD PRESSURE: 68 MMHG | SYSTOLIC BLOOD PRESSURE: 140 MMHG | BODY MASS INDEX: 29.71 KG/M2 | WEIGHT: 174 LBS

## 2023-05-15 DIAGNOSIS — I63.9 CEREBRAL INFARCTION, UNSPECIFIED: ICD-10-CM

## 2023-05-15 DIAGNOSIS — I25.810 ATHEROSCLEROSIS OF CORONARY ARTERY BYPASS GRAFT(S) W/OUT ANGINA PECTORIS: ICD-10-CM

## 2023-05-15 DIAGNOSIS — E11.9 TYPE 2 DIABETES MELLITUS W/OUT COMPLICATIONS: ICD-10-CM

## 2023-05-15 PROCEDURE — 99214 OFFICE O/P EST MOD 30 MIN: CPT

## 2023-05-15 PROCEDURE — 93000 ELECTROCARDIOGRAM COMPLETE: CPT

## 2023-05-15 RX ORDER — ATORVASTATIN CALCIUM 80 MG/1
80 TABLET, FILM COATED ORAL DAILY
Refills: 0 | Status: ACTIVE | COMMUNITY

## 2023-05-15 RX ORDER — FUROSEMIDE 40 MG/1
40 TABLET ORAL
Qty: 30 | Refills: 0 | Status: ACTIVE | COMMUNITY
Start: 2023-05-15 | End: 1900-01-01

## 2023-05-15 RX ORDER — MIRTAZAPINE 7.5 MG/1
7.5 TABLET, FILM COATED ORAL
Qty: 30 | Refills: 1 | Status: DISCONTINUED | COMMUNITY
Start: 2023-01-19 | End: 2023-05-15

## 2023-05-15 RX ORDER — INSULIN LISPRO 100 [IU]/ML
100 INJECTION, SOLUTION INTRAVENOUS; SUBCUTANEOUS 3 TIMES DAILY
Refills: 0 | Status: ACTIVE | COMMUNITY

## 2023-05-15 NOTE — HISTORY OF PRESENT ILLNESS
[FreeTextEntry1] : Ms. Hopkins is an 89-year-old woman with history of AF on AC, ischemic CVA 8/2022 in the setting of subtherapeutic AC, CAD s/p CABG 2002, DM2, and HLD presenting for follow up.\par \par Patient previously followed with Dr. Cheng and was last seen in office 5/2022.\par Today, feels well though notes she is minimally active. She does get short of breath when she overexerts herself.\par \par TTE 8/2022\par - EF 50-55%, moderate LVH, moderate RA enlargement, mild MR, mild AI\par \par Nuclear Stress 2009\par 1] IV Dipyridamole Dual Isotope Study which was negative with respect to symptoms and EKG changes. \par 2] There is no scan evidence of myocardial ischemia. The mild fixed perfusion defect involving the distal rhoda-apical segment is probably secondary to soft tissue attenuation artifact. \par 3] Normal left ventricular systolic function with normal wall motion and thickening. \par \par Labs:\par - Hgb 12.4, Plt 134\par - Cr 1.4, K 4.5\par - , , HDL 44, , A1c 6.1%\par - Vit D 23\par - pBNP 2685\par \par Meds:\par - Atorva 40mg\par - Apixaban 5mg BID\par - Valsartan 320mg daily\par - Metoprolol succinate 100mg daily\par - Insulin\par - Mirtazapine 7.5mg daily

## 2023-10-23 NOTE — H&P ADULT - NSHPLABSRESULTS_GEN_ALL_CORE
First Attempt    Left VM for pt to get and INR ASAP and call with results   11.3   7.58  )-----------( 122      ( 18 Aug 2022 07:30 )             34.4     08-18    146  |  110  |  36<H>  ----------------------------<  196<H>  4.3   |  28  |  1.4    Ca    9.0      18 Aug 2022 07:30  Mg     2.0     08-18    TPro  6.0  /  Alb  3.6  /  TBili  0.5  /  DBili  x   /  AST  17  /  ALT  16  /  AlkPhos  39  08-18    PT/INR - ( 16 Aug 2022 21:17 )   PT: 13.40 sec;   INR: 1.17 ratio         PTT - ( 16 Aug 2022 21:17 )  PTT:34.8 sec    POCT Blood Glucose.: 207 mg/dL (08-18-22 @ 11:37)  POCT Blood Glucose.: 186 mg/dL (08-18-22 @ 07:46)  POCT Blood Glucose.: 166 mg/dL (08-17-22 @ 12:22)  POCT Blood Glucose.: 165 mg/dL (08-17-22 @ 11:19)  POCT Blood Glucose.: 155 mg/dL (08-17-22 @ 06:24)  POCT Blood Glucose.: 157 mg/dL (08-16-22 @ 23:59)  POCT Blood Glucose.: 172 mg/dL (08-16-22 @ 16:59)  POCT Blood Glucose.: 179 mg/dL (08-16-22 @ 12:53)  POCT Blood Glucose.: 160 mg/dL (08-16-22 @ 05:56)  POCT Blood Glucose.: 161 mg/dL (08-16-22 @ 00:01)  POCT Blood Glucose.: 164 mg/dL (08-15-22 @ 17:09)  POCT Blood Glucose.: 171 mg/dL (08-15-22 @ 12:28)

## 2023-11-13 ENCOUNTER — APPOINTMENT (OUTPATIENT)
Dept: CARDIOLOGY | Facility: CLINIC | Age: 88
End: 2023-11-13
Payer: MEDICARE

## 2023-11-13 PROCEDURE — 93306 TTE W/DOPPLER COMPLETE: CPT

## 2023-11-19 ENCOUNTER — EMERGENCY (EMERGENCY)
Facility: HOSPITAL | Age: 88
LOS: 0 days | Discharge: HOME | End: 2023-11-20
Attending: STUDENT IN AN ORGANIZED HEALTH CARE EDUCATION/TRAINING PROGRAM
Payer: MEDICARE

## 2023-11-19 VITALS
RESPIRATION RATE: 18 BRPM | SYSTOLIC BLOOD PRESSURE: 133 MMHG | WEIGHT: 169.98 LBS | HEART RATE: 81 BPM | TEMPERATURE: 98 F | OXYGEN SATURATION: 99 % | DIASTOLIC BLOOD PRESSURE: 90 MMHG

## 2023-11-19 DIAGNOSIS — I48.91 UNSPECIFIED ATRIAL FIBRILLATION: ICD-10-CM

## 2023-11-19 DIAGNOSIS — E11.9 TYPE 2 DIABETES MELLITUS WITHOUT COMPLICATIONS: ICD-10-CM

## 2023-11-19 DIAGNOSIS — Z23 ENCOUNTER FOR IMMUNIZATION: ICD-10-CM

## 2023-11-19 DIAGNOSIS — Z79.01 LONG TERM (CURRENT) USE OF ANTICOAGULANTS: ICD-10-CM

## 2023-11-19 DIAGNOSIS — I10 ESSENTIAL (PRIMARY) HYPERTENSION: ICD-10-CM

## 2023-11-19 DIAGNOSIS — S61.210A LACERATION WITHOUT FOREIGN BODY OF RIGHT INDEX FINGER WITHOUT DAMAGE TO NAIL, INITIAL ENCOUNTER: ICD-10-CM

## 2023-11-19 DIAGNOSIS — Z79.4 LONG TERM (CURRENT) USE OF INSULIN: ICD-10-CM

## 2023-11-19 DIAGNOSIS — I25.10 ATHEROSCLEROTIC HEART DISEASE OF NATIVE CORONARY ARTERY WITHOUT ANGINA PECTORIS: ICD-10-CM

## 2023-11-19 DIAGNOSIS — Z96.659 PRESENCE OF UNSPECIFIED ARTIFICIAL KNEE JOINT: Chronic | ICD-10-CM

## 2023-11-19 DIAGNOSIS — E78.5 HYPERLIPIDEMIA, UNSPECIFIED: ICD-10-CM
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
## 2023-11-19 DIAGNOSIS — Z88.5 ALLERGY STATUS TO NARCOTIC AGENT: ICD-10-CM

## 2023-11-19 DIAGNOSIS — Z96.643 PRESENCE OF ARTIFICIAL HIP JOINT, BILATERAL: Chronic | ICD-10-CM
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
## 2023-11-19 DIAGNOSIS — I25.810 ATHEROSCLEROSIS OF CORONARY ARTERY BYPASS GRAFT(S) WITHOUT ANGINA PECTORIS: Chronic | ICD-10-CM

## 2023-11-19 PROCEDURE — 99284 EMERGENCY DEPT VISIT MOD MDM: CPT | Mod: FS,25

## 2023-11-19 PROCEDURE — 90715 TDAP VACCINE 7 YRS/> IM: CPT

## 2023-11-19 PROCEDURE — 12002 RPR S/N/AX/GEN/TRNK2.6-7.5CM: CPT | Mod: 59

## 2023-11-19 PROCEDURE — 99283 EMERGENCY DEPT VISIT LOW MDM: CPT | Mod: 25

## 2023-11-19 PROCEDURE — 12002 RPR S/N/AX/GEN/TRNK2.6-7.5CM: CPT

## 2023-11-19 PROCEDURE — 90471 IMMUNIZATION ADMIN: CPT

## 2023-11-19 PROCEDURE — 29130 APPL FINGER SPLINT STATIC: CPT | Mod: RT

## 2023-11-20 RX ORDER — TETANUS TOXOID, REDUCED DIPHTHERIA TOXOID AND ACELLULAR PERTUSSIS VACCINE, ADSORBED 5; 2.5; 8; 8; 2.5 [IU]/.5ML; [IU]/.5ML; UG/.5ML; UG/.5ML; UG/.5ML
0.5 SUSPENSION INTRAMUSCULAR ONCE
Refills: 0 | Status: COMPLETED | OUTPATIENT
Start: 2023-11-20 | End: 2023-11-20

## 2023-11-20 RX ADMIN — TETANUS TOXOID, REDUCED DIPHTHERIA TOXOID AND ACELLULAR PERTUSSIS VACCINE, ADSORBED 0.5 MILLILITER(S): 5; 2.5; 8; 8; 2.5 SUSPENSION INTRAMUSCULAR at 01:07

## 2023-11-20 NOTE — ED PROVIDER NOTE - CLINICAL SUMMARY MEDICAL DECISION MAKING FREE TEXT BOX
no blunt injury, family declined xr  wound explored - no fb, no e/o tendon injury, full rom/str/sensation  wound repaired  home care and return precautions discussed  suture removal  timeframe discussed

## 2023-11-20 NOTE — ED ADULT NURSE NOTE - NSFALLUNIVINTERV_ED_ALL_ED
Bed/Stretcher in lowest position, wheels locked, appropriate side rails in place/Call bell, personal items and telephone in reach/Instruct patient to call for assistance before getting out of bed/chair/stretcher/Non-slip footwear applied when patient is off stretcher/[place] to call system/Physically safe environment - no spills, clutter or unnecessary equipment/Purposeful proactive rounding/Room/bathroom lighting operational, light cord in reach

## 2023-11-20 NOTE — ED PROVIDER NOTE - ATTENDING APP SHARED VISIT CONTRIBUTION OF CARE
89-year-old female with past medical history CAD, diabetes, hyperlipidemia, hypertension, A-fib on Eliquis presents with complaint of laceration. pt states she cut her finger on an edge. cut to R index finger. no direct trauma/crush injury.  bleeding persisted prompting eval. unsure of tdap. no numbness/weakness. no fall    vss  gen- NAD, aaox3  card-rrr  lungs-ctab, no wheezing or rhonchi  neuro- full str/sensation, cn ii-xii grossly intact, normal coordination  msk- R 2nd digit w/ superficial laceration/skin avulsion, no exposed tendon/bone, ~2cm, full from/str to mcp/dip/pip, sensation intact throughout

## 2023-11-20 NOTE — ED PROVIDER NOTE - PHYSICAL EXAMINATION
Physical Exam    Vital Signs: I have reviewed the initial vital signs.  Constitutional: appears stated age, no acute distress  Eyes:  Sclera clear, EOMI.  Cardiovascular: S1 and S2, regular rate, regular rhythm, well-perfused extremities, radial pulses equal and 2+  Respiratory: unlabored respiratory effort, clear to auscultation bilaterally no wheezing, rales, or rhonchi  Gastrointestinal:  abdomen soft, non-tender, no pulsatile mass, bowel sounds within normal limits  Musculoskeletal: supple neck, no bony tenderness, full range of motion joints of right hand, 5/5 strength flexion and extension at right hand fingers and thumb  Integumentary: warm, dry, 4cm laceration to right index finger palmar aspect with no deep tissue/tendinous involvement on visualization of wound base  Neurologic: awake, alert, oriented x3, extremities’ motor and sensory functions grossly intact, sensation intact over right hand fingers all aspects

## 2023-11-20 NOTE — ED PROVIDER NOTE - OBJECTIVE STATEMENT
89-year-old female with past medical history CAD, diabetes, hyperlipidemia, hypertension, A-fib on Eliquis presents with complaint of laceration.  Per patient and daughter at bedside patient sustained laceration to right index finger and was brought to emergency department due to difficulty controlling bleeding.  Denies other injuries/complaints, is unsure whether up-to-date with tetanus.

## 2023-11-20 NOTE — ED PROVIDER NOTE - PATIENT PORTAL LINK FT
You can access the FollowMyHealth Patient Portal offered by Health system by registering at the following website: [email or web address]. By joining [employer]’s FollowMyHealth portal, you will also be able to view your health information using other applications (apps) compatible with our system.

## 2023-11-20 NOTE — ED PROCEDURE NOTE - NS ED ATTENDING STATEMENT MOD
This was a shared visit with the GAUTAM. I reviewed and verified the documentation and independently performed the documented:
This was a shared visit with the GAUTAM. I reviewed and verified the documentation and independently performed the documented:

## 2023-11-20 NOTE — ED PROCEDURE NOTE - CPROC ED TIME OUT STATEMENT1
“Patient's name, , procedure and correct site were confirmed during the [place] Timeout.”
“Patient's name, , procedure and correct site were confirmed during the [place] Timeout.”

## 2023-11-20 NOTE — ED PROVIDER NOTE - NSFOLLOWUPINSTRUCTIONS_ED_ALL_ED_FT
return to ED or urgent care in 7-10 days for suture removal.    Laceration    A laceration is a cut that goes through all of the layers of the skin and into the tissue that is right under the skin. Some lacerations heal on their own. Others need to be closed with stitches (sutures), staples, skin adhesive strips, or skin glue. Proper laceration care minimizes the risk of infection and helps the laceration to heal better.     SEEK IMMEDIATE MEDICAL CARE IF YOU HAVE THE FOLLOWING SYMPTOMS: swelling around the wound, worsening pain, drainage from the wound, red streaking going away from your wound, inability to move finger or toe near the laceration, or discoloration of skin near the laceration.

## 2023-11-25 ENCOUNTER — TRANSCRIPTION ENCOUNTER (OUTPATIENT)
Age: 88
End: 2023-11-25

## 2023-12-06 ENCOUNTER — APPOINTMENT (OUTPATIENT)
Dept: CARDIOLOGY | Facility: CLINIC | Age: 88
End: 2023-12-06

## 2023-12-06 DIAGNOSIS — E78.00 PURE HYPERCHOLESTEROLEMIA, UNSPECIFIED: ICD-10-CM

## 2023-12-06 DIAGNOSIS — I50.30 UNSPECIFIED DIASTOLIC (CONGESTIVE) HEART FAILURE: ICD-10-CM

## 2023-12-06 DIAGNOSIS — I48.91 UNSPECIFIED ATRIAL FIBRILLATION: ICD-10-CM

## 2024-01-16 RX ORDER — METOPROLOL SUCCINATE 100 MG/1
100 TABLET, EXTENDED RELEASE ORAL
Qty: 90 | Refills: 3 | Status: ACTIVE | COMMUNITY
Start: 2020-08-18 | End: 1900-01-01

## 2024-01-16 RX ORDER — ATORVASTATIN CALCIUM 80 MG/1
80 TABLET, FILM COATED ORAL DAILY
Qty: 90 | Refills: 3 | Status: ACTIVE | COMMUNITY
Start: 2024-01-16 | End: 1900-01-01

## 2024-01-16 RX ORDER — VALSARTAN 320 MG/1
320 TABLET, COATED ORAL
Qty: 90 | Refills: 3 | Status: ACTIVE | COMMUNITY
Start: 2021-03-06 | End: 1900-01-01

## 2024-02-07 RX ORDER — APIXABAN 5 MG/1
5 TABLET, FILM COATED ORAL
Qty: 180 | Refills: 0 | Status: ACTIVE | COMMUNITY
Start: 1900-01-01 | End: 1900-01-01

## 2024-02-28 ENCOUNTER — INPATIENT (INPATIENT)
Facility: HOSPITAL | Age: 89
LOS: 2 days | Discharge: HOME HEALTH | DRG: 280 | End: 2024-03-02
Attending: INTERNAL MEDICINE | Admitting: STUDENT IN AN ORGANIZED HEALTH CARE EDUCATION/TRAINING PROGRAM
Payer: MEDICARE

## 2024-02-28 VITALS
HEART RATE: 80 BPM | RESPIRATION RATE: 20 BRPM | TEMPERATURE: 97 F | DIASTOLIC BLOOD PRESSURE: 76 MMHG | SYSTOLIC BLOOD PRESSURE: 138 MMHG | WEIGHT: 175.05 LBS | OXYGEN SATURATION: 99 %

## 2024-02-28 DIAGNOSIS — Z96.659 PRESENCE OF UNSPECIFIED ARTIFICIAL KNEE JOINT: Chronic | ICD-10-CM

## 2024-02-28 DIAGNOSIS — I50.9 HEART FAILURE, UNSPECIFIED: ICD-10-CM

## 2024-02-28 DIAGNOSIS — Z96.643 PRESENCE OF ARTIFICIAL HIP JOINT, BILATERAL: Chronic | ICD-10-CM

## 2024-02-28 DIAGNOSIS — I25.810 ATHEROSCLEROSIS OF CORONARY ARTERY BYPASS GRAFT(S) WITHOUT ANGINA PECTORIS: Chronic | ICD-10-CM

## 2024-02-28 LAB
ACANTHOCYTES BLD QL SMEAR: SLIGHT — SIGNIFICANT CHANGE UP
ALBUMIN SERPL ELPH-MCNC: 3.9 G/DL — SIGNIFICANT CHANGE UP (ref 3.5–5.2)
ALP SERPL-CCNC: 50 U/L — SIGNIFICANT CHANGE UP (ref 30–115)
ALT FLD-CCNC: 14 U/L — SIGNIFICANT CHANGE UP (ref 0–41)
ANION GAP SERPL CALC-SCNC: 12 MMOL/L — SIGNIFICANT CHANGE UP (ref 7–14)
APPEARANCE UR: ABNORMAL
AST SERPL-CCNC: 22 U/L — SIGNIFICANT CHANGE UP (ref 0–41)
BACTERIA # UR AUTO: ABNORMAL /HPF
BASE EXCESS BLDV CALC-SCNC: -4.5 MMOL/L — LOW (ref -2–3)
BASOPHILS # BLD AUTO: 0.04 K/UL — SIGNIFICANT CHANGE UP (ref 0–0.2)
BASOPHILS NFR BLD AUTO: 0.5 % — SIGNIFICANT CHANGE UP (ref 0–1)
BILIRUB DIRECT SERPL-MCNC: <0.2 MG/DL — SIGNIFICANT CHANGE UP (ref 0–0.3)
BILIRUB INDIRECT FLD-MCNC: >0.3 MG/DL — SIGNIFICANT CHANGE UP (ref 0.2–1.2)
BILIRUB SERPL-MCNC: 0.5 MG/DL — SIGNIFICANT CHANGE UP (ref 0.2–1.2)
BILIRUB UR-MCNC: NEGATIVE — SIGNIFICANT CHANGE UP
BUN SERPL-MCNC: 48 MG/DL — HIGH (ref 10–20)
BUN SERPL-MCNC: 50 MG/DL — HIGH (ref 10–20)
BUN SERPL-MCNC: 51 MG/DL — HIGH (ref 10–20)
BURR CELLS BLD QL SMEAR: PRESENT — SIGNIFICANT CHANGE UP
CA-I SERPL-SCNC: 1.18 MMOL/L — SIGNIFICANT CHANGE UP (ref 1.15–1.33)
CALCIUM SERPL-MCNC: 8.8 MG/DL — SIGNIFICANT CHANGE UP (ref 8.4–10.5)
CALCIUM SERPL-MCNC: 8.9 MG/DL — SIGNIFICANT CHANGE UP (ref 8.4–10.5)
CALCIUM SERPL-MCNC: 9 MG/DL — SIGNIFICANT CHANGE UP (ref 8.4–10.5)
CAST: 3 /LPF — SIGNIFICANT CHANGE UP (ref 0–4)
CHLORIDE SERPL-SCNC: 103 MMOL/L — SIGNIFICANT CHANGE UP (ref 98–110)
CHLORIDE SERPL-SCNC: 104 MMOL/L — SIGNIFICANT CHANGE UP (ref 98–110)
CHLORIDE SERPL-SCNC: 104 MMOL/L — SIGNIFICANT CHANGE UP (ref 98–110)
CO2 SERPL-SCNC: 19 MMOL/L — SIGNIFICANT CHANGE UP (ref 17–32)
COLOR SPEC: YELLOW — SIGNIFICANT CHANGE UP
CREAT SERPL-MCNC: 1.6 MG/DL — HIGH (ref 0.7–1.5)
CREAT SERPL-MCNC: 1.7 MG/DL — HIGH (ref 0.7–1.5)
CREAT SERPL-MCNC: 1.8 MG/DL — HIGH (ref 0.7–1.5)
DIFF PNL FLD: NEGATIVE — SIGNIFICANT CHANGE UP
EGFR: 26 ML/MIN/1.73M2 — LOW
EGFR: 28 ML/MIN/1.73M2 — LOW
EGFR: 30 ML/MIN/1.73M2 — LOW
EOSINOPHIL # BLD AUTO: 0.11 K/UL — SIGNIFICANT CHANGE UP (ref 0–0.7)
EOSINOPHIL NFR BLD AUTO: 1.4 % — SIGNIFICANT CHANGE UP (ref 0–8)
GAS PNL BLDV: 132 MMOL/L — LOW (ref 136–145)
GAS PNL BLDV: SIGNIFICANT CHANGE UP
GLUCOSE SERPL-MCNC: 101 MG/DL — HIGH (ref 70–99)
GLUCOSE SERPL-MCNC: 115 MG/DL — HIGH (ref 70–99)
GLUCOSE SERPL-MCNC: 127 MG/DL — HIGH (ref 70–99)
GLUCOSE UR QL: NEGATIVE MG/DL — SIGNIFICANT CHANGE UP
HCO3 BLDV-SCNC: 21 MMOL/L — LOW (ref 22–29)
HCT VFR BLD CALC: 34.1 % — LOW (ref 37–47)
HCT VFR BLDA CALC: 35 % — SIGNIFICANT CHANGE UP (ref 34.5–46.5)
HGB BLD CALC-MCNC: 11.7 G/DL — SIGNIFICANT CHANGE UP (ref 11.7–16.1)
HGB BLD-MCNC: 11.2 G/DL — LOW (ref 12–16)
IMM GRANULOCYTES NFR BLD AUTO: 0.5 % — HIGH (ref 0.1–0.3)
KETONES UR-MCNC: NEGATIVE MG/DL — SIGNIFICANT CHANGE UP
LACTATE BLDV-MCNC: 1.6 MMOL/L — SIGNIFICANT CHANGE UP (ref 0.5–2)
LACTATE SERPL-SCNC: 1.6 MMOL/L — SIGNIFICANT CHANGE UP (ref 0.7–2)
LEUKOCYTE ESTERASE UR-ACNC: ABNORMAL
LIDOCAIN IGE QN: 30 U/L — SIGNIFICANT CHANGE UP (ref 7–60)
LYMPHOCYTES # BLD AUTO: 1.1 K/UL — LOW (ref 1.2–3.4)
LYMPHOCYTES # BLD AUTO: 13.7 % — LOW (ref 20.5–51.1)
MANUAL SMEAR VERIFICATION: SIGNIFICANT CHANGE UP
MCHC RBC-ENTMCNC: 31.6 PG — HIGH (ref 27–31)
MCHC RBC-ENTMCNC: 32.8 G/DL — SIGNIFICANT CHANGE UP (ref 32–37)
MCV RBC AUTO: 96.3 FL — SIGNIFICANT CHANGE UP (ref 81–99)
MONOCYTES # BLD AUTO: 0.79 K/UL — HIGH (ref 0.1–0.6)
MONOCYTES NFR BLD AUTO: 9.8 % — HIGH (ref 1.7–9.3)
NEUTROPHILS # BLD AUTO: 5.97 K/UL — SIGNIFICANT CHANGE UP (ref 1.4–6.5)
NEUTROPHILS NFR BLD AUTO: 74.1 % — SIGNIFICANT CHANGE UP (ref 42.2–75.2)
NITRITE UR-MCNC: NEGATIVE — SIGNIFICANT CHANGE UP
NRBC # BLD: 0 /100 WBCS — SIGNIFICANT CHANGE UP (ref 0–0)
NRBC # BLD: 1 /100 WBCS — HIGH (ref 0–0)
NT-PROBNP SERPL-SCNC: HIGH PG/ML (ref 0–300)
OVALOCYTES BLD QL SMEAR: SLIGHT — SIGNIFICANT CHANGE UP
PCO2 BLDV: 40 MMHG — SIGNIFICANT CHANGE UP (ref 39–42)
PH BLDV: 7.33 — SIGNIFICANT CHANGE UP (ref 7.32–7.43)
PH UR: 5.5 — SIGNIFICANT CHANGE UP (ref 5–8)
PLAT MORPH BLD: ABNORMAL
PLATELET # BLD AUTO: 131 K/UL — SIGNIFICANT CHANGE UP (ref 130–400)
PMV BLD: 11.4 FL — HIGH (ref 7.4–10.4)
PO2 BLDV: 26 MMHG — SIGNIFICANT CHANGE UP (ref 25–45)
POIKILOCYTOSIS BLD QL AUTO: SLIGHT — SIGNIFICANT CHANGE UP
POLYCHROMASIA BLD QL SMEAR: SLIGHT — SIGNIFICANT CHANGE UP
POTASSIUM BLDV-SCNC: 7.7 MMOL/L — CRITICAL HIGH (ref 3.5–5.1)
POTASSIUM SERPL-MCNC: 5.8 MMOL/L — HIGH (ref 3.5–5)
POTASSIUM SERPL-MCNC: 5.8 MMOL/L — HIGH (ref 3.5–5)
POTASSIUM SERPL-MCNC: 6 MMOL/L — CRITICAL HIGH (ref 3.5–5)
POTASSIUM SERPL-SCNC: 5.8 MMOL/L — HIGH (ref 3.5–5)
POTASSIUM SERPL-SCNC: 5.8 MMOL/L — HIGH (ref 3.5–5)
POTASSIUM SERPL-SCNC: 6 MMOL/L — CRITICAL HIGH (ref 3.5–5)
PROT SERPL-MCNC: 6.7 G/DL — SIGNIFICANT CHANGE UP (ref 6–8)
PROT UR-MCNC: SIGNIFICANT CHANGE UP MG/DL
RBC # BLD: 3.54 M/UL — LOW (ref 4.2–5.4)
RBC # FLD: 15.2 % — HIGH (ref 11.5–14.5)
RBC BLD AUTO: ABNORMAL
RBC CASTS # UR COMP ASSIST: 5 /HPF — HIGH (ref 0–4)
SAO2 % BLDV: 37.7 % — LOW (ref 67–88)
SMUDGE CELLS # BLD: PRESENT — SIGNIFICANT CHANGE UP
SODIUM SERPL-SCNC: 134 MMOL/L — LOW (ref 135–146)
SODIUM SERPL-SCNC: 135 MMOL/L — SIGNIFICANT CHANGE UP (ref 135–146)
SODIUM SERPL-SCNC: 135 MMOL/L — SIGNIFICANT CHANGE UP (ref 135–146)
SP GR SPEC: 1.02 — SIGNIFICANT CHANGE UP (ref 1–1.03)
SQUAMOUS # UR AUTO: 0 /HPF — SIGNIFICANT CHANGE UP (ref 0–5)
TROPONIN SAMPLING TIME: 1711 — SIGNIFICANT CHANGE UP
TROPONIN T, HIGH SENSITIVITY RESULT: 1053 NG/L — CRITICAL HIGH (ref 6–13)
TROPONIN T, HIGH SENSITIVITY RESULT: 1091 NG/L — CRITICAL HIGH (ref 6–13)
UROBILINOGEN FLD QL: 0.2 MG/DL — SIGNIFICANT CHANGE UP (ref 0.2–1)
VARIANT LYMPHS # BLD: 8.7 % — HIGH (ref 0–5)
WBC # BLD: 8.05 K/UL — SIGNIFICANT CHANGE UP (ref 4.8–10.8)
WBC # FLD AUTO: 8.05 K/UL — SIGNIFICANT CHANGE UP (ref 4.8–10.8)
WBC UR QL: 152 /HPF — HIGH (ref 0–5)

## 2024-02-28 PROCEDURE — 70450 CT HEAD/BRAIN W/O DYE: CPT | Mod: 26,MC

## 2024-02-28 PROCEDURE — 84484 ASSAY OF TROPONIN QUANT: CPT

## 2024-02-28 PROCEDURE — 74177 CT ABD & PELVIS W/CONTRAST: CPT | Mod: 26,MC

## 2024-02-28 PROCEDURE — 85730 THROMBOPLASTIN TIME PARTIAL: CPT

## 2024-02-28 PROCEDURE — 71275 CT ANGIOGRAPHY CHEST: CPT | Mod: 26,MC

## 2024-02-28 PROCEDURE — 36415 COLL VENOUS BLD VENIPUNCTURE: CPT

## 2024-02-28 PROCEDURE — 93307 TTE W/O DOPPLER COMPLETE: CPT

## 2024-02-28 PROCEDURE — 82962 GLUCOSE BLOOD TEST: CPT

## 2024-02-28 PROCEDURE — 85027 COMPLETE CBC AUTOMATED: CPT

## 2024-02-28 PROCEDURE — 83735 ASSAY OF MAGNESIUM: CPT

## 2024-02-28 PROCEDURE — 80048 BASIC METABOLIC PNL TOTAL CA: CPT

## 2024-02-28 PROCEDURE — 85610 PROTHROMBIN TIME: CPT

## 2024-02-28 PROCEDURE — 93010 ELECTROCARDIOGRAM REPORT: CPT | Mod: 77

## 2024-02-28 PROCEDURE — 83036 HEMOGLOBIN GLYCOSYLATED A1C: CPT

## 2024-02-28 PROCEDURE — 99285 EMERGENCY DEPT VISIT HI MDM: CPT | Mod: FS

## 2024-02-28 PROCEDURE — 85025 COMPLETE CBC W/AUTO DIFF WBC: CPT

## 2024-02-28 PROCEDURE — 80053 COMPREHEN METABOLIC PANEL: CPT

## 2024-02-28 PROCEDURE — 71045 X-RAY EXAM CHEST 1 VIEW: CPT | Mod: 26

## 2024-02-28 RX ORDER — CLOPIDOGREL BISULFATE 75 MG/1
75 TABLET, FILM COATED ORAL ONCE
Refills: 0 | Status: COMPLETED | OUTPATIENT
Start: 2024-02-28 | End: 2024-02-28

## 2024-02-28 RX ORDER — FUROSEMIDE 40 MG
20 TABLET ORAL ONCE
Refills: 0 | Status: COMPLETED | OUTPATIENT
Start: 2024-02-28 | End: 2024-02-28

## 2024-02-28 RX ORDER — SODIUM ZIRCONIUM CYCLOSILICATE 10 G/10G
10 POWDER, FOR SUSPENSION ORAL ONCE
Refills: 0 | Status: COMPLETED | OUTPATIENT
Start: 2024-02-28 | End: 2024-02-28

## 2024-02-28 RX ORDER — HEPARIN SODIUM 5000 [USP'U]/ML
INJECTION INTRAVENOUS; SUBCUTANEOUS
Qty: 25000 | Refills: 0 | Status: DISCONTINUED | OUTPATIENT
Start: 2024-02-28 | End: 2024-02-29

## 2024-02-28 RX ORDER — CEFTRIAXONE 500 MG/1
1000 INJECTION, POWDER, FOR SOLUTION INTRAMUSCULAR; INTRAVENOUS ONCE
Refills: 0 | Status: COMPLETED | OUTPATIENT
Start: 2024-02-28 | End: 2024-02-28

## 2024-02-28 RX ORDER — ASPIRIN/CALCIUM CARB/MAGNESIUM 324 MG
325 TABLET ORAL ONCE
Refills: 0 | Status: COMPLETED | OUTPATIENT
Start: 2024-02-28 | End: 2024-02-28

## 2024-02-28 RX ADMIN — Medication 325 MILLIGRAM(S): at 22:39

## 2024-02-28 RX ADMIN — CEFTRIAXONE 100 MILLIGRAM(S): 500 INJECTION, POWDER, FOR SOLUTION INTRAMUSCULAR; INTRAVENOUS at 23:51

## 2024-02-28 RX ADMIN — CLOPIDOGREL BISULFATE 75 MILLIGRAM(S): 75 TABLET, FILM COATED ORAL at 22:39

## 2024-02-28 RX ADMIN — Medication 20 MILLIGRAM(S): at 22:39

## 2024-02-28 RX ADMIN — SODIUM ZIRCONIUM CYCLOSILICATE 10 GRAM(S): 10 POWDER, FOR SUSPENSION ORAL at 22:39

## 2024-02-28 NOTE — ED PROVIDER NOTE - PHYSICAL EXAMINATION
CONSTITUTIONAL: elderly, non-toxic appearing female  SKIN: skin exam is warm and dry,  HEAD: Normocephalic; atraumatic.  EYES: PERRL, 3 mm bilateral, no nystagmus, EOM intact; conjunctiva and sclera clear.  ENT: MMM  CARD: S1, S2 normal, no murmur  RESP: +increased wob, speaking full sentences, no accessory muscle use  ABD: soft; non-distended; non-tender. No Rebound, No guarding  EXT: Normal ROM. No edema   NEURO: awake, alert, following commands, No Focal deficits. GCS 15.   PSYCH: Cooperative, appropriate.

## 2024-02-28 NOTE — ED PROVIDER NOTE - WHICH SHOWED
EKG showed NSR, non-specific st-t abnormalities but no STEMI    CXR showed (+) b/l congestion suggestive of fluid overload

## 2024-02-28 NOTE — ED PROVIDER NOTE - CLINICAL SUMMARY MEDICAL DECISION MAKING FREE TEXT BOX
Patient presented with dyspnea over the past 3 days that has been worsening, no history of this in the past.  Also with cough which is new for her.  On arrival, patient afebrile, hemodynamically stable, but grossly fluid overloaded on exam which is new per patient's family at bedside.  EKG obtained and nonspecific, but no evidence of STEMI.  Obtained labs which showed elevated troponin over 1000 as well as a proBNP of 24,000 indicating likely CHF.  Potassium also noted to be elevated with slight MARCIN noted, but no other metabolic acidosis.  Chest x-ray showed bilateral fluid overload without focal consolidations to suggest pneumonia.  Potassium was treated in the ED.  CTA of the chest obtained and negative for PE to explain onset of heart failure.  Patient was also tender in the abdomen, although CT of the abdomen pelvis negative for emergent pathologies.  CT of the head was obtained given the patient been altered over the past few days as well intermittently per family, however this was negative for emergent intracranial pathologies.  Cardiology consulted for elevated troponin and they recommended aspirin, Plavix and admission to medicine and they will follow-up for further management.  Family agreeable with plan.  Hemodynamically stable at time of admission.

## 2024-02-28 NOTE — CONSULT NOTE ADULT - SUBJECTIVE AND OBJECTIVE BOX
Date of Admission: 02-28-24    HISTORY OF PRESENT ILLNESS:    89 YO F with PMHx of Afib on Eliquis, ischemic CVA 8/2022 in the setting of subtherapeutic AC, HTN, HLD, DM type II, dementia, CAD s/p CABG 2002 BIB for with shortness of breath for 4 days. Patient lives with one of her daughters and doesn't ambulate much at baseline. 4 days back the daughter noticed that the patient was short of breath. Also reports associated cough.       PAST MEDICAL & SURGICAL HISTORY  Diabetes    Hypertension    Hyperlipemia    CAD (coronary artery disease) of artery bypass graft    S/P total knee replacement    History of total hip replacement, bilateral        FAMILY HISTORY:  FH: type 2 diabetes (Mother)    FH: hypertension (Mother)      SOCIAL HISTORY:   [X ] Non-smoker  [ ] Smoker  [ ] Alcohol    Allergies    codeine (Unknown)    Intolerances      VITALS:  T(C): 36.3 (02-28-24 @ 15:59), Max: 36.3 (02-28-24 @ 15:59)  HR: 80 (02-28-24 @ 15:59) (80 - 80)  BP: 138/76 (02-28-24 @ 15:59) (138/76 - 138/76)  RR: 20 (02-28-24 @ 15:59) (20 - 20)  SpO2: 99% (02-28-24 @ 15:59) (99% - 99%)  Wt(kg): --  I&O's Summary    Daily     Daily     PHYSICAL EXAM:  GEN: Not in acute distress  HEENT: EOMI  LUNGS: b/l crackles   CARDIOVASCULAR: Irregular   ABD: Soft, non-tender, non-distended  EXT: No ROB  SKIN: Intact  NEURO: AAOx0    LABS:	 	                          11.2   8.05  )-----------( 131      ( 28 Feb 2024 17:11 )             34.1     02-28    135  |  104  |  48<H>  ----------------------------<  101<H>  5.8<H>   |  19  |  1.6<H>    Ca    8.8      28 Feb 2024 19:39    TPro  6.7  /  Alb  3.9  /  TBili  0.5  /  DBili  <0.2  /  AST  22  /  ALT  14  /  AlkPhos  50  02-28            CARDIAC MARKERS:  Troponin T, High Sensitivity Result: 1091 ng/L (02-28-24 @ 19:39)  Troponin T, High Sensitivity Result: 1053 ng/L (02-28-24 @ 17:11)      TELEMETRY EVENTS:      ECG:    RADIOLOGY:    OTHER:    Echocardiogram:  < from: TTE Echo Complete w/o Contrast w/ Doppler (08.14.22 @ 12:51) >  Summary:   1. Left ventricular ejection fraction, by visual estimation, is 50 to   55%.   2. Moderate left ventricular hypertrophy.   3. Mildly enlarged left atrium.   4. Moderately enlarged right atrium.   5. Degenerative mitral valve.   6. Mild mitral valve regurgitation.   7. Mitral annular calcification.   8. Mild tricuspid regurgitation.   9. Mild aortic regurgitation.    < end of copied text >    Catheterization:    Stress Test: 	  Nuclear Stress 2009  1] IV Dipyridamole Dual Isotope Study which was negative with respect to symptoms and EKG changes.   2] There is no scan evidence of myocardial ischemia. The mild fixed perfusion defect involving the distal rhoda-apical segment is probably secondary to soft tissue attenuation artifact.   3] Normal left ventricular systolic function with normal wall motion and thickening. 	    Home Medications:  acetaminophen 325 mg oral tablet: 2 tab(s) orally every 6 hours, As needed, for pain or fever (28 Aug 2022 17:24)  aluminum hydroxide-magnesium hydroxide 200 mg-200 mg/5 mL oral suspension: 30 milliliter(s) orally every 6 hours, As Needed for indigestion, gas, heartburn (28 Aug 2022 17:27)  insulin glargine 100 units/mL subcutaneous solution: 15 unit(s) subcutaneous once a day (in the morning) (28 Aug 2022 17:24)  insulin lispro 100 units/mL injectable solution: 5 unit(s) subcutaneous 3 times a day (before meals), take just before first bite of your meal (28 Aug 2022 17:24)  melatonin 10 mg oral tablet: 1 tab(s) orally once a day (at bedtime), As Needed (28 Aug 2022 17:29)  nystatin 100,000 units/g topical powder: 1 application topically 2 times a day (30 Aug 2022 16:21)  Toprol- mg oral tablet, extended release: 1 tab(s) orally once a day (06 Jun 2020 10:56)    MEDICATIONS  (STANDING):  cefTRIAXone   IVPB 1000 milliGRAM(s) IV Intermittent once    MEDICATIONS  (PRN):         Date of Admission: 02-28-24    HISTORY OF PRESENT ILLNESS:    89 YO F with PMHx of Afib on Eliquis, ischemic CVA 8/2022 in the setting of subtherapeutic AC, HTN, HLD, DM type II, dementia, CAD s/p CABG 2002 BIB for with shortness of breath for 4 days. Patient lives with one of her daughters and doesn't ambulate much at baseline. 4 days back the daughter noticed that the patient was short of breath. Also reports associated cough. No chest pain or palpitations. No syncope. No leg swelling, orthopnea and PND.       PAST MEDICAL & SURGICAL HISTORY  Diabetes    Hypertension    Hyperlipemia    CAD (coronary artery disease) of artery bypass graft    S/P total knee replacement    History of total hip replacement, bilateral        FAMILY HISTORY:  FH: type 2 diabetes (Mother)    FH: hypertension (Mother)      SOCIAL HISTORY:   [X ] Non-smoker  [ ] Smoker  [ ] Alcohol    Allergies    codeine (Unknown)    Intolerances    ROS: Unable to obtain.      VITALS:  T(C): 36.3 (02-28-24 @ 15:59), Max: 36.3 (02-28-24 @ 15:59)  HR: 80 (02-28-24 @ 15:59) (80 - 80)  BP: 138/76 (02-28-24 @ 15:59) (138/76 - 138/76)  RR: 20 (02-28-24 @ 15:59) (20 - 20)  SpO2: 99% (02-28-24 @ 15:59) (99% - 99%)  Wt(kg): --  I&O's Summary    Daily     Daily     PHYSICAL EXAM:  GEN: Not in acute distress, non-toxic appearing  HEENT: EOMI, PERRLA  Neck: supple, n + JVD  LUNGS: b/l crackles , no wheezing  CARDIOVASCULAR: Irregular , no murmurs  ABD: Soft, non-tender, non-distended  EXT: No ROB, warm  SKIN: Intact, no rash  NEURO: AAOx0, no focal deficits    LABS:	 	                          11.2   8.05  )-----------( 131      ( 28 Feb 2024 17:11 )             34.1     02-28    135  |  104  |  48<H>  ----------------------------<  101<H>  5.8<H>   |  19  |  1.6<H>    Ca    8.8      28 Feb 2024 19:39    TPro  6.7  /  Alb  3.9  /  TBili  0.5  /  DBili  <0.2  /  AST  22  /  ALT  14  /  AlkPhos  50  02-28            CARDIAC MARKERS:  Troponin T, High Sensitivity Result: 1091 ng/L (02-28-24 @ 19:39)  Troponin T, High Sensitivity Result: 1053 ng/L (02-28-24 @ 17:11)      TELEMETRY EVENTS:      ECG:    RADIOLOGY:    OTHER:    Echocardiogram:  < from: TTE Echo Complete w/o Contrast w/ Doppler (08.14.22 @ 12:51) >  Summary:   1. Left ventricular ejection fraction, by visual estimation, is 50 to   55%.   2. Moderate left ventricular hypertrophy.   3. Mildly enlarged left atrium.   4. Moderately enlarged right atrium.   5. Degenerative mitral valve.   6. Mild mitral valve regurgitation.   7. Mitral annular calcification.   8. Mild tricuspid regurgitation.   9. Mild aortic regurgitation.    < end of copied text >    Catheterization:    Stress Test: 	  Nuclear Stress 2009  1] IV Dipyridamole Dual Isotope Study which was negative with respect to symptoms and EKG changes.   2] There is no scan evidence of myocardial ischemia. The mild fixed perfusion defect involving the distal rhoda-apical segment is probably secondary to soft tissue attenuation artifact.   3] Normal left ventricular systolic function with normal wall motion and thickening. 	    Home Medications:  acetaminophen 325 mg oral tablet: 2 tab(s) orally every 6 hours, As needed, for pain or fever (28 Aug 2022 17:24)  aluminum hydroxide-magnesium hydroxide 200 mg-200 mg/5 mL oral suspension: 30 milliliter(s) orally every 6 hours, As Needed for indigestion, gas, heartburn (28 Aug 2022 17:27)  insulin glargine 100 units/mL subcutaneous solution: 15 unit(s) subcutaneous once a day (in the morning) (28 Aug 2022 17:24)  insulin lispro 100 units/mL injectable solution: 5 unit(s) subcutaneous 3 times a day (before meals), take just before first bite of your meal (28 Aug 2022 17:24)  melatonin 10 mg oral tablet: 1 tab(s) orally once a day (at bedtime), As Needed (28 Aug 2022 17:29)  nystatin 100,000 units/g topical powder: 1 application topically 2 times a day (30 Aug 2022 16:21)  Toprol- mg oral tablet, extended release: 1 tab(s) orally once a day (06 Jun 2020 10:56)    MEDICATIONS  (STANDING):  cefTRIAXone   IVPB 1000 milliGRAM(s) IV Intermittent once    MEDICATIONS  (PRN):

## 2024-02-28 NOTE — ED ADULT NURSE NOTE - NSFALLRISKINTERV_ED_ALL_ED
Assistance OOB with selected safe patient handling equipment if applicable/Assistance with ambulation/Communicate fall risk and risk factors to all staff, patient, and family/Monitor gait and stability/Monitor for mental status changes and reorient to person, place, and time, as needed/Provide visual cue: yellow wristband, yellow gown, etc/Reinforce activity limits and safety measures with patient and family/Toileting schedule using arm’s reach rule for commode and bathroom/Use of alarms - bed, stretcher, chair and/or video monitoring/Call bell, personal items and telephone in reach/Instruct patient to call for assistance before getting out of bed/chair/stretcher/Non-slip footwear applied when patient is off stretcher/[place] to call system/Physically safe environment - no spills, clutter or unnecessary equipment/Purposeful Proactive Rounding/Room/bathroom lighting operational, light cord in reach

## 2024-02-28 NOTE — ED PROVIDER NOTE - ATTENDING SHARED VISIT SELECTORS
Methotrexate Pregnancy And Lactation Text: This medication is Pregnancy Category X and is known to cause fetal harm. This medication is excreted in breast milk. EKG

## 2024-02-28 NOTE — ED PROVIDER NOTE - CONSIDERATION OF ADMISSION OBSERVATION
Consideration of Admission/Observation Patient with fluid overload which is new for patient with (+) likely CHF etiology. (+) Hyperkalemia as well which was treated in ED, elevated troponin >1000. Will require admission for further management.

## 2024-02-28 NOTE — CONSULT NOTE ADULT - ATTENDING COMMENTS
Briefly, 89 year old woman for whom cardiology is consulted for elevated troponin, most likely NSTEMI. Patient has known atrial fibrillation and HFpEF. She is volume overloaded on exam.     VS, PE as above.    Telemetry, labs, imaging personally reviewed.     A/P: Medical management for NSTEMI with aspirin, plavix and heparin drip for 48 hours. Once heparin drip is complete, would resume Eliquis for this patient and drop the aspirin as there is concern for bleeding in this 89 year old woman. Continue to trend troponin until peak. Check echocardiogram. Agree with one dose of IV diuresis as above and please monitor Is/Os. If required can spot dose IV lasix based on patient's clinical progression. For her afib, would aim for lenient rate control, less than 110 bpm. Can switch metoprolol succinate to metoprolol tartrate 100mg BID in this acute setting.     We will follow with you.

## 2024-02-28 NOTE — ED PROVIDER NOTE - CARE PLAN
1 Principal Discharge DX:	New onset of congestive heart failure  Secondary Diagnosis:	Elevated troponin  Secondary Diagnosis:	Hyperkalemia

## 2024-02-28 NOTE — ED PROVIDER NOTE - DIFFERENTIAL DIAGNOSIS
Dyspnea, consider ACS vs PE vs dissection vs tamponade vs esophageal rupture vs pneumothorax vs pneumonia vs fluid overload    Also abdominal tenderness r/o UTI/pyelo, kidney stone, obstruction, perforation, pancreatitis, abscess, appendicitis, ischemia, hepatobiliary abnormality or any other emergent intra-abdominal pathology. Differential Diagnosis

## 2024-02-28 NOTE — CONSULT NOTE ADULT - ASSESSMENT
89 YO F with PMHx of Afib on Eliquis, ischemic CVA 8/2022 in the setting of subtherapeutic AC, HTN, HLD, DM type II, dementia, CAD s/p CABG 2002 BIB for with shortness of breath for 4 days.Cardiology consulted for elevated troponin.     TTE 8/14/22: EF 50-55%. Mod LVH. Mod enlarged LA. Mild MVR. Mild TR.    IMPRESSION  #NSTEMI  - HS Troponin trend: 1053 (28 Feb 2024 17:11), 1091 (28 Feb 2024 19:39)  - No active chest pain. Patient is a poor historian.  #Paroxysmal Afib  - CHADSVASc at least 8 (Age +2, female, CVA +2, DM2, HTN, CAD) on AC  - Now on Eliquis 5mg PO BID  #HFpEF  #CVA 8/2022 in the setting of subtherapeutic AC; was on apixaban 2.5mg PO BID  #CAD s/p CABG  #DM type II  #HTN  #Hyperkalemia     PLAN  - Hold Eliquis.  - Start ASA 81mg PO QD and plavix 75mg PO QD.  - Start heparin drip without bolus  - Serial cardiac enzymes  - Repeat EKG in AM  - 2D echo  - Lasix 40mg IV x 1. Reassess volume status tomorrow.     Detailed discussion with the daughters. Considering advanced age and dementia, would like to proceed with medical management.        89 YO F with PMHx of Afib on Eliquis, ischemic CVA 8/2022 in the setting of subtherapeutic AC, HTN, HLD, DM type II, dementia, CAD s/p CABG 2002 BIB for with shortness of breath for 4 days. Cardiology consulted for elevated troponin.     TTE 8/14/22: EF 50-55%. Mod LVH. Mod enlarged LA. Mild MVR. Mild TR.    IMPRESSION  #NSTEMI  - HS Troponin trend: 1053 (28 Feb 2024 17:11), 1091 (28 Feb 2024 19:39)  - No active chest pain. Patient is a poor historian.  #Paroxysmal Afib  - CHADSVASc at least 8 (Age +2, female, CVA +2, DM2, HTN, CAD) on AC  - Now on Eliquis 5mg PO BID  #HFpEF  #CVA 8/2022 in the setting of subtherapeutic AC; was on apixaban 2.5mg PO BID  #CAD s/p CABG  #DM type II  #HTN  #Hyperkalemia     PLAN  - Hold Eliquis.  - Start ASA 81mg PO QD and plavix 75mg PO QD.  - Start heparin drip without bolus  - Serial cardiac enzymes  - Repeat EKG in AM  - 2D echo  - Lasix 40mg IV x 1. Reassess volume status tomorrow.     Detailed discussion with the daughters. Considering advanced age and dementia, would like to proceed with medical management.

## 2024-02-28 NOTE — ED PROVIDER NOTE - OBJECTIVE STATEMENT
89 year old female, past medical history afib on ac, htn, hdl, dm, dementia, bib daughter with shortness of breath. patient with shortness of breath x3 days worsening today prompting ed eval. +cough. no fever, uri symptoms, vomiting, diarrhea, syncope, falls/head injury. 89 year old female, past medical history afib on ac, htn, hdl, dm, dementia, cad s/p cabg, bib daughter with shortness of breath. patient with shortness of breath x3 days worsening today prompting ed eval. +cough. no fever, uri symptoms, vomiting, diarrhea, syncope, falls/head injury.

## 2024-02-28 NOTE — ED PROVIDER NOTE - PROGRESS NOTE DETAILS
cards consulted fu with cards s/p second troponin. lasix ordered.   patient to be evaluated by cards fellow, dr nowak. patient evaluated by cards fellow who recommends aspirin/plavix/heparin. will admit to med tele.  cards tele with no beds

## 2024-02-29 LAB
ANION GAP SERPL CALC-SCNC: 14 MMOL/L — SIGNIFICANT CHANGE UP (ref 7–14)
ANION GAP SERPL CALC-SCNC: 17 MMOL/L — HIGH (ref 7–14)
APTT BLD: 167.9 SEC — CRITICAL HIGH (ref 27–39.2)
APTT BLD: 32 SEC — SIGNIFICANT CHANGE UP (ref 27–39.2)
APTT BLD: 47.2 SEC — HIGH (ref 27–39.2)
BUN SERPL-MCNC: 48 MG/DL — HIGH (ref 10–20)
BUN SERPL-MCNC: 50 MG/DL — HIGH (ref 10–20)
CALCIUM SERPL-MCNC: 9.2 MG/DL — SIGNIFICANT CHANGE UP (ref 8.4–10.5)
CALCIUM SERPL-MCNC: 9.6 MG/DL — SIGNIFICANT CHANGE UP (ref 8.4–10.5)
CHLORIDE SERPL-SCNC: 102 MMOL/L — SIGNIFICANT CHANGE UP (ref 98–110)
CHLORIDE SERPL-SCNC: 104 MMOL/L — SIGNIFICANT CHANGE UP (ref 98–110)
CO2 SERPL-SCNC: 16 MMOL/L — LOW (ref 17–32)
CO2 SERPL-SCNC: 21 MMOL/L — SIGNIFICANT CHANGE UP (ref 17–32)
CREAT SERPL-MCNC: 1.9 MG/DL — HIGH (ref 0.7–1.5)
CREAT SERPL-MCNC: 1.9 MG/DL — HIGH (ref 0.7–1.5)
EGFR: 25 ML/MIN/1.73M2 — LOW
EGFR: 25 ML/MIN/1.73M2 — LOW
GLUCOSE BLDC GLUCOMTR-MCNC: 153 MG/DL — HIGH (ref 70–99)
GLUCOSE BLDC GLUCOMTR-MCNC: 176 MG/DL — HIGH (ref 70–99)
GLUCOSE BLDC GLUCOMTR-MCNC: 190 MG/DL — HIGH (ref 70–99)
GLUCOSE BLDC GLUCOMTR-MCNC: 215 MG/DL — HIGH (ref 70–99)
GLUCOSE BLDC GLUCOMTR-MCNC: 94 MG/DL — SIGNIFICANT CHANGE UP (ref 70–99)
GLUCOSE SERPL-MCNC: 130 MG/DL — HIGH (ref 70–99)
GLUCOSE SERPL-MCNC: 151 MG/DL — HIGH (ref 70–99)
HCT VFR BLD CALC: 36.7 % — LOW (ref 37–47)
HGB BLD-MCNC: 12.2 G/DL — SIGNIFICANT CHANGE UP (ref 12–16)
INR BLD: 1.69 RATIO — HIGH (ref 0.65–1.3)
MCHC RBC-ENTMCNC: 31.4 PG — HIGH (ref 27–31)
MCHC RBC-ENTMCNC: 33.2 G/DL — SIGNIFICANT CHANGE UP (ref 32–37)
MCV RBC AUTO: 94.3 FL — SIGNIFICANT CHANGE UP (ref 81–99)
NRBC # BLD: 0 /100 WBCS — SIGNIFICANT CHANGE UP (ref 0–0)
PLATELET # BLD AUTO: 191 K/UL — SIGNIFICANT CHANGE UP (ref 130–400)
PMV BLD: 10.6 FL — HIGH (ref 7.4–10.4)
POTASSIUM SERPL-MCNC: 4.9 MMOL/L — SIGNIFICANT CHANGE UP (ref 3.5–5)
POTASSIUM SERPL-MCNC: 5 MMOL/L — SIGNIFICANT CHANGE UP (ref 3.5–5)
POTASSIUM SERPL-SCNC: 4.9 MMOL/L — SIGNIFICANT CHANGE UP (ref 3.5–5)
POTASSIUM SERPL-SCNC: 5 MMOL/L — SIGNIFICANT CHANGE UP (ref 3.5–5)
PROTHROM AB SERPL-ACNC: 19.4 SEC — HIGH (ref 9.95–12.87)
RBC # BLD: 3.89 M/UL — LOW (ref 4.2–5.4)
RBC # FLD: 15.4 % — HIGH (ref 11.5–14.5)
SODIUM SERPL-SCNC: 137 MMOL/L — SIGNIFICANT CHANGE UP (ref 135–146)
SODIUM SERPL-SCNC: 137 MMOL/L — SIGNIFICANT CHANGE UP (ref 135–146)
TROPONIN T, HIGH SENSITIVITY RESULT: 1159 NG/L — CRITICAL HIGH (ref 6–13)
TROPONIN T, HIGH SENSITIVITY RESULT: 984 NG/L — CRITICAL HIGH (ref 6–13)
WBC # BLD: 7.97 K/UL — SIGNIFICANT CHANGE UP (ref 4.8–10.8)
WBC # FLD AUTO: 7.97 K/UL — SIGNIFICANT CHANGE UP (ref 4.8–10.8)

## 2024-02-29 PROCEDURE — 99222 1ST HOSP IP/OBS MODERATE 55: CPT

## 2024-02-29 PROCEDURE — 99233 SBSQ HOSP IP/OBS HIGH 50: CPT

## 2024-02-29 RX ORDER — SODIUM CHLORIDE 9 MG/ML
1000 INJECTION, SOLUTION INTRAVENOUS
Refills: 0 | Status: DISCONTINUED | OUTPATIENT
Start: 2024-02-29 | End: 2024-03-02

## 2024-02-29 RX ORDER — INSULIN LISPRO 100/ML
VIAL (ML) SUBCUTANEOUS
Refills: 0 | Status: DISCONTINUED | OUTPATIENT
Start: 2024-02-29 | End: 2024-03-02

## 2024-02-29 RX ORDER — INSULIN LISPRO 100/ML
VIAL (ML) SUBCUTANEOUS AT BEDTIME
Refills: 0 | Status: DISCONTINUED | OUTPATIENT
Start: 2024-02-29 | End: 2024-03-02

## 2024-02-29 RX ORDER — VALSARTAN 80 MG/1
1 TABLET ORAL
Refills: 0 | DISCHARGE

## 2024-02-29 RX ORDER — GLUCAGON INJECTION, SOLUTION 0.5 MG/.1ML
1 INJECTION, SOLUTION SUBCUTANEOUS ONCE
Refills: 0 | Status: DISCONTINUED | OUTPATIENT
Start: 2024-02-29 | End: 2024-03-02

## 2024-02-29 RX ORDER — DEXTROSE 50 % IN WATER 50 %
15 SYRINGE (ML) INTRAVENOUS ONCE
Refills: 0 | Status: DISCONTINUED | OUTPATIENT
Start: 2024-02-29 | End: 2024-03-02

## 2024-02-29 RX ORDER — DEXTROSE 50 % IN WATER 50 %
25 SYRINGE (ML) INTRAVENOUS ONCE
Refills: 0 | Status: DISCONTINUED | OUTPATIENT
Start: 2024-02-29 | End: 2024-03-02

## 2024-02-29 RX ORDER — ATORVASTATIN CALCIUM 80 MG/1
80 TABLET, FILM COATED ORAL AT BEDTIME
Refills: 0 | Status: DISCONTINUED | OUTPATIENT
Start: 2024-02-29 | End: 2024-03-02

## 2024-02-29 RX ORDER — CEFTRIAXONE 500 MG/1
1000 INJECTION, POWDER, FOR SOLUTION INTRAMUSCULAR; INTRAVENOUS EVERY 24 HOURS
Refills: 0 | Status: COMPLETED | OUTPATIENT
Start: 2024-02-29 | End: 2024-03-02

## 2024-02-29 RX ORDER — FUROSEMIDE 40 MG
40 TABLET ORAL ONCE
Refills: 0 | Status: COMPLETED | OUTPATIENT
Start: 2024-02-29 | End: 2024-02-29

## 2024-02-29 RX ORDER — DEXTROSE 50 % IN WATER 50 %
12.5 SYRINGE (ML) INTRAVENOUS ONCE
Refills: 0 | Status: DISCONTINUED | OUTPATIENT
Start: 2024-02-29 | End: 2024-03-02

## 2024-02-29 RX ORDER — METOPROLOL TARTRATE 50 MG
100 TABLET ORAL EVERY 12 HOURS
Refills: 0 | Status: DISCONTINUED | OUTPATIENT
Start: 2024-03-01 | End: 2024-03-02

## 2024-02-29 RX ORDER — METOPROLOL TARTRATE 50 MG
1 TABLET ORAL
Qty: 0 | Refills: 0 | DISCHARGE

## 2024-02-29 RX ORDER — INSULIN LISPRO 100/ML
5 VIAL (ML) SUBCUTANEOUS
Refills: 0 | Status: DISCONTINUED | OUTPATIENT
Start: 2024-02-29 | End: 2024-03-02

## 2024-02-29 RX ORDER — HEPARIN SODIUM 5000 [USP'U]/ML
INJECTION INTRAVENOUS; SUBCUTANEOUS
Qty: 25000 | Refills: 0 | Status: DISCONTINUED | OUTPATIENT
Start: 2024-02-29 | End: 2024-03-01

## 2024-02-29 RX ORDER — HEPARIN SODIUM 5000 [USP'U]/ML
1100 INJECTION INTRAVENOUS; SUBCUTANEOUS
Qty: 25000 | Refills: 0 | Status: DISCONTINUED | OUTPATIENT
Start: 2024-02-29 | End: 2024-02-29

## 2024-02-29 RX ORDER — METOPROLOL TARTRATE 50 MG
100 TABLET ORAL DAILY
Refills: 0 | Status: DISCONTINUED | OUTPATIENT
Start: 2024-02-29 | End: 2024-02-29

## 2024-02-29 RX ORDER — INSULIN GLARGINE 100 [IU]/ML
15 INJECTION, SOLUTION SUBCUTANEOUS EVERY MORNING
Refills: 0 | Status: DISCONTINUED | OUTPATIENT
Start: 2024-02-29 | End: 2024-03-02

## 2024-02-29 RX ADMIN — Medication 40 MILLIGRAM(S): at 16:36

## 2024-02-29 RX ADMIN — HEPARIN SODIUM 1100 UNIT(S)/HR: 5000 INJECTION INTRAVENOUS; SUBCUTANEOUS at 19:49

## 2024-02-29 RX ADMIN — Medication 1: at 08:53

## 2024-02-29 RX ADMIN — HEPARIN SODIUM 0 UNIT(S)/HR: 5000 INJECTION INTRAVENOUS; SUBCUTANEOUS at 09:35

## 2024-02-29 RX ADMIN — Medication 1: at 11:27

## 2024-02-29 RX ADMIN — ATORVASTATIN CALCIUM 80 MILLIGRAM(S): 80 TABLET, FILM COATED ORAL at 21:49

## 2024-02-29 RX ADMIN — Medication 100 MILLIGRAM(S): at 07:06

## 2024-02-29 RX ADMIN — Medication 1: at 16:47

## 2024-02-29 RX ADMIN — HEPARIN SODIUM 1400 UNIT(S)/HR: 5000 INJECTION INTRAVENOUS; SUBCUTANEOUS at 21:20

## 2024-02-29 RX ADMIN — CEFTRIAXONE 100 MILLIGRAM(S): 500 INJECTION, POWDER, FOR SOLUTION INTRAMUSCULAR; INTRAVENOUS at 06:00

## 2024-02-29 RX ADMIN — INSULIN GLARGINE 15 UNIT(S): 100 INJECTION, SOLUTION SUBCUTANEOUS at 09:16

## 2024-02-29 RX ADMIN — Medication 40 MILLIGRAM(S): at 06:00

## 2024-02-29 RX ADMIN — HEPARIN SODIUM 1400 UNIT(S)/HR: 5000 INJECTION INTRAVENOUS; SUBCUTANEOUS at 01:36

## 2024-02-29 RX ADMIN — Medication 5 UNIT(S): at 16:48

## 2024-02-29 NOTE — GOALS OF CARE CONVERSATION - ADVANCED CARE PLANNING - CONVERSATION DETAILS
Introduced concept of goals of care with the patient's daughter Mrs Virgie Hopkins at bedside.   The patient's daughter is familiar of the different concepts such as full medical management, limited medical care, and comfort measures.   She is also aware of "full code" concept including need for compressions if patient were to arrest and need for intubation if patient experiencing severe respiratory distress or inability to protect airway.     The patient's daughters had already a filled MOLST form.   I further asked about the trial of NIV and I explained to them what is a BIPAP and when it might be used.     The Pt's daughters expressed understanding of all concepts. At this time, they would like the patient to be DNR/DNI with trial of NIV.   THey want long term feeding tubes to be placed if needed.

## 2024-02-29 NOTE — H&P ADULT - ASSESSMENT
89 year old female, past medical history afib on ac, htn, hdl, dm, dementia, CVA, cad s/p cabg, CKD 3b, bib daughter with shortness of breath    #NSTEMI  #Congestive Heart Failure Exacerbation  #HX CAD s/p CABG  #Essential Hypertension  #Hyperlipidemia  -c/w asa/plavix/hep gtt  -trend EKG/CE to peak  -lasix 40mg IVP, strict I/o  -TTE    #Hyperkalemia  #CKD stage 3b  #Acute Kidney Injury  -hold valsartan  -UA/lytes  -lokelma as needed    #Atrial Fibrillation, rate controlled  -hep gtt for now  -c/w metoprolol    #UTI  -c/w ceftr x5d    #DVT ppx  -hep gtt      #Diet  -dash/tlc  -fluid restrict 1000cc      #Activity  -bedrest 89 year old female, past medical history afib on ac, htn, hdl, dm, dementia, CVA, cad s/p cabg, CKD 3b, bib daughter with shortness of breath    #NSTEMI  #Congestive Heart Failure Exacerbation  #HX CAD s/p CABG  #Essential Hypertension  #Hyperlipidemia  -c/w asa/plavix/hep gtt  -trend EKG/CE to peak  -lasix 40mg IVP x1, strict I/o  -TTE    #Hyperkalemia  #CKD stage 3b  #Acute Kidney Injury  -hold valsartan  -UA/lytes  -lokelma as needed    #Atrial Fibrillation, rate controlled  -hep gtt for now  -c/w metoprolol    #UTI  -c/w ceftr x5d    #DVT ppx  -hep gtt      #Diet  #DM  -15/5/5/5+ SSI for now  -dash/tlc | CC  -fluid restrict 1000cc      #Activity  -bedrest

## 2024-02-29 NOTE — PATIENT PROFILE ADULT - FALL HARM RISK - HARM RISK INTERVENTIONS
Assistance with ambulation/Assistance OOB with selected safe patient handling equipment/Communicate Risk of Fall with Harm to all staff/Discuss with provider need for PT consult/Monitor for mental status changes/Monitor gait and stability/Move patient closer to nurses' station/Provide patient with walking aids - walker, cane, crutches/Reinforce activity limits and safety measures with patient and family/Reorient to person, place and time as needed/Tailored Fall Risk Interventions/Toileting schedule using arm’s reach rule for commode and bathroom/Use of alarms - bed, chair and/or voice tab/Visual Cue: Yellow wristband and red socks/Bed in lowest position, wheels locked, appropriate side rails in place/Call bell, personal items and telephone in reach/Instruct patient to call for assistance before getting out of bed or chair/Non-slip footwear when patient is out of bed/[place] to call system/Physically safe environment - no spills, clutter or unnecessary equipment/Purposeful Proactive Rounding/Room/bathroom lighting operational, light cord in reach

## 2024-02-29 NOTE — H&P ADULT - NSHPLABSRESULTS_GEN_ALL_CORE
CBC Full  -  ( 2024 17:11 )  WBC Count : 8.05 K/uL  RBC Count : 3.54 M/uL  Hemoglobin : 11.2 g/dL  Hematocrit : 34.1 %  Platelet Count - Automated : 131 K/uL  Mean Cell Volume : 96.3 fL  Mean Cell Hemoglobin : 31.6 pg  Mean Cell Hemoglobin Concentration : 32.8 g/dL  Auto Neutrophil # : 5.97 K/uL  Auto Lymphocyte # : 1.10 K/uL  Auto Monocyte # : 0.79 K/uL  Auto Eosinophil # : 0.11 K/uL  Auto Basophil # : 0.04 K/uL  Auto Neutrophil % : 74.1 %  Auto Lymphocyte % : 13.7 %  Auto Monocyte % : 9.8 %  Auto Eosinophil % : 1.4 %  Auto Basophil % : 0.5 %        135  |  104  |  48<H>  ----------------------------<  101<H>  5.8<H>   |  19  |  1.6<H>    Ca    8.8      2024 19:39    TPro  6.7  /  Alb  3.9  /  TBili  0.5  /  DBili  <0.2  /  AST  22  /  ALT  14  /  AlkPhos  50      LIVER FUNCTIONS - ( 2024 17:11 )  Alb: 3.9 g/dL / Pro: 6.7 g/dL / ALK PHOS: 50 U/L / ALT: 14 U/L / AST: 22 U/L / GGT: x           Urinalysis Basic - ( 2024 21:20 )    Color: Yellow / Appearance: Cloudy / S.025 / pH: x  Gluc: x / Ketone: Negative mg/dL  / Bili: Negative / Urobili: 0.2 mg/dL   Blood: x / Protein: Trace mg/dL / Nitrite: Negative   Leuk Esterase: Moderate / RBC: 5 /HPF /  /HPF   Sq Epi: x / Non Sq Epi: 0 /HPF / Bacteria: Many /HPF

## 2024-02-29 NOTE — PATIENT PROFILE ADULT - MONEY FOR FOOD
Interval HPI / Overnight events:   Female Single liveborn infant delivered vaginally born at 39 weeks gestation, now 1d old. No acute events overnight. Voiding and stooling appropriately. Infant having issues with feeding and mother working with lactation.    Physical Exam:     Current Weight: Daily Height/Length in cm: 48.5 (26 Jun 2020 20:30)    Daily Weight Gm: 2410 (27 Jun 2020 15:59)  Birth Weight:   Percent Change From Birth:     Vital signs stable    Physical exam  General: swaddled, quiet in crib, AGA  Head: Anterior and posterior fontanels open and flat  Eyes:  Orbits+ b/l; no scleral icterus, +red reflex bilaterally   Ears: patent bilaterally, no deformities  Nose: nares clinically patent  Mouth/Throat: no cleft lip or palate, no lesions  Neck: no masses, intact clavicles  Cardiovascular: +S1,S2, no murmurs, 2+ femoral pulses bilaterally  Respiratory: no retractions, Lungs clear to auscultation bilaterally  Abdomen: soft, non-distended, + BS, no masses, no organomegaly, umbilical cord stump attached  Genitourinary: normal external genitalia; anus clinically patent  Back: spine straight, no sacral dimple or tags  Extremities: moving all extremities, negative Ortolani/Hughes  Skin: pink, no jaundice;  no significant lesions  Neurological: reactive on exam, +suck, +grasp, +junaid, +babinski      Laboratory & Imaging Studies:   POCT Blood Glucose.: 62 mg/dL (06-27-20 @ 14:47)  POCT Blood Glucose.: 82 mg/dL (06-27-20 @ 02:45)  POCT Blood Glucose.: 73 mg/dL (06-26-20 @ 18:56)  POCT Blood Glucose.: 98 mg/dL (06-26-20 @ 18:00)    Total Bilirubin: 6.3 mg/dL no

## 2024-02-29 NOTE — H&P ADULT - HISTORY OF PRESENT ILLNESS
89 year old female, past medical history afib on ac, htn, hdl, dm, dementia, CVA, cad s/p cabg, CKD 3b, bib daughter with shortness of breath. patient with shortness of breath x3 days worsening today prompting ed eval. +cough. no fever, uri symptoms, vomiting, diarrhea, syncope, falls/head injury.    Labs significant for trop 1053->1091->1159, Hg 11.2 (baseline), K 5.8, Cr 1.8 (base 1.3), Lactate 1.6, BNP over 24k, UA mod LE, many bact, wbc 152    Imaging: CTh without acute pathology. CXR, CT angio chest showing bilateral pleural effusions, CTAP without acute intra-abdominal pathology    EKG:     Pt ordered for     Vitals  T(C): 36.4 (02-29-24 @ 01:51), Max: 36.4 (02-29-24 @ 01:51)  T(F): 97.5 (02-29-24 @ 01:51), Max: 97.5 (02-29-24 @ 01:51)  HR: 104 (02-29-24 @ 01:51) (80 - 104)  BP: 122/86 (02-29-24 @ 01:51) (122/86 - 138/76)  RR: 20 (02-28-24 @ 15:59) (20 - 20)  SpO2: 99% (02-28-24 @ 15:59) (99% - 99%)  Wt(kg): --    Review of Systems  CONSTITUTIONAL:  No weakness, fevers or chills  EYES/ENT:  No visual changes;  No vertigo or throat pain   NECK:  No pain or stiffness  RESPIRATORY:  No cough, wheezing, hemoptysis; No shortness of breath  CARDIOVASCULAR:  No chest pain or palpitations  GASTROINTESTINAL:  No abdominal or epigastric pain. No nausea, vomiting, or hematemesis; No diarrhea or constipation. No melena or hematochezia.  GENITOURINARY:  No dysuria, frequency or hematuria  MUSCULOSKELETAL: No muscle pains or joint pains  NEUROLOGICAL:  No numbness or weakness  SKIN:  No itching, rashes      Physical Exam  *deferred per family to let her sleep* 89 year old female, past medical history afib on ac, htn, hdl, dm, dementia, CVA, cad s/p cabg, CKD 3b, bib daughter with shortness of breath. patient with shortness of breath x3 days worsening today prompting ed eval. +cough. no fever, uri symptoms, vomiting, diarrhea, syncope, falls/head injury.    Labs significant for trop 1053->1091->1159, Hg 11.2 (baseline), K 5.8, Cr 1.8 (base 1.3), Lactate 1.6, BNP over 24k, UA mod LE, many bact, wbc 152    Imaging: CTh without acute pathology. CXR, CT angio chest showing bilateral pleural effusions, CTAP without acute intra-abdominal pathology    EKG: afib, L axis deviation    Pt evaluated by cardiology, rec DAPT/hep gtt, diuresis, echo    Pt ordered for aspirin/ plavix, ceftriaxone, hep gtt, lokelma, lasix 20mg IVP    Vitals  T(C): 36.4 (02-29-24 @ 01:51), Max: 36.4 (02-29-24 @ 01:51)  T(F): 97.5 (02-29-24 @ 01:51), Max: 97.5 (02-29-24 @ 01:51)  HR: 104 (02-29-24 @ 01:51) (80 - 104)  BP: 122/86 (02-29-24 @ 01:51) (122/86 - 138/76)  RR: 20 (02-28-24 @ 15:59) (20 - 20)  SpO2: 99% (02-28-24 @ 15:59) (99% - 99%)  Wt(kg): --    Physical Exam  *deferred per family to let her sleep*

## 2024-03-01 ENCOUNTER — RESULT REVIEW (OUTPATIENT)
Age: 89
End: 2024-03-01

## 2024-03-01 LAB
ALBUMIN SERPL ELPH-MCNC: 4 G/DL — SIGNIFICANT CHANGE UP (ref 3.5–5.2)
ALP SERPL-CCNC: 50 U/L — SIGNIFICANT CHANGE UP (ref 30–115)
ALT FLD-CCNC: 11 U/L — SIGNIFICANT CHANGE UP (ref 0–41)
ANION GAP SERPL CALC-SCNC: 21 MMOL/L — HIGH (ref 7–14)
APTT BLD: 134.8 SEC — CRITICAL HIGH (ref 27–39.2)
APTT BLD: >200 SEC — CRITICAL HIGH (ref 27–39.2)
AST SERPL-CCNC: 17 U/L — SIGNIFICANT CHANGE UP (ref 0–41)
BASOPHILS # BLD AUTO: 0.04 K/UL — SIGNIFICANT CHANGE UP (ref 0–0.2)
BASOPHILS NFR BLD AUTO: 0.6 % — SIGNIFICANT CHANGE UP (ref 0–1)
BILIRUB SERPL-MCNC: 0.5 MG/DL — SIGNIFICANT CHANGE UP (ref 0.2–1.2)
BUN SERPL-MCNC: 49 MG/DL — HIGH (ref 10–20)
CALCIUM SERPL-MCNC: 9.3 MG/DL — SIGNIFICANT CHANGE UP (ref 8.4–10.5)
CHLORIDE SERPL-SCNC: 101 MMOL/L — SIGNIFICANT CHANGE UP (ref 98–110)
CO2 SERPL-SCNC: 17 MMOL/L — SIGNIFICANT CHANGE UP (ref 17–32)
CREAT SERPL-MCNC: 1.7 MG/DL — HIGH (ref 0.7–1.5)
EGFR: 28 ML/MIN/1.73M2 — LOW
EOSINOPHIL # BLD AUTO: 0.19 K/UL — SIGNIFICANT CHANGE UP (ref 0–0.7)
EOSINOPHIL NFR BLD AUTO: 2.7 % — SIGNIFICANT CHANGE UP (ref 0–8)
GLUCOSE BLDC GLUCOMTR-MCNC: 122 MG/DL — HIGH (ref 70–99)
GLUCOSE BLDC GLUCOMTR-MCNC: 123 MG/DL — HIGH (ref 70–99)
GLUCOSE BLDC GLUCOMTR-MCNC: 145 MG/DL — HIGH (ref 70–99)
GLUCOSE SERPL-MCNC: 123 MG/DL — HIGH (ref 70–99)
HCT VFR BLD CALC: 35.6 % — LOW (ref 37–47)
HGB BLD-MCNC: 11.7 G/DL — LOW (ref 12–16)
IMM GRANULOCYTES NFR BLD AUTO: 0.4 % — HIGH (ref 0.1–0.3)
LYMPHOCYTES # BLD AUTO: 1.02 K/UL — LOW (ref 1.2–3.4)
LYMPHOCYTES # BLD AUTO: 14.4 % — LOW (ref 20.5–51.1)
MAGNESIUM SERPL-MCNC: 2.1 MG/DL — SIGNIFICANT CHANGE UP (ref 1.8–2.4)
MCHC RBC-ENTMCNC: 31.3 PG — HIGH (ref 27–31)
MCHC RBC-ENTMCNC: 32.9 G/DL — SIGNIFICANT CHANGE UP (ref 32–37)
MCV RBC AUTO: 95.2 FL — SIGNIFICANT CHANGE UP (ref 81–99)
MONOCYTES # BLD AUTO: 0.69 K/UL — HIGH (ref 0.1–0.6)
MONOCYTES NFR BLD AUTO: 9.8 % — HIGH (ref 1.7–9.3)
NEUTROPHILS # BLD AUTO: 5.09 K/UL — SIGNIFICANT CHANGE UP (ref 1.4–6.5)
NEUTROPHILS NFR BLD AUTO: 72.1 % — SIGNIFICANT CHANGE UP (ref 42.2–75.2)
NRBC # BLD: 0 /100 WBCS — SIGNIFICANT CHANGE UP (ref 0–0)
PLATELET # BLD AUTO: 178 K/UL — SIGNIFICANT CHANGE UP (ref 130–400)
PMV BLD: 10.8 FL — HIGH (ref 7.4–10.4)
POTASSIUM SERPL-MCNC: 4.4 MMOL/L — SIGNIFICANT CHANGE UP (ref 3.5–5)
POTASSIUM SERPL-SCNC: 4.4 MMOL/L — SIGNIFICANT CHANGE UP (ref 3.5–5)
PROT SERPL-MCNC: 6.8 G/DL — SIGNIFICANT CHANGE UP (ref 6–8)
RBC # BLD: 3.74 M/UL — LOW (ref 4.2–5.4)
RBC # FLD: 15.5 % — HIGH (ref 11.5–14.5)
SODIUM SERPL-SCNC: 139 MMOL/L — SIGNIFICANT CHANGE UP (ref 135–146)
WBC # BLD: 7.06 K/UL — SIGNIFICANT CHANGE UP (ref 4.8–10.8)
WBC # FLD AUTO: 7.06 K/UL — SIGNIFICANT CHANGE UP (ref 4.8–10.8)

## 2024-03-01 PROCEDURE — 93320 DOPPLER ECHO COMPLETE: CPT | Mod: 26

## 2024-03-01 PROCEDURE — 99232 SBSQ HOSP IP/OBS MODERATE 35: CPT

## 2024-03-01 PROCEDURE — 93325 DOPPLER ECHO COLOR FLOW MAPG: CPT | Mod: 26

## 2024-03-01 PROCEDURE — 93312 ECHO TRANSESOPHAGEAL: CPT | Mod: 26

## 2024-03-01 RX ORDER — ASPIRIN/CALCIUM CARB/MAGNESIUM 324 MG
81 TABLET ORAL DAILY
Refills: 0 | Status: DISCONTINUED | OUTPATIENT
Start: 2024-03-01 | End: 2024-03-02

## 2024-03-01 RX ORDER — CLOPIDOGREL BISULFATE 75 MG/1
75 TABLET, FILM COATED ORAL DAILY
Refills: 0 | Status: DISCONTINUED | OUTPATIENT
Start: 2024-03-01 | End: 2024-03-02

## 2024-03-01 RX ADMIN — HEPARIN SODIUM 0 UNIT(S)/HR: 5000 INJECTION INTRAVENOUS; SUBCUTANEOUS at 06:08

## 2024-03-01 RX ADMIN — ATORVASTATIN CALCIUM 80 MILLIGRAM(S): 80 TABLET, FILM COATED ORAL at 21:41

## 2024-03-01 RX ADMIN — INSULIN GLARGINE 15 UNIT(S): 100 INJECTION, SOLUTION SUBCUTANEOUS at 08:32

## 2024-03-01 RX ADMIN — CLOPIDOGREL BISULFATE 75 MILLIGRAM(S): 75 TABLET, FILM COATED ORAL at 08:22

## 2024-03-01 RX ADMIN — Medication 100 MILLIGRAM(S): at 05:21

## 2024-03-01 RX ADMIN — HEPARIN SODIUM 1100 UNIT(S)/HR: 5000 INJECTION INTRAVENOUS; SUBCUTANEOUS at 07:13

## 2024-03-01 RX ADMIN — Medication 81 MILLIGRAM(S): at 08:22

## 2024-03-01 RX ADMIN — CEFTRIAXONE 100 MILLIGRAM(S): 500 INJECTION, POWDER, FOR SOLUTION INTRAMUSCULAR; INTRAVENOUS at 05:22

## 2024-03-01 NOTE — PROGRESS NOTE ADULT - SUBJECTIVE AND OBJECTIVE BOX
Progress Note:  Provider Speciality                            Hospitalist      GARY NUNEZ MRN-[id number] 89y Female     CHIEF PRESENTING COMPLAINT:  Patient is a 89y old  Female who presents with a chief complaint of NSTEMI (29 Feb 2024 03:45)        SUBJECTIVE:  Patient was seen and examined at bedside. Reports improvement in  presenting complaint.   No significant overnight events reported.     HISTORY OF PRESENTING ILLNESS:  HPI:  89 year old female, past medical history afib on ac, htn, hdl, dm, dementia, CVA, cad s/p cabg, CKD 3b, bib daughter with shortness of breath. patient with shortness of breath x3 days worsening today prompting ed eval. +cough. no fever, uri symptoms, vomiting, diarrhea, syncope, falls/head injury.    Labs significant for trop 1053->1091->1159, Hg 11.2 (baseline), K 5.8, Cr 1.8 (base 1.3), Lactate 1.6, BNP over 24k, UA mod LE, many bact, wbc 152    Imaging: CTh without acute pathology. CXR, CT angio chest showing bilateral pleural effusions, CTAP without acute intra-abdominal pathology    EKG: afib, L axis deviation    Pt evaluated by cardiology, rec DAPT/hep gtt, diuresis, echo    Pt ordered for aspirin/ plavix, ceftriaxone, hep gtt, lokelma, lasix 20mg IVP    Vitals  T(C): 36.4 (02-29-24 @ 01:51), Max: 36.4 (02-29-24 @ 01:51)  T(F): 97.5 (02-29-24 @ 01:51), Max: 97.5 (02-29-24 @ 01:51)  HR: 104 (02-29-24 @ 01:51) (80 - 104)  BP: 122/86 (02-29-24 @ 01:51) (122/86 - 138/76)  RR: 20 (02-28-24 @ 15:59) (20 - 20)  SpO2: 99% (02-28-24 @ 15:59) (99% - 99%)  Wt(kg): --    Physical Exam  *deferred per family to let her sleep* (29 Feb 2024 03:45)        REVIEW OF SYSTEMS:  Patient denies  headache, fever, chills. Denies chest pain, shortness of breath, palpitation. Denies nausea, vomiting, abdominal pain or diarrhoea, Denies dysuria.   At least 10 systems were reviewed in ROS. All systems reviewed  are within normal limits except for the complaints as described in Subjective.    PAST MEDICAL & SURGICAL HISTORY:  PAST MEDICAL & SURGICAL HISTORY:  Diabetes      Hypertension      Hyperlipemia      CAD (coronary artery disease) of artery bypass graft      S/P total knee replacement      History of total hip replacement, bilateral              VITAL SIGNS:  Vital Signs Last 24 Hrs  T(C): 36.4 (01 Mar 2024 04:36), Max: 36.6 (29 Feb 2024 14:50)  T(F): 97.5 (01 Mar 2024 04:36), Max: 97.8 (29 Feb 2024 14:50)  HR: 54 (01 Mar 2024 08:01) (54 - 96)  BP: 152/70 (01 Mar 2024 08:01) (124/70 - 152/70)  BP(mean): --  RR: 18 (01 Mar 2024 04:36) (18 - 18)  SpO2: --              PHYSICAL EXAMINATION:  Not in acute distress  General: No icterus  HEENT:   no JVD.  Heart: S1+S2 audible  Lungs: bilateral  moderate air entry, no wheezing, no crepitations.  Abdomen: Soft, non-tender, non-distended , no  rigidity or guarding.  CNS: Awake alert, CN  grossly intact.  Extremities:  No edema            CONSULTS:  Consultant(s) Notes Reviewed by me.   Care Discussed with Consultants/Other Providers where required.    All the images and labs were reviewed today        MEDICATIONS:  MEDICATIONS  (STANDING):  aspirin  chewable 81 milliGRAM(s) Oral daily  atorvastatin 80 milliGRAM(s) Oral at bedtime  cefTRIAXone   IVPB 1000 milliGRAM(s) IV Intermittent every 24 hours  clopidogrel Tablet 75 milliGRAM(s) Oral daily  dextrose 5%. 1000 milliLiter(s) (50 mL/Hr) IV Continuous <Continuous>  dextrose 5%. 1000 milliLiter(s) (100 mL/Hr) IV Continuous <Continuous>  dextrose 50% Injectable 12.5 Gram(s) IV Push once  dextrose 50% Injectable 25 Gram(s) IV Push once  dextrose 50% Injectable 25 Gram(s) IV Push once  glucagon  Injectable 1 milliGRAM(s) IntraMuscular once  insulin glargine Injectable (LANTUS) 15 Unit(s) SubCutaneous every morning  insulin lispro (ADMELOG) corrective regimen sliding scale   SubCutaneous at bedtime  insulin lispro (ADMELOG) corrective regimen sliding scale   SubCutaneous three times a day before meals  insulin lispro Injectable (ADMELOG) 5 Unit(s) SubCutaneous three times a day before meals  metoprolol tartrate 100 milliGRAM(s) Oral every 12 hours    MEDICATIONS  (PRN):  dextrose Oral Gel 15 Gram(s) Oral once PRN Blood Glucose LESS THAN 70 milliGRAM(s)/deciliter

## 2024-03-01 NOTE — PROGRESS NOTE ADULT - ASSESSMENT
89 year old female, past medical history afib on ac, htn, hdl, dm, dementia, CVA, cad s/p cabg, CKD 3b, bib daughter with shortness of breath. patient with shortness of breath x3 days worsening today prompting ed eval. +cough    Acute on Chronic HFpEF  NSTEMI  Acute cystitis  CAD s/p CABG  HTN/HLD      Received lasix IVP twice since admission. MAy switch to po lasix 40 mg today  Empiric coverage with Rocephin U cx growing E coli. Sensitivity pending  TTE 02/29-EF 55 to 60%. Moderate LVH. Moderately enlarged left atrium.  MARCIN on CKD Stage 3 with Hyperkalemia- Cr imprving. Hold ARB.Lokelma PRN  Chronic Atrial Fibrillation w rvr-Switched metoprolol ER 100mg to metoprolol 100 BID  Troponin downtrending  Will discuss with Cardio if we can d/c plavix and switch to ASA and eliquis. Heparin discontinued  PT eval  GOC addressed- pt is DNR/DNI    #Progress Note Handoff  Pending: Cont trending trops, IV diuresis, TTE to be followed. U cx sensitivity pending

## 2024-03-02 ENCOUNTER — TRANSCRIPTION ENCOUNTER (OUTPATIENT)
Age: 89
End: 2024-03-02

## 2024-03-02 VITALS
SYSTOLIC BLOOD PRESSURE: 126 MMHG | RESPIRATION RATE: 18 BRPM | HEART RATE: 80 BPM | TEMPERATURE: 98 F | DIASTOLIC BLOOD PRESSURE: 66 MMHG

## 2024-03-02 LAB
-  AMOXICILLIN/CLAVULANIC ACID: SIGNIFICANT CHANGE UP
-  AMPICILLIN/SULBACTAM: SIGNIFICANT CHANGE UP
-  AMPICILLIN: SIGNIFICANT CHANGE UP
-  AZTREONAM: SIGNIFICANT CHANGE UP
-  CEFAZOLIN: SIGNIFICANT CHANGE UP
-  CEFEPIME: SIGNIFICANT CHANGE UP
-  CEFOXITIN: SIGNIFICANT CHANGE UP
-  CEFTRIAXONE: SIGNIFICANT CHANGE UP
-  CIPROFLOXACIN: SIGNIFICANT CHANGE UP
-  ERTAPENEM: SIGNIFICANT CHANGE UP
-  GENTAMICIN: SIGNIFICANT CHANGE UP
-  IMIPENEM: SIGNIFICANT CHANGE UP
-  LEVOFLOXACIN: SIGNIFICANT CHANGE UP
-  MEROPENEM: SIGNIFICANT CHANGE UP
-  NITROFURANTOIN: SIGNIFICANT CHANGE UP
-  PIPERACILLIN/TAZOBACTAM: SIGNIFICANT CHANGE UP
-  TOBRAMYCIN: SIGNIFICANT CHANGE UP
-  TRIMETHOPRIM/SULFAMETHOXAZOLE: SIGNIFICANT CHANGE UP
A1C WITH ESTIMATED AVERAGE GLUCOSE RESULT: 6 % — HIGH (ref 4–5.6)
ALBUMIN SERPL ELPH-MCNC: 3.9 G/DL — SIGNIFICANT CHANGE UP (ref 3.5–5.2)
ALP SERPL-CCNC: 48 U/L — SIGNIFICANT CHANGE UP (ref 30–115)
ALT FLD-CCNC: 11 U/L — SIGNIFICANT CHANGE UP (ref 0–41)
ANION GAP SERPL CALC-SCNC: 18 MMOL/L — HIGH (ref 7–14)
AST SERPL-CCNC: 15 U/L — SIGNIFICANT CHANGE UP (ref 0–41)
BILIRUB SERPL-MCNC: 0.6 MG/DL — SIGNIFICANT CHANGE UP (ref 0.2–1.2)
BUN SERPL-MCNC: 47 MG/DL — HIGH (ref 10–20)
CALCIUM SERPL-MCNC: 9.4 MG/DL — SIGNIFICANT CHANGE UP (ref 8.4–10.5)
CHLORIDE SERPL-SCNC: 103 MMOL/L — SIGNIFICANT CHANGE UP (ref 98–110)
CO2 SERPL-SCNC: 21 MMOL/L — SIGNIFICANT CHANGE UP (ref 17–32)
CREAT SERPL-MCNC: 1.4 MG/DL — SIGNIFICANT CHANGE UP (ref 0.7–1.5)
CULTURE RESULTS: ABNORMAL
EGFR: 36 ML/MIN/1.73M2 — LOW
ESTIMATED AVERAGE GLUCOSE: 126 MG/DL — HIGH (ref 68–114)
GLUCOSE BLDC GLUCOMTR-MCNC: 116 MG/DL — HIGH (ref 70–99)
GLUCOSE BLDC GLUCOMTR-MCNC: 144 MG/DL — HIGH (ref 70–99)
GLUCOSE SERPL-MCNC: 114 MG/DL — HIGH (ref 70–99)
HCT VFR BLD CALC: 36.3 % — LOW (ref 37–47)
HGB BLD-MCNC: 11.8 G/DL — LOW (ref 12–16)
MAGNESIUM SERPL-MCNC: 2 MG/DL — SIGNIFICANT CHANGE UP (ref 1.8–2.4)
MCHC RBC-ENTMCNC: 31.1 PG — HIGH (ref 27–31)
MCHC RBC-ENTMCNC: 32.5 G/DL — SIGNIFICANT CHANGE UP (ref 32–37)
MCV RBC AUTO: 95.5 FL — SIGNIFICANT CHANGE UP (ref 81–99)
METHOD TYPE: SIGNIFICANT CHANGE UP
NRBC # BLD: 0 /100 WBCS — SIGNIFICANT CHANGE UP (ref 0–0)
ORGANISM # SPEC MICROSCOPIC CNT: ABNORMAL
ORGANISM # SPEC MICROSCOPIC CNT: SIGNIFICANT CHANGE UP
PLATELET # BLD AUTO: 152 K/UL — SIGNIFICANT CHANGE UP (ref 130–400)
PMV BLD: 10.7 FL — HIGH (ref 7.4–10.4)
POTASSIUM SERPL-MCNC: 4 MMOL/L — SIGNIFICANT CHANGE UP (ref 3.5–5)
POTASSIUM SERPL-SCNC: 4 MMOL/L — SIGNIFICANT CHANGE UP (ref 3.5–5)
PROT SERPL-MCNC: 6.7 G/DL — SIGNIFICANT CHANGE UP (ref 6–8)
RBC # BLD: 3.8 M/UL — LOW (ref 4.2–5.4)
RBC # FLD: 15.6 % — HIGH (ref 11.5–14.5)
SODIUM SERPL-SCNC: 142 MMOL/L — SIGNIFICANT CHANGE UP (ref 135–146)
SPECIMEN SOURCE: SIGNIFICANT CHANGE UP
WBC # BLD: 7.01 K/UL — SIGNIFICANT CHANGE UP (ref 4.8–10.8)
WBC # FLD AUTO: 7.01 K/UL — SIGNIFICANT CHANGE UP (ref 4.8–10.8)

## 2024-03-02 PROCEDURE — 99238 HOSP IP/OBS DSCHRG MGMT 30/<: CPT

## 2024-03-02 RX ORDER — FUROSEMIDE 40 MG
1 TABLET ORAL
Qty: 30 | Refills: 0
Start: 2024-03-02 | End: 2024-03-31

## 2024-03-02 RX ORDER — CLOPIDOGREL BISULFATE 75 MG/1
1 TABLET, FILM COATED ORAL
Qty: 0 | Refills: 0 | DISCHARGE
Start: 2024-03-02

## 2024-03-02 RX ORDER — ASPIRIN/CALCIUM CARB/MAGNESIUM 324 MG
1 TABLET ORAL
Qty: 0 | Refills: 0 | DISCHARGE
Start: 2024-03-02

## 2024-03-02 RX ORDER — ASPIRIN/CALCIUM CARB/MAGNESIUM 324 MG
1 TABLET ORAL
Qty: 30 | Refills: 0
Start: 2024-03-02 | End: 2024-03-31

## 2024-03-02 RX ORDER — LEVOFLOXACIN 5 MG/ML
1 INJECTION, SOLUTION INTRAVENOUS
Qty: 5 | Refills: 0
Start: 2024-03-02 | End: 2024-03-06

## 2024-03-02 RX ORDER — VALSARTAN 80 MG/1
320 TABLET ORAL DAILY
Refills: 0 | Status: DISCONTINUED | OUTPATIENT
Start: 2024-03-02 | End: 2024-03-02

## 2024-03-02 RX ORDER — CLOPIDOGREL BISULFATE 75 MG/1
1 TABLET, FILM COATED ORAL
Qty: 30 | Refills: 0
Start: 2024-03-02 | End: 2024-03-31

## 2024-03-02 RX ADMIN — Medication 81 MILLIGRAM(S): at 11:56

## 2024-03-02 RX ADMIN — Medication 5 UNIT(S): at 11:55

## 2024-03-02 RX ADMIN — Medication 100 MILLIGRAM(S): at 05:32

## 2024-03-02 RX ADMIN — CLOPIDOGREL BISULFATE 75 MILLIGRAM(S): 75 TABLET, FILM COATED ORAL at 11:56

## 2024-03-02 RX ADMIN — CEFTRIAXONE 100 MILLIGRAM(S): 500 INJECTION, POWDER, FOR SOLUTION INTRAMUSCULAR; INTRAVENOUS at 05:27

## 2024-03-02 RX ADMIN — VALSARTAN 320 MILLIGRAM(S): 80 TABLET ORAL at 13:07

## 2024-03-02 RX ADMIN — Medication 5 UNIT(S): at 08:20

## 2024-03-02 RX ADMIN — INSULIN GLARGINE 15 UNIT(S): 100 INJECTION, SOLUTION SUBCUTANEOUS at 08:54

## 2024-03-02 NOTE — DISCHARGE NOTE NURSING/CASE MANAGEMENT/SOCIAL WORK - PATIENT PORTAL LINK FT
You can access the FollowMyHealth Patient Portal offered by [facility] by registering at the following website: [email or web address]. By joining [facility]’s FollowMyHealth portal, you will also be able to view your health information using other applications (apps) compatible with our system.

## 2024-03-02 NOTE — DIETITIAN INITIAL EVALUATION ADULT - PERTINENT LABORATORY DATA
03-02    142  |  103  |  47<H>  ----------------------------<  114<H>  4.0   |  21  |  1.4    Ca    9.4      02 Mar 2024 06:18  Mg     2.0     03-02    TPro  6.7  /  Alb  3.9  /  TBili  0.6  /  DBili  x   /  AST  15  /  ALT  11  /  AlkPhos  48  03-02  POCT Blood Glucose.: 144 mg/dL (03-02-24 @ 11:36)  A1C with Estimated Average Glucose Result: 6.0 % (03-01-24 @ 08:17)

## 2024-03-02 NOTE — DISCHARGE NOTE PROVIDER - NSDCMRMEDTOKEN_GEN_ALL_CORE_FT
apixaban 5 mg oral tablet: 1 tab(s) orally every 12 hours  atorvastatin 80 mg oral tablet: 1 tab(s) orally once a day (at bedtime)  Diovan 320 mg oral tablet: 1 tab(s) orally once a day  insulin glargine 100 units/mL subcutaneous solution: 15 unit(s) subcutaneous once a day (in the morning)  insulin lispro 100 units/mL injectable solution: 5 unit(s) subcutaneous 3 times a day (before meals), take just before first bite of your meal  Toprol- mg oral tablet, extended release: 1 tab(s) orally once a day   aspirin 81 mg oral tablet, chewable: 1 tab(s) orally once a day  atorvastatin 80 mg oral tablet: 1 tab(s) orally once a day (at bedtime)  clopidogrel 75 mg oral tablet: 1 tab(s) orally once a day  Diovan 320 mg oral tablet: 1 tab(s) orally once a day  insulin glargine 100 units/mL subcutaneous solution: 15 unit(s) subcutaneous once a day (in the morning)  insulin lispro 100 units/mL injectable solution: 5 unit(s) subcutaneous 3 times a day (before meals), take just before first bite of your meal  Toprol- mg oral tablet, extended release: 1 tab(s) orally once a day

## 2024-03-02 NOTE — DIETITIAN INITIAL EVALUATION ADULT - PERTINENT MEDS FT
MEDICATIONS  (STANDING):  aspirin  chewable 81 milliGRAM(s) Oral daily  atorvastatin 80 milliGRAM(s) Oral at bedtime  clopidogrel Tablet 75 milliGRAM(s) Oral daily  dextrose 5%. 1000 milliLiter(s) (50 mL/Hr) IV Continuous <Continuous>  dextrose 5%. 1000 milliLiter(s) (100 mL/Hr) IV Continuous <Continuous>  dextrose 50% Injectable 25 Gram(s) IV Push once  dextrose 50% Injectable 25 Gram(s) IV Push once  dextrose 50% Injectable 12.5 Gram(s) IV Push once  glucagon  Injectable 1 milliGRAM(s) IntraMuscular once  insulin glargine Injectable (LANTUS) 15 Unit(s) SubCutaneous every morning  insulin lispro (ADMELOG) corrective regimen sliding scale   SubCutaneous at bedtime  insulin lispro (ADMELOG) corrective regimen sliding scale   SubCutaneous three times a day before meals  insulin lispro Injectable (ADMELOG) 5 Unit(s) SubCutaneous three times a day before meals  metoprolol tartrate 100 milliGRAM(s) Oral every 12 hours  valsartan 320 milliGRAM(s) Oral daily    MEDICATIONS  (PRN):  dextrose Oral Gel 15 Gram(s) Oral once PRN Blood Glucose LESS THAN 70 milliGRAM(s)/deciliter

## 2024-03-02 NOTE — DISCHARGE NOTE NURSING/CASE MANAGEMENT/SOCIAL WORK - NSDCVIVACCINE_GEN_ALL_CORE_FT
Tdap; 20-Nov-2023 01:07; Yomaira Zavala (RN); [employer]; [id number] (Exp. Date: 03-Oct-2025); IntraMuscular; Deltoid Left.; 0.5 milliLiter(s); VIS (VIS Published: 09-May-2013, VIS Presented: 20-Nov-2023);

## 2024-03-02 NOTE — DISCHARGE NOTE PROVIDER - CARE PROVIDER_API CALL
Chloe West  Family Medicine  [address] [place] [address]  Phone: [phone]  Fax: [phone]  Follow Up Time:     Behuria, Supreeti  Cardiology  [address] [place] [address], [address]  [place] [address]  Phone: [phone]  Fax: [phone]  Follow Up Time:

## 2024-03-02 NOTE — DISCHARGE NOTE PROVIDER - NSDCCPCAREPLAN_GEN_ALL_CORE_FT
PRINCIPAL DISCHARGE DIAGNOSIS  Diagnosis: New onset of congestive heart failure  Assessment and Plan of Treatment: Congestive heart failure (CHF) is a chronic condition in which the heart has trouble pumping blood. In some cases of heart failure, fluid may back up into your lungs or you may have swelling (edema) in your lower legs. There are many causes of heart failure including high blood pressure, coronary artery disease, abnormal heart valves, heart muscle disease, lung disease, diabetes, etc. Symptoms include shortness of breath with activity or when lying flat, cough, swelling of the legs, fatigue, or increased urination during the night.   Treatment is aimed at managing the symptoms of heart failure and may include lifestyle changes, medications, or surgical procedures. Take medicines only as directed by your health care provider and do not stop unless instructed to do so. Eat heart-healthy foods with low or no trans/saturated fats, cholesterol and salt. Weigh yourself every day for early recognition of fluid accumulation.  SEEK IMMEDIATE MEDICAL CARE IF YOU HAVE ANY OF THE FOLLOWING SYMPTOMS: shortness of breath, change in mental status, chest pain, lightheadedness/dizziness/fainting, or worsening of symptoms including not being able to conduct normal physical activity.      SECONDARY DISCHARGE DIAGNOSES  Diagnosis: Elevated troponin  Assessment and Plan of Treatment: Myocardial infarction (Myocardial Infarction) refers to tissue death (infarction) of the heart muscle (myocardium) caused by ischaemia, the lack of oxygen delivery to myocardial tissue. It is a type of acute coronary syndrome, which describes a sudden or short-term change in symptoms related to blood flow to the heart.[22] Unlike the other type of acute coronary syndrome, unstable angina, a myocardial infarction occurs when there is cell death, which can be estimated by measuring by a blood test for biomarkers (the cardiac protein troponin)  Non-ST elevation myocardial infarction is in your condition the cause of Myocardial Infarction and can result.   You had a cardiac catheterization for evaluation of the possible cornonary artery blockage which is not significant in your condition.  You are to follow-up with your cardiologist as outpatient for constant monitoring      Diagnosis: Hyperkalemia  Assessment and Plan of Treatment:

## 2024-03-02 NOTE — DIETITIAN INITIAL EVALUATION ADULT - NS FNS REASON FOR WEIGHT CHANG
weight hx per EMR:    77.1 kg - 169.62 lbs (11/19/23)    weight gain noted - possibly related to fluid shifts 2/2 CHF

## 2024-03-02 NOTE — DIETITIAN INITIAL EVALUATION ADULT - OTHER CALCULATIONS
estimated needs with consideration for age, acuity of illness  Energy: 1185.53 kcal/day (MSJ x 1.0)   IBW used for Protein needs due to obese BMI (class 1)  Estimated Fluid needs: 1000 mL fluid restriction at this time

## 2024-03-02 NOTE — DIETITIAN INITIAL EVALUATION ADULT - NS FNS DIET ORDER
Diet, DASH/TLC:   Sodium & Cholesterol Restricted  Consistent Carbohydrate {Evening Snack}  1000mL Fluid Restriction ([id number]) (02-29-24 @ 04:09) [Active]

## 2024-03-02 NOTE — DIETITIAN INITIAL EVALUATION ADULT - COLLABORATION WITH OTHER PROVIDERS
Interventions: meals and snacks  Monitoring/Evaluation: energy intake, weight, labs, skin status, NFPE

## 2024-03-02 NOTE — DIETITIAN INITIAL EVALUATION ADULT - OTHER INFO
89 year old female, past medical history afib on ac, htn, hdl, dm, dementia, CVA, cad s/p cabg, CKD 3b, bib daughter with shortness of breath. patient with shortness of breath x3 days worsening today prompting ed eval. +cough    Acute on Chronic HFpEF; NSTEMI; Acute cystitis; CAD s/p CABG; HTN/HLD  MARCIN on CKD - stage 3 with hyperkalemia - improving. Chronic afib

## 2024-03-02 NOTE — DISCHARGE NOTE NURSING/CASE MANAGEMENT/SOCIAL WORK - NSDCPEFALRISK_GEN_ALL_CORE
For information on Fall & Injury Prevention, visit: [email or web address] OR  [email or web address] OR  https://www.cdc.gov/steadi/patient.html

## 2024-03-02 NOTE — DIETITIAN INITIAL EVALUATION ADULT - NAME AND PHONE
Sudha Allred, RD [phone] or via Teams    Patient is at high nutrition risk, RD to f/u in 3-5 days or PRN if not discharged

## 2024-03-02 NOTE — DISCHARGE NOTE PROVIDER - CARE PROVIDERS DIRECT ADDRESSES
,DirectAddress_Unknown,[email or web address]

## 2024-03-02 NOTE — DISCHARGE NOTE PROVIDER - HOSPITAL COURSE
89 year old female, past medical history afib on ac, htn, hdl, dm, dementia, CVA, cad s/p cabg, CKD 3b, bib daughter with shortness of breath. patient with shortness of breath x3 days worsening today prompting ed eval. +cough. no fever, uri symptoms, vomiting, diarrhea, syncope, falls/head injury.    Labs significant for trop 1053->1091->1159, Hg 11.2 (baseline), K 5.8, Cr 1.8 (base 1.3), Lactate 1.6, BNP over 24k, UA mod LE, many bact, wbc 152    Imaging: CTh without acute pathology. CXR, CT angio chest showing bilateral pleural effusions, CTAP without acute intra-abdominal pathology    Admitted for NSTEMI  Cardiology Consulted - f/u eval   HCP son knows and understand situation and is opting for CMO and no further escalation of care. Request avoiding further A/C and increasing risk of bleed     Patient was seen and examined this morning at bedside and is stable for discharge.  He is to be discharged on the prescribed medications  He is to follow-up with his PCP   89 year old female, past medical history afib on ac, htn, hdl, dm, dementia, CVA, cad s/p cabg, CKD 3b, bib daughter with shortness of breath. patient with shortness of breath x3 days worsening today prompting ed eval. +cough. no fever, uri symptoms, vomiting, diarrhea, syncope, falls/head injury.    Labs significant for trop 1053->1091->1159, Hg 11.2 (baseline), K 5.8, Cr 1.8 (base 1.3), Lactate 1.6, BNP over 24k, UA mod LE, many bact, wbc 152    Imaging: CTh without acute pathology. CXR, CT angio chest showing bilateral pleural effusions, CTAP without acute intra-abdominal pathology    Admitted for NSTEMI  Cardiology Consulted - f/u evaluation    TTE done 03/01/2024:  1. Left ventricular ejection fraction, by visual estimation, is 35 to   40%.   2. Mildly to moderately decreased global left ventricular systolic   function.   3. Multiple left ventricular regional wall motion abnormalities exist.   See wall motion findings.   4. Mild left ventricular hypertrophy.   5. The left ventricular diastolic function could not be assessed in this   study.   6. Moderately enlarged left atrium.   7. Moderately enlarged right atrium.   8. Mitral annular calcification.   9. Moderate mitral valve regurgitation.  10. Mild-moderate tricuspid regurgitation.           HCP son knows and understand situation and is opting for CMO and no further escalation of care. Request avoiding further A/C due to  increased risk of bleed     Patient was seen and examined this morning at bedside and is stable for discharge.  He is to be discharged on the prescribed medications  He is to follow-up with his PCP    Attending Attestation:  Patient was seen & examined independently. Patients HCP, her son ,understands overall poor prognosis and risks associated with NSTEMI and echo findings. He wants only comfort care measures and refusing any invasive procedure including cardiac cath or stress test. He also refuses anticoagulation including ELIQUIS but agrees to Continue home meds for chronic  comorbid conditions including DAPT.  Requesting the patient to be discharged home with home care..

## 2024-03-02 NOTE — DIETITIAN INITIAL EVALUATION ADULT - ORAL INTAKE PTA/DIET HISTORY
Nutrition hx obtained from patient's son present at bedside. Patient did not follow any particular diet PTA, but she was eating smaller portions at eat meal usually. No vitamin, mineral supplements taken. No oral nutrition supplements taken. Height reported as 63 inches and UBW: 177 lbs. No chewing/swallowing difficulties. NKFA, no food intolerances reported.

## 2024-03-03 ENCOUNTER — TRANSCRIPTION ENCOUNTER (OUTPATIENT)
Age: 89
End: 2024-03-03

## 2024-03-04 ENCOUNTER — NON-APPOINTMENT (OUTPATIENT)
Age: 89
End: 2024-03-04

## 2024-03-04 ENCOUNTER — TRANSCRIPTION ENCOUNTER (OUTPATIENT)
Age: 89
End: 2024-03-04

## 2024-03-08 ENCOUNTER — TRANSCRIPTION ENCOUNTER (OUTPATIENT)
Age: 89
End: 2024-03-08

## 2024-03-08 DIAGNOSIS — N18.32 CHRONIC KIDNEY DISEASE, STAGE 3B: ICD-10-CM

## 2024-03-08 DIAGNOSIS — Z96.643 PRESENCE OF ARTIFICIAL HIP JOINT, BILATERAL: ICD-10-CM

## 2024-03-08 DIAGNOSIS — R06.02 SHORTNESS OF BREATH: ICD-10-CM

## 2024-03-08 DIAGNOSIS — E87.5 HYPERKALEMIA: ICD-10-CM

## 2024-03-08 DIAGNOSIS — Z86.73 PERSONAL HISTORY OF TRANSIENT ISCHEMIC ATTACK (TIA), AND CEREBRAL INFARCTION WITHOUT RESIDUAL DEFICITS: ICD-10-CM

## 2024-03-08 DIAGNOSIS — I50.33 ACUTE ON CHRONIC DIASTOLIC (CONGESTIVE) HEART FAILURE: ICD-10-CM

## 2024-03-08 DIAGNOSIS — N39.0 URINARY TRACT INFECTION, SITE NOT SPECIFIED: ICD-10-CM

## 2024-03-08 DIAGNOSIS — Z88.5 ALLERGY STATUS TO NARCOTIC AGENT: ICD-10-CM

## 2024-03-08 DIAGNOSIS — E78.5 HYPERLIPIDEMIA, UNSPECIFIED: ICD-10-CM

## 2024-03-08 DIAGNOSIS — I48.20 CHRONIC ATRIAL FIBRILLATION, UNSPECIFIED: ICD-10-CM

## 2024-03-08 DIAGNOSIS — I69.315 COGNITIVE SOCIAL OR EMOTIONAL DEFICIT FOLLOWING CEREBRAL INFARCTION: ICD-10-CM

## 2024-03-08 DIAGNOSIS — Z95.1 PRESENCE OF AORTOCORONARY BYPASS GRAFT: ICD-10-CM

## 2024-03-08 DIAGNOSIS — Z79.899 OTHER LONG TERM (CURRENT) DRUG THERAPY: ICD-10-CM

## 2024-03-08 DIAGNOSIS — N17.9 ACUTE KIDNEY FAILURE, UNSPECIFIED: ICD-10-CM

## 2024-03-08 DIAGNOSIS — I13.0 HYPERTENSIVE HEART AND CHRONIC KIDNEY DISEASE WITH HEART FAILURE AND STAGE 1 THROUGH STAGE 4 CHRONIC KIDNEY DISEASE, OR UNSPECIFIED CHRONIC KIDNEY DISEASE: ICD-10-CM

## 2024-03-08 DIAGNOSIS — Z96.659 PRESENCE OF UNSPECIFIED ARTIFICIAL KNEE JOINT: ICD-10-CM

## 2024-03-08 DIAGNOSIS — N30.00 ACUTE CYSTITIS WITHOUT HEMATURIA: ICD-10-CM

## 2024-03-08 DIAGNOSIS — I25.10 ATHEROSCLEROTIC HEART DISEASE OF NATIVE CORONARY ARTERY WITHOUT ANGINA PECTORIS: ICD-10-CM

## 2024-03-08 DIAGNOSIS — E11.22 TYPE 2 DIABETES MELLITUS WITH DIABETIC CHRONIC KIDNEY DISEASE: ICD-10-CM

## 2024-03-08 DIAGNOSIS — F03.90 UNSPECIFIED DEMENTIA WITHOUT BEHAVIORAL DISTURBANCE: ICD-10-CM

## 2024-03-08 DIAGNOSIS — I21.4 NON-ST ELEVATION (NSTEMI) MYOCARDIAL INFARCTION: ICD-10-CM

## 2024-03-08 DIAGNOSIS — Z79.01 LONG TERM (CURRENT) USE OF ANTICOAGULANTS: ICD-10-CM

## 2024-03-19 ENCOUNTER — TRANSCRIPTION ENCOUNTER (OUTPATIENT)
Age: 89
End: 2024-03-19

## 2024-03-26 ENCOUNTER — TRANSCRIPTION ENCOUNTER (OUTPATIENT)
Age: 89
End: 2024-03-26

## 2024-04-17 ENCOUNTER — APPOINTMENT (OUTPATIENT)
Dept: CARDIOLOGY | Facility: CLINIC | Age: 89
End: 2024-04-17

## 2024-10-30 NOTE — PATIENT PROFILE ADULT - ARRIVAL FROM
Home arousal, attention, and cognition/cognitive impairment/gait, locomotion, and balance/muscle strength/poor safety awareness

## 2024-11-08 NOTE — ASSESSMENT
[FreeTextEntry1] : Ms. Hopkins is an 89-year-old woman with history of AF on AC, ischemic CVA 8/2022 in the setting of subtherapeutic AC, CAD s/p CABG 2002, DM2, and HLD presenting for follow up.\par \par Impression:\par (1) Paroxysmal AF, CHADSVASc at least 8 (Age +2, F, CVA +2, DM2, HTN, CAD) on AC, stable\par (2) CVA 8/2022 in the setting of subtherapeutic AC (was on apixaban 2.5mg BID)\par (3) CAD s/p CABG, stable, LDL 81, goal <70\par (4) DM2, well controlled\par (5) HTN, well controlled\par (6) HFpEF, significantly elevated pBNP, mildly volume overloaded on exam\par \par Plan:\par - Trial of furosemide 40mg once daily for 3 days.\par - Repeat TTE and pBNP\par - Continue Eliquis 5mg BID (Age >80, last Cr 1.3 from OSH, weight >60kg)\par - Continue metoprolol succinate 100mg \par - Continue valsartan 320mg daily\par - Repeat labs prior to next visit\par \par RTC 6 months, sooner as needed with patient

## 2025-03-24 NOTE — DISCHARGE NOTE PROVIDER - CARE PROVIDER_API CALL
Lubna Ag)  Neurology  [address] [place] [address]  Phone: [phone]  Fax: [phone]  Follow Up Time:     Chloe West ()  Family Medicine  [address]  [place] [address]  Phone: [phone]  Fax: [phone]  Follow Up Time:     Aaron Cheng)  Cardiovascular Disease; Internal Medicine; Nuclear Cardiology  [address], [address]  [place] [address]  Phone: [phone]  Fax: [phone]  Follow Up Time:   
98